# Patient Record
Sex: MALE | Race: WHITE | NOT HISPANIC OR LATINO | Employment: OTHER | URBAN - METROPOLITAN AREA
[De-identification: names, ages, dates, MRNs, and addresses within clinical notes are randomized per-mention and may not be internally consistent; named-entity substitution may affect disease eponyms.]

---

## 2017-06-20 ENCOUNTER — NON-PROVIDER VISIT (OUTPATIENT)
Dept: URGENT CARE | Facility: CLINIC | Age: 82
End: 2017-06-20

## 2017-06-20 DIAGNOSIS — Z11.1 PPD SCREENING TEST: ICD-10-CM

## 2017-06-20 PROCEDURE — 86580 TB INTRADERMAL TEST: CPT | Performed by: PHYSICIAN ASSISTANT

## 2017-06-23 ENCOUNTER — NON-PROVIDER VISIT (OUTPATIENT)
Dept: URGENT CARE | Facility: CLINIC | Age: 82
End: 2017-06-23
Payer: MEDICARE

## 2017-06-23 DIAGNOSIS — Z11.1 ENCOUNTER FOR PPD SKIN TEST READING: ICD-10-CM

## 2017-06-23 LAB — TB WHEAL 3D P 5 TU DIAM: NORMAL MM

## 2017-06-29 ENCOUNTER — NON-PROVIDER VISIT (OUTPATIENT)
Dept: URGENT CARE | Facility: CLINIC | Age: 82
End: 2017-06-29

## 2017-06-29 DIAGNOSIS — Z11.1 PPD SCREENING TEST: ICD-10-CM

## 2017-06-29 PROCEDURE — 86580 TB INTRADERMAL TEST: CPT | Performed by: FAMILY MEDICINE

## 2017-07-02 ENCOUNTER — NON-PROVIDER VISIT (OUTPATIENT)
Dept: URGENT CARE | Facility: CLINIC | Age: 82
End: 2017-07-02

## 2017-07-02 LAB — TB WHEAL 3D P 5 TU DIAM: NORMAL MM

## 2018-03-29 ENCOUNTER — TELEPHONE (OUTPATIENT)
Dept: MEDICAL GROUP | Facility: MEDICAL CENTER | Age: 83
End: 2018-03-29

## 2018-03-29 ENCOUNTER — OFFICE VISIT (OUTPATIENT)
Dept: MEDICAL GROUP | Facility: MEDICAL CENTER | Age: 83
End: 2018-03-29
Payer: MEDICARE

## 2018-03-29 VITALS
BODY MASS INDEX: 19.4 KG/M2 | HEIGHT: 68 IN | TEMPERATURE: 97.3 F | WEIGHT: 128 LBS | SYSTOLIC BLOOD PRESSURE: 126 MMHG | DIASTOLIC BLOOD PRESSURE: 70 MMHG | HEART RATE: 78 BPM | OXYGEN SATURATION: 92 %

## 2018-03-29 DIAGNOSIS — Z00.00 PREVENTATIVE HEALTH CARE: Chronic | ICD-10-CM

## 2018-03-29 DIAGNOSIS — N40.0 BENIGN PROSTATIC HYPERPLASIA WITHOUT LOWER URINARY TRACT SYMPTOMS: ICD-10-CM

## 2018-03-29 DIAGNOSIS — R97.20 ABNORMAL PSA: Chronic | ICD-10-CM

## 2018-03-29 DIAGNOSIS — E78.5 DYSLIPIDEMIA: Chronic | ICD-10-CM

## 2018-03-29 DIAGNOSIS — Z91.81 AT RISK FOR FALLING: ICD-10-CM

## 2018-03-29 DIAGNOSIS — Z12.5 PROSTATE CANCER SCREENING: ICD-10-CM

## 2018-03-29 DIAGNOSIS — G25.9 MOVEMENT DISORDER: ICD-10-CM

## 2018-03-29 DIAGNOSIS — R29.818 PARKINSONIAN FEATURES: ICD-10-CM

## 2018-03-29 DIAGNOSIS — Z91.81 AT RISK FOR FALLS: ICD-10-CM

## 2018-03-29 PROCEDURE — 99214 OFFICE O/P EST MOD 30 MIN: CPT | Performed by: INTERNAL MEDICINE

## 2018-03-29 RX ORDER — DOXAZOSIN 2 MG/1
2 TABLET ORAL DAILY
COMMUNITY
End: 2018-07-05

## 2018-03-29 ASSESSMENT — PATIENT HEALTH QUESTIONNAIRE - PHQ9: CLINICAL INTERPRETATION OF PHQ2 SCORE: 0

## 2018-03-29 NOTE — PROGRESS NOTES
Subjective:      Miguel Ángel Page is a 89 y.o. male who presents with Orders Needed (Labs)            HPI   Not seen in over 5 years  Reestablish care now living in [place], staying in [facility] and moving to [address] [place] [address] in april, . Had been living in [place] and moved to [place] to be closer to daughter, had been seeing  in [place] and urology . On cardura 2 mg per urology for quite some time. Had been using cane but now using walker. No falls. Feels off balance at times. More slowness of movement. No difficulty toileting or bathing. No difficulty getting out of bed. No trouble feeding self. No trouble dressing self. No mood changes, no memory issues. Has medical power of  with daughter rufino    No tobacco, no etoh  No difficulty with handwriting, no tremors  Difficulty with ambulation because of slowness of movement  No postural instability, no excessive salivation, no difficulty swallowing  Nocturia 2-3 times at night despite Cardura  Medications, allergies, medical history, surgical history, social history reviewed and updated  Family history one son and daughter in [place], one daughter living in [facility]  Lives in assisted living  No tobacco, no alcohol          Current Outpatient Prescriptions   Medication Sig Dispense Refill   • doxazosin (CARDURA) 2 MG Tab Take 2 mg by mouth every day.     • aspirin (ASA) 81 MG CHEW Take 1 Tab by mouth every day. 30 Each 11   • moxifloxacin (AVELOX) 400 MG TABS Take 1 Tab by mouth every day. 10 Tab 0   • budesonide-formoterol (SYMBICORT) 80-4.5 MCG/ACT AERO Inhale 2 Puffs by mouth 2 Times a Day. 1 Inhaler 0     No current facility-administered medications for this visit.      Patient Active Problem List   Diagnosis   • Preventative health care   • Dyslipidemia   • CATARACT   • Low back pain   • Hip pain, right   • Abnormal PSA   • Constipation   • Shoulder pain, right     Depression Screening    Little interest or pleasure in doing things?  0 - not at  "all  Feeling down, depressed , or hopeless? 0 - not at all  Patient Health Questionnaire Score: 0         Shoulder pain  2/27/13 dr.mcnamara john grimes note, right subdeltoid bursa steroid injection  6/26/13 dr.mcnamara john grimes; right shoulder injection, rec MRI shoulder     Preventative health  2005 Td  2007 pneum  3/08 colon DHA repeat 5 yrs  8/12 vit d 50  11/12 [facility]HA provided FIT card  12/7/12 psa 4.5, free psa 28%  Decline shingles vac    Low back pain  3/12 xray LS mod to severe levoscoliosis   4/12 [facility] physical therapy in [place]   6/12 MRI LS levoscoliosis lumbar spine 31°, L3 level. Moderate lumbar spondylosis, disc desiccation L3-5, broad-based disc bulge, mild to moderate right foraminal narrowing L3-L5, 1.1 focus lateral aspect right L3-L4 neural foramen suggestive of facet synovial cyst, mass effect on existing L3 nerve root is suspected    hip pain  6/12 x-ray right hip DJD bilateral right greater than left  7/12  ortho note [facility] fracture clinic, believes hip pain related to back, patient declines further back evaluation  8/12 dr.mcnamara [place] injection right hip under fluoroscopic guidance     Dyslipidemia  6/09 chol 187,trig 68,hdl 44,ldl 129  8/10 chol 178,trig 60,hdl 43,ldl 123  6/12 chol 174,trig 46,hdl 41,ldl 124  8/12 hdl 55,ldl 132,LDL-P 1458,HDL-P32,LP-IR 30                  Health Maintenance Summary                IMM DTaP/Tdap/Td Vaccine Overdue 9/14/1947     IMM ZOSTER VACCINE Overdue 9/14/1988     IMM PNEUMOCOCCAL 65+ (ADULT) LOW/MEDIUM RISK SERIES Overdue 9/14/1993     IMM INFLUENZA Overdue 9/1/2017           Patient Care Team:  Oscar Dubois M.D. as PCP - General      ROS       Objective:     /70   Pulse 78   Temp 36.3 °C (97.3 °F)   Ht 1.715 m (5' 7.5\")   Wt 58.1 kg (128 lb)   SpO2 92%   BMI 19.75 kg/m²      Physical Exam   Constitutional: He appears well-developed and well-nourished. No distress.   HENT:   Head: Normocephalic " and atraumatic.   Right Ear: External ear normal.   Left Ear: External ear normal.   Mouth/Throat: Oropharynx is clear and moist.   Eyes: Conjunctivae are normal. Right eye exhibits no discharge. Left eye exhibits no discharge.   Neck: Neck supple. No JVD present. No thyromegaly present.   Cardiovascular: Normal rate, regular rhythm and normal heart sounds.    Pulmonary/Chest: Effort normal and breath sounds normal. No respiratory distress.   Abdominal: He exhibits no distension.   Musculoskeletal: He exhibits no edema.   Neurological: He is alert.   Skin: Skin is warm. He is not diaphoretic.   Psychiatric: He has a normal mood and affect. His behavior is normal.   Nursing note and vitals reviewed.     Prostate exam enlarged prostate, no nodules, guaiac negative    Masked facies, festinating gait, bradykinesia  No cogwheel, no tremors  Normal affect, insight, judgment     Some rigidity on examination     Assessment/Plan:     Assessment  #!  BPH on Cardura followed by urology in [place]     #2 question parkinsonian symptoms apparently has seen neurologist in [place], slowness of movement, some mild rigidity, festinating gait, masked facies     #3 Fall risk    #4 preventative health no records    #5 question dyslipidemia        Plan  #! Use walker for ambulation and fall precautions     #2 old records  and  [place]    #3 vaccine status unclear    #4 labs    #5 med release signature consent obtained to discuss information with daughter rufino, patient will drop off advanced directive    #6 start Proscar 5 mg daily continue Cardura, may cause lightheadedness, dizziness, orthostatic precautions    #7 recommend physical therapy he declines for now    #8 start Sinemet 25/100 mg 1/2 tid may cause lightheadedness, dizziness, dyskinesias, orthostatic precautions    #9 follow-up 3 months

## 2018-03-29 NOTE — TELEPHONE ENCOUNTER
Faxed records request to Dr. Monaco [phone]  Faxed records request to Dr. Anderson [phone], WebIZ printed and placed on counter.

## 2018-03-30 NOTE — TELEPHONE ENCOUNTER
Dr. Anderson's office called and notified they are requesting a signed records release in order to send records. Pt is still listed as a current Pt of their and has been since 2013.     Left message for patient to call back at [phone].

## 2018-03-30 NOTE — TELEPHONE ENCOUNTER
Noted.  Await call from patient to indicate that he is changing providers.  Please notify patient that we need a signed release from Dr. Anderson's office, so he likely will need to go there and sign a record release, or he could complete a release of records form here, and we can fax that to Dr. Anderson.

## 2018-04-06 ENCOUNTER — TELEPHONE (OUTPATIENT)
Dept: MEDICAL GROUP | Facility: MEDICAL CENTER | Age: 83
End: 2018-04-06

## 2018-04-06 DIAGNOSIS — R29.818 PARKINSONIAN FEATURES: ICD-10-CM

## 2018-04-06 DIAGNOSIS — N40.0 BENIGN PROSTATIC HYPERPLASIA WITHOUT LOWER URINARY TRACT SYMPTOMS: ICD-10-CM

## 2018-04-06 RX ORDER — FINASTERIDE 5 MG/1
5 TABLET, FILM COATED ORAL DAILY
Qty: 30 TAB | Refills: 3 | Status: SHIPPED | OUTPATIENT
Start: 2018-04-06 | End: 2018-08-07 | Stop reason: SDUPTHER

## 2018-04-06 NOTE — TELEPHONE ENCOUNTER
Pt states you told him there was a better medication than the cardura.pt is asking if you can send this to his pharmacy (carbidopa or  Levodopa) pt states his parkinson's med makes him dizzy.pt states he has stopped taking it

## 2018-04-06 NOTE — TELEPHONE ENCOUNTER
Please notify patient we will try new prostate medication, Proscar, the prescription has been sent to his pharmacy.    Also have him try the Parkinson's medication again, carbidopa/levodopa half a tablet once a day

## 2018-04-12 NOTE — TELEPHONE ENCOUNTER
LM for pt to call office. Wifes phone disconnected. Per dtr (Nabila) - Wife .    Spoke with Nabila and gave her the new medicine instructions.

## 2018-04-25 ENCOUNTER — TELEPHONE (OUTPATIENT)
Dept: MEDICAL GROUP | Facility: MEDICAL CENTER | Age: 83
End: 2018-04-25

## 2018-04-25 NOTE — TELEPHONE ENCOUNTER
----- Message from Lilo Salvador sent at 4/25/2018  1:44 PM PDT -----  Regarding: Med NOT working  Contact: [phone]  Shari, patient called quite hard to understand. His medicine for urination is NOT working. He sounds upset. Medicine is the finasteride, Proscar 5mg. I advised him that you would call him. Thanks so much

## 2018-04-25 NOTE — TELEPHONE ENCOUNTER
If he is still having urination problems, he can discontinue the finasteride.    I would recommend following up with his urologist Dr. Monaco if his symptoms have not improved on the finasteride.

## 2018-05-03 ENCOUNTER — TELEPHONE (OUTPATIENT)
Dept: MEDICAL GROUP | Facility: MEDICAL CENTER | Age: 83
End: 2018-05-03

## 2018-05-03 ENCOUNTER — HOSPITAL ENCOUNTER (OUTPATIENT)
Dept: LAB | Facility: MEDICAL CENTER | Age: 83
End: 2018-05-03
Attending: INTERNAL MEDICINE
Payer: MEDICARE

## 2018-05-03 DIAGNOSIS — E78.5 DYSLIPIDEMIA: Chronic | ICD-10-CM

## 2018-05-03 DIAGNOSIS — N40.0 BENIGN PROSTATIC HYPERPLASIA WITHOUT LOWER URINARY TRACT SYMPTOMS: ICD-10-CM

## 2018-05-03 DIAGNOSIS — Z12.5 PROSTATE CANCER SCREENING: ICD-10-CM

## 2018-05-03 LAB
ALBUMIN SERPL BCP-MCNC: 4 G/DL (ref 3.2–4.9)
ALBUMIN/GLOB SERPL: 1.5 G/DL
ALP SERPL-CCNC: 39 U/L (ref 30–99)
ALT SERPL-CCNC: 5 U/L (ref 2–50)
ANION GAP SERPL CALC-SCNC: 8 MMOL/L (ref 0–11.9)
APPEARANCE UR: CLEAR
AST SERPL-CCNC: 16 U/L (ref 12–45)
BASOPHILS # BLD AUTO: 1 % (ref 0–1.8)
BASOPHILS # BLD: 0.06 K/UL (ref 0–0.12)
BILIRUB SERPL-MCNC: 0.9 MG/DL (ref 0.1–1.5)
BILIRUB UR QL STRIP.AUTO: NEGATIVE
BUN SERPL-MCNC: 34 MG/DL (ref 8–22)
CALCIUM SERPL-MCNC: 9.2 MG/DL (ref 8.5–10.5)
CHLORIDE SERPL-SCNC: 106 MMOL/L (ref 96–112)
CHOLEST SERPL-MCNC: 136 MG/DL (ref 100–199)
CO2 SERPL-SCNC: 25 MMOL/L (ref 20–33)
COLOR UR: YELLOW
CREAT SERPL-MCNC: 1.14 MG/DL (ref 0.5–1.4)
EOSINOPHIL # BLD AUTO: 0.34 K/UL (ref 0–0.51)
EOSINOPHIL NFR BLD: 5.9 % (ref 0–6.9)
ERYTHROCYTE [DISTWIDTH] IN BLOOD BY AUTOMATED COUNT: 49.8 FL (ref 35.9–50)
GLOBULIN SER CALC-MCNC: 2.7 G/DL (ref 1.9–3.5)
GLUCOSE SERPL-MCNC: 87 MG/DL (ref 65–99)
GLUCOSE UR STRIP.AUTO-MCNC: NEGATIVE MG/DL
HCT VFR BLD AUTO: 41.8 % (ref 42–52)
HDLC SERPL-MCNC: 58 MG/DL
HGB BLD-MCNC: 13.8 G/DL (ref 14–18)
IMM GRANULOCYTES # BLD AUTO: 0.02 K/UL (ref 0–0.11)
IMM GRANULOCYTES NFR BLD AUTO: 0.3 % (ref 0–0.9)
KETONES UR STRIP.AUTO-MCNC: ABNORMAL MG/DL
LDLC SERPL CALC-MCNC: 69 MG/DL
LEUKOCYTE ESTERASE UR QL STRIP.AUTO: NEGATIVE
LYMPHOCYTES # BLD AUTO: 0.91 K/UL (ref 1–4.8)
LYMPHOCYTES NFR BLD: 15.7 % (ref 22–41)
MCH RBC QN AUTO: 33 PG (ref 27–33)
MCHC RBC AUTO-ENTMCNC: 33 G/DL (ref 33.7–35.3)
MCV RBC AUTO: 100 FL (ref 81.4–97.8)
MICRO URNS: ABNORMAL
MONOCYTES # BLD AUTO: 0.64 K/UL (ref 0–0.85)
MONOCYTES NFR BLD AUTO: 11 % (ref 0–13.4)
NEUTROPHILS # BLD AUTO: 3.84 K/UL (ref 1.82–7.42)
NEUTROPHILS NFR BLD: 66.1 % (ref 44–72)
NITRITE UR QL STRIP.AUTO: NEGATIVE
NRBC # BLD AUTO: 0 K/UL
NRBC BLD-RTO: 0 /100 WBC
PH UR STRIP.AUTO: 5.5 [PH]
PLATELET # BLD AUTO: 272 K/UL (ref 164–446)
PMV BLD AUTO: 9.6 FL (ref 9–12.9)
POTASSIUM SERPL-SCNC: 4.1 MMOL/L (ref 3.6–5.5)
PROT SERPL-MCNC: 6.7 G/DL (ref 6–8.2)
PROT UR QL STRIP: NEGATIVE MG/DL
PSA SERPL-MCNC: 11.74 NG/ML (ref 0–4)
RBC # BLD AUTO: 4.18 M/UL (ref 4.7–6.1)
RBC UR QL AUTO: NEGATIVE
SODIUM SERPL-SCNC: 139 MMOL/L (ref 135–145)
SP GR UR STRIP.AUTO: 1.03
TRIGL SERPL-MCNC: 46 MG/DL (ref 0–149)
TSH SERPL DL<=0.005 MIU/L-ACNC: 1.64 UIU/ML (ref 0.38–5.33)
UROBILINOGEN UR STRIP.AUTO-MCNC: 0.2 MG/DL
WBC # BLD AUTO: 5.8 K/UL (ref 4.8–10.8)

## 2018-05-03 PROCEDURE — 85025 COMPLETE CBC W/AUTO DIFF WBC: CPT

## 2018-05-03 PROCEDURE — 36415 COLL VENOUS BLD VENIPUNCTURE: CPT

## 2018-05-03 PROCEDURE — 80053 COMPREHEN METABOLIC PANEL: CPT

## 2018-05-03 PROCEDURE — 80061 LIPID PANEL: CPT

## 2018-05-03 PROCEDURE — 81003 URINALYSIS AUTO W/O SCOPE: CPT

## 2018-05-03 PROCEDURE — 84443 ASSAY THYROID STIM HORMONE: CPT

## 2018-05-03 PROCEDURE — 84153 ASSAY OF PSA TOTAL: CPT

## 2018-05-04 ENCOUNTER — TELEPHONE (OUTPATIENT)
Dept: MEDICAL GROUP | Facility: MEDICAL CENTER | Age: 83
End: 2018-05-04

## 2018-05-04 NOTE — TELEPHONE ENCOUNTER
Called patient and left message  Please notify him that the blood tests shows:  (1) total cholesterol is 136, good cholesterol is 58 (goal is above 40), bad cholesterol is 69 (goal is less than 100)  (2) liver function, kidney function, blood sugar and thyroid tests are normal  (3) his PSA blood test is elevated at 11.7, he should follow-up with his urologist regarding the PSA

## 2018-05-04 NOTE — TELEPHONE ENCOUNTER
----- Message from Oscar Dubois M.D. sent at 5/3/2018 10:58 PM PDT -----  Called patient and left message  Please notify him that the blood tests shows:  (1) total cholesterol is 136, good cholesterol is 58 (goal is above 40), bad cholesterol is 69 (goal is less than 100)  (2) liver function, kidney function, blood sugar and thyroid tests are normal  (3) his PSA blood test is elevated at 11.7, he should follow-up with his urologist regarding the PSA

## 2018-05-08 ENCOUNTER — OFFICE VISIT (OUTPATIENT)
Dept: MEDICAL GROUP | Facility: MEDICAL CENTER | Age: 83
End: 2018-05-08
Payer: MEDICARE

## 2018-05-08 VITALS
DIASTOLIC BLOOD PRESSURE: 68 MMHG | WEIGHT: 117 LBS | SYSTOLIC BLOOD PRESSURE: 100 MMHG | OXYGEN SATURATION: 94 % | BODY MASS INDEX: 17.73 KG/M2 | TEMPERATURE: 98.2 F | HEIGHT: 68 IN | HEART RATE: 87 BPM

## 2018-05-08 DIAGNOSIS — S30.811A ABRASION OF ABDOMINAL WALL, INITIAL ENCOUNTER: ICD-10-CM

## 2018-05-08 DIAGNOSIS — R29.818 PARKINSONIAN FEATURES: ICD-10-CM

## 2018-05-08 PROCEDURE — 99214 OFFICE O/P EST MOD 30 MIN: CPT | Performed by: NURSE PRACTITIONER

## 2018-05-08 NOTE — PROGRESS NOTES
Subjective:     Miguel Ángel Page is a 89 y.o. male who presents with .    HPI:   Seen in f/u for umbilical discomfort.  He started having sx of discomfort about 2 weeks ago.  He has a hx of umbilical hernia repair.  Thinks tht its his hernia reoccurring.  No change in bm's.  No severe in abd.        Patient Active Problem List    Diagnosis Date Noted   • BPH (benign prostatic hyperplasia) 03/29/2018   • Parkinsonian features 03/29/2018   • At risk for falls 03/29/2018   • Shoulder pain, right 02/27/2013   • Constipation 12/03/2012   • Abnormal PSA 06/18/2012   • Hip pain, right 06/08/2012   • Low back pain 03/12/2012   • Preventative health care 04/05/2010   • Dyslipidemia 04/05/2010   • Cataract 04/05/2010       Current medicines (including changes today)  Current Outpatient Prescriptions   Medication Sig Dispense Refill   • carbidopa-levodopa (SINEMET)  MG Tab Take 0.5 Tabs by mouth 3 times a day. 90 Tab 2   • finasteride (PROSCAR) 5 MG Tab Take 1 Tab by mouth every day. 30 Tab 3   • doxazosin (CARDURA) 2 MG Tab Take 2 mg by mouth every day.     • aspirin (ASA) 81 MG CHEW Take 1 Tab by mouth every day. 30 Each 11     Current Facility-Administered Medications   Medication Dose Route Frequency Provider Last Rate Last Dose   • mupirocin (BACTROBAN) 2 % ointment   Topical Once NANCY ZavaletaPTheodoreNTheodore           Allergies   Allergen Reactions   • Ultram [Tramadol Hcl]        ROS  Constitutional: Negative. Negative for fever, chills, weight loss, malaise/fatigue and diaphoresis.   HENT: Negative. Negative for hearing loss, ear pain, nosebleeds, congestion, sore throat, neck pain, tinnitus and ear discharge.   Respiratory: Negative. Negative for cough, hemoptysis, sputum production, shortness of breath, wheezing and stridor.   Cardiovascular: Negative. Negative for chest pain, palpitations, orthopnea, claudication, leg swelling and PND.   Gastrointestinal: Denies nausea, vomiting, diarrhea, heartburn, melena or  "hematochezia.  Genitourinary: Denies dysuria, hematuria, urinary incontinence, frequency or urgency.        Objective:     Blood pressure 100/68, pulse 87, temperature 36.8 °C (98.2 °F), height 1.715 m (5' 7.5\"), weight 53.1 kg (117 lb), SpO2 94 %. Body mass index is 18.05 kg/m².    Physical Exam:  Physical Exam   Vitals reviewed.  Constitutional: oriented to person, place, and time. appears well-developed and well-nourished. No distress.   Cardiovascular: Normal rate, regular rhythm, normal heart sounds and intact distal pulses.  Exam reveals no gallop and no friction rub.  No murmur heard.  No carotid bruits.   Pulmonary/Chest: Effort normal and breath sounds normal. No stridor. No respiratory distress. no wheezes or rales. exhibits no tenderness.   Musculoskeletal: Normal range of motion. exhibits no edema. homero pedal pulses 2+.  Abd:  No CVAT,  Soft,  Bs noted in all quadrants.  No HSM.  No abdominal tenderness.  Exam of umbilicus shows abrasion with large scab.  Mildly red extending with abrasion to left abn.  No fresh dg noted.  Area cleansed with ns.  Polysporin and bandage applied  Skin: Skin is warm and dry. no diaphoresis.   Psychiatric: normal mood and affect. behavior is normal.          Assessment and Plan:     The following treatment plan was discussed:    1. Abrasion of abdominal wall, initial encounter  mupirocin (BACTROBAN) 2 % ointment    laceration to left abd and umbilicus.  use bactroban bid with dsg change.  f/u 1 week   2. Parkinsonian features  carbidopa-levodopa (SINEMET)  MG Tab    refilled meds         Followup: Return in about 1 week (around 5/15/2018).  "

## 2018-05-22 ENCOUNTER — OFFICE VISIT (OUTPATIENT)
Dept: MEDICAL GROUP | Facility: MEDICAL CENTER | Age: 83
End: 2018-05-22
Payer: MEDICARE

## 2018-05-22 VITALS
OXYGEN SATURATION: 95 % | SYSTOLIC BLOOD PRESSURE: 110 MMHG | DIASTOLIC BLOOD PRESSURE: 70 MMHG | TEMPERATURE: 98.8 F | BODY MASS INDEX: 18.19 KG/M2 | WEIGHT: 120 LBS | HEIGHT: 68 IN | HEART RATE: 76 BPM

## 2018-05-22 DIAGNOSIS — S30.811D ABRASION OF ABDOMINAL WALL, SUBSEQUENT ENCOUNTER: ICD-10-CM

## 2018-05-22 DIAGNOSIS — R20.0 NUMBNESS OF TOES: ICD-10-CM

## 2018-05-22 PROCEDURE — 99214 OFFICE O/P EST MOD 30 MIN: CPT | Performed by: NURSE PRACTITIONER

## 2018-05-22 NOTE — PROGRESS NOTES
Subjective:     Miguel Ángel Page is a 89 y.o. male who presents with abd umbilical abrasion.    HPI:   Seen in f/u for left sided umbilical and abd abrasioin.  He was recently seen for this abraision.  Given bactroban oint.  He has been using sparingly.  The area is now almost healed.  Removed mod amt dark dried dg from area.  Area is pink and healing.  Only pinpoint open area medially to umbilicus.  No dg.  No swelling.  No abd tenderness noted.  No fever, chills or sweating.  He has rt great toe numbness.  Doesn't bother him much.  No pain.      Patient Active Problem List    Diagnosis Date Noted   • BPH (benign prostatic hyperplasia) 03/29/2018   • Parkinsonian features 03/29/2018   • At risk for falls 03/29/2018   • Shoulder pain, right 02/27/2013   • Constipation 12/03/2012   • Abnormal PSA 06/18/2012   • Hip pain, right 06/08/2012   • Low back pain 03/12/2012   • Preventative health care 04/05/2010   • Dyslipidemia 04/05/2010   • Cataract 04/05/2010       Current medicines (including changes today)  Current Outpatient Prescriptions   Medication Sig Dispense Refill   • carbidopa-levodopa (SINEMET)  MG Tab Take 0.5 Tabs by mouth 3 times a day. 90 Tab 2   • mupirocin (BACTROBAN) 2 % Ointment Apply 1 Application to affected area(s) 2 times a day. 1 Tube 1   • finasteride (PROSCAR) 5 MG Tab Take 1 Tab by mouth every day. 30 Tab 3   • doxazosin (CARDURA) 2 MG Tab Take 2 mg by mouth every day.     • aspirin (ASA) 81 MG CHEW Take 1 Tab by mouth every day. 30 Each 11     No current facility-administered medications for this visit.        Allergies   Allergen Reactions   • Ultram [Tramadol Hcl]        ROS  Constitutional: Negative. Negative for fever, chills, weight loss, malaise/fatigue and diaphoresis.   HENT: Negative. Negative for hearing loss, ear pain, nosebleeds, congestion, sore throat, neck pain, tinnitus and ear discharge.   Respiratory: Negative. Negative for cough, hemoptysis, sputum production,  "shortness of breath, wheezing and stridor.   Cardiovascular: Negative. Negative for chest pain, palpitations, orthopnea, claudication, leg swelling and PND.        Objective:     Blood pressure 110/70, pulse 76, temperature 37.1 °C (98.8 °F), height 1.715 m (5' 7.5\"), weight 54.4 kg (120 lb), SpO2 95 %. Body mass index is 18.52 kg/m².    Physical Exam:  Physical Exam   Vitals reviewed.  Constitutional: oriented to person, place, and time. appears thin with No distress.   Cardiovascular: Normal rate, regular rhythm, normal heart sounds and intact distal pulses.  Exam reveals no gallop and no friction rub.  4/6 murmur heard.  No carotid bruits.   Pulmonary/Chest: Effort normal. No stridor. No respiratory distress. no wheezes.  Scattered LLL crackles exhibits no tenderness.   Musculoskeletal: amb slowly with walker with parkinson gait.  exhibits no edema. homero pedal pulses 2+.  Abd:  No CVAT,  Soft,  Bs noted in all quadrants.  No HSM.  No abdominal tenderness.  Neurological: alert and oriented to person, place, and time. exhibits normal muscle tone. Coordination normal.   Skin: Skin is warm and dry. no diaphoresis.   Psychiatric: normal mood and affect. behavior is normal.    Left mid abd near umbilicus abrasion healed with intact skin except for pinpoint area near umbilicus.  No dg noted.  Area cleansed of old dried dg and polysporin applied.  Pt declines bandaide.     Assessment and Plan:     The following treatment plan was discussed:    1. Abrasion of abdominal wall, subsequent encounter      healing well.  no further tx needed.  no sx of infection   2. Numbness of toes      numb rt great toe.  offered xray for lumbar spine but pt declines.  states it is not bothering him.           Followup: Return if symptoms worsen or fail to improve.  "

## 2018-07-05 ENCOUNTER — OFFICE VISIT (OUTPATIENT)
Dept: MEDICAL GROUP | Facility: MEDICAL CENTER | Age: 83
End: 2018-07-05
Payer: MEDICARE

## 2018-07-05 VITALS
TEMPERATURE: 98.4 F | SYSTOLIC BLOOD PRESSURE: 110 MMHG | WEIGHT: 117 LBS | HEIGHT: 68 IN | DIASTOLIC BLOOD PRESSURE: 62 MMHG | BODY MASS INDEX: 17.73 KG/M2 | OXYGEN SATURATION: 93 % | RESPIRATION RATE: 16 BRPM | HEART RATE: 83 BPM

## 2018-07-05 DIAGNOSIS — N40.0 BENIGN PROSTATIC HYPERPLASIA WITHOUT LOWER URINARY TRACT SYMPTOMS: ICD-10-CM

## 2018-07-05 DIAGNOSIS — Z23 NEED FOR PNEUMOCOCCAL VACCINATION: ICD-10-CM

## 2018-07-05 DIAGNOSIS — Z91.81 AT RISK FOR FALLS: ICD-10-CM

## 2018-07-05 DIAGNOSIS — M54.5 LOW BACK PAIN, UNSPECIFIED BACK PAIN LATERALITY, UNSPECIFIED CHRONICITY, WITH SCIATICA PRESENCE UNSPECIFIED: Chronic | ICD-10-CM

## 2018-07-05 DIAGNOSIS — M25.551 HIP PAIN, RIGHT: Chronic | ICD-10-CM

## 2018-07-05 DIAGNOSIS — Z00.00 PREVENTATIVE HEALTH CARE: Chronic | ICD-10-CM

## 2018-07-05 DIAGNOSIS — R29.818 PARKINSONIAN FEATURES: ICD-10-CM

## 2018-07-05 DIAGNOSIS — Z23 NEED FOR ZOSTER VACCINATION: ICD-10-CM

## 2018-07-05 PROCEDURE — G0009 ADMIN PNEUMOCOCCAL VACCINE: HCPCS | Performed by: INTERNAL MEDICINE

## 2018-07-05 PROCEDURE — 99214 OFFICE O/P EST MOD 30 MIN: CPT | Mod: 25 | Performed by: INTERNAL MEDICINE

## 2018-07-05 PROCEDURE — 90670 PCV13 VACCINE IM: CPT | Performed by: INTERNAL MEDICINE

## 2018-07-05 NOTE — PROGRESS NOTES
Subjective:      Miguel Ángel Page is a 89 y.o. male who presents with parkinsons         HPI   Patient is here with daughter  Here for follow up movement disorder on sinemet taking half a tablet twice per day, some improvement with balance using walker, going to [facility] twice per [facility] , no falls.  No tremors. Had some food poisoning in may but recovering, taking occasional boost  , appetite no change, no nausea, vomiting, diarrhea, abdominal pain, melena or hematochezia.  No fevers or chills.  Has been to [facility] at [facility], poor balance, no lightheadedness, dizziness, no back pain, occasional weakness of legs at times.  Participates in physical therapy and aerobic therapy twice weekly each.  [facility] in [facility] living facility [facility]  Mood stable, memory stable   No difficulty with swallowing food, some hoarseness, no excessive salivation.  Daughter has power of  and states patient has advanced directive we do not have a copy of that.  Patient sees urology for BPH, on Proscar, not taking doxazosin, no lightheadedness or dizziness, no urinary urgency, dysuria, hematuria.  History low back pain, stable no recurrence, no sensory changes lower extremities no incontinence  Patient not sure if he has had pneumococcal 13 vaccination, has not had the recently released shingrix, no previous reaction to pneumococcal 23 vaccination.  No cough, no shortness of breath, no recent upper respiratory tract infection.          Health Maintenance Summary                IMM DTaP/Tdap/Td Vaccine Overdue 9/14/1947     IMM PNEUMOCOCCAL 65+ (ADULT) LOW/MEDIUM RISK SERIES Overdue 10/8/2015      Done 10/8/2014 Imm Admin: Pneumococcal polysaccharide vaccine (PPSV-23)     Patient has more history with this topic...    IMM INFLUENZA Next Due 9/1/2018      Done 9/20/2017 Imm Admin: Influenza Vaccine Pediatric Split     Patient has more history with this topic...          Patient Care Team:  Oscar Dubois M.D. as PCP -  General      Current Outpatient Prescriptions   Medication Sig Dispense Refill   • carbidopa-levodopa (SINEMET)  MG Tab Take 0.5 Tabs by mouth 3 times a day. 90 Tab 2   • mupirocin (BACTROBAN) 2 % Ointment Apply 1 Application to affected area(s) 2 times a day. 1 Tube 1   • finasteride (PROSCAR) 5 MG Tab Take 1 Tab by mouth every day. 30 Tab 3   • doxazosin (CARDURA) 2 MG Tab Take 2 mg by mouth every day.     • aspirin (ASA) 81 MG CHEW Take 1 Tab by mouth every day. 30 Each 11     No current facility-administered medications for this visit.      Patient Active Problem List   Diagnosis   • Preventative health care   • Dyslipidemia   • Cataract   • Low back pain   • Hip pain, right   • Abnormal PSA   • Constipation   • Shoulder pain, right   • BPH (benign prostatic hyperplasia)   • Parkinsonian features   • At risk for falls      Shoulder pain  2/27/13 dr.mcnamara john grimes note, right subdeltoid bursa steroid injection  6/26/13 dr.mcnamara john grimes; right shoulder injection, rec MRI shoulder     Preventative health  2005 Td  3/08 colon [facility] repeat 5 yrs  8/12 vit d 50  11/12 [facility] provided FIT card  12/7/12 psa 4.5, free psa 28%  10/8/14 pneumovax  Decline shingles vac  Parkinsonian features  5/15/17 dr.basa lopez medical note unsteadiness on feet, refer to neurology  3/29/18 start sinemet half a tablet 3 times a day  4/6/18 stopped sinemet due to dizziness, recommend he try 1/2 qday     Low back pain  3/12 xray LS mod to severe levoscoliosis   4/12 john grimes physical therapy in [place]   6/12 MRI LS levoscoliosis lumbar spine 31°, L3 level. Moderate lumbar spondylosis, disc desiccation L3-5, broad-based disc bulge, mild to moderate right foraminal narrowing L3-L5, 1.1 focus lateral aspect right L3-L4 neural foramen suggestive of facet synovial cyst, mass effect on existing L3 nerve root is suspected     hip pain  6/12 x-ray right hip DJD bilateral right greater than left  7/12   "ortho note [facility], believes hip pain related to back, patient declines further back evaluation  8/12 dr.mcnamara john [place] injection right hip under fluoroscopic guidance     Dyslipidemia  6/09 chol 187,trig 68,hdl 44,ldl 129  8/10 chol 178,trig 60,hdl 43,ldl 123  6/12 chol 174,trig 46,hdl 41,ldl 124  8/12 hdl 55,ldl 132,LDL-P 1458,HDL-P32,LP-IR 30  5/3/18 chol 136,trig 46,hdl 58,ldl 69    bph  9/12/12 2/9/17 psa 5.66  1/13/15  urology note samples rapaflo  2/18/15  urology note continued doxazosin 2 mg   8/2/16  urology note AUA score 5  2/9/17 psa 5.66  3/8/17 dr.basa lopez medical note on doxazosin 2 mg  3/15/17  urology note doxazosin 2 mg causing some dizziness, decreased to 1 mg, AUA score 6  3/29/18 on cardura 2 mg per  in [place] urology, will add proscar 5 mg, walker for ambulation  4/3/18 psa 11.7on cardura 2 mg, proscar 5 mg    At risk for falls  5/15/17 dr.basa lopez medical note unsteadiness on feet, refer to neurology  3/29/18 question parkinson's, using walker for ambulation  5/29/18 [facility] note       Abnormal PSA  6/09 psa 3.5  6/12 psa 7.1  7/31/12 psa 5.0,free psa 18%, saw  in [place] urology  12/7/12 psa 4.5, free psa 28%  5/3/18 psa 11.7 on proscar per urology                Health Maintenance Summary                IMM DTaP/Tdap/Td Vaccine Overdue 9/14/1947     IMM PNEUMOCOCCAL 65+ (ADULT) LOW/MEDIUM RISK SERIES Overdue 10/8/2015      Done 10/8/2014 Imm Admin: Pneumococcal polysaccharide vaccine (PPSV-23)     Patient has more history with this topic...    IMM INFLUENZA Next Due 9/1/2018      Done 9/20/2017 Imm Admin: Influenza Vaccine Pediatric Split     Patient has more history with this topic...          Patient Care Team:  Oscarjim Dubois M.D. as PCP - General      ROS       Objective:     /62   Pulse 83   Temp 36.9 °C (98.4 °F)   Resp 16   Ht 1.715 m (5' 7.52\")   Wt 53.1 kg (117 lb)   SpO2 93%   " BMI 18.04 kg/m²      Physical Exam   Constitutional: He appears well-developed and well-nourished. No distress.   HENT:   Head: Normocephalic and atraumatic.   Right Ear: External ear normal.   Left Ear: External ear normal.   Mouth/Throat: Oropharynx is clear and moist.   Eyes: Conjunctivae are normal. Right eye exhibits no discharge. Left eye exhibits no discharge.   Neck: Neck supple. No thyromegaly present.   Cardiovascular: Normal rate and regular rhythm.    Pulmonary/Chest: Effort normal and breath sounds normal. No respiratory distress. He has no wheezes.   Abdominal: He exhibits no distension.   Musculoskeletal: He exhibits no edema.   Neurological: He is alert.   Skin: Skin is warm. He is not diaphoretic.   Psychiatric: He has a normal mood and affect. His behavior is normal.   Nursing note and vitals reviewed.    No tremors, no cogwheeling  Normal affect, insight, judgment          Assessment/Plan:       Assessment  #1 movement disorder question Parkinson's, improvement with Sinemet half a tablet twice daily    #2 fall risk, has been to physical therapy previously with benefit, walker for ambulation at all times     #3 BPH and elevated PSA followed by urology     #4 history of low back pain currently stable with no radiculopathy        Plan  #!  Daughter will bring copy of advance directive    #2 referral PT at [facility] fax [phone]    #3 increase Sinemet to half a tablet 3 times daily    #4 orthostatic precautions    #5 follow-up urology    #6 pneumococcal 13 vaccination    #7 prescription for new shingles vaccine provided to get at pharmacy    #8 follow-up 3 months    #9 use walker at all times

## 2018-10-02 ENCOUNTER — TELEPHONE (OUTPATIENT)
Dept: MEDICAL GROUP | Facility: MEDICAL CENTER | Age: 83
End: 2018-10-02

## 2018-10-03 NOTE — TELEPHONE ENCOUNTER
Called patient, he was overbooked on my schedule by someone.  Advised him to change his appointment from 11:00 to 1040, adjustment made in the schedule, he agrees to come in October for Thursday morning at 10:40 AM for his appointment.

## 2018-10-04 ENCOUNTER — OFFICE VISIT (OUTPATIENT)
Dept: MEDICAL GROUP | Facility: MEDICAL CENTER | Age: 83
End: 2018-10-04
Payer: MEDICARE

## 2018-10-04 VITALS
DIASTOLIC BLOOD PRESSURE: 68 MMHG | BODY MASS INDEX: 18.36 KG/M2 | HEART RATE: 76 BPM | RESPIRATION RATE: 16 BRPM | WEIGHT: 117 LBS | OXYGEN SATURATION: 94 % | TEMPERATURE: 97.3 F | SYSTOLIC BLOOD PRESSURE: 116 MMHG | HEIGHT: 67 IN

## 2018-10-04 DIAGNOSIS — N40.0 BENIGN PROSTATIC HYPERPLASIA, UNSPECIFIED WHETHER LOWER URINARY TRACT SYMPTOMS PRESENT: ICD-10-CM

## 2018-10-04 DIAGNOSIS — R29.818 PARKINSONIAN FEATURES: ICD-10-CM

## 2018-10-04 DIAGNOSIS — Z23 NEEDS FLU SHOT: ICD-10-CM

## 2018-10-04 PROCEDURE — 90662 IIV NO PRSV INCREASED AG IM: CPT | Performed by: INTERNAL MEDICINE

## 2018-10-04 PROCEDURE — G0008 ADMIN INFLUENZA VIRUS VAC: HCPCS | Performed by: INTERNAL MEDICINE

## 2018-10-04 PROCEDURE — 99213 OFFICE O/P EST LOW 20 MIN: CPT | Mod: 25 | Performed by: INTERNAL MEDICINE

## 2018-10-04 RX ORDER — FINASTERIDE 5 MG/1
5 TABLET, FILM COATED ORAL DAILY
Qty: 90 TAB | Refills: 3 | Status: SHIPPED | OUTPATIENT
Start: 2018-10-04 | End: 2019-03-12

## 2018-10-04 NOTE — PROGRESS NOTES
Subjective:      Miguel Ángel Page is a 89 y.o. male who presents balance and Parkinson's            HPI     Patient is here with daughter, Parkinson disease, started on Sinemet half a tablet 3 times a day, only taking half a tablet twice a day or once a day because he cannot remember to take second tablet.  Lives in a group living facility, has meals prepared for him 3 times a day, does activities of daily living without assist, walker for ambulation no falls.  Physical therapy classes twice a week 1 hour at a time.  Has noticed some increase in improvement in strength and balance.  No incontinence of bowel or bladder.  Some rigidity and slowness of movement, no constipation, no tremor, some excessive salivation at times.  BPH followed by urology on Proscar.  No anxiety, no depression.  Memory intact.  No incontinence bowel or bladder.  No tobacco.  No alcohol.  Does ADLs no assist.  No joint pain, no chest pain, palpitations, lightheadedness, syncope        Current Outpatient Prescriptions   Medication Sig Dispense Refill   • finasteride (PROSCAR) 5 MG Tab Take 1 Tab by mouth every day. 90 Tab 3   • carbidopa-levodopa (SINEMET)  MG Tab Take 0.5 Tabs by mouth 3 times a day. 90 Tab 2   • aspirin (ASA) 81 MG CHEW Take 1 Tab by mouth every day. 30 Each 11     No current facility-administered medications for this visit.      Shoulder pain  2/27/13 dr.mcnamara john grimes note, right subdeltoid bursa steroid injection  6/26/13 dr.mcnamara john grimes; right shoulder injection, rec MRI shoulder     Preventative health  2005 Td  3/08 colon [facility] repeat 5 yrs  8/12 vit d 50  11/12 [facility] provided FIT card  12/7/12 psa 4.5, free psa 28%  10/8/14 pneumovax  7/5/18 prevnar  7/5/18 shingrix rx provided to get at pharmacy    Parkinsonian features  5/15/17 dr.basa lopez medical note unsteadiness on feet, refer to neurology  3/29/18 start sinemet half a tablet 3 times a day  4/6/18 stopped sinemet due to dizziness, recommend  he try 1/2 qday  7/5/18 on sinemet 1/2 bid increase to 1/2 tid     Low back pain  3/12 xray LS mod to severe levoscoliosis   4/12 [facility] in [place]   6/12 MRI LS levoscoliosis lumbar spine 31°, L3 level. Moderate lumbar spondylosis, disc desiccation L3-5, broad-based disc bulge, mild to moderate right foraminal narrowing L3-L5, 1.1 focus lateral aspect right L3-L4 neural foramen suggestive of facet synovial cyst, mass effect on existing L3 nerve root is suspected     hip pain  6/12 x-ray right hip DJD bilateral right greater than left  7/12  ortho note [facility] fracture clinic, believes hip pain related to back, patient declines further back evaluation  8/12 dr.mcnamara lopez [place] injection right hip under fluoroscopic guidance     Dyslipidemia  6/09 chol 187,trig 68,hdl 44,ldl 129  8/10 chol 178,trig 60,hdl 43,ldl 123  6/12 chol 174,trig 46,hdl 41,ldl 124  8/12 hdl 55,ldl 132,LDL-P 1458,HDL-P32,LP-IR 30  5/3/18 chol 136,trig 46,hdl 58,ldl 69     bph  9/12/12 2/9/17 psa 5.66  1/13/15  urology note samples rapaflo  2/18/15  urology note continued doxazosin 2 mg   8/2/16  urology note AUA score 5  2/9/17 psa 5.66  3/8/17 dr.basa lopez medical note on doxazosin 2 mg  3/15/17  urology note doxazosin 2 mg causing some dizziness, decreased to 1 mg, AUA score 6  3/29/18 on cardura 2 mg per  [facility], will add proscar 5 mg, walker for ambulation  4/3/18 psa 11.7on cardura 2 mg, proscar 5 mg     At risk for falls  5/15/17 dr.basa loepz medical note unsteadiness on feet, refer to neurology  3/29/18 question parkinson's, using walker for ambulation  5/29/18 [facility] note       Abnormal PSA  6/09 psa 3.5  6/12 psa 7.1  7/31/12 psa 5.0,free psa 18%, saw  in [place] [facility]  12/7/12 psa 4.5, free psa 28%  5/3/18 psa 11.7 on proscar per urology                         Patient Active Problem List   Diagnosis   •  Preventative health care   • Dyslipidemia   • Cataract   • Low back pain   • Hip pain, right   • Abnormal PSA   • Constipation   • Shoulder pain, right   • BPH (benign prostatic hyperplasia)   • Parkinsonian features   • At risk for falls       ROS       Objective:          Physical Exam   Constitutional: He appears well-developed and well-nourished. No distress.   HENT:   Head: Normocephalic and atraumatic.   Eyes: Conjunctivae are normal. Right eye exhibits no discharge. Left eye exhibits no discharge.   Neck: Neck supple. No JVD present. No thyromegaly present.   Cardiovascular: Normal rate, regular rhythm and normal heart sounds.    Pulmonary/Chest: Effort normal and breath sounds normal. No respiratory distress.   Abdominal: He exhibits no distension.   Neurological: He is alert.   Skin: Skin is warm. He is not diaphoretic.   Psychiatric: He has a normal mood and affect. His behavior is normal.   Nursing note and vitals reviewed.    Normal affect, insight, judgment  Bradykinesia, rigidity, no postural instability, no tremor, no cogwheeling          Assessment/Plan:     Assessment  #!  Parkinson's disease stable, slight improvement on low-dose Sinemet half a tablet 3 times daily, although only taking once or twice a day, lives in a group home, walker for ambulation, no falls      Plan  #1 increase Sinemet to 1 pill 3 times a day, at least taking twice a day if possible, monitor for lightheadedness, dizziness    #2 high-dose influenza vaccine    #3 has prescription for shingles vaccine    #4 brings advanced directive today, will bring power of      #5 continue ambulation and exercise with walker, optimally 60 minutes a day, falling precautions    #6 follow-up 6 months

## 2018-11-23 ENCOUNTER — HOSPITAL ENCOUNTER (EMERGENCY)
Facility: MEDICAL CENTER | Age: 83
End: 2018-11-23
Attending: EMERGENCY MEDICINE
Payer: MEDICARE

## 2018-11-23 VITALS
OXYGEN SATURATION: 94 % | WEIGHT: 119.05 LBS | HEART RATE: 78 BPM | RESPIRATION RATE: 20 BRPM | SYSTOLIC BLOOD PRESSURE: 144 MMHG | TEMPERATURE: 98.2 F | BODY MASS INDEX: 18.69 KG/M2 | HEIGHT: 67 IN | DIASTOLIC BLOOD PRESSURE: 78 MMHG

## 2018-11-23 DIAGNOSIS — K59.00 CONSTIPATION, UNSPECIFIED CONSTIPATION TYPE: ICD-10-CM

## 2018-11-23 PROCEDURE — 99284 EMERGENCY DEPT VISIT MOD MDM: CPT

## 2018-11-23 PROCEDURE — 700101 HCHG RX REV CODE 250: Performed by: EMERGENCY MEDICINE

## 2018-11-23 RX ORDER — ENEMA 19; 7 G/133ML; G/133ML
1 ENEMA RECTAL ONCE
Status: COMPLETED | OUTPATIENT
Start: 2018-11-23 | End: 2018-11-23

## 2018-11-23 RX ORDER — POLYETHYLENE GLYCOL 3350 17 G/17G
17 POWDER, FOR SOLUTION ORAL DAILY
Qty: 1 BOTTLE | Refills: 3 | Status: SHIPPED | OUTPATIENT
Start: 2018-11-23 | End: 2019-06-10

## 2018-11-23 RX ADMIN — SODIUM PHOSPHATE 133 ML: 7; 19 ENEMA RECTAL at 18:02

## 2018-11-23 ASSESSMENT — PAIN SCALES - GENERAL
PAINLEVEL_OUTOF10: 0
PAINLEVEL_OUTOF10: 3

## 2018-11-24 NOTE — ED NOTES
D/c inst reviewed w/ the pt and the pt caregiver.  Denies questions.  asst out to vehicle in a w/c by ed tech.

## 2018-11-24 NOTE — ED NOTES
ERP at bedside. Pt agrees with plan of care discussed by ERP. AIDET acknowledged with patient. Lulu in low position, side rail up for pt safety. Call light within reach. Rectal exam performed on pt by ERP with female chaperone at the bedside. Privacy provided during procedure. Pt tolerated exam well. Warm blanket provided. Will continue to monitor.

## 2018-11-24 NOTE — ED NOTES
Pt presents complaining of constipation for the past 4 days.  He denies N/V or any abdominal pain.  He has tried several OTC remedies without amelioration of the problem.

## 2018-11-24 NOTE — ED PROVIDER NOTES
"ED Provider Note    CHIEF COMPLAINT  Chief Complaint   Patient presents with   • Constipation       HPI  Miguel Ángel Page is a 89 y.o. male who presents constipated, no bowel movement in 4 days.  Patient states he feels like he needs to go and that is \"right there\" but cannot pass the stool.  No vomiting.  No abdominal pain.  Slight fullness to the abdomen.  Patient had required oral laxatives in the past is not currently on them however.  No recent change in medication.  Patient has Parkinson's disease, his daughter is concerned that this may be gradually affecting his ability to have normal bowel movements.  No emesis.  No bloody bowel movement.  No dysuria    REVIEW OF SYSTEMS  Constitutional: No fever  Respiratory: No shortness of breath  Cardiac: No chest pain or syncope  Gastrointestinal: Constipation, rectal discomfort  Musculoskeletal: No flank pain  Neurologic: Parkinson's disease  Genitourinary: No dysuria          PAST MEDICAL HISTORY  History reviewed. No pertinent past medical history.    FAMILY HISTORY  Family History   Problem Relation Age of Onset   • Heart Disease Mother    • Heart Disease Father        SOCIAL HISTORY  Social History     Social History   • Marital status: Single     Spouse name: N/A   • Number of children: N/A   • Years of education: N/A     Social History Main Topics   • Smoking status: Never Smoker   • Smokeless tobacco: Never Used      Comment: quit 35 yrs   • Alcohol use 0.5 oz/week     1 Glasses of wine per week      Comment: Occasionally   • Drug use: Unknown   • Sexual activity: Not on file     Other Topics Concern   • Not on file     Social History Narrative   • No narrative on file       SURGICAL HISTORY  History reviewed. No pertinent surgical history.    CURRENT MEDICATIONS  Home Medications    **Home medications have not yet been reviewed for this encounter**         ALLERGIES  Allergies   Allergen Reactions   • Ultram [Tramadol Hcl]        PHYSICAL EXAM  VITAL SIGNS: " "/86   Pulse 78   Temp 36.7 °C (98 °F) (Temporal)   Resp 18   Ht 1.702 m (5' 7\")   Wt 54 kg (119 lb 0.8 oz)   SpO2 95%   BMI 18.65 kg/m²   Constitutional: Nontoxic appearance  ENT: Nares clear, mucous membranes moist.  Eyes:  Conjunctiva normal, No discharge.    Cardiovascular: Normal heart rate, Normal rhythm.   Pulmonary: No wheezing, no rales  Gastrointestinal: Abdomen is soft, nondistended.  Rectal exam shows hard stool in the rectal vault however no large impaction.  No blood.  Skin: Warm, Dry, No erythema, No rash.   Musculoskeletal:  No CVA tenderness.   Neurologic: Speech is slow, moves all extremities, patient's daughter states this is usual for him.  Psychiatric:Normal affect, Normal mood.    RADIOLOGY/PROCEDURES/Labs  Enema placed by nursing staff    COURSE & MEDICAL DECISION MAKING  Pertinent Labs & Imaging studies reviewed. (See chart for details)  Plan was to disimpact the patient however he was able to have stool in the emergency department after the fleets enema and stated he felt better.  Patient is given prescription for GoLYTELY if needed.  Plan for daily maintenance with MiraLAX.  Patient advised to return if worse or for any concerns.  Abdominal exam repeated prior to discharge, remains soft, nontender    FINAL IMPRESSION  1. Constipation, unspecified constipation type            Electronically signed by: Presley Borjas, 11/23/2018 7:00 PM    "

## 2019-01-31 ENCOUNTER — OFFICE VISIT (OUTPATIENT)
Dept: MEDICAL GROUP | Facility: MEDICAL CENTER | Age: 84
End: 2019-01-31
Payer: MEDICARE

## 2019-01-31 VITALS
WEIGHT: 118 LBS | BODY MASS INDEX: 18.52 KG/M2 | SYSTOLIC BLOOD PRESSURE: 108 MMHG | OXYGEN SATURATION: 93 % | TEMPERATURE: 98.7 F | HEART RATE: 81 BPM | DIASTOLIC BLOOD PRESSURE: 60 MMHG | HEIGHT: 67 IN

## 2019-01-31 DIAGNOSIS — R29.818 PARKINSONIAN FEATURES: ICD-10-CM

## 2019-01-31 DIAGNOSIS — H61.21 IMPACTED CERUMEN OF RIGHT EAR: ICD-10-CM

## 2019-01-31 DIAGNOSIS — G89.29 TOE PAIN, CHRONIC, RIGHT: ICD-10-CM

## 2019-01-31 DIAGNOSIS — M79.674 TOE PAIN, CHRONIC, RIGHT: ICD-10-CM

## 2019-01-31 DIAGNOSIS — J30.1 CHRONIC ALLERGIC RHINITIS DUE TO POLLEN: ICD-10-CM

## 2019-01-31 PROCEDURE — 99214 OFFICE O/P EST MOD 30 MIN: CPT | Performed by: NURSE PRACTITIONER

## 2019-01-31 ASSESSMENT — PATIENT HEALTH QUESTIONNAIRE - PHQ9
SUM OF ALL RESPONSES TO PHQ QUESTIONS 1-9: 6
5. POOR APPETITE OR OVEREATING: 0 - NOT AT ALL
CLINICAL INTERPRETATION OF PHQ2 SCORE: 3

## 2019-01-31 NOTE — PROGRESS NOTES
Subjective:     Miguel Ángel Page is a 89 y.o. male who presents with popping rt ear.    HPI:   Seen in f/u for popping of rt ear.  Started several weeks ago.  No pain in ear.  No dg from ear.  Dec hearing in the ear.  + allergy sx with headache, sinus pressure and ear pressure.  Nasal secretions are clear.  No fever, chills or sweating. occas tinnitus.  No SOB or wheezing.  No coughing or sore throat.    He also has a sore rt 2nd toe.  Been there for long time.  The 2nd toe is longer than the big toe.   It is tender and painful.  No dg.     He has Parkinsons disease.  D/t that he has limited mobility.  Needs PT with De[facility] Walker.  Needs updated referral.     Patient Active Problem List    Diagnosis Date Noted   • BPH (benign prostatic hyperplasia) 03/29/2018   • Parkinsonian features 03/29/2018   • At risk for falls 03/29/2018   • Shoulder pain, right 02/27/2013   • Constipation 12/03/2012   • Abnormal PSA 06/18/2012   • Hip pain, right 06/08/2012   • Low back pain 03/12/2012   • Preventative health care 04/05/2010   • Dyslipidemia 04/05/2010   • Cataract 04/05/2010       Current medicines (including changes today)  Current Outpatient Prescriptions   Medication Sig Dispense Refill   • polyethylene glycol 3350 (MIRALAX) Powder Take 17 g by mouth every day. 1 Bottle 3   • PEG 3350-KCl-NaBcb-NaCl-NaSulf (GOLYTELY) 227.1 GM Pack Drink 100 cc every 10 minutes as needed 1 Each 0   • carbidopa-levodopa (SINEMET)  MG Tab Take 1 Tab by mouth 3 times a day. 270 Tab 2   • finasteride (PROSCAR) 5 MG Tab Take 1 Tab by mouth every day. 90 Tab 3   • aspirin (ASA) 81 MG CHEW Take 1 Tab by mouth every day. 30 Each 11     No current facility-administered medications for this visit.        Allergies   Allergen Reactions   • Ultram [Tramadol Hcl]        ROS  Constitutional: Negative. Negative for fever, chills, weight loss, malaise/fatigue and diaphoresis.   HENT: Negative. Negative for hearing loss, ear pain, nosebleeds, sore  "throat, neck pain, tinnitus and ear discharge.   Respiratory: Negative. Negative for cough, hemoptysis, sputum production, shortness of breath, wheezing and stridor.   Cardiovascular: Negative. Negative for chest pain, palpitations, orthopnea, claudication, leg swelling and PND.   Gastrointestinal: Denies nausea, vomiting, diarrhea, constipation, heartburn, melena or hematochezia.  Genitourinary: Denies dysuria, hematuria, urinary incontinence, frequency or urgency.        Objective:     Blood pressure 108/60, pulse 81, temperature 37.1 °C (98.7 °F), temperature source Temporal, height 1.702 m (5' 7\"), weight 53.5 kg (118 lb), SpO2 93 %. Body mass index is 18.48 kg/m².    Physical Exam:  Physical Exam   Vitals reviewed.  Constitutional: oriented to person, place, and time. appears well-developed and well-nourished. No distress.   HENT:  Head: Normocephalic and atraumatic. Right Ear: External ear normal. Left Ear: External ear normal. Nose: Nose normal. Mouth/Throat: Oropharynx is clear and moist. No oropharyngeal exudate.  homero tm wnl but rt canal has mod amt cerumen.  Eyes: Right eye exhibits no discharge. Left eye exhibits no discharge. No scleral icterus.  Neck: No JVD present.  Cardiovascular: Normal rate, regular rhythm, normal heart sounds and intact distal pulses.  Exam reveals no gallop and no friction rub.  No murmur heard.  No carotid bruits.   Pulmonary/Chest: Effort normal and breath sounds normal. No stridor. No respiratory distress. no wheezes or rales. exhibits no tenderness.   Musculoskeletal: Normal range of motion. exhibits 1+ rt ankle edema. homero pedal pulses 2+.  Lymphadenopathy: no cervical or supraclavicular adenopathy.   Neurological: alert and oriented to person, place, and time.    Skin: Skin is warm and dry. no diaphoresis.  rt 2nd toe longer than great toe.  Entire end of toe has callous with scab noted in center.  No erythema or dg noted.    Psychiatric: normal mood and affect. behavior is " normal.        Assessment and Plan:     The following treatment plan was discussed:    1. Chronic allergic rhinitis due to pollen      this is prob causing sx in rt ear.  use zyrtec and flonase otc daily x 3 wks.  f/u if sx not improved with tx   2. Impacted cerumen of right ear  Ear Irrigation ([facility] Only)    mod amt cerumen in rt canal.  irrigated by MA with warm water.  complete clearing accomplished.   3. Parkinsonian features  REFERRAL TO PHYSICAL THERAPY Reason for Therapy: Eval/Treat/Report    needs updated referral to PT   4. Toe pain, chronic, right  REFERRAL TO PODIATRY    has lg callous on end of 2nd toe.  refer podiatry for treatment.         Followup: Return if symptoms worsen or fail to improve.

## 2019-03-12 ENCOUNTER — OFFICE VISIT (OUTPATIENT)
Dept: MEDICAL GROUP | Facility: MEDICAL CENTER | Age: 84
End: 2019-03-12
Payer: MEDICARE

## 2019-03-12 VITALS
DIASTOLIC BLOOD PRESSURE: 60 MMHG | WEIGHT: 117.6 LBS | TEMPERATURE: 97.8 F | HEART RATE: 92 BPM | OXYGEN SATURATION: 92 % | BODY MASS INDEX: 18.46 KG/M2 | HEIGHT: 67 IN | SYSTOLIC BLOOD PRESSURE: 102 MMHG

## 2019-03-12 DIAGNOSIS — N40.0 BENIGN PROSTATIC HYPERPLASIA WITHOUT LOWER URINARY TRACT SYMPTOMS: ICD-10-CM

## 2019-03-12 DIAGNOSIS — R29.818 PARKINSONIAN FEATURES: ICD-10-CM

## 2019-03-12 DIAGNOSIS — R79.89 LOW VITAMIN D LEVEL: ICD-10-CM

## 2019-03-12 DIAGNOSIS — E78.5 DYSLIPIDEMIA: Chronic | ICD-10-CM

## 2019-03-12 PROCEDURE — 99213 OFFICE O/P EST LOW 20 MIN: CPT | Performed by: INTERNAL MEDICINE

## 2019-03-12 RX ORDER — TAMSULOSIN HYDROCHLORIDE 0.4 MG/1
0.4 CAPSULE ORAL
Qty: 30 CAP | Refills: 5
Start: 2019-03-12 | End: 2019-07-09 | Stop reason: SDUPTHER

## 2019-03-12 NOTE — PROGRESS NOTES
Subjective:      Miguel Ángel Page is a 89 y.o. male who presents with movement disorder Follow-Up            HPI     Patient is here with his daughter, lives in independent living, parkinsonian symptoms, on Sinemet twice a day.  Patient uses walker for ambulation.  No falls.  Does ADLs without assistance, monitors and takes his own medication, still does physical therapy 3 times weekly.  No difficulty with dressing, feeding himself, bathing, showering, toileting.  Does use a shower chair.  Has shower bars.  No difficulty with transfers.  No issues with memory loss.  No mood changes, no change in function or behavior.  No lightheadedness or dizziness.  Still with some slowness of movement, some hoarseness at times.  No excessive salivation, no difficulty with swallowing.  BPH on Flomax per urology no lightheadedness or dizziness with medication of Proscar.  No tobacco, no alcohol      Current Outpatient Prescriptions   Medication Sig Dispense Refill   • polyethylene glycol 3350 (MIRALAX) Powder Take 17 g by mouth every day. 1 Bottle 3   • PEG 3350-KCl-NaBcb-NaCl-NaSulf (GOLYTELY) 227.1 GM Pack Drink 100 cc every 10 minutes as needed 1 Each 0   • carbidopa-levodopa (SINEMET)  MG Tab Take 1 Tab by mouth 3 times a day. 270 Tab 2   • finasteride (PROSCAR) 5 MG Tab Take 1 Tab by mouth every day. 90 Tab 3   • aspirin (ASA) 81 MG CHEW Take 1 Tab by mouth every day. 30 Each 11     No current facility-administered medications for this visit.      Shoulder pain  2/27/13 dr.mcnamara john grimes note, right subdeltoid bursa steroid injection  6/26/13 dr.mcnamara john grimes; right shoulder injection, rec MRI shoulder     Preventative health  2005 Td  3/08 colon [facility]HA repeat 5 yrs  8/12 vit d 50  11/12 [facility] provided FIT card  12/7/12 psa 4.5, free psa 28%  10/8/14 pneumovax  7/5/18 prevnar  7/5/18 shingrix rx provided to get at pharmacy     Parkinsonian features  5/15/17 dr.basa lopez medical note unsteadiness on feet,  refer to neurology  3/29/18 start sinemet half a tablet 3 times a day  4/6/18 stopped sinemet due to dizziness, recommend he try 1/2 qday  7/5/18 on sinemet 1/2 bid increase to 1/2 tid  10/4/18 increase sinemet to 1 bid (difficulty remembering to take medication)  2/5/19 [facility]     Low back pain  3/12 xray LS mod to severe levoscoliosis   4/12 [facility] gr[facility] physical therapy in [place]   6/12 MRI LS levoscoliosis lumbar spine 31°, L3 level. Moderate lumbar spondylosis, disc desiccation L3-5, broad-based disc bulge, mild to moderate right foraminal narrowing L3-L5, 1.1 focus lateral aspect right L3-L4 neural foramen suggestive of facet synovial cyst, mass effect on existing L3 nerve root is suspected     hip pain  6/12 x-ray right hip DJD bilateral right greater than left  7/12  ortho note [facility] fracture clinic, believes hip pain related to back, patient declines further back evaluation  8/12 dr.mcnamara lopez [place] injection right hip under fluoroscopic guidance     Dyslipidemia  6/09 chol 187,trig 68,hdl 44,ldl 129  8/10 chol 178,trig 60,hdl 43,ldl 123  6/12 chol 174,trig 46,hdl 41,ldl 124  8/12 hdl 55,ldl 132,LDL-P 1458,HDL-P32,LP-IR 30  5/3/18 chol 136,trig 46,hdl 58,ldl 69     bph  9/12/12 2/9/17 psa 5.66  1/13/15  urology note samples rapaflo  2/18/15  urology note continued doxazosin 2 mg   8/2/16  urology note AUA score 5  2/9/17 psa 5.66  3/8/17 dr.basa lopez medical note on doxazosin 2 mg  3/15/17  urology note doxazosin 2 mg causing some dizziness, decreased to 1 mg, AUA score 6  3/29/18 on cardura 2 mg [facility]  [facility], will add proscar 5 mg, walker for ambulation  4/3/18 psa 11.7on cardura 2 mg, proscar 5 mg     At risk for falls  5/15/17 dr.basa lopez medical note unsteadiness on feet, refer to neurology  3/29/18 question parkinson's, using walker for ambulation  5/29/18 [facility] note       Abnormal PSA  6/09 psa 3.5  6/12  psa 7.1  7/31/12 psa 5.0,free psa 18%, saw  in [place] [facility]  12/7/12 psa 4.5, free psa 28%  5/3/18 psa 11.7 on proscar per urology            Health Maintenance Summary                Annual Wellness Visit Overdue 9/14/1928     IMM DTaP/Tdap/Td Vaccine Overdue 9/14/1947     IMM ZOSTER VACCINES Overdue 9/14/1978           Patient Care Team:  Oscar Dubois M.D. as PCP - General            Patient Active Problem List   Diagnosis   • Preventative health care   • Dyslipidemia   • Cataract   • Low back pain   • Hip pain, right   • Abnormal PSA   • Constipation   • Shoulder pain, right   • BPH (benign prostatic hyperplasia)   • Parkinsonian features   • At risk for falls         ROS       Objective:          Physical Exam   Constitutional: He appears well-developed and well-nourished. No distress.   HENT:   Head: Normocephalic and atraumatic.   Eyes: Conjunctivae are normal. Right eye exhibits no discharge. Left eye exhibits no discharge.   Neck: Neck supple. No JVD present.   Cardiovascular: Normal rate and regular rhythm.    Pulmonary/Chest: Effort normal and breath sounds normal. No respiratory distress.   Abdominal: He exhibits no distension.   Neurological: He is alert.   Skin: Skin is warm. He is not diaphoretic.   Nursing note and vitals reviewed.    Bradykinesia, some rigidity, no cogwheeling, no resting tremor, no postural instability, festinating gait with walker          Assessment/Plan:     Assessment  #!  Parkinsonian features on Sinemet twice a day 25/100 no lightheadedness or dizziness, no falls, walker for ambulation, going to physical therapy, memory stable, mood stable    #2 BPH on Flomax per urology    #3 dyslipidemia diet-controlled    #4 low vitamin D      Plan  #! Increase sinemet to tid monitor for lightheadedness, dizziness, falling precautions    #2 continue PT 3 times weekly    #3 use walker    #4 fall precautions     #5 participate in yoga chair therapy    #6 follow-up  urology    #7 discussed with patient and daughter, follow-up 4 months    #8 labs    #9 shingles vaccine waiting list at pharmacy

## 2019-05-28 DIAGNOSIS — R29.818 PARKINSONIAN FEATURES: ICD-10-CM

## 2019-05-28 NOTE — TELEPHONE ENCOUNTER
----- Message from Faye Chapa sent at 5/28/2019 10:45 AM PDT -----  Regarding: med refill  Patient requesting refill on carbidopa-levodopa (SINEMET)  MG Tab  Pharm[facility]: [facility]

## 2019-06-10 ENCOUNTER — APPOINTMENT (OUTPATIENT)
Dept: RADIOLOGY | Facility: MEDICAL CENTER | Age: 84
DRG: 177 | End: 2019-06-10
Attending: EMERGENCY MEDICINE
Payer: MEDICARE

## 2019-06-10 ENCOUNTER — HOSPITAL ENCOUNTER (INPATIENT)
Facility: MEDICAL CENTER | Age: 84
LOS: 9 days | DRG: 177 | End: 2019-06-20
Attending: EMERGENCY MEDICINE | Admitting: HOSPITALIST
Payer: MEDICARE

## 2019-06-10 DIAGNOSIS — G20.A1 PARKINSON'S DISEASE: ICD-10-CM

## 2019-06-10 DIAGNOSIS — J90 PLEURAL EFFUSION: ICD-10-CM

## 2019-06-10 DIAGNOSIS — J18.9 PNEUMONIA OF RIGHT LOWER LOBE DUE TO INFECTIOUS ORGANISM: ICD-10-CM

## 2019-06-10 DIAGNOSIS — R29.818 PARKINSONIAN FEATURES: ICD-10-CM

## 2019-06-10 DIAGNOSIS — J42 CHRONIC BRONCHITIS, UNSPECIFIED CHRONIC BRONCHITIS TYPE (HCC): ICD-10-CM

## 2019-06-10 DIAGNOSIS — R07.89 CHEST WALL PAIN: ICD-10-CM
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
## 2019-06-10 PROBLEM — R60.9 EDEMA: Status: ACTIVE | Noted: 2019-06-10

## 2019-06-10 PROBLEM — D75.89 MACROCYTOSIS: Status: ACTIVE | Noted: 2019-06-10

## 2019-06-10 LAB
ALBUMIN SERPL BCP-MCNC: 3.8 G/DL (ref 3.2–4.9)
ALBUMIN/GLOB SERPL: 1.5 G/DL
ALP SERPL-CCNC: 40 U/L (ref 30–99)
ALT SERPL-CCNC: 11 U/L (ref 2–50)
ANION GAP SERPL CALC-SCNC: 10 MMOL/L (ref 0–11.9)
AST SERPL-CCNC: 29 U/L (ref 12–45)
BASOPHILS # BLD AUTO: 0.5 % (ref 0–1.8)
BASOPHILS # BLD: 0.05 K/UL (ref 0–0.12)
BILIRUB SERPL-MCNC: 1 MG/DL (ref 0.1–1.5)
BUN SERPL-MCNC: 31 MG/DL (ref 8–22)
CALCIUM SERPL-MCNC: 9.4 MG/DL (ref 8.5–10.5)
CHLORIDE SERPL-SCNC: 108 MMOL/L (ref 96–112)
CO2 SERPL-SCNC: 23 MMOL/L (ref 20–33)
CREAT SERPL-MCNC: 1.02 MG/DL (ref 0.5–1.4)
EKG IMPRESSION: NORMAL
EOSINOPHIL # BLD AUTO: 0.01 K/UL (ref 0–0.51)
EOSINOPHIL NFR BLD: 0.1 % (ref 0–6.9)
ERYTHROCYTE [DISTWIDTH] IN BLOOD BY AUTOMATED COUNT: 52.2 FL (ref 35.9–50)
FOLATE SERPL-MCNC: >23.8 NG/ML
GLOBULIN SER CALC-MCNC: 2.6 G/DL (ref 1.9–3.5)
GLUCOSE SERPL-MCNC: 80 MG/DL (ref 65–99)
HCT VFR BLD AUTO: 40.9 % (ref 42–52)
HGB BLD-MCNC: 13 G/DL (ref 14–18)
IMM GRANULOCYTES # BLD AUTO: 0.06 K/UL (ref 0–0.11)
IMM GRANULOCYTES NFR BLD AUTO: 0.6 % (ref 0–0.9)
LIPASE SERPL-CCNC: 43 U/L (ref 11–82)
LYMPHOCYTES # BLD AUTO: 0.33 K/UL (ref 1–4.8)
LYMPHOCYTES NFR BLD: 3.4 % (ref 22–41)
MCH RBC QN AUTO: 32.3 PG (ref 27–33)
MCHC RBC AUTO-ENTMCNC: 31.8 G/DL (ref 33.7–35.3)
MCV RBC AUTO: 101.5 FL (ref 81.4–97.8)
MONOCYTES # BLD AUTO: 0.62 K/UL (ref 0–0.85)
MONOCYTES NFR BLD AUTO: 6.4 % (ref 0–13.4)
NEUTROPHILS # BLD AUTO: 8.61 K/UL (ref 1.82–7.42)
NEUTROPHILS NFR BLD: 89 % (ref 44–72)
NRBC # BLD AUTO: 0 K/UL
NRBC BLD-RTO: 0 /100 WBC
PLATELET # BLD AUTO: 240 K/UL (ref 164–446)
PMV BLD AUTO: 9 FL (ref 9–12.9)
POTASSIUM SERPL-SCNC: 4.7 MMOL/L (ref 3.6–5.5)
PROT SERPL-MCNC: 6.4 G/DL (ref 6–8.2)
RBC # BLD AUTO: 4.03 M/UL (ref 4.7–6.1)
SODIUM SERPL-SCNC: 141 MMOL/L (ref 135–145)
TROPONIN I SERPL-MCNC: 0.01 NG/ML (ref 0–0.04)
VIT B12 SERPL-MCNC: 261 PG/ML (ref 211–911)
WBC # BLD AUTO: 9.7 K/UL (ref 4.8–10.8)

## 2019-06-10 PROCEDURE — 80053 COMPREHEN METABOLIC PANEL: CPT

## 2019-06-10 PROCEDURE — 94667 MNPJ CHEST WALL 1ST: CPT

## 2019-06-10 PROCEDURE — 700105 HCHG RX REV CODE 258: Performed by: EMERGENCY MEDICINE

## 2019-06-10 PROCEDURE — A9270 NON-COVERED ITEM OR SERVICE: HCPCS | Performed by: HOSPITALIST

## 2019-06-10 PROCEDURE — 93005 ELECTROCARDIOGRAM TRACING: CPT | Performed by: EMERGENCY MEDICINE

## 2019-06-10 PROCEDURE — 96367 TX/PROPH/DG ADDL SEQ IV INF: CPT

## 2019-06-10 PROCEDURE — 700111 HCHG RX REV CODE 636 W/ 250 OVERRIDE (IP): Performed by: HOSPITALIST

## 2019-06-10 PROCEDURE — 84156 ASSAY OF PROTEIN URINE: CPT

## 2019-06-10 PROCEDURE — 85025 COMPLETE CBC W/AUTO DIFF WBC: CPT

## 2019-06-10 PROCEDURE — 700105 HCHG RX REV CODE 258: Performed by: HOSPITALIST

## 2019-06-10 PROCEDURE — 96365 THER/PROPH/DIAG IV INF INIT: CPT

## 2019-06-10 PROCEDURE — 71045 X-RAY EXAM CHEST 1 VIEW: CPT

## 2019-06-10 PROCEDURE — 82607 VITAMIN B-12: CPT

## 2019-06-10 PROCEDURE — 84484 ASSAY OF TROPONIN QUANT: CPT

## 2019-06-10 PROCEDURE — 83690 ASSAY OF LIPASE: CPT

## 2019-06-10 PROCEDURE — 700101 HCHG RX REV CODE 250: Performed by: EMERGENCY MEDICINE

## 2019-06-10 PROCEDURE — 82570 ASSAY OF URINE CREATININE: CPT

## 2019-06-10 PROCEDURE — 302242 IV POLE: Performed by: HOSPITALIST

## 2019-06-10 PROCEDURE — 87040 BLOOD CULTURE FOR BACTERIA: CPT

## 2019-06-10 PROCEDURE — G0378 HOSPITAL OBSERVATION PER HR: HCPCS

## 2019-06-10 PROCEDURE — 700111 HCHG RX REV CODE 636 W/ 250 OVERRIDE (IP): Performed by: EMERGENCY MEDICINE

## 2019-06-10 PROCEDURE — 99285 EMERGENCY DEPT VISIT HI MDM: CPT

## 2019-06-10 PROCEDURE — 82746 ASSAY OF FOLIC ACID SERUM: CPT

## 2019-06-10 PROCEDURE — 700102 HCHG RX REV CODE 250 W/ 637 OVERRIDE(OP): Performed by: HOSPITALIST

## 2019-06-10 PROCEDURE — 99220 PR INITIAL OBSERVATION CARE,LEVL III: CPT | Mod: AI | Performed by: HOSPITALIST

## 2019-06-10 PROCEDURE — 302128 INFUSION PUMP: Performed by: HOSPITALIST

## 2019-06-10 RX ORDER — TAMSULOSIN HYDROCHLORIDE 0.4 MG/1
0.4 CAPSULE ORAL
Status: DISCONTINUED | OUTPATIENT
Start: 2019-06-10 | End: 2019-06-20 | Stop reason: HOSPADM

## 2019-06-10 RX ORDER — POLYETHYLENE GLYCOL 3350 17 G/17G
1 POWDER, FOR SOLUTION ORAL
Status: DISCONTINUED | OUTPATIENT
Start: 2019-06-10 | End: 2019-06-20 | Stop reason: HOSPADM

## 2019-06-10 RX ORDER — AMOXICILLIN 250 MG
2 CAPSULE ORAL 2 TIMES DAILY
Status: DISCONTINUED | OUTPATIENT
Start: 2019-06-11 | End: 2019-06-20 | Stop reason: HOSPADM

## 2019-06-10 RX ORDER — BISACODYL 10 MG
10 SUPPOSITORY, RECTAL RECTAL
Status: DISCONTINUED | OUTPATIENT
Start: 2019-06-10 | End: 2019-06-20 | Stop reason: HOSPADM

## 2019-06-10 RX ORDER — ASPIRIN 81 MG/1
81 TABLET, CHEWABLE ORAL DAILY
Status: DISCONTINUED | OUTPATIENT
Start: 2019-06-10 | End: 2019-06-20 | Stop reason: HOSPADM

## 2019-06-10 RX ORDER — DOXYCYCLINE 100 MG/1
100 TABLET ORAL EVERY 12 HOURS
Status: COMPLETED | OUTPATIENT
Start: 2019-06-11 | End: 2019-06-15

## 2019-06-10 RX ADMIN — DOXYCYCLINE 100 MG: 100 INJECTION, POWDER, LYOPHILIZED, FOR SOLUTION INTRAVENOUS at 13:27

## 2019-06-10 RX ADMIN — ASPIRIN 81 MG 81 MG: 81 TABLET ORAL at 17:51

## 2019-06-10 RX ADMIN — TAMSULOSIN HYDROCHLORIDE 0.4 MG: 0.4 CAPSULE ORAL at 17:51

## 2019-06-10 RX ADMIN — AMPICILLIN SODIUM AND SULBACTAM SODIUM 3 G: 2; 1 INJECTION, POWDER, FOR SOLUTION INTRAMUSCULAR; INTRAVENOUS at 17:52

## 2019-06-10 RX ADMIN — AMPICILLIN SODIUM AND SULBACTAM SODIUM 3 G: 2; 1 INJECTION, POWDER, FOR SOLUTION INTRAMUSCULAR; INTRAVENOUS at 23:50

## 2019-06-10 RX ADMIN — CARBIDOPA AND LEVODOPA 1 TABLET: 25; 100 TABLET ORAL at 17:51

## 2019-06-10 RX ADMIN — CEFTRIAXONE SODIUM 2 G: 2 INJECTION, POWDER, FOR SOLUTION INTRAMUSCULAR; INTRAVENOUS at 12:41

## 2019-06-10 ASSESSMENT — COGNITIVE AND FUNCTIONAL STATUS - GENERAL
MOVING FROM LYING ON BACK TO SITTING ON SIDE OF FLAT BED: A LOT
DRESSING REGULAR LOWER BODY CLOTHING: A LOT
STANDING UP FROM CHAIR USING ARMS: A LOT
MOBILITY SCORE: 12
CLIMB 3 TO 5 STEPS WITH RAILING: A LOT
TOILETING: A LOT
DAILY ACTIVITIY SCORE: 13
TURNING FROM BACK TO SIDE WHILE IN FLAT BAD: A LOT
DRESSING REGULAR UPPER BODY CLOTHING: A LOT
WALKING IN HOSPITAL ROOM: A LOT
SUGGESTED CMS G CODE MODIFIER DAILY ACTIVITY: CL
EATING MEALS: A LITTLE
PERSONAL GROOMING: A LOT
SUGGESTED CMS G CODE MODIFIER MOBILITY: CL
MOVING TO AND FROM BED TO CHAIR: A LOT
HELP NEEDED FOR BATHING: A LOT

## 2019-06-10 ASSESSMENT — PATIENT HEALTH QUESTIONNAIRE - PHQ9
2. FEELING DOWN, DEPRESSED, IRRITABLE, OR HOPELESS: NOT AT ALL
SUM OF ALL RESPONSES TO PHQ9 QUESTIONS 1 AND 2: 0
1. LITTLE INTEREST OR PLEASURE IN DOING THINGS: NOT AT ALL

## 2019-06-10 ASSESSMENT — COPD QUESTIONNAIRES
HAVE YOU SMOKED AT LEAST 100 CIGARETTES IN YOUR ENTIRE LIFE: NO/DON'T KNOW
DO YOU EVER COUGH UP ANY MUCUS OR PHLEGM?: NO/ONLY WITH OCCASIONAL COLDS OR INFECTIONS
COPD SCREENING SCORE: 2
DURING THE PAST 4 WEEKS HOW MUCH DID YOU FEEL SHORT OF BREATH: NONE/LITTLE OF THE TIME

## 2019-06-10 ASSESSMENT — LIFESTYLE VARIABLES
ALCOHOL_USE: NO
EVER_SMOKED: NEVER
EVER_SMOKED: NEVER
DO YOU DRINK ALCOHOL: NO

## 2019-06-10 NOTE — DISCHARGE PLANNING
Care Transition Team Assessment    Information Source  Orientation : Unable to Assess  Information Given By: Other (Comments) (Daughter)  Informant's Name: Nabila Page  Who is responsible for making decisions for patient? : Adult child    Readmission Evaluation  Is this a readmission?: No    Elopement Risk  Legal Hold: No  Elopement Risk: Not at Risk for Elopement    Interdisciplinary Discharge Planning  Does Admitting Nurse Feel This Could be a Complex Discharge?: No  Primary Care Physician: Oscar Dubois MD  Lives with - Patient's Self Care Capacity: Attendant / Paid Care Giver  Support Systems: Family Member(s), Home Care Staff  Housing / Facility: Other (Comments) (long-term apartment, no services)  Do You Take your Prescribed Medications Regularly: Yes    Finances  Financial Barriers to Discharge: No  Prescription Coverage: Yes    Advance Directive  Advance Directive?: DPOA for Health Care, POLST (Daughter to bring in.)    Domestic Abuse  Have you ever been the victim of abuse or violence?: No    Psychological Assessment  History of Substance Abuse: None    Anticipated Discharge Information  Anticipated discharge disposition: Other (comment) (Pending assessment)

## 2019-06-10 NOTE — ED TRIAGE NOTES
Pt to rm 13 from [facility] with c/o R lateral rib pain after multiple MGLF at his assisted living facility.  Pt states his falls are mechanical in nature.  No obvious injury noted

## 2019-06-10 NOTE — ED NOTES
Med Rec complete per Pt and Pt's daughter at bedside   Ok per Pt to discuss medications with visitor/s present    Allergies Reviewed  No ABX in the last 30 days

## 2019-06-10 NOTE — ED PROVIDER NOTES
ED Provider Note    ER PROVIDER NOTE    CHIEF COMPLAINT  Chief Complaint   Patient presents with   • Rib Pain     R lateral after MGLF       HPI  Miguel Ángel Page is a 89 y.o. male who presents to the emergency department complaining of fall.  Patient is resident at nursing facility, he reports he fell onto his walker landing on his right side.  He reports he is unsure why he fell, he has had some lightheadedness.  currently reporting right-sided chest wall pain.  He denies any shortness of breath or other chest pain although has had some cough over the last few days.  No nausea vomiting or abdominal pain.  Denies any extremity pain or headache or back pain or neck pain.  No fever chills or focal weakness numbness or tingling.    When patient's daughter arrived I discussed further, she states he has been somewhat weaker recently although appears to be in baseline health      REVIEW OF SYSTEMS  Pertinent positives include chest wall pain. Pertinent negatives include no fever or chills. See HPI for details. All other systems reviewed and are negative.    PAST MEDICAL HISTORY       SURGICAL HISTORY  patient denies any surgical history    FAMILY HISTORY  Family History   Problem Relation Age of Onset   • Heart Disease Mother    • Heart Disease Father        SOCIAL HISTORY  Social History     Social History   • Marital status: Single     Spouse name: N/A   • Number of children: N/A   • Years of education: N/A     Social History Main Topics   • Smoking status: Never Smoker   • Smokeless tobacco: Never Used      Comment: quit 35 yrs   • Alcohol use 0.5 oz/week     1 Glasses of wine per week      Comment: Occasionally   • Drug use: No   • Sexual activity: Not on file     Other Topics Concern   • Not on file     Social History Narrative   • No narrative on file      History   Drug Use No       CURRENT MEDICATIONS  Home Medications     Reviewed by Jensen Encarnacion (Pharmacy Tech) on 06/10/19 at 1153  Med List Status:  Complete   Medication Last Dose Status   aspirin (ASA) 81 MG CHEW 6/9/2019 Active   carbidopa-levodopa (SINEMET)  MG Tab 6/9/2019 Active   tamsulosin (FLOMAX) 0.4 MG capsule 6/9/2019 Active                ALLERGIES  Allergies   Allergen Reactions   • Ultram [Tramadol Hcl]        PHYSICAL EXAM  VITAL SIGNS: /86   Pulse 87   Temp 35.9 °C (96.6 °F) (Temporal)   Resp 18   Wt 50 kg (110 lb 3.7 oz)   SpO2 99%   BMI 17.26 kg/m²   Pulse ox interpretation: I interpret this pulse ox as normal.    Constitutional: Alert thin elderly male, chronically ill-appearing  HENT: No signs of trauma, Bilateral external ears normal, Nose normal.   Eyes: Pupils are equal and reactive, Conjunctiva normal, Non-icteric.   Neck: Normal range of motion, No tenderness, Supple, No stridor.   Lymphatic: No lymphadenopathy noted.   Cardiovascular: Regular rate and rhythm, no murmurs.   Thorax & Lungs: Normal breath sounds, No respiratory distress, No wheezing, right sided chest wall tenderness  Abdomen: Bowel sounds normal, Soft, No tenderness, No masses, No pulsatile masses. No peritoneal signs.  Skin: Warm, Dry, No erythema, No rash.   Back: No bony tenderness, No CVA tenderness.   Extremities: Intact distal pulses, No edema, No tenderness, No cyanosis, Negative Jim's sign.  Musculoskeletal: Good range of motion in all major joints. No tenderness to palpation or major deformities noted.   Neurologic: Alert , Normal motor function, Normal sensory function, No focal deficits noted.   Psychiatric: Affect normal, Judgment normal, Mood normal.     DIAGNOSTIC STUDIES / PROCEDURES    Results for orders placed or performed during the hospital encounter of 06/10/19   CBC WITH DIFFERENTIAL   Result Value Ref Range    WBC 9.7 4.8 - 10.8 K/uL    RBC 4.03 (L) 4.70 - 6.10 M/uL    Hemoglobin 13.0 (L) 14.0 - 18.0 g/dL    Hematocrit 40.9 (L) 42.0 - 52.0 %    .5 (H) 81.4 - 97.8 fL    MCH 32.3 27.0 - 33.0 pg    MCHC 31.8 (L) 33.7 - 35.3  g/dL    RDW 52.2 (H) 35.9 - 50.0 fL    Platelet Count 240 164 - 446 K/uL    MPV 9.0 9.0 - 12.9 fL    Neutrophils-Polys 89.00 (H) 44.00 - 72.00 %    Lymphocytes 3.40 (L) 22.00 - 41.00 %    Monocytes 6.40 0.00 - 13.40 %    Eosinophils 0.10 0.00 - 6.90 %    Basophils 0.50 0.00 - 1.80 %    Immature Granulocytes 0.60 0.00 - 0.90 %    Nucleated RBC 0.00 /100 WBC    Neutrophils (Absolute) 8.61 (H) 1.82 - 7.42 K/uL    Lymphs (Absolute) 0.33 (L) 1.00 - 4.80 K/uL    Monos (Absolute) 0.62 0.00 - 0.85 K/uL    Eos (Absolute) 0.01 0.00 - 0.51 K/uL    Baso (Absolute) 0.05 0.00 - 0.12 K/uL    Immature Granulocytes (abs) 0.06 0.00 - 0.11 K/uL    NRBC (Absolute) 0.00 K/uL   COMP METABOLIC PANEL   Result Value Ref Range    Sodium 141 135 - 145 mmol/L    Potassium 4.7 3.6 - 5.5 mmol/L    Chloride 108 96 - 112 mmol/L    Co2 23 20 - 33 mmol/L    Anion Gap 10.0 0.0 - 11.9    Glucose 80 65 - 99 mg/dL    Bun 31 (H) 8 - 22 mg/dL    Creatinine 1.02 0.50 - 1.40 mg/dL    Calcium 9.4 8.5 - 10.5 mg/dL    AST(SGOT) 29 12 - 45 U/L    ALT(SGPT) 11 2 - 50 U/L    Alkaline Phosphatase 40 30 - 99 U/L    Total Bilirubin 1.0 0.1 - 1.5 mg/dL    Albumin 3.8 3.2 - 4.9 g/dL    Total Protein 6.4 6.0 - 8.2 g/dL    Globulin 2.6 1.9 - 3.5 g/dL    A-G Ratio 1.5 g/dL   LIPASE   Result Value Ref Range    Lipase 43 11 - 82 U/L   TROPONIN   Result Value Ref Range    Troponin I 0.01 0.00 - 0.04 ng/mL   ESTIMATED GFR   Result Value Ref Range    GFR If African American >60 >60 mL/min/1.73 m 2    GFR If Non African American >60 >60 mL/min/1.73 m 2   EKG (Now)   Result Value Ref Range    Report       [facility] Emergency Dept.    Test Date:  2019-06-10  Pt Name:    HELADIO PINEDA              Department: ER  MRN:        [id number]                      Room:       [facility]  Gender:     Male                         Technician: [id number]  :        [id number]                   Requested By:KYLER PRASAD  Order #:    [id number]                    Frandy MD: KYLER  KMA PRASAD MD    Measurements  Intervals                                Axis  Rate:       80                           P:          85  CO:         216                          QRS:        110  QRSD:       148                          T:          79  QT:         444  QTc:        513    Interpretive Statements  SINUS RHYTHM  BORDERLINE AV CONDUCTION DELAY  RIGHT BUNDLE BRANCH BLOCK  No previous ECG available for comparison    Electronically Signed On 6- 9:56:27 PDT by KYLER PRASAD MD           RADIOLOGY  DX-CHEST-LIMITED (1 VIEW)   Final Result      Right basilar opacity likely represents pneumonia.      Trace right pleural effusion is not excluded.      Atherosclerotic plaque.      Prominent scoliosis.      If clinical suspicion for rib fracture is high, further evaluation with CT is recommended.           The radiologist's interpretation of all radiological studies have been reviewed by me.    COURSE & MEDICAL DECISION MAKING  Nursing notes, VS, PMSFHx reviewed in chart.    9:30 AM Patient seen and examined at bedside.  Ordered for labs, x-ray to evaluate his symptoms.     Patient reevaluated, discussed findings, will plan for antibiotics and admission.    Discussed case with Dr. Cherry for admission      Decision Making:  This is a 89 y.o. male presenting after fall.  He does have some right-sided chest pain, although on his x-ray appears to have pneumonia.  With his slight cough as well as increased weakness as well as history of Parkinson's and potential aspiration, this certainly seems like a possibility and will be started on antibiotics and admitted for further care.  Does not appear septic at this time with no leukocytosis hypotension or persistent tachycardia.  X-ray diminishes no large rib fracture, pneumothorax or hemothorax either    Patient is admitted in stable condition    FINAL IMPRESSION  1. Fall, initial encounter    2. Chest wall pain    3. Pneumonia of right lower lobe due to infectious  organism ([facility])          The note accurately reflects work and decisions made by me.  Sulaiman Rogers  6/10/2019  12:52 PM

## 2019-06-11 ENCOUNTER — APPOINTMENT (OUTPATIENT)
Dept: CARDIOLOGY | Facility: MEDICAL CENTER | Age: 84
DRG: 177 | End: 2019-06-11
Attending: HOSPITALIST
Payer: MEDICARE

## 2019-06-11 PROBLEM — Z71.89 ADVANCED CARE PLANNING/COUNSELING DISCUSSION: Status: ACTIVE | Noted: 2019-06-11

## 2019-06-11 PROBLEM — N17.9 AKI (ACUTE KIDNEY INJURY) (HCC): Status: ACTIVE | Noted: 2019-06-11

## 2019-06-11 PROBLEM — J96.01 ACUTE RESPIRATORY FAILURE WITH HYPOXIA (HCC): Status: ACTIVE | Noted: 2019-06-11

## 2019-06-11 LAB
ANION GAP SERPL CALC-SCNC: 12 MMOL/L (ref 0–11.9)
APPEARANCE UR: CLEAR
BASOPHILS # BLD AUTO: 0.5 % (ref 0–1.8)
BASOPHILS # BLD: 0.04 K/UL (ref 0–0.12)
BILIRUB UR QL STRIP.AUTO: NEGATIVE
BUN SERPL-MCNC: 31 MG/DL (ref 8–22)
CALCIUM SERPL-MCNC: 8.9 MG/DL (ref 8.5–10.5)
CHLORIDE SERPL-SCNC: 104 MMOL/L (ref 96–112)
CO2 SERPL-SCNC: 23 MMOL/L (ref 20–33)
COLOR UR: YELLOW
CREAT SERPL-MCNC: 1.15 MG/DL (ref 0.5–1.4)
CREAT UR-MCNC: 110.6 MG/DL
EOSINOPHIL # BLD AUTO: 0.08 K/UL (ref 0–0.51)
EOSINOPHIL NFR BLD: 1 % (ref 0–6.9)
ERYTHROCYTE [DISTWIDTH] IN BLOOD BY AUTOMATED COUNT: 50.7 FL (ref 35.9–50)
GLUCOSE SERPL-MCNC: 110 MG/DL (ref 65–99)
GLUCOSE UR STRIP.AUTO-MCNC: NEGATIVE MG/DL
HCT VFR BLD AUTO: 35.7 % (ref 42–52)
HGB BLD-MCNC: 11.6 G/DL (ref 14–18)
IMM GRANULOCYTES # BLD AUTO: 0.03 K/UL (ref 0–0.11)
IMM GRANULOCYTES NFR BLD AUTO: 0.4 % (ref 0–0.9)
KETONES UR STRIP.AUTO-MCNC: ABNORMAL MG/DL
LEUKOCYTE ESTERASE UR QL STRIP.AUTO: NEGATIVE
LV EJECT FRACT MOD 4C 99902: 64.21
LYMPHOCYTES # BLD AUTO: 0.37 K/UL (ref 1–4.8)
LYMPHOCYTES NFR BLD: 4.6 % (ref 22–41)
MCH RBC QN AUTO: 33.1 PG (ref 27–33)
MCHC RBC AUTO-ENTMCNC: 32.5 G/DL (ref 33.7–35.3)
MCV RBC AUTO: 102 FL (ref 81.4–97.8)
MICRO URNS: ABNORMAL
MONOCYTES # BLD AUTO: 0.68 K/UL (ref 0–0.85)
MONOCYTES NFR BLD AUTO: 8.5 % (ref 0–13.4)
NEUTROPHILS # BLD AUTO: 6.82 K/UL (ref 1.82–7.42)
NEUTROPHILS NFR BLD: 85 % (ref 44–72)
NITRITE UR QL STRIP.AUTO: NEGATIVE
NRBC # BLD AUTO: 0 K/UL
NRBC BLD-RTO: 0 /100 WBC
PH UR STRIP.AUTO: 5.5 [PH]
PLATELET # BLD AUTO: 229 K/UL (ref 164–446)
PMV BLD AUTO: 9.5 FL (ref 9–12.9)
POTASSIUM SERPL-SCNC: 3.6 MMOL/L (ref 3.6–5.5)
PROCALCITONIN SERPL-MCNC: 0.05 NG/ML
PROT UR QL STRIP: NEGATIVE MG/DL
PROT UR-MCNC: 30.3 MG/DL (ref 0–15)
PROT/CREAT UR: 274 MG/G (ref 15–68)
RBC # BLD AUTO: 3.5 M/UL (ref 4.7–6.1)
RBC UR QL AUTO: NEGATIVE
SODIUM SERPL-SCNC: 139 MMOL/L (ref 135–145)
SP GR UR STRIP.AUTO: 1.02
UROBILINOGEN UR STRIP.AUTO-MCNC: 0.2 MG/DL
WBC # BLD AUTO: 8 K/UL (ref 4.8–10.8)

## 2019-06-11 PROCEDURE — 80048 BASIC METABOLIC PNL TOTAL CA: CPT

## 2019-06-11 PROCEDURE — 96366 THER/PROPH/DIAG IV INF ADDON: CPT

## 2019-06-11 PROCEDURE — 97166 OT EVAL MOD COMPLEX 45 MIN: CPT

## 2019-06-11 PROCEDURE — G0378 HOSPITAL OBSERVATION PER HR: HCPCS

## 2019-06-11 PROCEDURE — 84145 PROCALCITONIN (PCT): CPT

## 2019-06-11 PROCEDURE — 85025 COMPLETE CBC W/AUTO DIFF WBC: CPT

## 2019-06-11 PROCEDURE — 307059 PAD,EAR PROTECTOR: Performed by: HOSPITALIST

## 2019-06-11 PROCEDURE — A9270 NON-COVERED ITEM OR SERVICE: HCPCS | Performed by: HOSPITALIST

## 2019-06-11 PROCEDURE — 700111 HCHG RX REV CODE 636 W/ 250 OVERRIDE (IP): Performed by: HOSPITALIST

## 2019-06-11 PROCEDURE — 770006 HCHG ROOM/CARE - MED/SURG/GYN SEMI*

## 2019-06-11 PROCEDURE — 93306 TTE W/DOPPLER COMPLETE: CPT

## 2019-06-11 PROCEDURE — 96372 THER/PROPH/DIAG INJ SC/IM: CPT

## 2019-06-11 PROCEDURE — 700105 HCHG RX REV CODE 258: Performed by: HOSPITALIST

## 2019-06-11 PROCEDURE — 36415 COLL VENOUS BLD VENIPUNCTURE: CPT

## 2019-06-11 PROCEDURE — 97162 PT EVAL MOD COMPLEX 30 MIN: CPT

## 2019-06-11 PROCEDURE — 94668 MNPJ CHEST WALL SBSQ: CPT

## 2019-06-11 PROCEDURE — 92610 EVALUATE SWALLOWING FUNCTION: CPT

## 2019-06-11 PROCEDURE — 99233 SBSQ HOSP IP/OBS HIGH 50: CPT | Performed by: HOSPITALIST

## 2019-06-11 PROCEDURE — 81003 URINALYSIS AUTO W/O SCOPE: CPT

## 2019-06-11 PROCEDURE — 700102 HCHG RX REV CODE 250 W/ 637 OVERRIDE(OP): Performed by: HOSPITALIST

## 2019-06-11 PROCEDURE — 93306 TTE W/DOPPLER COMPLETE: CPT | Mod: 26 | Performed by: INTERNAL MEDICINE

## 2019-06-11 RX ADMIN — SENNOSIDES,DOCUSATE SODIUM 2 TABLET: 8.6; 5 TABLET, FILM COATED ORAL at 05:28

## 2019-06-11 RX ADMIN — DOXYCYCLINE 100 MG: 100 TABLET, FILM COATED ORAL at 18:00

## 2019-06-11 RX ADMIN — DOXYCYCLINE 100 MG: 100 TABLET, FILM COATED ORAL at 05:29

## 2019-06-11 RX ADMIN — CARBIDOPA AND LEVODOPA 1 TABLET: 25; 100 TABLET ORAL at 13:54

## 2019-06-11 RX ADMIN — AMPICILLIN SODIUM AND SULBACTAM SODIUM 3 G: 2; 1 INJECTION, POWDER, FOR SOLUTION INTRAMUSCULAR; INTRAVENOUS at 13:54

## 2019-06-11 RX ADMIN — CARBIDOPA AND LEVODOPA 1 TABLET: 25; 100 TABLET ORAL at 18:00

## 2019-06-11 RX ADMIN — SENNOSIDES,DOCUSATE SODIUM 2 TABLET: 8.6; 5 TABLET, FILM COATED ORAL at 18:00

## 2019-06-11 RX ADMIN — CARBIDOPA AND LEVODOPA 1 TABLET: 25; 100 TABLET ORAL at 05:28

## 2019-06-11 RX ADMIN — TAMSULOSIN HYDROCHLORIDE 0.4 MG: 0.4 CAPSULE ORAL at 21:43

## 2019-06-11 RX ADMIN — ASPIRIN 81 MG 81 MG: 81 TABLET ORAL at 05:28

## 2019-06-11 RX ADMIN — AMPICILLIN SODIUM AND SULBACTAM SODIUM 3 G: 2; 1 INJECTION, POWDER, FOR SOLUTION INTRAMUSCULAR; INTRAVENOUS at 05:30

## 2019-06-11 RX ADMIN — ENOXAPARIN SODIUM 30 MG: 100 INJECTION SUBCUTANEOUS at 05:29

## 2019-06-11 ASSESSMENT — ENCOUNTER SYMPTOMS
DIZZINESS: 0
MYALGIAS: 1
EYE PAIN: 0
SPUTUM PRODUCTION: 1
HEMOPTYSIS: 0
WEIGHT LOSS: 1
BACK PAIN: 0
FEVER: 1
WEAKNESS: 1
COUGH: 1
SHORTNESS OF BREATH: 1
NAUSEA: 0
ABDOMINAL PAIN: 0
FALLS: 1
DEPRESSION: 0
HALLUCINATIONS: 0
HEADACHES: 0
ORTHOPNEA: 0
VOMITING: 0
PHOTOPHOBIA: 0
CHILLS: 1
PALPITATIONS: 0
DIAPHORESIS: 0
HEARTBURN: 0

## 2019-06-11 ASSESSMENT — COGNITIVE AND FUNCTIONAL STATUS - GENERAL
DRESSING REGULAR LOWER BODY CLOTHING: A LOT
EATING MEALS: A LITTLE
STANDING UP FROM CHAIR USING ARMS: A LOT
PERSONAL GROOMING: A LITTLE
MOVING TO AND FROM BED TO CHAIR: UNABLE
HELP NEEDED FOR BATHING: A LOT
DAILY ACTIVITIY SCORE: 15
TOILETING: A LOT
WALKING IN HOSPITAL ROOM: TOTAL
SUGGESTED CMS G CODE MODIFIER MOBILITY: CM
SUGGESTED CMS G CODE MODIFIER DAILY ACTIVITY: CK
MOVING FROM LYING ON BACK TO SITTING ON SIDE OF FLAT BED: UNABLE
CLIMB 3 TO 5 STEPS WITH RAILING: TOTAL
MOBILITY SCORE: 7
TURNING FROM BACK TO SIDE WHILE IN FLAT BAD: UNABLE
DRESSING REGULAR UPPER BODY CLOTHING: A LITTLE

## 2019-06-11 ASSESSMENT — GAIT ASSESSMENTS: GAIT LEVEL OF ASSIST: UNABLE TO PARTICIPATE

## 2019-06-11 ASSESSMENT — ACTIVITIES OF DAILY LIVING (ADL): TOILETING: INDEPENDENT

## 2019-06-11 ASSESSMENT — LIFESTYLE VARIABLES: SUBSTANCE_ABUSE: 0

## 2019-06-11 NOTE — ASSESSMENT & PLAN NOTE
Persistent; Likely 2/2 PNA and large right pleural effusion as seen on chest CT which is improving however now has right apical small PTX  Likely viral etiology as there is no white count and procalcitonin is negative  Refused ultrasound-guided thoracentesis  Supplemental O2  DNR/DNI  Monitor closely  Influenza screen is negative    Repeat CXR showing decreased pneumothorax and effusion    Echo was noted normal

## 2019-06-11 NOTE — ASSESSMENT & PLAN NOTE
Patient is treated with Sinemet for probable Parkinson's disease  Mobility is severely limited, came from [facility]  Continue sinemet  GOC discussed, wishes are DNR   Patient already [facility], however PT evaluated patient and recommended 24/7 assistance,family again changed their mind and instead of SNF they [facility]

## 2019-06-11 NOTE — PROGRESS NOTES
Assumed care of patient at start of shift. Patient is alert and oriented X 4. Patient denies any pain at this time. Spoke with daughter over the phone this AM with patient's permission and gave updates. Patient has safety precautions in place including bed alarm on, treaded socks on, bed locked and in lowest position, call light and personal belongings within reach.Patient requested a private room, notified charge. Patient denies any needs at this time.

## 2019-06-11 NOTE — DIETARY
"Nutrition services: Day 0 of admit.  Miguel Ángel Page is a 89 y.o. male with admitting DX of Pneumonia  Consult received for Underweight BMI    RD was able to meet pt at bedside. Pt very difficult to hear/ understand during interview as talks very softly, therefore information obtained was limited. Pt states was eating good PTA and feels eating okay now.     Assessment:  Height: 177.8 cm (5' 10\")  Weight: 50 kg (110 lb 3.7 oz)  Body mass index is 15.82 kg/m²., BMI classification: Underweight  Diet/Intake: Regular     Evaluation:   1. Hx of Parkinson's disease, CARLA, acute respiratory failure with hypoxia, edema. Per H&P, mobility is severely limited and has been weaker than normal.  2. Per ADL thus far, pt consumed 50-75%. Spoke with SLP-René- she mentions will change pt to DYS III. Discussed sandwiches and preferences per pt, still appropriate with this diet change.   3. Pt with underweight BMI, no weight loss per admit screen. Unable to address this with pt. Per computer hx, pt weighed 118 lbs (53.5 kg), indicating a potential loss of 8 lbs within ~ 5 months, this is a 6% loss, not severe but worth noting.  4. Pt appears very thin and has severe muscle wasting and severe fat loss. Though pt with advanced age, difficulty establishing baseline for pt. Will add high protein milkshake for maintenance given decrease in mobility.   5. Labs: BUN 31, Glucose 110  6. Meds: sinemet, senokot  7. Last BM: 6/11- incontinent    Malnutrition Risk: Unable to meet criteria at this time.     Recommendations/Plan:  1. RD to add high protein milkshake 1x/day   2. Encourage intake of meals  3. Document intake of all meals  as % taken in ADL's to provide interdisciplinary communication across all shifts.   4. Monitor weight.  5. Nutrition rep will continue to see patient for ongoing meal and snack preferences.     RD following       "

## 2019-06-11 NOTE — PROGRESS NOTES
Pt arrived to unit via gurney at 1700. Pt oriented to room, unit, and plan of care. All questions answered at this time. Call light within reach; fall precautions in place.

## 2019-06-11 NOTE — PROGRESS NOTES
[facility] Medicine Daily Progress Note    Date of Service  6/11/2019    Chief Complaint  89 y.o. male admitted 6/10/2019 with fall, PNA    Hospital Course    89 y.o. male who presented 6/10/2019 with fall.     I have reviewed some of the recent provider documentation available to me in the patient's medical chart.  Records are briefly summarized: Mr. Page has a history of movement disorder/possible Parkinson's disease.  He is treated with Sinemet.  During an office visit on 3/12/2019, it is noted that his memory is stable.     Patient speaks in a soft voice, he does not say much, it is very difficult to understand him during the interview.  He indicates that the pain in his right chest is tolerable, beyond that I am unable to elicit any history of present illness due to the patient's difficulties with communication.  His daughter is at the bedside, she indicates that he is generally easier to understand.  She says that he recently took a cruise to [place] and returned about a week ago.  Since that time the patient has been weaker than normal.  Distances with a walker and has had difficulty doing that.  She is noted him to be more fatigued than normal.  No cough, no fever.  Patient today had a fall, he landed on his right side and came to the emergency room for evaluation.       Interval Problem Update  Seen and examined AAOx3, wants to sit up on the chair next to bed, + SOB, + coughing, +_ body aches  Otherwise now chest pains    Consultants/Specialty  none    Code Status  DNR    Disposition  Medical/ pt/ot    Review of Systems  Review of Systems   Constitutional: Positive for chills, fever, malaise/fatigue and weight loss. Negative for diaphoresis.   HENT: Negative for congestion, ear pain, hearing loss and tinnitus.    Eyes: Negative for photophobia and pain.   Respiratory: Positive for cough, sputum production and shortness of breath. Negative for hemoptysis.    Cardiovascular: Negative for chest pain,  palpitations, orthopnea and leg swelling.   Gastrointestinal: Negative for abdominal pain, heartburn, nausea and vomiting.   Genitourinary: Negative for dysuria, hematuria and urgency.   Musculoskeletal: Positive for falls and myalgias. Negative for back pain.   Skin: Negative for itching and rash.   Neurological: Positive for weakness. Negative for dizziness and headaches.   Psychiatric/Behavioral: Negative for depression, hallucinations, substance abuse and suicidal ideas.        Physical Exam  Temp:  [36.1 °C (97 °F)-36.4 °C (97.6 °F)] 36.3 °C (97.4 °F)  Pulse:  [79-92] 85  Resp:  [16-18] 16  BP: ()/(67-83) 125/75  SpO2:  [96 %-100 %] 96 %    Physical Exam   Constitutional: He is oriented to person, place, and time. He appears well-developed. No distress.   fraile   HENT:   Head: Normocephalic and atraumatic.   Mouth/Throat: Oropharynx is clear and moist. No oropharyngeal exudate.   Eyes: Pupils are equal, round, and reactive to light. Conjunctivae and EOM are normal. No scleral icterus.   Neck: Normal range of motion. Neck supple. No JVD present. No thyromegaly present.   Cardiovascular: Normal rate, regular rhythm and intact distal pulses.    No murmur heard.  Pulmonary/Chest: Effort normal and breath sounds normal. No respiratory distress. He has no wheezes. He has no rales.   Diminished breath sounds b/l   Abdominal: Soft. Bowel sounds are normal. He exhibits no distension. There is no tenderness. There is no rebound.   Musculoskeletal: Normal range of motion. He exhibits edema. He exhibits no tenderness.   Lymphadenopathy:     He has no cervical adenopathy.   Neurological: He is alert and oriented to person, place, and time. He exhibits normal muscle tone.   Skin: Skin is warm and dry. No erythema.   Psychiatric: He has a normal mood and affect. His behavior is normal.       Fluids    Intake/Output Summary (Last 24 hours) at 06/11/19 1528  Last data filed at 06/11/19 1000   Gross per 24 hour   Intake               720 ml   Output                0 ml   Net              720 ml       Laboratory  Recent Labs      06/10/19   1000  06/11/19   0221   WBC  9.7  8.0   RBC  4.03*  3.50*   HEMOGLOBIN  13.0*  11.6*   HEMATOCRIT  40.9*  35.7*   MCV  101.5*  102.0*   MCH  32.3  33.1*   MCHC  31.8*  32.5*   RDW  52.2*  50.7*   PLATELETCT  240  229   MPV  9.0  9.5     Recent Labs      06/10/19   1000  06/11/19   0221   SODIUM  141  139   POTASSIUM  4.7  3.6   CHLORIDE  108  104   CO2  23  23   GLUCOSE  80  110*   BUN  31*  31*   CREATININE  1.02  1.15   CALCIUM  9.4  8.9                   Imaging  EC-ECHOCARDIOGRAM COMPLETE W/O CONT   Final Result      DX-CHEST-LIMITED (1 VIEW)   Final Result      Right basilar opacity likely represents pneumonia.      Trace right pleural effusion is not excluded.      Atherosclerotic plaque.      Prominent scoliosis.      If clinical suspicion for rib fracture is high, further evaluation with CT is recommended.              Assessment/Plan  * Pneumonia- (present on admission)   Assessment & Plan    Right lower lobe opacity, consistent with pneumonia  Treat for pneumonia with Unasyn and doxy continue     Parkinson's disease (HCC)- (present on admission)   Assessment & Plan    Patient is treated with Sinemet for probable Parkinson's disease  Mobility is severely limited  Continue sinemet  Daughter not at bedside today, however patient AAOx3 for me this morning  GO discussed, wishes are DNR   Patient already getting scheduled physical therapy at [facility] assisted living     Acute respiratory failure with hypoxia (HCC)   Assessment & Plan    Likely 2/2 PNA and trace right pleural effusion  Continue abx for PNA  Supplemental O2  DNR/DNI  Monitor closely     CARLA (acute kidney injury) ([facility])   Assessment & Plan    No hx of CKD, creatinine elevated  Dehydration likely cause  Avoid nephrotoxics, renally dose all meds  Repeat BMP in the am  Obtain urine protein.cr ratio       Advanced care  planning/counseling discussion   Assessment & Plan    Discussed in detail about goals of care and at this time he wishes to be DNR/DNI  Will change code status     Edema- (present on admission)   Assessment & Plan    Bilateral lower extremity edema  This is a new problem, he is never experienced this before  Echo noted, normal EF 65% no DD  Labs monitored     Macrocytosis- (present on admission)   Assessment & Plan    b12 and folate normal          VTE prophylaxis: lovenox

## 2019-06-11 NOTE — CARE PLAN
Problem: Nutritional:  Goal: Achieve adequate nutritional intake  Patient will consume 50% of meals or greater   Outcome: NOT MET  See RD note

## 2019-06-11 NOTE — CARE PLAN
Problem: Safety  Goal: Will remain free from falls  Outcome: PROGRESSING AS EXPECTED  Educated patient on fall risk and safety precautions. Safety precautions in place including bed locked and in lowest position, call light and personal belongings within reach, treaded socks on, bed alarm on, fall risk armband on.    Problem: Respiratory:  Goal: Respiratory status will improve  Outcome: PROGRESSING AS EXPECTED  Patient's lungs are clear and diminished.

## 2019-06-11 NOTE — PROGRESS NOTES
Assumed care of pt at shift change. A/Ox3, reoriented pt situation. Pt talks in very low and soft voice and hard to understand, pt verbalize that he wanted to leave the hospital,  discussed plan of care. Pt verbalized understanding. Pt is on 3 liter oxygen.  Assisted pt to bedside commode. No BM so far. Denied pain. Admission record completed. PT/OT and SLP order place in based on admission assessment score. Educated pt the need for UA and sputum culture. Pt called to void. Assisted pt to edge of bed for urinal. Pt couldn't urinate. Pt called again and incontinence on bed. Cleaned pt . Unable to get UA for now. Educated pt to call next time he wants to void.  Pt verbalized understanding.     All needs met at this time. Bed in lowest position, treaded socks on, personal belongings and call light within reach, instructed to call for any assistance, hourly rounding in place.

## 2019-06-11 NOTE — ASSESSMENT & PLAN NOTE
Right lower lobe opacity, consistent with pneumonia, however no white count, procalc neg  Likely viral etiology hold off on antibiotics   CT chest/abd shows right-sided pneumonia and large pleural effusion, patient refused thoracentesis. repeat cxrshowed decreasing pleural effusion however now he has a right apical PTX. He is on 2L NC and stable. Will monitor  At this point continue supplemental oxygen and conservative management.    Repeat showing decreasing pneumothorax, monitoring closely, no coughing , however cxr that was repeat this morning can not exclude PNA, I will order procalc and cbc again to monitor and see if is bacterial PNA,    Guarded condition

## 2019-06-11 NOTE — H&P
Hospital Medicine History & Physical Note    Date of Service  6/10/2019    Primary Care Physician  Oscar JORGE A Dubois M.D.    Consultants  None    Code Status  Full Code    Chief Complaint  Fall    History of Presenting Illness  89 y.o. male who presented 6/10/2019 with fall.    I have reviewed some of the recent provider documentation available to me in the patient's medical chart.  Records are briefly summarized: Mr. Page has a history of movement disorder/possible Parkinson's disease.  He is treated with Sinemet.  During an office visit on 3/12/2019, it is noted that his memory is stable.    Patient speaks in a soft voice, he does not say much, it is very difficult to understand him during the interview.  He indicates that the pain in his right chest is tolerable, beyond that I am unable to elicit any history of present illness due to the patient's difficulties with communication.  His daughter is at the bedside, she indicates that he is generally easier to understand.  She says that he recently took a cruise to [place] and returned about a week ago.  Since that time the patient has been weaker than normal.  Distances with a walker and has had difficulty doing that.  She is noted him to be more fatigued than normal.  No cough, no fever.  Patient today had a fall, he landed on his right side and came to the emergency room for evaluation.     Review of Systems  Review of Systems   Unable to perform ROS: Mental status change       Past Medical History  Parkinson's disease    Surgical History   has no past surgical history on file.     Family History  family history includes Heart Disease in his father and mother.     Social History   reports that he has never smoked. He has never used smokeless tobacco. He reports that he drinks about 0.5 oz of alcohol per week . He reports that he does not use drugs.    Allergies  Allergies   Allergen Reactions   • Ultram [Tramadol Hcl]        Medications  Prior to Admission  Medications   Prescriptions Last Dose Informant Patient Reported? Taking?   aspirin (ASA) 81 MG CHEW 6/9/2019 at AM Family Member No No   Sig: Take 1 Tab by mouth every day.   carbidopa-levodopa (SINEMET)  MG Tab 6/9/2019 at PM Family Member No No   Sig: Take 1 Tab by mouth 3 times a day.   tamsulosin (FLOMAX) 0.4 MG capsule 6/9/2019 at PM Family Member No No   Sig: Take 1 Cap by mouth ONE-HALF HOUR AFTER BREAKFAST.      Facility-Administered Medications: None       Physical Exam  Temp:  [35.9 °C (96.6 °F)-36.1 °C (97 °F)] 36.1 °C (97 °F)  Pulse:  [70-92] 84  Resp:  [16-18] 16  BP: ()/(67-86) 97/67  SpO2:  [94 %-100 %] 96 %    Physical Exam   Constitutional: He appears well-developed and well-nourished.   Frail and cachectic   HENT:   Head: Normocephalic and atraumatic.   Eyes: Conjunctivae and EOM are normal. Right eye exhibits no discharge. Left eye exhibits no discharge.   Cardiovascular: Normal rate, regular rhythm and intact distal pulses.    No murmur heard.  2+ Radial Pulses  Brisk Capillary Refill   Pulmonary/Chest: Effort normal and breath sounds normal. No respiratory distress. He has no wheezes. He exhibits tenderness.   Right-sided crackles   Abdominal: Soft. Bowel sounds are normal. He exhibits no distension. There is no tenderness. There is no rebound.   Musculoskeletal: Normal range of motion. He exhibits no edema.   Neurological: No cranial nerve deficit.   Awake and cooperative  Hoarse voice   Skin: Skin is warm and dry. He is not diaphoretic. No erythema.   Skin is warm and well perfused       Laboratory:  Recent Labs      06/10/19   1000   WBC  9.7   RBC  4.03*   HEMOGLOBIN  13.0*   HEMATOCRIT  40.9*   MCV  101.5*   MCH  32.3   MCHC  31.8*   RDW  52.2*   PLATELETCT  240   MPV  9.0     Recent Labs      06/10/19   1000   SODIUM  141   POTASSIUM  4.7   CHLORIDE  108   CO2  23   GLUCOSE  80   BUN  31*   CREATININE  1.02   CALCIUM  9.4     Recent Labs      06/10/19   1000   ALTSGPT  11    ASTSGOT  29   ALKPHOSPHAT  40   TBILIRUBIN  1.0   LIPASE  43   GLUCOSE  80                 Recent Labs      06/10/19   1000   TROPONINI  0.01       Urinalysis:    No results found     Imaging:  Chest x-ray 6/10/2019, images and by me: Right sided opacity consistent with right lower lobe pneumonia    Assessment/Plan:  I anticipate this patient will require at least two midnights for appropriate medical management, necessitating inpatient admission.    * Pneumonia- (present on admission)   Assessment & Plan    Right lower lobe opacity, consistent with pneumonia  Treat for pneumonia with Unasyn and doxy     Parkinson's disease (HCC)- (present on admission)   Assessment & Plan    Patient is treated with Sinemet for probable Parkinson's disease  Mobility is severely limited  Continue sinemet  Patient's daughter indicates that the patient is generally much more oriented and communicative than he is today, palliative care consult, will be ideal for him to fill out a POLST  Patient already getting scheduled physical therapy at [facility]     Edema- (present on admission)   Assessment & Plan    Bilateral lower extremity edema  This is a new problem, he is never experienced this before  Check echocardiogram, check urine protein/creatinine ratio     Macrocytosis- (present on admission)   Assessment & Plan    Check B12 and folate level         VTE prophylaxis: lovenox

## 2019-06-11 NOTE — ASSESSMENT & PLAN NOTE
No hx of CKD, creatinine is back to baseline  Dehydration likely cause, large pleural effusion therefore I have DC'd his IV fluids  Avoid nephrotoxics, renally dose all meds     urine protein.cr ratio noted  DC Lovenox and add heparin for DVT prophylaxis instead    Ultrasound of renals have been negative  Creatinine is back to baseline

## 2019-06-11 NOTE — ASSESSMENT & PLAN NOTE
Bilateral lower extremity edema  This is a new problem, he is never experienced this before  Echo noted, normal EF 65% no DD  Labs monitored    resolved

## 2019-06-11 NOTE — ASSESSMENT & PLAN NOTE
Discussed in detail about goals of care and at this time he wishes to be DNR/DNI  DNR.DNI  Palliative consulted-as patient is high risk for aspiration according to speech evaluation however is declining any sort of feeding tube

## 2019-06-11 NOTE — CONSULTS
Palliative Care Social Work Assessment    Reason for PC Consult:  Advanced Care Planning  Consulted by: Rosendo Villegas MD    General/Reason for admission: Patient fell at his assisted living facility.  Pt has a history of Parkisons.      Affect: Alert and Oriented   Ability to cope: Coping well    Social: Good   Who lives in the home?: Pt lives at [facility] and has been there for a year.    Who is supportive of them?: Pt reports that he has a dtr, Nabila that resides here locally.  He also has another dtr, Isabell and son Albert that reside in [place].     Financial resources: Adequate   Social Security: Pt did not want to disclose his finances.     [place]: No      Caodaism/Spirituality:   Is Jainism or spirituality important for coping with this illness?   -     What type of christian? Religious   Attend any local churches? Pt reports that there is an in house Gnosticist at the facility.  He attends this twice a month.    Has a  or spiritual provider visit been requested?        Palliative Performance Scale:  60%    Advance Directive:  None on file, however pt reports that he does have one.    DPOA:  -  None on file, however pt reports that he does have one.   POLST:  -  None    Code Status:  -  Full Code    Outcome:  SW met with pt at bedside and went of the role of PC SW.  SW went over pt's values and beliefs.  Pt resides at [facility] and he is able to do care for himself, bathing, dressing, eating, and ambulating w/ FWW.  The facility assists with cooking meals and housekeeping.  Pt receives physical therapy at the facility twice a week and has not been to a SNF before.  Pt reports that this was his first incident where he has fallen. Pt reports that he fell three times and was not found by staff for 20 minutes. Pt stated that he has a   Pt is anxious to return back to the facility and his goal is to return there.  SW asked what is his mobility is like now, pt reports that he has only been in the bed.  Pt believes that he  "\"over extended himself\" after coming back arriving back from his [place] [place].  He stated that his trip plus staying up late has fatigued him more than normal.    Pt reports that his daughter is supportive of him and she sees him once a week for coffee and shopping.     Pt stated that he has an advanced directive.  Pt reports that his daughter, Nabila has a copy and provided consent for SW to speak to dtr. He has named her as his DPOA.  SW went over the POLST form and code status with the pt.  Pt stated that code status is a part of the documents that Nabila has copies of.  Pt not willing to complete POLST at this time. SW contacted Nabila, she will email copies of the AD and thinks that Code Status might be included in the pt's file.  SW will upload documents once received.  If there is no POLST form, SW will re-attempt with pt in completing one.    Nabila stated that the pt was diagnosed with Parkinsons four years ago.  She has seen a decline in his functional mobility throughout the years.  Pt is described as being slower moving.  Pt's facility is able to provide more care to the pt as needed.      What referrals were made?: None   Resources provided?: None  Follow up (if needed): AD and POLST   Discussed w/ PRISCILLA Montgomery    Thank you for allowing Palliative Care to participate in this patient's care. Please feel free to call [phone] with any questions or concerns.    "

## 2019-06-11 NOTE — PROGRESS NOTES
· 2 RN skin check complete.  FITO Welch.   · Devices in place: NC, PIV.  · Skin assessed under devices: .  · Confirmed pressure ulcers found on: NA.  · New potential pressure ulcers noted on NA.   · The following skin issue noted: Ears and red and blanching. calluses noted. Ear pad ordered. Bruising noted on left arm. Scabbed noted on left forearm. New dressing applied. Picture taken and uploaded. Bruising noted noted on right arm.  Red mira/skin tear noted on back from possibly fall. Picture taken. Pillow used for repositioning.   · The following interventions in place:  Q2 turn. Douglas cream. Waffle overlay applied. Keep dry and clean. Frequently toilet pt.

## 2019-06-11 NOTE — THERAPY
"Physical Therapy Evaluation completed.   Bed Mobility:  Supine to Sit: Maximal Assist  Transfers: Sit to Stand: Maximal Assist  Gait: Level Of Assist: Unable to Participate   Discharge Recommendations: Equipment: Will Continue to Assess for Equipment Needs. Post-acute therapy Discharge to a transitional care facility for continued skilled therapy services.    Pt is a 89 year old male admitted to the hospital for R lateral rib pain and multiple GLF at home. No acute fractures found, however, pt found to have PNA. Pt also with history of Parkinson's. Pt lives in senior care but was independent with all mobility and ADL's. At time of initial evaluation, pt required maximal assist for all mobility tasks. Once seated EOB, significant posterior lean and overall extremely rigid. Pt with B UE and LE weakness, grossly assessed at 4/5. Pt required maximal assist for sit to stand. Again, pt with significant posterior lean in standing and unable to correct posture. Pt was unable to take steps at EOB due to balance and strength impairments. Pt would benefit from skilled PT intervention while in the acute care to address the listed deficits. If [facility] is able to provide 24/7 assist for mobility and ADL's upon DC, pt can likely return, otherwise, pt will required post acute transitional care upon DC    See \"Rehab Therapy-Acute\" Patient Summary Report for complete documentation.     "

## 2019-06-11 NOTE — THERAPY
"Speech Language Therapy Clinical Swallow Evaluation completed.    Functional Status: Pt was AAOx3, with confusion to date only.  He was very soft spoken, with delayed speech, however appropriate in conversation.  Pt noted to have slight tremor in lower lip, with intermittent drooling noted.  He had tremors in BUE, with difficulty noted while self feeding and grasping feeding utensils.  Pt declining help with feeding, but requested assistance setting up his tray.  Pt was seen with a regular meal tray at lunch.  He consumed soft solids, dry solids and thin liquids via straw sip without any overt s/sx of aspiration.  Mastication was very prolonged with dry solids (hard roll), and pt had increased drooling.  Pt requested finger foods, such as sandwiches, for lunch only, discussed with RD.  Recommend to downgrade diet to dysphagia III with thin liquids.  Pt may benefit from built up hand  to assist with self feeding, but needs direct feeding assistance nevertheless.  He verbalized understanding of SLP recs, swallow precautions and s/sx of aspiration.  SLP continues to follow.      Recommendations - Diet: Thin Liquid, Dysphagia III                          Strategies: Direct supervision during meals, Assistance needed for meal tray set-up and Head of Bed at 90 Degrees                          Medication Administration:  Whole with Liquid Wash    Plan of Care: Will benefit from Speech Therapy 3 times per week    Post-Acute Therapy: Recommend inpatient transitional care services for continued speech therapy services.      See \"Rehab Therapy-Acute\" Patient Summary Report for complete documentation.  Thank you for the consult.      "

## 2019-06-11 NOTE — CARE PLAN
Problem: Safety  Goal: Will remain free from falls  Outcome: PROGRESSING AS EXPECTED  Bed alarm in place. Bed in lowest position, treaded socks on, personal belongings and call light within reach, instructed to call for any assistance, hourly rounding in place.    Problem: Infection  Goal: Will remain free from infection  Outcome: PROGRESSING AS EXPECTED  IV Unasyn in place for Pheumonia    Problem: Knowledge Deficit  Goal: Knowledge of disease process/condition, treatment plan, diagnostic tests, and medications will improve  Outcome: PROGRESSING AS EXPECTED  Educated pt about pneumonia and IV Unasyn. Pt verbalized understanding.

## 2019-06-11 NOTE — PROGRESS NOTES
2 RN skin check complete with Mo PAYTON.   Devices in place oxygen tubing nasal cannula.  Skin assessed under devices yes.  Confirmed pressure ulcers found on N/A.  New potential pressure ulcers noted on N/A.   The following interventions in place pillows used for support, q 2 turns, pt is encouraged to reposition.     Pt's skin is red and fragile on sacrum but blanching, elbows are blanching, there is a small skin tear/scab area on left ear. On the right back/upper torso area there is a red skin tear from fall. Skin tear area is open and red. Will rinse with normal saline

## 2019-06-12 ENCOUNTER — APPOINTMENT (OUTPATIENT)
Dept: RADIOLOGY | Facility: MEDICAL CENTER | Age: 84
DRG: 177 | End: 2019-06-12
Attending: HOSPITALIST
Payer: MEDICARE

## 2019-06-12 LAB
BASOPHILS # BLD AUTO: 0.6 % (ref 0–1.8)
BASOPHILS # BLD: 0.04 K/UL (ref 0–0.12)
EOSINOPHIL # BLD AUTO: 0.12 K/UL (ref 0–0.51)
EOSINOPHIL NFR BLD: 1.7 % (ref 0–6.9)
ERYTHROCYTE [DISTWIDTH] IN BLOOD BY AUTOMATED COUNT: 51.4 FL (ref 35.9–50)
FLUAV RNA SPEC QL NAA+PROBE: NEGATIVE
FLUBV RNA SPEC QL NAA+PROBE: NEGATIVE
HCT VFR BLD AUTO: 35.2 % (ref 42–52)
HGB BLD-MCNC: 11.5 G/DL (ref 14–18)
IMM GRANULOCYTES # BLD AUTO: 0.02 K/UL (ref 0–0.11)
IMM GRANULOCYTES NFR BLD AUTO: 0.3 % (ref 0–0.9)
LYMPHOCYTES # BLD AUTO: 0.31 K/UL (ref 1–4.8)
LYMPHOCYTES NFR BLD: 4.5 % (ref 22–41)
MCH RBC QN AUTO: 33 PG (ref 27–33)
MCHC RBC AUTO-ENTMCNC: 32.7 G/DL (ref 33.7–35.3)
MCV RBC AUTO: 100.9 FL (ref 81.4–97.8)
MONOCYTES # BLD AUTO: 0.65 K/UL (ref 0–0.85)
MONOCYTES NFR BLD AUTO: 9.5 % (ref 0–13.4)
NEUTROPHILS # BLD AUTO: 5.72 K/UL (ref 1.82–7.42)
NEUTROPHILS NFR BLD: 83.4 % (ref 44–72)
NRBC # BLD AUTO: 0 K/UL
NRBC BLD-RTO: 0 /100 WBC
PLATELET # BLD AUTO: 203 K/UL (ref 164–446)
PMV BLD AUTO: 9.1 FL (ref 9–12.9)
PROCALCITONIN SERPL-MCNC: 0.06 NG/ML
RBC # BLD AUTO: 3.49 M/UL (ref 4.7–6.1)
WBC # BLD AUTO: 6.9 K/UL (ref 4.8–10.8)

## 2019-06-12 PROCEDURE — 700111 HCHG RX REV CODE 636 W/ 250 OVERRIDE (IP): Performed by: HOSPITALIST

## 2019-06-12 PROCEDURE — 99233 SBSQ HOSP IP/OBS HIGH 50: CPT | Performed by: HOSPITALIST

## 2019-06-12 PROCEDURE — A9270 NON-COVERED ITEM OR SERVICE: HCPCS | Performed by: HOSPITALIST

## 2019-06-12 PROCEDURE — 94760 N-INVAS EAR/PLS OXIMETRY 1: CPT

## 2019-06-12 PROCEDURE — 770006 HCHG ROOM/CARE - MED/SURG/GYN SEMI*

## 2019-06-12 PROCEDURE — 87502 INFLUENZA DNA AMP PROBE: CPT

## 2019-06-12 PROCEDURE — 84145 PROCALCITONIN (PCT): CPT

## 2019-06-12 PROCEDURE — 85025 COMPLETE CBC W/AUTO DIFF WBC: CPT

## 2019-06-12 PROCEDURE — 74176 CT ABD & PELVIS W/O CONTRAST: CPT

## 2019-06-12 PROCEDURE — 700102 HCHG RX REV CODE 250 W/ 637 OVERRIDE(OP): Performed by: HOSPITALIST

## 2019-06-12 PROCEDURE — 94668 MNPJ CHEST WALL SBSQ: CPT

## 2019-06-12 PROCEDURE — 51798 US URINE CAPACITY MEASURE: CPT

## 2019-06-12 PROCEDURE — 36415 COLL VENOUS BLD VENIPUNCTURE: CPT

## 2019-06-12 RX ADMIN — ASPIRIN 81 MG 81 MG: 81 TABLET ORAL at 06:13

## 2019-06-12 RX ADMIN — SENNOSIDES,DOCUSATE SODIUM 2 TABLET: 8.6; 5 TABLET, FILM COATED ORAL at 06:13

## 2019-06-12 RX ADMIN — CARBIDOPA AND LEVODOPA 1 TABLET: 25; 100 TABLET ORAL at 18:16

## 2019-06-12 RX ADMIN — ENOXAPARIN SODIUM 30 MG: 100 INJECTION SUBCUTANEOUS at 06:13

## 2019-06-12 RX ADMIN — CARBIDOPA AND LEVODOPA 1 TABLET: 25; 100 TABLET ORAL at 13:27

## 2019-06-12 RX ADMIN — POLYETHYLENE GLYCOL 3350 1 PACKET: 17 POWDER, FOR SOLUTION ORAL at 18:15

## 2019-06-12 RX ADMIN — DOXYCYCLINE 100 MG: 100 TABLET, FILM COATED ORAL at 06:13

## 2019-06-12 RX ADMIN — TAMSULOSIN HYDROCHLORIDE 0.4 MG: 0.4 CAPSULE ORAL at 18:16

## 2019-06-12 RX ADMIN — CARBIDOPA AND LEVODOPA 1 TABLET: 25; 100 TABLET ORAL at 06:13

## 2019-06-12 RX ADMIN — DOXYCYCLINE 100 MG: 100 TABLET, FILM COATED ORAL at 18:15

## 2019-06-12 ASSESSMENT — LIFESTYLE VARIABLES: SUBSTANCE_ABUSE: 0

## 2019-06-12 ASSESSMENT — ENCOUNTER SYMPTOMS
EYE PAIN: 0
BACK PAIN: 1
PALPITATIONS: 0
DIZZINESS: 0
HEADACHES: 0
SPUTUM PRODUCTION: 1
ABDOMINAL PAIN: 0
MYALGIAS: 1
WEIGHT LOSS: 1
CHILLS: 1
FEVER: 1
FALLS: 1
COUGH: 1
HALLUCINATIONS: 0
SHORTNESS OF BREATH: 1
PHOTOPHOBIA: 0
HEMOPTYSIS: 0
ORTHOPNEA: 0
VOMITING: 0
DEPRESSION: 0
WEAKNESS: 1
NAUSEA: 0
DIAPHORESIS: 0
HEARTBURN: 0

## 2019-06-12 NOTE — CARE PLAN
Problem: Safety  Goal: Will remain free from falls  Outcome: PROGRESSING AS EXPECTED  Bed alarm in place. Bed in lowest position, treaded socks on, personal belongings and call light within reach, instructed to call for any assistance, hourly rounding in place.    Problem: Venous Thromboembolism (VTW)/Deep Vein Thrombosis (DVT) Prevention:  Goal: Patient will participate in Venous Thrombosis (VTE)/Deep Vein Thrombosis (DVT)Prevention Measures  Outcome: PROGRESSING AS EXPECTED  Lovenox in place.     Problem: Pain Management  Goal: Pain level will decrease to patient's comfort goal  Outcome: PROGRESSING AS EXPECTED  Denied pain

## 2019-06-12 NOTE — PROGRESS NOTES
On call physician Dr Ortiz paged to give updates of the progressive confusion. Orders to keep reorienting and reassuring pt.

## 2019-06-12 NOTE — PROGRESS NOTES
RN paged MD to update on CT results. MD will enter orders into epic. Per MD RN can order a bladder scan

## 2019-06-12 NOTE — PROGRESS NOTES
"Pt woke up around 05:30 very agitated. Pt is alert and oriented x2. Reoriented to place and situation. Pt is confused and trying to get out of bed. Education provided. Pt started getting aggressive to CNA. Talked to pt. Pt stated that ''I need to get out of here\" and moved his legs off the bed. Reoriented pt that he is in the hospital.  Pt still tried to get out of bed. On call physician paged. No response. Contacted daughter Nabila. Daughter talked with pt. Pt told daughter throughout the phone '' Call the police. I am being held hostage. Daughter explained situation to pt. Pt is calmer.  This RN talked with pt and explained plan of care again and reoriented pt again. Pt is finally calm for now. Decided not page physician second time.   "

## 2019-06-12 NOTE — PROGRESS NOTES
2 RN skin check completed with FITO Myers.   Devices in place N/A.  Skin assessed under devices yes .  Confirmed pressure ulcers found on N/A.  New potential pressure ulcers noted on N/A. Wound consult placed N/A.  The following interventions in place: barrier cream, Q2h turns, waffle overlay mattress, frequent checks for incontinence.           Pt has the following:   Bilateral ears are red and blanching  bruising, fragile, and generalized scabbing to BUE  R side rib cage has a large purple/yellow bruised area from fall   Bilateral heels are pink and blanching  Sacrum is pink and blanching

## 2019-06-12 NOTE — PROGRESS NOTES
A/Ox2, reoriented pt to place and situation. Pt kept saying that he needed to get to a party. Told pt that he is in the hospital. No evidence of learning.  Pt think that he is in the [facility] later when asked orientation questions.  This RN entered pt room after hearing the bed alarm around midnight.  Pt's bed was high in the air. Education provided about safety risks. No evidence of learning. Bed locked. Reinforcement needed.  Pt is agitated and kept pushing call light until 01:30 am this morning. Pt continued to push call light when this RN is in bathroom.  Asked pt what we can do to help him. Pt answered that '' I want to get out of here. I am in the nursing home. I am captured, you cannot keep me here''.  Education provided that he is in the hospital being treated for pneumonia.  Reinforment needed. Pt has parkinson's. No dementia history noted, but symptoms are close to sundowning. Pt fell into sleep around 02:00 in the morning.       Bed alarm in place. Bed in lowest position, treaded socks on, personal belongings and call light within reach, instructed to call for any assistance, hourly rounding in place.

## 2019-06-12 NOTE — PROGRESS NOTES
· 2 RN skin check complete with FITO Welch.   · Devices in place: NC, PIV.  · Skin assessed under devices: .  · Confirmed pressure ulcers found on: NA.  · New potential pressure ulcers noted on NA.   · The following skin issue noted: Ears and red and blanching. calluses noted. Bruising noted on left arm. Scabbed noted on left forearm. New dressing applied. Bruising noted noted on right arm.  Red mira/skin tear noted on back from possibly fall. Large area of bruising noted from back to left lateral side of chest. Heels are pinkish red and blanching. Pillows used for elevating.   · The following interventions in place:  Q2 turn. Douglas cream. Waffle overlay applied. Keep dry and clean. Frequently toilet pt.

## 2019-06-12 NOTE — PROGRESS NOTES
Assumed care of pt this am. Pt is A&O x4. Pt reports being tired this am. Temperature rechecked this am. Pt denies chills. Family at bedside. Hourly rounding in place.

## 2019-06-12 NOTE — PROGRESS NOTES
· 2 RN skin check complete with FITO Welch.   · Devices in place: NC, PIV.  · Skin assessed under devices: .  · Confirmed pressure ulcers found on: NA.  · New potential pressure ulcers noted on NA.   · The following skin issue noted: Ears and red and blanching. calluses noted. Bruising noted on left arm. Scabbed noted on left forearm. New dressing applied. Bruising noted noted on right arm.  Red mira/skin damage noted on back from possibly fall. Large area of purple bruising noted right side rib cage. Heels are pinkish red and blanching. Pillows used for elevating.   The following interventions in place:  Q2 turn. Douglas cream. Waffle overlay applied. Keep dry and clean. Frequently toilet pt.

## 2019-06-12 NOTE — RESPIRATORY CARE
COPD EDUCATION by COPD CLINICAL EDUCATOR  6/12/2019 at 7:35 AM by Cassi Page     Patient reviewed by COPD education team. Patient does not qualify for the COPD program.

## 2019-06-12 NOTE — CARE PLAN
Problem: Communication  Goal: The ability to communicate needs accurately and effectively will improve  Outcome: PROGRESSING AS EXPECTED  Pt encouraged to voice needs and concerns, pt verbalized understanding.     Problem: Safety  Goal: Will remain free from injury  Outcome: PROGRESSING AS EXPECTED  Pt is in a room near the nursing station. Bed alarm in use. Pt educated to call for assistance.

## 2019-06-12 NOTE — THERAPY
"Occupational Therapy Evaluation completed.   Functional Status:  Min A for sit>stand, grooming while seated; mod A for LB dressing and sit>supine  Plan of Care: Will benefit from Occupational Therapy 3 times per week  Discharge Recommendations:  Equipment: Will Continue to Assess for Equipment Needs. Recommend inpatient transitional care services for continued occupational therapy services.     See \"Rehab Therapy-Acute\" Patient Summary Report for complete documentation.    OT eval completed on 89 YO M admitted with multiple GLFs. Pt with decreased activity tolerance, standing balance, standing tolerance, poor problem solving and cognition. Pt with BUE tremors and required increased time to coordinate tasks. Dtr present and able to confirm PLOF. Pt would benefit from inpatient transitional care stay prior to d/c home. Will continue to follow for acute OT services while in-house.   "

## 2019-06-13 ENCOUNTER — APPOINTMENT (OUTPATIENT)
Dept: RADIOLOGY | Facility: MEDICAL CENTER | Age: 84
DRG: 177 | End: 2019-06-13
Attending: HOSPITALIST
Payer: MEDICARE

## 2019-06-13 PROBLEM — R47.02 DYSPHASIA: Status: ACTIVE | Noted: 2019-06-13

## 2019-06-13 LAB
ANION GAP SERPL CALC-SCNC: 7 MMOL/L (ref 0–11.9)
BASOPHILS # BLD AUTO: 0.3 % (ref 0–1.8)
BASOPHILS # BLD: 0.02 K/UL (ref 0–0.12)
BUN SERPL-MCNC: 36 MG/DL (ref 8–22)
CALCIUM SERPL-MCNC: 8.7 MG/DL (ref 8.5–10.5)
CHLORIDE SERPL-SCNC: 105 MMOL/L (ref 96–112)
CO2 SERPL-SCNC: 27 MMOL/L (ref 20–33)
CREAT SERPL-MCNC: 1.24 MG/DL (ref 0.5–1.4)
EOSINOPHIL # BLD AUTO: 0.34 K/UL (ref 0–0.51)
EOSINOPHIL NFR BLD: 5.8 % (ref 0–6.9)
ERYTHROCYTE [DISTWIDTH] IN BLOOD BY AUTOMATED COUNT: 51.9 FL (ref 35.9–50)
GLUCOSE SERPL-MCNC: 95 MG/DL (ref 65–99)
HCT VFR BLD AUTO: 30.9 % (ref 42–52)
HGB BLD-MCNC: 10 G/DL (ref 14–18)
IMM GRANULOCYTES # BLD AUTO: 0.02 K/UL (ref 0–0.11)
IMM GRANULOCYTES NFR BLD AUTO: 0.3 % (ref 0–0.9)
INR PPP: 1.11 (ref 0.87–1.13)
LYMPHOCYTES # BLD AUTO: 0.44 K/UL (ref 1–4.8)
LYMPHOCYTES NFR BLD: 7.5 % (ref 22–41)
MCH RBC QN AUTO: 32.8 PG (ref 27–33)
MCHC RBC AUTO-ENTMCNC: 32.4 G/DL (ref 33.7–35.3)
MCV RBC AUTO: 101.3 FL (ref 81.4–97.8)
MONOCYTES # BLD AUTO: 0.77 K/UL (ref 0–0.85)
MONOCYTES NFR BLD AUTO: 13.1 % (ref 0–13.4)
NEUTROPHILS # BLD AUTO: 4.3 K/UL (ref 1.82–7.42)
NEUTROPHILS NFR BLD: 73 % (ref 44–72)
NRBC # BLD AUTO: 0 K/UL
NRBC BLD-RTO: 0 /100 WBC
PLATELET # BLD AUTO: 190 K/UL (ref 164–446)
PMV BLD AUTO: 9.6 FL (ref 9–12.9)
POTASSIUM SERPL-SCNC: 3.8 MMOL/L (ref 3.6–5.5)
PROTHROMBIN TIME: 14.6 SEC (ref 12–14.6)
RBC # BLD AUTO: 3.05 M/UL (ref 4.7–6.1)
SODIUM SERPL-SCNC: 139 MMOL/L (ref 135–145)
WBC # BLD AUTO: 5.9 K/UL (ref 4.8–10.8)

## 2019-06-13 PROCEDURE — 76775 US EXAM ABDO BACK WALL LIM: CPT

## 2019-06-13 PROCEDURE — 85025 COMPLETE CBC W/AUTO DIFF WBC: CPT

## 2019-06-13 PROCEDURE — 700102 HCHG RX REV CODE 250 W/ 637 OVERRIDE(OP): Performed by: HOSPITALIST

## 2019-06-13 PROCEDURE — 80048 BASIC METABOLIC PNL TOTAL CA: CPT

## 2019-06-13 PROCEDURE — 770006 HCHG ROOM/CARE - MED/SURG/GYN SEMI*

## 2019-06-13 PROCEDURE — 94668 MNPJ CHEST WALL SBSQ: CPT

## 2019-06-13 PROCEDURE — A9270 NON-COVERED ITEM OR SERVICE: HCPCS | Performed by: HOSPITALIST

## 2019-06-13 PROCEDURE — 99233 SBSQ HOSP IP/OBS HIGH 50: CPT | Performed by: HOSPITALIST

## 2019-06-13 PROCEDURE — 94760 N-INVAS EAR/PLS OXIMETRY 1: CPT

## 2019-06-13 PROCEDURE — 85610 PROTHROMBIN TIME: CPT

## 2019-06-13 PROCEDURE — 92526 ORAL FUNCTION THERAPY: CPT

## 2019-06-13 PROCEDURE — 700111 HCHG RX REV CODE 636 W/ 250 OVERRIDE (IP): Performed by: HOSPITALIST

## 2019-06-13 PROCEDURE — 36415 COLL VENOUS BLD VENIPUNCTURE: CPT

## 2019-06-13 RX ORDER — HEPARIN SODIUM 5000 [USP'U]/ML
5000 INJECTION, SOLUTION INTRAVENOUS; SUBCUTANEOUS EVERY 8 HOURS
Status: DISCONTINUED | OUTPATIENT
Start: 2019-06-14 | End: 2019-06-20 | Stop reason: HOSPADM

## 2019-06-13 RX ADMIN — CARBIDOPA AND LEVODOPA 1 TABLET: 25; 100 TABLET ORAL at 17:15

## 2019-06-13 RX ADMIN — DOXYCYCLINE 100 MG: 100 TABLET, FILM COATED ORAL at 17:15

## 2019-06-13 RX ADMIN — DOXYCYCLINE 100 MG: 100 TABLET, FILM COATED ORAL at 05:54

## 2019-06-13 RX ADMIN — CARBIDOPA AND LEVODOPA 1 TABLET: 25; 100 TABLET ORAL at 12:02

## 2019-06-13 RX ADMIN — ENOXAPARIN SODIUM 30 MG: 100 INJECTION SUBCUTANEOUS at 05:54

## 2019-06-13 RX ADMIN — TAMSULOSIN HYDROCHLORIDE 0.4 MG: 0.4 CAPSULE ORAL at 17:15

## 2019-06-13 RX ADMIN — ASPIRIN 81 MG 81 MG: 81 TABLET ORAL at 05:54

## 2019-06-13 RX ADMIN — MAGNESIUM HYDROXIDE 30 ML: 400 SUSPENSION ORAL at 05:54

## 2019-06-13 RX ADMIN — SENNOSIDES,DOCUSATE SODIUM 2 TABLET: 8.6; 5 TABLET, FILM COATED ORAL at 05:54

## 2019-06-13 RX ADMIN — SENNOSIDES,DOCUSATE SODIUM 2 TABLET: 8.6; 5 TABLET, FILM COATED ORAL at 17:15

## 2019-06-13 RX ADMIN — CARBIDOPA AND LEVODOPA 1 TABLET: 25; 100 TABLET ORAL at 05:54

## 2019-06-13 ASSESSMENT — ENCOUNTER SYMPTOMS
PALPITATIONS: 0
COUGH: 1
MYALGIAS: 1
DIAPHORESIS: 0
ORTHOPNEA: 0
DEPRESSION: 0
VOMITING: 0
SPUTUM PRODUCTION: 1
PHOTOPHOBIA: 0
SHORTNESS OF BREATH: 1
ABDOMINAL PAIN: 0
WEIGHT LOSS: 1
HEADACHES: 0
BACK PAIN: 1
HEARTBURN: 0
NAUSEA: 0
WEAKNESS: 1
HALLUCINATIONS: 0
FEVER: 1
FALLS: 1
HEMOPTYSIS: 0
CHILLS: 1
DIZZINESS: 0
EYE PAIN: 0

## 2019-06-13 ASSESSMENT — LIFESTYLE VARIABLES: SUBSTANCE_ABUSE: 0

## 2019-06-13 NOTE — ASSESSMENT & PLAN NOTE
As noted by speech evaluation today.  He is unable to eat thin liquids and will be placed on a dysphagia diet however speech therapist is still concerned about aspiration   Fees study noted   After having a discussion with patient for possible core track versus feeding tube patient adamantly refused.  Speech has made recommendations moving forward with his diet even after discharge  Palliative care consulted

## 2019-06-13 NOTE — PROGRESS NOTES
[facility] Medicine Daily Progress Note    Date of Service  6/13/2019    Chief Complaint  89 y.o. male admitted 6/10/2019 with fall, PNA    Hospital Course    89 y.o. male who presented 6/10/2019 with fall.     I have reviewed some of the recent provider documentation available to me in the patient's medical chart.  Records are briefly summarized: Mr. Page has a history of movement disorder/possible Parkinson's disease.  He is treated with Sinemet.  During an office visit on 3/12/2019, it is noted that his memory is stable.     Patient speaks in a soft voice, he does not say much, it is very difficult to understand him during the interview.  He indicates that the pain in his right chest is tolerable, beyond that I am unable to elicit any history of present illness due to the patient's difficulties with communication.  His daughter is at the bedside, she indicates that he is generally easier to understand.  She says that he recently took a cruise to [place] and returned about a week ago.  Since that time the patient has been weaker than normal.  Distances with a walker and has had difficulty doing that.  She is noted him to be more fatigued than normal.  No cough, no fever.  Patient today had a fall, he landed on his right side and came to the emergency room for evaluation.       Interval Problem Update  Seen and examined AAOx3, wants to sit up on the chair next to bed, + SOB, + coughing, +_ body aches  Otherwise now chest pains    6/12: seen and examined this morning, daughter at bedside, patient did not sleep well last night because of all the disturbance from being near the nurses station. He was a bit agitated as well overnight. Pulling out lines. However this morning he is calm and appropriate. AAOx3  C/o right sided pain where he fell, has a bruise      6/13: Patient seen and examined this morning he is hard of hearing no family is at bedside.  Patient continues to complain of shortness of breath and  discomfort.  At this time I discussed his CT chest findings of large pleural effusion and needing a ultrasound-guided thoracentesis and patient is agreeable.  Speech evaluated patient and is concerned about aspiration.  The did put him on a dysphagia diet however we will order a fees study to further evaluate.  I had a discussion with the patient and he absolutely does not want any sort of feeding tube even a core track.  Consultants/Specialty  none    Code Status  DNR    Disposition  Likely needs SNF    Review of Systems  Review of Systems   Constitutional: Positive for chills, fever, malaise/fatigue and weight loss. Negative for diaphoresis.   HENT: Negative for congestion, ear pain, hearing loss and tinnitus.    Eyes: Negative for photophobia and pain.   Respiratory: Positive for cough, sputum production and shortness of breath. Negative for hemoptysis.    Cardiovascular: Negative for chest pain, palpitations, orthopnea and leg swelling.   Gastrointestinal: Negative for abdominal pain, heartburn, nausea and vomiting.   Genitourinary: Negative for dysuria, hematuria and urgency.   Musculoskeletal: Positive for back pain, falls and myalgias.   Skin: Negative for itching and rash.   Neurological: Positive for weakness. Negative for dizziness and headaches.   Psychiatric/Behavioral: Negative for depression, hallucinations, substance abuse and suicidal ideas.        Physical Exam  Temp:  [36.7 °C (98 °F)-37 °C (98.6 °F)] 36.8 °C (98.3 °F)  Pulse:  [60-85] 76  Resp:  [16-18] 16  BP: (113-123)/(57-77) 113/57  SpO2:  [91 %-96 %] 95 %    Physical Exam   Constitutional: He is oriented to person, place, and time. He appears well-developed. No distress.   fraile   HENT:   Head: Normocephalic and atraumatic.   Mouth/Throat: Oropharynx is clear and moist. No oropharyngeal exudate.   Eyes: Pupils are equal, round, and reactive to light. Conjunctivae and EOM are normal. No scleral icterus.   Neck: Normal range of motion. Neck  supple. No JVD present. No thyromegaly present.   Cardiovascular: Normal rate, regular rhythm and intact distal pulses.    No murmur heard.  Pulmonary/Chest: Effort normal and breath sounds normal. No respiratory distress. He has no wheezes. He has no rales.   Diminished breath sounds b/l   Abdominal: Soft. Bowel sounds are normal. He exhibits no distension. There is no tenderness. There is no rebound.   Right sided flank and abd bruising from his recent fall   Musculoskeletal: Normal range of motion. He exhibits edema. He exhibits no tenderness.   Lymphadenopathy:     He has no cervical adenopathy.   Neurological: He is alert and oriented to person, place, and time. He exhibits normal muscle tone.   Skin: Skin is warm and dry. No erythema.   Psychiatric: He has a normal mood and affect. His behavior is normal.       Fluids    Intake/Output Summary (Last 24 hours) at 06/13/19 1422  Last data filed at 06/13/19 1000   Gross per 24 hour   Intake              440 ml   Output             1800 ml   Net            -1360 ml       Laboratory  Recent Labs      06/11/19   0221  06/12/19   1523  06/13/19   0850   WBC  8.0  6.9  5.9   RBC  3.50*  3.49*  3.05*   HEMOGLOBIN  11.6*  11.5*  10.0*   HEMATOCRIT  35.7*  35.2*  30.9*   MCV  102.0*  100.9*  101.3*   MCH  33.1*  33.0  32.8   MCHC  32.5*  32.7*  32.4*   RDW  50.7*  51.4*  51.9*   PLATELETCT  229  203  190   MPV  9.5  9.1  9.6     Recent Labs      06/11/19   0221  06/13/19   0850   SODIUM  139  139   POTASSIUM  3.6  3.8   CHLORIDE  104  105   CO2  23  27   GLUCOSE  110*  95   BUN  31*  36*   CREATININE  1.15  1.24   CALCIUM  8.9  8.7     Recent Labs      06/13/19   0850   INR  1.11               Imaging  CT-CHEST,ABDOMEN,PELVIS W/O   Final Result      1.  Acute fractures of the posterior aspects of the right 8th through 10th ribs.      2.  Large right pleural effusion. Hemothorax cannot be excluded. No pneumothorax is present.      3.  Small left pleural effusion.      4.   Bibasilar atelectasis or pneumonia, right greater than left.      5.  No acute posttraumatic change identified in the abdomen or pelvis.      6.  Markedly distended bladder. Enlarged prostate gland.      EC-ECHOCARDIOGRAM COMPLETE W/O CONT   Final Result      DX-CHEST-LIMITED (1 VIEW)   Final Result      Right basilar opacity likely represents pneumonia.      Trace right pleural effusion is not excluded.      Atherosclerotic plaque.      Prominent scoliosis.      If clinical suspicion for rib fracture is high, further evaluation with CT is recommended.         US-THORACENTESIS PUNCTURE RIGHT    (Results Pending)        Assessment/Plan  * Acute respiratory failure with hypoxia (HCC)   Assessment & Plan    Persistent; Likely 2/2 PNA and large right pleural effusion as seen on chest CT  Likely viral etiology as there is no white count and procalcitonin is negative  I have ordered a ultrasound-guided thoracentesis  Supplemental O2  DNR/DNI  Monitor closely  Influenza screen is negative    Echo was noted normal     Pneumonia- (present on admission)   Assessment & Plan    Right lower lobe opacity, consistent with pneumonia, however no white count, procalc neg  Likely viral etiology hold off on antibiotics   CT chest/abd shows right-sided pneumonia and large pleural effusion  I will order a ultrasound-guided thoracentesis on the right side at this point       Parkinson's disease (HCC)- (present on admission)   Assessment & Plan    Patient is treated with Sinemet for probable Parkinson's disease  Mobility is severely limited, came from prison  Continue sinemet  GOC discussed, wishes are DNR   Patient already getting scheduled physical therapy at [facility], however PT evaluated patient and recommended 24/7 assistance, will place referral for SNF     Dysphasia   Assessment & Plan    As noted by speech evaluation today.  He is unable to eat thin liquids and will be placed on a dysphagia diet however speech therapist  is still concerned about aspiration therefore we will order a fees study.  After having a discussion with patient for possible core track versus feeding tube patient adamantly refused.  I will consult palliative care to see if patient wants to go comfort care route     CARLA (acute kidney injury) (HCC)   Assessment & Plan    No hx of CKD, creatinine elevated, and persistently rising  Dehydration likely cause, large pleural effusion therefore I have DC'd his IV fluids  Avoid nephrotoxics, renally dose all meds  BP showing increased creatinine this morning 1.24   urine protein.cr ratio noted  DC Lovenox and add heparin for DVT prophylaxis instead    If creatinine continues to rise I will consider a nephrology consult however at this point I will order ultrasound of the renals.       Advanced care planning/counseling discussion   Assessment & Plan    Discussed in detail about goals of care and at this time he wishes to be DNR/DNI  DNR.DNI  Palliative consulted-as patient is high risk for aspiration according to speech evaluation however is declining any sort of feeding tube     Edema- (present on admission)   Assessment & Plan    Bilateral lower extremity edema  This is a new problem, he is never experienced this before  Echo noted, normal EF 65% no DD  Labs monitored    resolved     Macrocytosis- (present on admission)   Assessment & Plan    b12 and folate normal, nutrition consulted        This patient has high medical complexity conditions at this time and increased risk of deterioration clinically  We will have to monitor him closely  VTE prophylaxis: lovenox

## 2019-06-13 NOTE — DISCHARGE PLANNING
Anticipated Discharge Disposition: Skilled    Action: SW spoke to patient and his daughterNabila about d/c planning.  Patient is wanting to leave [facility] tomorrow if possible.  SW went over recommendations of PT/OT with them and discussed skilled facilities.  SW provide a SNF Choice form for them to look at.  SW stated he will follow up with them.  Patient is wanting to get out of his bed.  SW spoke to patient's nurse and requested that the patient to sit his a chair.     Barriers to Discharge: Choice    Plan: follow up with patient/daughter on choice

## 2019-06-13 NOTE — CARE PLAN
Problem: Oxygenation:  Goal: Maintain adequate oxygenation dependent on patient condition  1.5 lpm nc     Problem: Hyperinflation:  Goal: Prevent or improve atelectasis  PEP CJS=[id number]

## 2019-06-13 NOTE — THERAPY
"Speech Language Therapy dysphagia treatment completed.     Functional Status:  Pt was seen at breakfast with a dysphagia III/thin liquid meal tray.  Pt was AAOx4, with moderate dysarthria and very soft vocal quality.  Pt following all directives and answers questions appropriately when given extra time, however is delayed.  Pt continues to require assistance for set-up, and [place] assist with self feeding, however he does not want to be fed directly.  He consumed puree and soft solids without s/sx of aspiration, however he had oral residue, which was reduced and in some cases eliminated with a liquid wash.  Pt had throat clearing with ALL trials of thins and dry solids, in addition to wet vocal quality, which is concerning for possible penetration or aspiration.  Pt had a strong throat clear, and was able to improve vocal quality with throat clearing.  Pt was given PO trials of nectars, which still resulted in throat clearing and wet vocal quality in 50% of trials, however was an improvement as compared to thin liquids.  Pt was educated regarding SLP recommendations, s/sx of aspiration and risks of aspiration.  He stated, \"I have cleared my throat for a long time\", and also reported \"I want to do what I need to do to improve, but I don't want any feeding tubes.\"  Recommend a FEES to further assess swallow safety and rule out aspiration.  Recommend to downgrade diet to dysphagia II/NTL with direct supervision pending FEES.  Discussed with MD.      Recommendations:  1) FEES to further assess swallow safety and rule out aspiration.  2) Downgrade diet to dysphagia II/NTL with direct supervision pending FEES.        Plan of Care: Will benefit from Speech Therapy 3 times per week    Post-Acute Therapy: Recommend inpatient transitional care services for continued speech therapy services.      See \"Rehab Therapy-Acute\" Patient Summary Report for complete documentation.     "

## 2019-06-13 NOTE — PROGRESS NOTES
Assumed care of pt at shift change. A/Ox3-4,  Pt on 1.5 liter oxygen with 93%. Pt stated that he needed to get of here, reoriented pt and reassured pt. discussed plan of care. Pt calm down and went to sleep.    Bed alarm in place. Bed in lowest position, treaded socks on, personal belongings and call light within reach, instructed to call for any assistance, hourly rounding in place.

## 2019-06-13 NOTE — CARE PLAN
Problem: Safety  Goal: Will remain free from falls  Outcome: PROGRESSING AS EXPECTED  Bed alarm in place. Bed in lowest position, treaded socks on, personal belongings and call light within reach, instructed to call for any assistance, hourly rounding in place.    Problem: Venous Thromboembolism (VTW)/Deep Vein Thrombosis (DVT) Prevention:  Goal: Patient will participate in Venous Thrombosis (VTE)/Deep Vein Thrombosis (DVT)Prevention Measures  Outcome: PROGRESSING AS EXPECTED  Lovenox in place. Ambulatory.     Problem: Pain Management  Goal: Pain level will decrease to patient's comfort goal  Outcome: PROGRESSING AS EXPECTED  Denied pain    Problem: Respiratory:  Goal: Respiratory status will improve  Outcome: PROGRESSING AS EXPECTED  93% on 1 liter.

## 2019-06-13 NOTE — PALLIATIVE CARE
Palliative Care follow-up  AD and POLST completed and scanned into EMR. Duplicate referral to PC given concern for dysphagia and pt's expression of not wanting a feeding tube. FEES pending. PC will f/u to discuss POC once FEES completed.     Updated: MD    Plan: f/u after FEES completed    Thank you for allowing Palliative Care to participate in this patient's care. Please feel free to call [phone] with any questions or concerns.

## 2019-06-14 LAB
ANION GAP SERPL CALC-SCNC: 5 MMOL/L (ref 0–11.9)
BUN SERPL-MCNC: 29 MG/DL (ref 8–22)
CALCIUM SERPL-MCNC: 8.4 MG/DL (ref 8.5–10.5)
CHLORIDE SERPL-SCNC: 105 MMOL/L (ref 96–112)
CO2 SERPL-SCNC: 25 MMOL/L (ref 20–33)
CREAT SERPL-MCNC: 0.96 MG/DL (ref 0.5–1.4)
GLUCOSE SERPL-MCNC: 99 MG/DL (ref 65–99)
POTASSIUM SERPL-SCNC: 4.4 MMOL/L (ref 3.6–5.5)
PROCALCITONIN SERPL-MCNC: 0.06 NG/ML
SODIUM SERPL-SCNC: 135 MMOL/L (ref 135–145)

## 2019-06-14 PROCEDURE — 97116 GAIT TRAINING THERAPY: CPT

## 2019-06-14 PROCEDURE — 36415 COLL VENOUS BLD VENIPUNCTURE: CPT

## 2019-06-14 PROCEDURE — 99233 SBSQ HOSP IP/OBS HIGH 50: CPT | Performed by: HOSPITALIST

## 2019-06-14 PROCEDURE — 700102 HCHG RX REV CODE 250 W/ 637 OVERRIDE(OP): Performed by: HOSPITALIST

## 2019-06-14 PROCEDURE — 700105 HCHG RX REV CODE 258: Performed by: HOSPITALIST

## 2019-06-14 PROCEDURE — A9270 NON-COVERED ITEM OR SERVICE: HCPCS | Performed by: HOSPITALIST

## 2019-06-14 PROCEDURE — 770006 HCHG ROOM/CARE - MED/SURG/GYN SEMI*

## 2019-06-14 PROCEDURE — 92612 ENDOSCOPY SWALLOW (FEES) VID: CPT

## 2019-06-14 PROCEDURE — 94668 MNPJ CHEST WALL SBSQ: CPT

## 2019-06-14 PROCEDURE — 700111 HCHG RX REV CODE 636 W/ 250 OVERRIDE (IP): Performed by: HOSPITALIST

## 2019-06-14 PROCEDURE — 80048 BASIC METABOLIC PNL TOTAL CA: CPT

## 2019-06-14 PROCEDURE — 97530 THERAPEUTIC ACTIVITIES: CPT

## 2019-06-14 PROCEDURE — 84145 PROCALCITONIN (PCT): CPT

## 2019-06-14 PROCEDURE — 51798 US URINE CAPACITY MEASURE: CPT

## 2019-06-14 RX ORDER — MIDODRINE HYDROCHLORIDE 5 MG/1
5 TABLET ORAL
Status: DISCONTINUED | OUTPATIENT
Start: 2019-06-14 | End: 2019-06-20 | Stop reason: HOSPADM

## 2019-06-14 RX ORDER — SODIUM CHLORIDE 9 MG/ML
500 INJECTION, SOLUTION INTRAVENOUS ONCE
Status: COMPLETED | OUTPATIENT
Start: 2019-06-14 | End: 2019-06-14

## 2019-06-14 RX ORDER — MIDODRINE HYDROCHLORIDE 5 MG/1
5 TABLET ORAL ONCE
Status: COMPLETED | OUTPATIENT
Start: 2019-06-14 | End: 2019-06-14

## 2019-06-14 RX ADMIN — ASPIRIN 81 MG 81 MG: 81 TABLET ORAL at 06:01

## 2019-06-14 RX ADMIN — DOXYCYCLINE 100 MG: 100 TABLET, FILM COATED ORAL at 06:01

## 2019-06-14 RX ADMIN — HEPARIN SODIUM 5000 UNITS: 5000 INJECTION, SOLUTION INTRAVENOUS; SUBCUTANEOUS at 06:01

## 2019-06-14 RX ADMIN — SENNOSIDES,DOCUSATE SODIUM 2 TABLET: 8.6; 5 TABLET, FILM COATED ORAL at 18:04

## 2019-06-14 RX ADMIN — MIDODRINE HYDROCHLORIDE 5 MG: 5 TABLET ORAL at 11:51

## 2019-06-14 RX ADMIN — DOXYCYCLINE 100 MG: 100 TABLET, FILM COATED ORAL at 18:04

## 2019-06-14 RX ADMIN — CARBIDOPA AND LEVODOPA 1 TABLET: 25; 100 TABLET ORAL at 06:01

## 2019-06-14 RX ADMIN — SODIUM CHLORIDE 500 ML: 9 INJECTION, SOLUTION INTRAVENOUS at 11:51

## 2019-06-14 RX ADMIN — CARBIDOPA AND LEVODOPA 1 TABLET: 25; 100 TABLET ORAL at 18:04

## 2019-06-14 RX ADMIN — MIDODRINE HYDROCHLORIDE 5 MG: 5 TABLET ORAL at 18:04

## 2019-06-14 RX ADMIN — HEPARIN SODIUM 5000 UNITS: 5000 INJECTION, SOLUTION INTRAVENOUS; SUBCUTANEOUS at 13:26

## 2019-06-14 RX ADMIN — HEPARIN SODIUM 5000 UNITS: 5000 INJECTION, SOLUTION INTRAVENOUS; SUBCUTANEOUS at 22:24

## 2019-06-14 RX ADMIN — CARBIDOPA AND LEVODOPA 1 TABLET: 25; 100 TABLET ORAL at 13:26

## 2019-06-14 RX ADMIN — TAMSULOSIN HYDROCHLORIDE 0.4 MG: 0.4 CAPSULE ORAL at 18:04

## 2019-06-14 ASSESSMENT — GAIT ASSESSMENTS
DEVIATION: ATAXIC;STEP TO;DECREASED BASE OF SUPPORT;BRADYKINETIC;SHUFFLED GAIT;DECREASED HEEL STRIKE;DECREASED TOE OFF
ASSISTIVE DEVICE: 4 WHEEL WALKER
GAIT LEVEL OF ASSIST: MODERATE ASSIST
DISTANCE (FEET): 30

## 2019-06-14 ASSESSMENT — ENCOUNTER SYMPTOMS
WEAKNESS: 1
VOMITING: 0
ORTHOPNEA: 0
HEARTBURN: 0
HEMOPTYSIS: 0
HALLUCINATIONS: 0
ABDOMINAL PAIN: 0
PHOTOPHOBIA: 0
PALPITATIONS: 0
HEADACHES: 0
DIZZINESS: 0
BACK PAIN: 1
DIAPHORESIS: 0
EYE PAIN: 0
DEPRESSION: 0
MYALGIAS: 1
NAUSEA: 0
FALLS: 1

## 2019-06-14 ASSESSMENT — COGNITIVE AND FUNCTIONAL STATUS - GENERAL
WALKING IN HOSPITAL ROOM: A LOT
SUGGESTED CMS G CODE MODIFIER MOBILITY: CM
MOVING FROM LYING ON BACK TO SITTING ON SIDE OF FLAT BED: UNABLE
MOBILITY SCORE: 8
STANDING UP FROM CHAIR USING ARMS: A LOT
TURNING FROM BACK TO SIDE WHILE IN FLAT BAD: UNABLE
MOVING TO AND FROM BED TO CHAIR: UNABLE
CLIMB 3 TO 5 STEPS WITH RAILING: TOTAL

## 2019-06-14 ASSESSMENT — LIFESTYLE VARIABLES: SUBSTANCE_ABUSE: 0

## 2019-06-14 NOTE — PROGRESS NOTES
2 RN skin check completed with FITO Kendrick  Devices in place: nasal cannula and PIV.  Skin assessed under devices: yes.  Confirmed pressure ulcers found: none.  New potential pressure ulcers noted: none. Wound consult placed:  n/a.     Skin assessment: bilateral heels red/pink/blanching. Left forearm scab and left upper inner forearm scab. Dark purple bruising on right flank/rib cage/behind right upper arm and to L arm. Top of ears pink/blanching. Bridge of nose red/pink/blanching from glasses- glasses removed and improved within minutes of glasses being removed. Sacrum red and blanching. No open areas noted.     The following interventions in place: waffle overlay mattress in use. Barrier wipes and barrier paste in use. Pillows for positioning and floating heels. Q2hr turns. Linen kept clean and dry.

## 2019-06-14 NOTE — PROGRESS NOTES
Awaiting call back from dr. jang about low BP. Getting patient back to bed and will reassess BP while patient supine.

## 2019-06-14 NOTE — FACE TO FACE
"Face to Face Note  -  Durable Medical Equipment    Dali Colunga M.D. - NPI: [id number]  I certify that this patient is under my care and that they had a durable medical equipment(DME)face to face encounter by myself that meets the physician DME face-to-face encounter requirements with this patient on:    Date of encounter:   Patient:                    MRN:                       YOB: 2019  Miguel Ángel Page  [id number]  9/14/1928     The encounter with the patient was in whole, or in part, for the following medical condition, which is the primary reason for durable medical equipment:  COPD    I certify that, based on my findings, the following durable medical equipment is medically necessary:  Oxygen.    HOME O2 Saturation Measurements:(Values must be present for Home Oxygen orders)  Room air sat at rest: 88  Room air sat with amb: 89  With liters of O2: 1, O2 sat at rest with O2: 91  With Liters of O2: 1, O2 sat with amb with O2 : 93  Is the patient mobile?: Yes    My Clinical findings support the need for the above equipment due to:  Hypoxia    Supporting Symptoms: The patient requires supplemental oxygen, as the following interventions have been tried with limited or no improvement: \"Ambulation with oximetry    "

## 2019-06-14 NOTE — PROGRESS NOTES
2 RN skin check completed with FITO Campbell.   Devices in place: nasal cannula, PIV, lindsey catheter.  Skin assessed under devices: yes.  Confirmed pressure ulcers found: none.  New potential pressure ulcers noted: none. Wound consult placed:  n/a.    Skin assessment: bilateral heels red/pink/blanching. Left heel slightly boggy. LFA scab. Dark purple bruising on right flank/rib cage/behind right upper arm. Top of ears pink/blanching. Bridge of nose red/pink/blanching (from glasses). Sacrum red/slow blanching. Rest of skin intact, no open areas noted.    The following interventions in place: waffle overlay mattress in use. Barrier wipes/cream in use. Pillows for positioning and floating heels. Q2hr turns. Pt kept clean/dry, bed linen changed PRN for incontinence episodes.

## 2019-06-14 NOTE — CARE PLAN
Problem: Pain Management  Goal: Pain level will decrease to patient's comfort goal  Outcome: PROGRESSING AS EXPECTED  Patient denies pain at this time.     Problem: Skin Integrity  Goal: Risk for impaired skin integrity will decrease  Outcome: PROGRESSING AS EXPECTED  Patient turned every two hours and is on waffle overlay to prevent pressure ulcers.

## 2019-06-14 NOTE — CARE PLAN
Problem: Nutritional:  Goal: Achieve adequate nutritional intake  Patient will consume 50% of meals or greater    Outcome: PROGRESSING AS EXPECTED    Intervention: Monitor PO intake, weights, and laboratory values  Per ADL documentation pt has been consuming 25-75% of majority of meals.   Given advanced age this may be sufficient caloric intake, however FEES pending per review of SLP note.   Pt is on a dysphagia 2/Nectar thick diet per SLP already, and pt has expressed that he does not want a feeding tube.     RD will continue to follow for adequate intake/nutrition plan of care

## 2019-06-14 NOTE — PROGRESS NOTES
Patient BP improved post bolus, a&ox4, denies pain. Heck removed per order. Patient likely not to be discharged today as BP is low and now needs home oxygen per home O2 eval. Daughter at bedside and updated on POC. Will monitor.

## 2019-06-14 NOTE — PROGRESS NOTES
Pt AOx4, denies pain at this time. Pt has poor appetite and at approximately 30% of dinner. No s/sx respiratory distress, remains on 1.5L oxygen via nasal cannula.  Pt fatigued, resting quietly in bed watching television. Heck to gravity productive, urine appears yellow and clear. Call light within reach, personal belongings available, bed in lowest position, treaded socks on, and bed alarm on. Hourly rounding in place.

## 2019-06-14 NOTE — PROGRESS NOTES
Assumed care of pt art shift change. Pt awake, alert and oriented x4. No complaints of pain/discomfort throughout shift. Scheduled medications given and tolerated well. Pt refusing thoracentesis, MD notified. Heck in place and draining to gravity. Call light and personal belongings within reach, safety precautions in place. Pt denies any further needs at this time.

## 2019-06-14 NOTE — CARE PLAN
Problem: Hyperinflation:  Goal: Prevent or improve atelectasis  Outcome: PROGRESSING AS EXPECTED      Respiratory Update    Treatment modality:      PEP QID    Pt tolerating current treatments well with no adverse reactions.

## 2019-06-14 NOTE — THERAPY
"Pt demonstrating improved balance and trunk control. Pt w/narrow ERICA and ataxic gait apptern, requiring vebal cna visual cuing for sequencing. Pt w/decreased LE ROM. Pt would benefit from further acute PT txs to progress towards goals and independence. Recommend post acute placement if unable to acquire 24/7 caregiver assist at [facility].    Physical Therapy Treatment completed.   Bed Mobility:  Supine to Sit: Maximal Assist  Transfers: Sit to Stand: Moderate Assist  Gait: Level Of Assist: Moderate Assist with 4-Wheel Walker       Plan of Care: Will benefit from Physical Therapy 3 times per week    See \"Rehab Therapy-Acute\" Patient Summary Report for complete documentation.       "

## 2019-06-14 NOTE — CARE PLAN
Problem: Safety  Goal: Will remain free from falls  Safety precautions in place. Bed in low position, treaded socks on, personal possessions in reach, call light in reach and pt using it to call for assistance. Bed alarm on.    Problem: Skin Integrity  Goal: Risk for impaired skin integrity will decrease  Waffle mattress in use. Q2h turns, barrier cream/wipes in use. Pillows for positioning and floating heels.

## 2019-06-14 NOTE — PROGRESS NOTES
BP 73/48 after working with PT and patient compained of feeling dizzy. Dr. jang paged to make aware. Patient now denies feeling dizzy after sitting in chair for a few minutes. Will await recommendation from MD. Home O2 eval done and patient went to 88% at lowest on room air. Will monitor.

## 2019-06-15 PROBLEM — J44.9 COPD (CHRONIC OBSTRUCTIVE PULMONARY DISEASE) (HCC): Status: ACTIVE | Noted: 2019-06-15

## 2019-06-15 PROBLEM — I95.1 ORTHOSTATIC HYPOTENSION: Status: ACTIVE | Noted: 2019-06-15

## 2019-06-15 PROBLEM — E43 SEVERE PROTEIN-CALORIE MALNUTRITION (HCC): Status: ACTIVE | Noted: 2019-06-15

## 2019-06-15 LAB
ANION GAP SERPL CALC-SCNC: 11 MMOL/L (ref 0–11.9)
BACTERIA BLD CULT: NORMAL
BACTERIA BLD CULT: NORMAL
BUN SERPL-MCNC: 30 MG/DL (ref 8–22)
CALCIUM SERPL-MCNC: 8.5 MG/DL (ref 8.5–10.5)
CHLORIDE SERPL-SCNC: 101 MMOL/L (ref 96–112)
CO2 SERPL-SCNC: 25 MMOL/L (ref 20–33)
CREAT SERPL-MCNC: 0.86 MG/DL (ref 0.5–1.4)
GLUCOSE SERPL-MCNC: 114 MG/DL (ref 65–99)
POTASSIUM SERPL-SCNC: 4 MMOL/L (ref 3.6–5.5)
SIGNIFICANT IND 70042: NORMAL
SIGNIFICANT IND 70042: NORMAL
SITE SITE: NORMAL
SITE SITE: NORMAL
SODIUM SERPL-SCNC: 137 MMOL/L (ref 135–145)
SOURCE SOURCE: NORMAL
SOURCE SOURCE: NORMAL

## 2019-06-15 PROCEDURE — A9270 NON-COVERED ITEM OR SERVICE: HCPCS | Performed by: HOSPITALIST

## 2019-06-15 PROCEDURE — 36415 COLL VENOUS BLD VENIPUNCTURE: CPT

## 2019-06-15 PROCEDURE — 51798 US URINE CAPACITY MEASURE: CPT

## 2019-06-15 PROCEDURE — 700111 HCHG RX REV CODE 636 W/ 250 OVERRIDE (IP): Performed by: HOSPITALIST

## 2019-06-15 PROCEDURE — 770006 HCHG ROOM/CARE - MED/SURG/GYN SEMI*

## 2019-06-15 PROCEDURE — 700102 HCHG RX REV CODE 250 W/ 637 OVERRIDE(OP): Performed by: HOSPITALIST

## 2019-06-15 PROCEDURE — 80048 BASIC METABOLIC PNL TOTAL CA: CPT

## 2019-06-15 PROCEDURE — 99232 SBSQ HOSP IP/OBS MODERATE 35: CPT | Performed by: HOSPITALIST

## 2019-06-15 RX ORDER — FLUDROCORTISONE ACETATE 0.1 MG/1
0.05 TABLET ORAL EVERY MORNING
Status: DISCONTINUED | OUTPATIENT
Start: 2019-06-15 | End: 2019-06-20 | Stop reason: HOSPADM

## 2019-06-15 RX ADMIN — FLUDROCORTISONE ACETATE 0.05 MG: 0.1 TABLET ORAL at 08:15

## 2019-06-15 RX ADMIN — MIDODRINE HYDROCHLORIDE 5 MG: 5 TABLET ORAL at 17:52

## 2019-06-15 RX ADMIN — ASPIRIN 81 MG 81 MG: 81 TABLET ORAL at 05:42

## 2019-06-15 RX ADMIN — SENNOSIDES,DOCUSATE SODIUM 2 TABLET: 8.6; 5 TABLET, FILM COATED ORAL at 17:52

## 2019-06-15 RX ADMIN — DOXYCYCLINE 100 MG: 100 TABLET, FILM COATED ORAL at 17:52

## 2019-06-15 RX ADMIN — MIDODRINE HYDROCHLORIDE 5 MG: 5 TABLET ORAL at 09:11

## 2019-06-15 RX ADMIN — CARBIDOPA AND LEVODOPA 1 TABLET: 25; 100 TABLET ORAL at 17:52

## 2019-06-15 RX ADMIN — TAMSULOSIN HYDROCHLORIDE 0.4 MG: 0.4 CAPSULE ORAL at 17:52

## 2019-06-15 RX ADMIN — MIDODRINE HYDROCHLORIDE 5 MG: 5 TABLET ORAL at 13:11

## 2019-06-15 RX ADMIN — HEPARIN SODIUM 5000 UNITS: 5000 INJECTION, SOLUTION INTRAVENOUS; SUBCUTANEOUS at 13:11

## 2019-06-15 RX ADMIN — HEPARIN SODIUM 5000 UNITS: 5000 INJECTION, SOLUTION INTRAVENOUS; SUBCUTANEOUS at 21:19

## 2019-06-15 RX ADMIN — CARBIDOPA AND LEVODOPA 1 TABLET: 25; 100 TABLET ORAL at 13:11

## 2019-06-15 RX ADMIN — CARBIDOPA AND LEVODOPA 1 TABLET: 25; 100 TABLET ORAL at 05:41

## 2019-06-15 RX ADMIN — DOXYCYCLINE 100 MG: 100 TABLET, FILM COATED ORAL at 05:42

## 2019-06-15 ASSESSMENT — ENCOUNTER SYMPTOMS
CHILLS: 0
WEIGHT LOSS: 0
FEVER: 0
SHORTNESS OF BREATH: 0
SPUTUM PRODUCTION: 0
COUGH: 0

## 2019-06-15 NOTE — PROGRESS NOTES
Patient has not voided and attempted with urinal and to bedside commode and unable to void although states has the urge. Bladder scan ordered.

## 2019-06-15 NOTE — CARE PLAN
Problem: Safety  Goal: Will remain free from injury  Outcome: PROGRESSING AS EXPECTED  Safety maintained

## 2019-06-15 NOTE — DOCUMENTATION QUERY
"                                                                         [facility]                                                                       Query Response Note      PATIENT:               HELADIO PINEDA  ACCT #:                  [id number]  MRN:                     [id number]  :                      [id number]  ADMIT DATE:       6/10/2019 9:04 AM  DISCH DATE:          RESPONDING  PROVIDER #:        [id number]           QUERY TEXT:    BMI <19 is documented in the Medical Record.  Can a diagnosis be provided to support this finding?    NOTE:  If an appropriate response is not listed below, please respond with a new note.    The patient's Clinical Indicators include:   Dietary Note: Ht: 5' 10\"; Wt: 110 lb 3.7 oz BMI: 15.82 kg/m²; underweight; appears very thin and has severe muscle wasting and severe fat loss.  Treatment: Dietary consult; high protein shake; encourage intake; monitor wt; nutrition rep  Risk Factors: Advanced age; parkinson's; pneumonia    Query created by: Caitlyn Rowe on 2019 8:27 AM    RESPONSE TEXT:    Underweight       QUERY TEXT:    Pneumonia is documented in the Medical Record. Please specify the type of pneumonia and/or causative organism (includes probable or suspected)     NOTE:  If an appropriate response is not listed below, please respond with a new note.    The patient's Clinical Indicators include:  6/10 CXR: Right basilar opacity likely represents pneumonia, Trace right pleural effusion is not excluded.  6/10 Blood Cultures x 2: Negative; WBC's: 9.7; : Procalcitonin: 0.05   CT-Chest: Large right pleural effusion, small left pleural effusion, atelectasis or pneumonia right lower lobe; atelectasis or pneumonia left lower lobe.  Treatment: Adoxa initiated ; unasyn; rocephin; oxygen; respiratory care protocol; imaging  Risk Factors: Advanced age; parkinson's; low BMI (15.82 kg/m²)    Query created by: Caitlyn Rowe on 2019 8:48 AM    RESPONSE " TEXT:    Aspiration Pneumonia          Electronically signed by:  JULIO BANERJEE MD 6/15/2019 7:59 AM

## 2019-06-15 NOTE — CARE PLAN
Problem: Safety  Goal: Will remain free from injury  Outcome: PROGRESSING AS EXPECTED  Bed alarm on and functioning, patient safe and free from injury.    Problem: Venous Thromboembolism (VTW)/Deep Vein Thrombosis (DVT) Prevention:  Goal: Patient will participate in Venous Thrombosis (VTE)/Deep Vein Thrombosis (DVT)Prevention Measures  Outcome: PROGRESSING AS EXPECTED

## 2019-06-15 NOTE — THERAPY
Speech Language Therapy FEES completed.  Functional Status: The patient was seen for FEES evaluation this date. FEES procedure completed. Patient noted to have slight asymmetry to vocal folds as evidenced by suspected weak left vocal fold movement however, patient able to achieve complete adduction with breath hold. Presentation of PO included ice chips, nectars, purees, and thins via teaspoon, cup sips, and straw as well as soft solids. The patient presented with mild oropharyngeal dysphagia as evidenced by delayed onset of swallow with premature spillage of bolus to the level of the pyriform sinuses with intermittent penetration over laryngeal rim at the interarytenoid space resulting in deep laryngeal penetration and trickle aspiration with thin liquids which was silent in nature. Patient also presented with weak pharyngeal squeeze and decreased hyo-laryngeal elevation resulting in diffuse residue in pharynx on pudding and soft solid trials which cleared with cued effortful swallow. Of note, patient with consistent throat clear response when residue was noted in the vallecular. Modified PO trials of NTL, purees and soft solids with effortful double swallow strategy were consumed with no overt penetration or aspiration during this study. At this time, recommend patient begin modified PO diet of D2/NTL with swallow strategies. Daughter present at end of evaluation and was provided extensive education regarding results of FEES, recommendation for thickened liquids and how to use thickening agent to modify patient’s favorite liquids. Daughter verbalized clear understanding and at end of session patient in agreement remaining on modified PO diet of D2/NTL meals with addition of swallow strategies. Updated sign posted at [facility]. RN and MD updated.    Recommendations - Diet: Dysphagia II, Nectar Thick Liquid                          Strategies: Monitor during meals, straws okay and Head of Bed at 90 Degrees                  "         Medication Administration: Medication Administration : Float Whole with Puree, Whole with Liquid Wash  Plan of Care: Will benefit from Speech Therapy 3 times per week    Post-Acute Therapy: Recommend outpatient or home health transitional care services for continued speech therapy services      See \"Rehab Therapy-Acute\" Patient Summary Report for complete documentation.   "

## 2019-06-15 NOTE — PROGRESS NOTES
Patient alert and oriented, wanting to go home. Vss, denies pain. Patient awaiting oxygen to go home and referral sent. Patient retaining urine and per Dr. Colunga place lindsey and patient likely to go home with lindsey. Patient daughter at bedside. Will monitor.

## 2019-06-15 NOTE — DISCHARGE PLANNING
Agency[facility]Facility Name: [facility]  Spoke To: Gayla  Outcome: Gayla informed that the patient does not have a qualifying diagnosis.     RN/CM Blanca notified

## 2019-06-15 NOTE — ASSESSMENT & PLAN NOTE
She does have remote history of smoking in the past and I suspect he may have some COPD underlying however he is not in acute exacerbation  DME O2 available

## 2019-06-15 NOTE — PROGRESS NOTES
Dr. Jensen notified of no void since césar being D/c'd.  MD wishes to wait several more hours.  Also notified of B/P.  No new orders

## 2019-06-15 NOTE — PROGRESS NOTES
[facility] Medicine Daily Progress Note    Date of Service  6/14/2019    Chief Complaint  89 y.o. male admitted 6/10/2019 with fall, PNA    Hospital Course    89 y.o. male who presented 6/10/2019 with fall.     I have reviewed some of the recent provider documentation available to me in the patient's medical chart.  Records are briefly summarized: Mr. Page has a history of movement disorder/possible Parkinson's disease.  He is treated with Sinemet.  During an office visit on 3/12/2019, it is noted that his memory is stable.     Patient speaks in a soft voice, he does not say much, it is very difficult to understand him during the interview.  He indicates that the pain in his right chest is tolerable, beyond that I am unable to elicit any history of present illness due to the patient's difficulties with communication.  His daughter is at the bedside, she indicates that he is generally easier to understand.  She says that he recently took a cruise to [place] and returned about a week ago.  Since that time the patient has been weaker than normal.  Distances with a walker and has had difficulty doing that.  She is noted him to be more fatigued than normal.  No cough, no fever.  Patient today had a fall, he landed on his right side and came to the emergency room for evaluation.       Interval Problem Update  Seen and examined AAOx3, wants to sit up on the chair next to bed, + SOB, + coughing, +_ body aches  Otherwise now chest pains    6/12: seen and examined this morning, daughter at bedside, patient did not sleep well last night because of all the disturbance from being near the nurses station. He was a bit agitated as well overnight. Pulling out lines. However this morning he is calm and appropriate. AAOx3  C/o right sided pain where he fell, has a bruise      6/13: Patient seen and examined this morning he is hard of hearing no family is at bedside.  Patient continues to complain of shortness of breath and  discomfort.  At this time I discussed his CT chest findings of large pleural effusion and needing a ultrasound-guided thoracentesis and patient is agreeable.  Speech evaluated patient and is concerned about aspiration.  The did put him on a dysphagia diet however we will order a fees study to further evaluate.  I had a discussion with the patient and he absolutely does not want any sort of feeding tube even a core track.    6/14: Patient seen and examined this morning he is acute awake alert and oriented x3 and his daughter is at bedside.  Patient really is adamant about going back to his assisted living facility.Patient denies any complaints of shortness of breath cough chest pain.  Consultants/Specialty  none    Code Status  DNR    Disposition  Back to his assisted living facility once oxygen is arranged    Review of Systems  Review of Systems   Constitutional: Positive for malaise/fatigue. Negative for chills, diaphoresis, fever and weight loss.   HENT: Negative for congestion, ear pain, hearing loss and tinnitus.    Eyes: Negative for photophobia and pain.   Respiratory: Negative for cough, hemoptysis, sputum production and shortness of breath.    Cardiovascular: Negative for chest pain, palpitations, orthopnea and leg swelling.   Gastrointestinal: Negative for abdominal pain, heartburn, nausea and vomiting.   Genitourinary: Negative for dysuria, hematuria and urgency.   Musculoskeletal: Positive for back pain, falls and myalgias.   Skin: Negative for itching and rash.   Neurological: Positive for weakness. Negative for dizziness and headaches.   Psychiatric/Behavioral: Negative for depression, hallucinations, substance abuse and suicidal ideas.        Physical Exam  Temp:  [36.2 °C (97.2 °F)-36.9 °C (98.4 °F)] 36.7 °C (98.1 °F)  Pulse:  [] 77  Resp:  [15-18] 17  BP: ()/(45-68) 102/49  SpO2:  [87 %-96 %] 92 %    Physical Exam   Constitutional: He is oriented to person, place, and time. He appears  well-developed. No distress.   fraile   HENT:   Head: Normocephalic and atraumatic.   Mouth/Throat: Oropharynx is clear and moist. No oropharyngeal exudate.   Eyes: Pupils are equal, round, and reactive to light. Conjunctivae and EOM are normal. No scleral icterus.   Neck: Normal range of motion. Neck supple. No JVD present. No thyromegaly present.   Cardiovascular: Normal rate, regular rhythm and intact distal pulses.    No murmur heard.  Pulmonary/Chest: Effort normal and breath sounds normal. No respiratory distress. He has no wheezes. He has no rales.   Diminished breath sounds b/l   Abdominal: Soft. Bowel sounds are normal. He exhibits no distension. There is no tenderness. There is no rebound.   Right sided flank and abd bruising from his recent fall   Musculoskeletal: Normal range of motion. He exhibits no edema or tenderness.   Lymphadenopathy:     He has no cervical adenopathy.   Neurological: He is alert and oriented to person, place, and time. He exhibits normal muscle tone.   Skin: Skin is warm and dry. No erythema.   Psychiatric: He has a normal mood and affect. His behavior is normal.       Fluids    Intake/Output Summary (Last 24 hours) at 06/14/19 2011  Last data filed at 06/14/19 1400   Gross per 24 hour   Intake             1220 ml   Output             1600 ml   Net             -380 ml       Laboratory  Recent Labs      06/12/19   1523  06/13/19   0850   WBC  6.9  5.9   RBC  3.49*  3.05*   HEMOGLOBIN  11.5*  10.0*   HEMATOCRIT  35.2*  30.9*   MCV  100.9*  101.3*   MCH  33.0  32.8   MCHC  32.7*  32.4*   RDW  51.4*  51.9*   PLATELETCT  203  190   MPV  9.1  9.6     Recent Labs      06/13/19   0850  06/14/19   0810   SODIUM  139  135   POTASSIUM  3.8  4.4   CHLORIDE  105  105   CO2  27  25   GLUCOSE  95  99   BUN  36*  29*   CREATININE  1.24  0.96   CALCIUM  8.7  8.4*     Recent Labs      06/13/19   0850   INR  1.11               Imaging  US-RENAL   Final Result      No hydronephrosis.       CT-CHEST,ABDOMEN,PELVIS W/O   Final Result      1.  Acute fractures of the posterior aspects of the right 8th through 10th ribs.      2.  Large right pleural effusion. Hemothorax cannot be excluded. No pneumothorax is present.      3.  Small left pleural effusion.      4.  Bibasilar atelectasis or pneumonia, right greater than left.      5.  No acute posttraumatic change identified in the abdomen or pelvis.      6.  Markedly distended bladder. Enlarged prostate gland.      EC-ECHOCARDIOGRAM COMPLETE W/O CONT   Final Result      DX-CHEST-LIMITED (1 VIEW)   Final Result      Right basilar opacity likely represents pneumonia.      Trace right pleural effusion is not excluded.      Atherosclerotic plaque.      Prominent scoliosis.      If clinical suspicion for rib fracture is high, further evaluation with CT is recommended.              Assessment/Plan  * Acute respiratory failure with hypoxia (HCC)   Assessment & Plan    Persistent; Likely 2/2 PNA and large right pleural effusion as seen on chest CT  Likely viral etiology as there is no white count and procalcitonin is negative  Refused ultrasound-guided thoracentesis  Supplemental O2  DNR/DNI  Monitor closely  Influenza screen is negative    Echo was noted normal     Pneumonia- (present on admission)   Assessment & Plan    Right lower lobe opacity, consistent with pneumonia, however no white count, procalc neg  Likely viral etiology hold off on antibiotics   CT chest/abd shows right-sided pneumonia and large pleural effusion  I ordered ultrasound-guided thoracentesis however patient refusing to obtain it.  I discussed the risks of not getting the thoracentesis for the large pleural effusion being acute shortness of breath and worsening hypoxia and respiratory failure however patient continues to refuse it.  I did discuss palliative care comfort care however at this time patient does not want to discuss this.  He wants to go home and then think about it.        Parkinson's disease (HCC)- (present on admission)   Assessment & Plan    Patient is treated with Sinemet for probable Parkinson's disease  Mobility is severely limited, came from [facility]  Continue sinemet  GOC discussed, wishes are DNR   Patient already getting scheduled physical therapy at [facility], however PT evaluated patient and recommended 24/7 assistance, will place referral for SNF, however patient refused to go to a skilled nursing facility.  Patient and daughter adamant about him being discharged back to assisted living and they will arrange for 24-hour care giver at the facility.     Orthostatic hypotension   Assessment & Plan    Symptomatic with dizziness, patient's blood pressure dropped after physical therapy and he was given 500 mL fluid bolus with caution given his large right pleural effusion which improved the blood pressure slightly however still remains low his orthostatics were also positive.  I have started him on Midrin 3 times daily.     Severe protein-calorie malnutrition (HCC)   Assessment & Plan    Patient with a BMI of 15.  He is very tiny and weak.  Nutrition consulted     COPD (chronic obstructive pulmonary disease) ([facility])   Assessment & Plan    She does have remote history of smoking in the past and I suspect he may have some COPD underlying however he is not in acute exacerbation  He is requiring chronic oxygen therefore I have ordered DME O2 for home.     Dysphasia   Assessment & Plan    As noted by speech evaluation today.  He is unable to eat thin liquids and will be placed on a dysphagia diet however speech therapist is still concerned about aspiration   Fees study noted   After having a discussion with patient for possible core track versus feeding tube patient adamantly refused.  Speech has made recommendations moving forward with his diet even after discharge  Palliative care consulted     CARLA (acute kidney injury) ([facility])   Assessment & Plan    No hx of CKD, creatinine  is back to baseline  Dehydration likely cause, large pleural effusion therefore I have DC'd his IV fluids  Avoid nephrotoxics, renally dose all meds     urine protein.cr ratio noted  DC Lovenox and add heparin for DVT prophylaxis instead    Ultrasound of renals have been negative  Creatinine is back to baseline       Advanced care planning/counseling discussion   Assessment & Plan    Discussed in detail about goals of care and at this time he wishes to be DNR/DNI  DNR.DNI  Palliative consulted-as patient is high risk for aspiration according to speech evaluation however is declining any sort of feeding tube     Edema- (present on admission)   Assessment & Plan    Bilateral lower extremity edema  This is a new problem, he is never experienced this before  Echo noted, normal EF 65% no DD  Labs monitored    resolved     Macrocytosis- (present on admission)   Assessment & Plan    b12 and folate normal, nutrition consulted        This patient has high medical complexity conditions at this time and increased risk of deterioration clinically  We will have to monitor him closely.  However at this time patient and his daughter want him to go back to the acute assisted living facility and they will get 24-hour care at that time.  VTE prophylaxis: lovenox

## 2019-06-15 NOTE — ASSESSMENT & PLAN NOTE
Symptomatic with dizziness, patient's blood pressure dropped after physical therapy and he was given 500 mL fluid bolus with caution given his large right pleural effusion which improved the blood pressure slightly however still remains low his orthostatics were also positive.   Continue midodrine 3 times daily  Continue fludrocortisone.

## 2019-06-15 NOTE — DISCHARGE PLANNING
Agency[facility]Facility [facility]: [facility]  Spoke To: Gayla  Outcome: Gayla informed she is working on the referral now

## 2019-06-16 ENCOUNTER — APPOINTMENT (OUTPATIENT)
Dept: RADIOLOGY | Facility: MEDICAL CENTER | Age: 84
DRG: 177 | End: 2019-06-16
Attending: HOSPITALIST
Payer: MEDICARE

## 2019-06-16 LAB
ANION GAP SERPL CALC-SCNC: 6 MMOL/L (ref 0–11.9)
BUN SERPL-MCNC: 26 MG/DL (ref 8–22)
CALCIUM SERPL-MCNC: 8.4 MG/DL (ref 8.5–10.5)
CHLORIDE SERPL-SCNC: 104 MMOL/L (ref 96–112)
CO2 SERPL-SCNC: 27 MMOL/L (ref 20–33)
CREAT SERPL-MCNC: 0.92 MG/DL (ref 0.5–1.4)
GLUCOSE SERPL-MCNC: 100 MG/DL (ref 65–99)
POTASSIUM SERPL-SCNC: 3.9 MMOL/L (ref 3.6–5.5)
SODIUM SERPL-SCNC: 137 MMOL/L (ref 135–145)

## 2019-06-16 PROCEDURE — 36415 COLL VENOUS BLD VENIPUNCTURE: CPT

## 2019-06-16 PROCEDURE — A9270 NON-COVERED ITEM OR SERVICE: HCPCS | Performed by: HOSPITALIST

## 2019-06-16 PROCEDURE — 700102 HCHG RX REV CODE 250 W/ 637 OVERRIDE(OP): Performed by: HOSPITALIST

## 2019-06-16 PROCEDURE — 80048 BASIC METABOLIC PNL TOTAL CA: CPT

## 2019-06-16 PROCEDURE — 99232 SBSQ HOSP IP/OBS MODERATE 35: CPT | Performed by: HOSPITALIST

## 2019-06-16 PROCEDURE — 770006 HCHG ROOM/CARE - MED/SURG/GYN SEMI*

## 2019-06-16 PROCEDURE — 700111 HCHG RX REV CODE 636 W/ 250 OVERRIDE (IP): Performed by: HOSPITALIST

## 2019-06-16 RX ADMIN — MIDODRINE HYDROCHLORIDE 5 MG: 5 TABLET ORAL at 17:42

## 2019-06-16 RX ADMIN — CARBIDOPA AND LEVODOPA 1 TABLET: 25; 100 TABLET ORAL at 17:42

## 2019-06-16 RX ADMIN — HEPARIN SODIUM 5000 UNITS: 5000 INJECTION, SOLUTION INTRAVENOUS; SUBCUTANEOUS at 05:24

## 2019-06-16 RX ADMIN — FLUDROCORTISONE ACETATE 0.05 MG: 0.1 TABLET ORAL at 05:24

## 2019-06-16 RX ADMIN — SENNOSIDES,DOCUSATE SODIUM 2 TABLET: 8.6; 5 TABLET, FILM COATED ORAL at 17:42

## 2019-06-16 RX ADMIN — CARBIDOPA AND LEVODOPA 1 TABLET: 25; 100 TABLET ORAL at 05:24

## 2019-06-16 RX ADMIN — MAGNESIUM HYDROXIDE 30 ML: 400 SUSPENSION ORAL at 17:42

## 2019-06-16 RX ADMIN — POLYETHYLENE GLYCOL 3350 1 PACKET: 17 POWDER, FOR SOLUTION ORAL at 08:11

## 2019-06-16 RX ADMIN — TAMSULOSIN HYDROCHLORIDE 0.4 MG: 0.4 CAPSULE ORAL at 17:42

## 2019-06-16 RX ADMIN — SENNOSIDES,DOCUSATE SODIUM 2 TABLET: 8.6; 5 TABLET, FILM COATED ORAL at 05:23

## 2019-06-16 RX ADMIN — MIDODRINE HYDROCHLORIDE 5 MG: 5 TABLET ORAL at 12:12

## 2019-06-16 RX ADMIN — MIDODRINE HYDROCHLORIDE 5 MG: 5 TABLET ORAL at 08:11

## 2019-06-16 RX ADMIN — HEPARIN SODIUM 5000 UNITS: 5000 INJECTION, SOLUTION INTRAVENOUS; SUBCUTANEOUS at 22:48

## 2019-06-16 RX ADMIN — HEPARIN SODIUM 5000 UNITS: 5000 INJECTION, SOLUTION INTRAVENOUS; SUBCUTANEOUS at 14:58

## 2019-06-16 RX ADMIN — CARBIDOPA AND LEVODOPA 1 TABLET: 25; 100 TABLET ORAL at 12:12

## 2019-06-16 RX ADMIN — ASPIRIN 81 MG 81 MG: 81 TABLET ORAL at 05:24

## 2019-06-16 ASSESSMENT — ENCOUNTER SYMPTOMS
FALLS: 1
HEADACHES: 0
HALLUCINATIONS: 0
HEMOPTYSIS: 0
PALPITATIONS: 0
PHOTOPHOBIA: 0
WEIGHT LOSS: 0
NAUSEA: 0
COUGH: 0
HEARTBURN: 0
CHILLS: 0
FEVER: 0
DEPRESSION: 0
SPUTUM PRODUCTION: 0
DIZZINESS: 0
EYE PAIN: 0
BACK PAIN: 1
MYALGIAS: 1
ORTHOPNEA: 0
WEAKNESS: 1
DIAPHORESIS: 0
SHORTNESS OF BREATH: 0
VOMITING: 0
ABDOMINAL PAIN: 0

## 2019-06-16 ASSESSMENT — LIFESTYLE VARIABLES: SUBSTANCE_ABUSE: 0

## 2019-06-16 NOTE — DISCHARGE PLANNING
/Agency[facility]Facility Name: [facility]  Spoke To: Gayla  Outcome: Gayla informed that she did receive the updated referral and her team will be looking it over shortly.

## 2019-06-16 NOTE — CARE PLAN
Problem: Venous Thromboembolism (VTW)/Deep Vein Thrombosis (DVT) Prevention:  Goal: Patient will participate in Venous Thrombosis (VTE)/Deep Vein Thrombosis (DVT)Prevention Measures  Outcome: PROGRESSING AS EXPECTED      Problem: Bowel/Gastric:  Goal: Normal bowel function is maintained or improved  Outcome: PROGRESSING AS EXPECTED  Patient with no BM since 6/13- miralax given and will wait 24 hours for results.

## 2019-06-16 NOTE — PROGRESS NOTES
A&Ox4, no pain reported.  Night time medication provided.  Heck in place. All needs met at this time.     Bed alarm is on, bed is locked and in lowest position, call light and bedside table are within reach, treaded slipper socks are on, and hourly rounding is in place.

## 2019-06-16 NOTE — PROGRESS NOTES
[facility] Medicine Daily Progress Note    Date of Service  6/15/19  Chief Complaint  89 y.o. male admitted 6/10/2019 with fall, PNA    Hospital Course    89 y.o. male who presented 6/10/2019 with fall.     I have reviewed some of the recent provider documentation available to me in the patient's medical chart.  Records are briefly summarized: Mr. Page has a history of movement disorder/possible Parkinson's disease.  He is treated with Sinemet.  During an office visit on 3/12/2019, it is noted that his memory is stable.     Patient speaks in a soft voice, he does not say much, it is very difficult to understand him during the interview.  He indicates that the pain in his right chest is tolerable, beyond that I am unable to elicit any history of present illness due to the patient's difficulties with communication.  His daughter is at the bedside, she indicates that he is generally easier to understand.  She says that he recently took a cruise to [place] and returned about a week ago.  Since that time the patient has been weaker than normal.  Distances with a walker and has had difficulty doing that.  She is noted him to be more fatigued than normal.  No cough, no fever.  Patient today had a fall, he landed on his right side and came to the emergency room for evaluation.       Interval Problem Update  Seen and examined AAOx3, wants to sit up on the chair next to bed, + SOB, + coughing, +_ body aches  Otherwise now chest pains    6/12: seen and examined this morning, daughter at bedside, patient did not sleep well last night because of all the disturbance from being near the nurses station. He was a bit agitated as well overnight. Pulling out lines. However this morning he is calm and appropriate. AAOx3  C/o right sided pain where he fell, has a bruise      6/13: Patient seen and examined this morning he is hard of hearing no family is at bedside.  Patient continues to complain of shortness of breath and discomfort.   At this time I discussed his CT chest findings of large pleural effusion and needing a ultrasound-guided thoracentesis and patient is agreeable.  Speech evaluated patient and is concerned about aspiration.  The did put him on a dysphagia diet however we will order a fees study to further evaluate.  I had a discussion with the patient and he absolutely does not want any sort of feeding tube even a core track.    6/14: Patient seen and examined this morning he is acute awake alert and oriented x3 and his daughter is at bedside.  Patient really is adamant about going back to his assisted living facility.Patient denies any complaints of shortness of breath cough chest pain.    6/15: Patient seen and examined this morning he is lying in bed comfortably.  He denies any pain or shortness of breath.  Denies any coughing.  Patient is urinary catheter was removed yesterday however overnight he needed to get straight cath because of increased volume of bladder scan.  This morning bladder scan is also revealing increased urinary retention therefore decision was made to place a Heck back in.  Consultants/Specialty  none    Code Status  DNR    Disposition  Back to his assisted living facility once oxygen is arranged    Review of Systems  Review of Systems   Constitutional: Positive for malaise/fatigue. Negative for chills, diaphoresis, fever and weight loss.   HENT: Negative for congestion, ear pain, hearing loss and tinnitus.    Eyes: Negative for photophobia and pain.   Respiratory: Negative for cough, hemoptysis, sputum production and shortness of breath.    Cardiovascular: Negative for chest pain, palpitations, orthopnea and leg swelling.   Gastrointestinal: Negative for abdominal pain, heartburn, nausea and vomiting.   Genitourinary: Negative for dysuria, hematuria and urgency.   Musculoskeletal: Positive for back pain, falls and myalgias.   Skin: Negative for itching and rash.   Neurological: Positive for weakness.  Negative for dizziness and headaches.   Psychiatric/Behavioral: Negative for depression, hallucinations, substance abuse and suicidal ideas.        Physical Exam  Temp:  [36.3 °C (97.4 °F)-36.7 °C (98 °F)] 36.6 °C (97.8 °F)  Pulse:  [66-79] 68  Resp:  [12-18] 12  BP: (110-120)/(58-64) 111/58  SpO2:  [90 %-93 %] 90 %    Physical Exam   Constitutional: He is oriented to person, place, and time. He appears well-developed. No distress.   fraile   HENT:   Head: Normocephalic and atraumatic.   Mouth/Throat: Oropharynx is clear and moist. No oropharyngeal exudate.   Eyes: Pupils are equal, round, and reactive to light. Conjunctivae and EOM are normal. No scleral icterus.   Neck: Normal range of motion. Neck supple. No JVD present. No thyromegaly present.   Cardiovascular: Normal rate, regular rhythm and intact distal pulses.    No murmur heard.  Pulmonary/Chest: Effort normal and breath sounds normal. No respiratory distress. He has no wheezes. He has no rales.   Diminished breath sounds b/l   Abdominal: Soft. Bowel sounds are normal. He exhibits no distension. There is no tenderness. There is no rebound.   Right sided flank and abd bruising from his recent fall   Genitourinary:   Genitourinary Comments: Heck in place   Musculoskeletal: Normal range of motion. He exhibits no edema or tenderness.   Lymphadenopathy:     He has no cervical adenopathy.   Neurological: He is alert and oriented to person, place, and time. He exhibits normal muscle tone.   Skin: Skin is warm and dry. No erythema.   Psychiatric: He has a normal mood and affect. His behavior is normal.       Fluids    Intake/Output Summary (Last 24 hours) at 06/16/19 1254  Last data filed at 06/16/19 0811   Gross per 24 hour   Intake              720 ml   Output             1400 ml   Net             -680 ml       Laboratory      Recent Labs      06/14/19   0810  06/15/19   0921  06/16/19   0848   SODIUM  135  137  137   POTASSIUM  4.4  4.0  3.9   CHLORIDE  105   101  104   CO2  25  25  27   GLUCOSE  99  114*  100*   BUN  29*  30*  26*   CREATININE  0.96  0.86  0.92   CALCIUM  8.4*  8.5  8.4*                   Imaging  US-RENAL   Final Result      No hydronephrosis.      CT-CHEST,ABDOMEN,PELVIS W/O   Final Result      1.  Acute fractures of the posterior aspects of the right 8th through 10th ribs.      2.  Large right pleural effusion. Hemothorax cannot be excluded. No pneumothorax is present.      3.  Small left pleural effusion.      4.  Bibasilar atelectasis or pneumonia, right greater than left.      5.  No acute posttraumatic change identified in the abdomen or pelvis.      6.  Markedly distended bladder. Enlarged prostate gland.      EC-ECHOCARDIOGRAM COMPLETE W/O CONT   Final Result      DX-CHEST-LIMITED (1 VIEW)   Final Result      Right basilar opacity likely represents pneumonia.      Trace right pleural effusion is not excluded.      Atherosclerotic plaque.      Prominent scoliosis.      If clinical suspicion for rib fracture is high, further evaluation with CT is recommended.              Assessment/Plan  * Acute respiratory failure with hypoxia (HCC)   Assessment & Plan    Persistent; Likely 2/2 PNA and large right pleural effusion as seen on chest CT  Likely viral etiology as there is no white count and procalcitonin is negative  Refused ultrasound-guided thoracentesis  Supplemental O2  DNR/DNI  Monitor closely  Influenza screen is negative    Echo was noted normal     Pneumonia- (present on admission)   Assessment & Plan    Right lower lobe opacity, consistent with pneumonia, however no white count, procalc neg  Likely viral etiology hold off on antibiotics   CT chest/abd shows right-sided pneumonia and large pleural effusion  I ordered ultrasound-guided thoracentesis however patient refusing to obtain it.  I discussed the risks of not getting the thoracentesis for the large pleural effusion being acute shortness of breath and worsening hypoxia and respiratory  failure however patient continues to refuse it.  I did discuss palliative care comfort care however at this time patient does not want to discuss this.  He wants to go home and then think about it.       Parkinson's disease ([facility])- (present on admission)   Assessment & Plan    Patient is treated with Sinemet for probable Parkinson's disease  Mobility is severely limited, came from [facility]  Continue sinemet  GOC discussed, wishes are DNR   Patient already [facility], however PT evaluated patient and recommended 24/7 assistance, will place referral for SNF, however patient refused to go to a skilled nursing facility.  Patient and daughter adamant about him being discharged back to assisted living and they will arrange for 24-hour care giver at the facility.     Orthostatic hypotension   Assessment & Plan    Symptomatic with dizziness, patient's blood pressure dropped after physical therapy and he was given 500 mL fluid bolus with caution given his large right pleural effusion which improved the blood pressure slightly however still remains low his orthostatics were also positive.   Continue midodrine 3 times daily  I will also add in fludrocortisone.     Severe protein-calorie malnutrition (HCC)   Assessment & Plan    Patient with a BMI of 15.  He is very tiny and weak.  Nutrition consulted     COPD (chronic obstructive pulmonary disease) ([facility])   Assessment & Plan    She does have remote history of smoking in the past and I suspect he may have some COPD underlying however he is not in acute exacerbation  He is requiring chronic oxygen therefore I have ordered DME O2 for home.     Dysphasia   Assessment & Plan    As noted by speech evaluation today.  He is unable to eat thin liquids and will be placed on a dysphagia diet however speech therapist is still concerned about aspiration   Fees study noted   After having a discussion with patient for possible core track versus  feeding tube patient adamantly refused.  Speech has made recommendations moving forward with his diet even after discharge  Palliative care consulted     CARLA (acute kidney injury) ([facility])   Assessment & Plan    No hx of CKD, creatinine is back to baseline  Dehydration likely cause, large pleural effusion therefore I have DC'd his IV fluids  Avoid nephrotoxics, renally dose all meds     urine protein.cr ratio noted  DC Lovenox and add heparin for DVT prophylaxis instead    Ultrasound of renals have been negative  Creatinine is back to baseline       Advanced care planning/counseling discussion   Assessment & Plan    Discussed in detail about goals of care and at this time he wishes to be DNR/DNI  DNR.DNI  Palliative consulted-as patient is high risk for aspiration according to speech evaluation however is declining any sort of feeding tube     Edema- (present on admission)   Assessment & Plan    Bilateral lower extremity edema  This is a new problem, he is never experienced this before  Echo noted, normal EF 65% no DD  Labs monitored    resolved     Macrocytosis- (present on admission)   Assessment & Plan    b12 and folate normal, nutrition consulted        This patient has high medical complexity conditions at this time and increased risk of deterioration clinically  We will have to monitor him closely.  However at this time patient and his daughter want him to go back to the acute assisted living facility and they will get 24-hour care at that time.  VTE prophylaxis: lovenox

## 2019-06-16 NOTE — PROGRESS NOTES
Patient alert and oriented, vss, denies pain. Patient daughter at bedside and involved in care. Oxygen to be delivered this afternoon. Patient likely to be discharged tomorrow as patient daughter will have 24 hours care set up tomorrow. Patient 2 person assist to chair, bed alarm in place and non skid socks on. Will monitor.

## 2019-06-16 NOTE — DISCHARGE PLANNING
Anticipated Discharge Disposition: Home on Monday, 6/17, if medically cleared.  He will go home on oxygen.    Action: This RN CM spoke with the [facility] and she faxed an ABN (Advance Beneficiary Notice) to have the patient sign it, saying that he will pay for the oxygen if Medicare won't pay for it.  I advised him that [employer], the oxygen company, said that the monthly charge would be $125/month to rent the home concentrator, $30.00 per month to rent the portable system, and $10.00 per oxygen tank per month rental, which would total $185.00 per month. The patient said that would be fine.  His daughter and POA was at the bedside and was also in agreement with this.  The patient signed the ABN and I faxed it to the [facility] so that she can get the oxygen approved.  Then [employer] should be able to deliver the oxygen to the patient's room within the next couple hours.    Barriers to Discharge: Medical clearance and the oxygen must be delivered to the patient's room before he can go.    Plan: Await medical clearance and await delivery of oxygen.

## 2019-06-16 NOTE — DISCHARGE PLANNING
Anticipated Discharge Disposition: Home with oxygen.    Action: This RN CM went to the patient's room to see if his oxygen had arrived, and it is not in the room.  I spoke with the patient and told him that we are having some difficulty getting the oxygen approved but that we are working on it.  He said that if he has to he will pay for the oxygen.  I called the [employer] and left a VM message to let me know if she has heard from [employer] yet today about his oxygen.    Barriers to Discharge: The patient needs to have oxygen delivered before he can discharge.  He also need to be medically cleared.    Plan: Will await call back from the [employer].

## 2019-06-17 ENCOUNTER — APPOINTMENT (OUTPATIENT)
Dept: RADIOLOGY | Facility: MEDICAL CENTER | Age: 84
DRG: 177 | End: 2019-06-17
Attending: HOSPITALIST
Payer: MEDICARE

## 2019-06-17 PROBLEM — J93.9 PNEUMOTHORAX: Status: ACTIVE | Noted: 2019-06-17

## 2019-06-17 LAB
ANION GAP SERPL CALC-SCNC: 6 MMOL/L (ref 0–11.9)
BUN SERPL-MCNC: 29 MG/DL (ref 8–22)
CALCIUM SERPL-MCNC: 8.8 MG/DL (ref 8.5–10.5)
CHLORIDE SERPL-SCNC: 105 MMOL/L (ref 96–112)
CO2 SERPL-SCNC: 28 MMOL/L (ref 20–33)
CREAT SERPL-MCNC: 0.94 MG/DL (ref 0.5–1.4)
GLUCOSE SERPL-MCNC: 115 MG/DL (ref 65–99)
POTASSIUM SERPL-SCNC: 4 MMOL/L (ref 3.6–5.5)
SODIUM SERPL-SCNC: 139 MMOL/L (ref 135–145)

## 2019-06-17 PROCEDURE — A9270 NON-COVERED ITEM OR SERVICE: HCPCS | Performed by: HOSPITALIST

## 2019-06-17 PROCEDURE — 770006 HCHG ROOM/CARE - MED/SURG/GYN SEMI*

## 2019-06-17 PROCEDURE — 700111 HCHG RX REV CODE 636 W/ 250 OVERRIDE (IP): Performed by: HOSPITALIST

## 2019-06-17 PROCEDURE — 99233 SBSQ HOSP IP/OBS HIGH 50: CPT | Performed by: HOSPITALIST

## 2019-06-17 PROCEDURE — 700102 HCHG RX REV CODE 250 W/ 637 OVERRIDE(OP): Performed by: HOSPITALIST

## 2019-06-17 PROCEDURE — 97116 GAIT TRAINING THERAPY: CPT

## 2019-06-17 PROCEDURE — 71045 X-RAY EXAM CHEST 1 VIEW: CPT

## 2019-06-17 PROCEDURE — 97530 THERAPEUTIC ACTIVITIES: CPT

## 2019-06-17 PROCEDURE — 80048 BASIC METABOLIC PNL TOTAL CA: CPT

## 2019-06-17 PROCEDURE — 36415 COLL VENOUS BLD VENIPUNCTURE: CPT

## 2019-06-17 PROCEDURE — 92526 ORAL FUNCTION THERAPY: CPT

## 2019-06-17 PROCEDURE — 97535 SELF CARE MNGMENT TRAINING: CPT

## 2019-06-17 RX ORDER — IBUPROFEN 400 MG/1
600 TABLET ORAL EVERY 6 HOURS PRN
Status: DISCONTINUED | OUTPATIENT
Start: 2019-06-17 | End: 2019-06-20 | Stop reason: HOSPADM

## 2019-06-17 RX ADMIN — CARBIDOPA AND LEVODOPA 1 TABLET: 25; 100 TABLET ORAL at 11:28

## 2019-06-17 RX ADMIN — FLUDROCORTISONE ACETATE 0.05 MG: 0.1 TABLET ORAL at 05:49

## 2019-06-17 RX ADMIN — HEPARIN SODIUM 5000 UNITS: 5000 INJECTION, SOLUTION INTRAVENOUS; SUBCUTANEOUS at 21:50

## 2019-06-17 RX ADMIN — MIDODRINE HYDROCHLORIDE 5 MG: 5 TABLET ORAL at 17:32

## 2019-06-17 RX ADMIN — IBUPROFEN 600 MG: 400 TABLET ORAL at 11:27

## 2019-06-17 RX ADMIN — HEPARIN SODIUM 5000 UNITS: 5000 INJECTION, SOLUTION INTRAVENOUS; SUBCUTANEOUS at 05:48

## 2019-06-17 RX ADMIN — HEPARIN SODIUM 5000 UNITS: 5000 INJECTION, SOLUTION INTRAVENOUS; SUBCUTANEOUS at 13:21

## 2019-06-17 RX ADMIN — CARBIDOPA AND LEVODOPA 1 TABLET: 25; 100 TABLET ORAL at 17:32

## 2019-06-17 RX ADMIN — CARBIDOPA AND LEVODOPA 1 TABLET: 25; 100 TABLET ORAL at 05:49

## 2019-06-17 RX ADMIN — TAMSULOSIN HYDROCHLORIDE 0.4 MG: 0.4 CAPSULE ORAL at 17:38

## 2019-06-17 RX ADMIN — SENNOSIDES,DOCUSATE SODIUM 2 TABLET: 8.6; 5 TABLET, FILM COATED ORAL at 17:32

## 2019-06-17 RX ADMIN — ASPIRIN 81 MG 81 MG: 81 TABLET ORAL at 05:49

## 2019-06-17 RX ADMIN — MIDODRINE HYDROCHLORIDE 5 MG: 5 TABLET ORAL at 11:28

## 2019-06-17 RX ADMIN — MIDODRINE HYDROCHLORIDE 5 MG: 5 TABLET ORAL at 08:33

## 2019-06-17 ASSESSMENT — ENCOUNTER SYMPTOMS
PALPITATIONS: 0
COUGH: 0
ABDOMINAL PAIN: 0
NAUSEA: 0
DIZZINESS: 0
DEPRESSION: 0
HEARTBURN: 0
MYALGIAS: 1
HEMOPTYSIS: 0
DIAPHORESIS: 0
SPUTUM PRODUCTION: 0
HALLUCINATIONS: 0
PHOTOPHOBIA: 0
WEIGHT LOSS: 0
CHILLS: 0
VOMITING: 0
SHORTNESS OF BREATH: 0
WEAKNESS: 1
HEADACHES: 0
ORTHOPNEA: 0
FEVER: 0
FALLS: 1
EYE PAIN: 0
BACK PAIN: 1

## 2019-06-17 ASSESSMENT — COGNITIVE AND FUNCTIONAL STATUS - GENERAL
STANDING UP FROM CHAIR USING ARMS: A LOT
MOVING FROM LYING ON BACK TO SITTING ON SIDE OF FLAT BED: UNABLE
MOBILITY SCORE: 8
CLIMB 3 TO 5 STEPS WITH RAILING: TOTAL
TURNING FROM BACK TO SIDE WHILE IN FLAT BAD: UNABLE
SUGGESTED CMS G CODE MODIFIER DAILY ACTIVITY: CK
MOVING TO AND FROM BED TO CHAIR: UNABLE
DRESSING REGULAR LOWER BODY CLOTHING: A LOT
WALKING IN HOSPITAL ROOM: A LOT
HELP NEEDED FOR BATHING: A LOT
DRESSING REGULAR UPPER BODY CLOTHING: A LOT
EATING MEALS: A LITTLE
TOILETING: A LOT
SUGGESTED CMS G CODE MODIFIER MOBILITY: CM
DAILY ACTIVITIY SCORE: 14
PERSONAL GROOMING: A LITTLE

## 2019-06-17 ASSESSMENT — LIFESTYLE VARIABLES: SUBSTANCE_ABUSE: 0

## 2019-06-17 ASSESSMENT — GAIT ASSESSMENTS
ASSISTIVE DEVICE: 4 WHEEL WALKER
DISTANCE (FEET): 8
DEVIATION: ATAXIC;STEP TO;DECREASED BASE OF SUPPORT;BRADYKINETIC;SHUFFLED GAIT;DECREASED HEEL STRIKE;DECREASED TOE OFF
GAIT LEVEL OF ASSIST: MODERATE ASSIST

## 2019-06-17 NOTE — PROGRESS NOTES
[facility] Medicine Daily Progress Note    Date of Service  6/17/2019    Chief Complaint  89 y.o. male admitted 6/10/2019 with fall, PNA    Hospital Course    89 y.o. male who presented 6/10/2019 with fall.     I have reviewed some of the recent provider documentation available to me in the patient's medical chart.  Records are briefly summarized: Mr. Page has a history of movement disorder/possible Parkinson's disease.  He is treated with Sinemet.  During an office visit on 3/12/2019, it is noted that his memory is stable.     Patient speaks in a soft voice, he does not say much, it is very difficult to understand him during the interview.  He indicates that the pain in his right chest is tolerable, beyond that I am unable to elicit any history of present illness due to the patient's difficulties with communication.  His daughter is at the bedside, she indicates that he is generally easier to understand.  She says that he recently took a cruise to [place] and returned about a week ago.  Since that time the patient has been weaker than normal.  Distances with a walker and has had difficulty doing that.  She is noted him to be more fatigued than normal.  No cough, no fever.  Patient today had a fall, he landed on his right side and came to the emergency room for evaluation.       Interval Problem Update  6/16: Patient was seen and examined this morning he is lying in bed and calm he is alert and oriented x3.  He denies any complaints or concerns.  He denies any pain any shortness of breath any chest pain.      6/17: Patient seen and examined this morning he is comfortable in bed.  He denies any acute questions or concerns.  Blood pressure is much better this morning on Midrin and fludrocortisone.  I reviewed his BMP as well  Consultants/Specialty  none    Code Status  DNR    Disposition  Back to his assisted living facility once oxygen is arranged    Review of Systems  Review of Systems   Constitutional: Positive for  malaise/fatigue. Negative for chills, diaphoresis, fever and weight loss.   HENT: Negative for congestion, ear pain, hearing loss and tinnitus.    Eyes: Negative for photophobia and pain.   Respiratory: Negative for cough, hemoptysis, sputum production and shortness of breath.    Cardiovascular: Negative for chest pain, palpitations, orthopnea and leg swelling.   Gastrointestinal: Negative for abdominal pain, heartburn, nausea and vomiting.   Genitourinary: Negative for dysuria, hematuria and urgency.   Musculoskeletal: Positive for back pain, falls and myalgias.   Skin: Negative for itching and rash.   Neurological: Positive for weakness. Negative for dizziness and headaches.   Psychiatric/Behavioral: Negative for depression, hallucinations, substance abuse and suicidal ideas.        Physical Exam  Temp:  [36.7 °C (98 °F)-37.1 °C (98.8 °F)] 36.7 °C (98 °F)  Pulse:  [69-82] 69  Resp:  [12-15] 15  BP: ()/(57-63) 111/63  SpO2:  [92 %-97 %] 94 %    Physical Exam   Constitutional: He is oriented to person, place, and time. He appears well-developed. No distress.   fraile   HENT:   Head: Normocephalic and atraumatic.   Mouth/Throat: Oropharynx is clear and moist. No oropharyngeal exudate.   Eyes: Pupils are equal, round, and reactive to light. Conjunctivae and EOM are normal. No scleral icterus.   Neck: Normal range of motion. Neck supple. No JVD present. No thyromegaly present.   Cardiovascular: Normal rate, regular rhythm and intact distal pulses.    No murmur heard.  Pulmonary/Chest: Effort normal and breath sounds normal. No respiratory distress. He has no wheezes. He has no rales.   Diminished breath sounds b/l   Abdominal: Soft. Bowel sounds are normal. He exhibits no distension. There is no tenderness. There is no rebound.   Right sided flank and abd bruising from his recent fall   Genitourinary:   Genitourinary Comments: Heck catheter in place secondary to urinary retention   Musculoskeletal: Normal range  of motion. He exhibits no edema or tenderness.   Lymphadenopathy:     He has no cervical adenopathy.   Neurological: He is alert and oriented to person, place, and time. He exhibits normal muscle tone.   Skin: Skin is warm and dry. No erythema.   Psychiatric: He has a normal mood and affect. His behavior is normal.       Fluids    Intake/Output Summary (Last 24 hours) at 06/17/19 1557  Last data filed at 06/17/19 0334   Gross per 24 hour   Intake              100 ml   Output              450 ml   Net             -350 ml       Laboratory      Recent Labs      06/15/19   0921  06/16/19   0848  06/17/19   0943   SODIUM  137  137  139   POTASSIUM  4.0  3.9  4.0   CHLORIDE  101  104  105   CO2  25  27  28   GLUCOSE  114*  100*  115*   BUN  30*  26*  29*   CREATININE  0.86  0.92  0.94   CALCIUM  8.5  8.4*  8.8                   Imaging  DX-CHEST-PORTABLE (1 VIEW)   Final Result      Increased volume right pleural effusion. Small/moderate right pleural effusion demonstrated.   2 cm right apical pneumothorax.   Small left pleural effusion.   Bilateral perihilar/lung base atelectasis. Pneumonitis not excluded.   Findings were discussed with JULIO BANERJEE on 6/17/2019 8:27 AM.      US-RENAL   Final Result      No hydronephrosis.      CT-CHEST,ABDOMEN,PELVIS W/O   Final Result      1.  Acute fractures of the posterior aspects of the right 8th through 10th ribs.      2.  Large right pleural effusion. Hemothorax cannot be excluded. No pneumothorax is present.      3.  Small left pleural effusion.      4.  Bibasilar atelectasis or pneumonia, right greater than left.      5.  No acute posttraumatic change identified in the abdomen or pelvis.      6.  Markedly distended bladder. Enlarged prostate gland.      EC-ECHOCARDIOGRAM COMPLETE W/O CONT   Final Result      DX-CHEST-LIMITED (1 VIEW)   Final Result      Right basilar opacity likely represents pneumonia.      Trace right pleural effusion is not excluded.      Atherosclerotic  plaque.      Prominent scoliosis.      If clinical suspicion for rib fracture is high, further evaluation with CT is recommended.              Assessment/Plan  * Acute respiratory failure with hypoxia (HCC)   Assessment & Plan    Persistent; Likely 2/2 PNA and large right pleural effusion as seen on chest CT  Likely viral etiology as there is no white count and procalcitonin is negative  Refused ultrasound-guided thoracentesis  Supplemental O2  DNR/DNI  Monitor closely  Influenza screen is negative    Echo was noted normal     Pneumonia- (present on admission)   Assessment & Plan    Right lower lobe opacity, consistent with pneumonia, however no white count, procalc neg  Likely viral etiology hold off on antibiotics   CT chest/abd shows right-sided pneumonia and large pleural effusion  I ordered ultrasound-guided thoracentesis however patient refusing to obtain it.  I discussed the risks of not getting the thoracentesis for the large pleural effusion being acute shortness of breath and worsening hypoxia and respiratory failure however patient continues to refuse it.  I did discuss palliative care comfort care however at this time patient does not want to discuss this.  He wants to go home and then think about it.    At this point continue supplemental oxygen and conservative management.       Parkinson's disease (HCC)- (present on admission)   Assessment & Plan    Patient is treated with Sinemet for probable Parkinson's disease  Mobility is severely limited, came from nursing home  Continue sinemet  GOC discussed, wishes are DNR   Patient already getting scheduled physical therapy at [facility], however PT evaluated patient and recommended 24/7 assistance, will place referral for SNF, however patient refused to go to a skilled nursing facility.  Patient and daughter adamant about him being discharged back to assisted living and they will arrange for 24-hour care giver at the facility.     Orthostatic hypotension    Assessment & Plan    Symptomatic with dizziness, patient's blood pressure dropped after physical therapy and he was given 500 mL fluid bolus with caution given his large right pleural effusion which improved the blood pressure slightly however still remains low his orthostatics were also positive.   Continue midodrine 3 times daily  I will also add in fludrocortisone.     Severe protein-calorie malnutrition ([facility])   Assessment & Plan    Patient with a BMI of 15.  He is very tiny and weak.  Nutrition consulted     COPD (chronic obstructive pulmonary disease) ([facility])   Assessment & Plan    She does have remote history of smoking in the past and I suspect he may have some COPD underlying however he is not in acute exacerbation  He is requiring chronic oxygen therefore I have ordered DME O2 for home.     Dysphasia   Assessment & Plan    As noted by speech evaluation today.  He is unable to eat thin liquids and will be placed on a dysphagia diet however speech therapist is still concerned about aspiration   Fees study noted   After having a discussion with patient for possible core track versus feeding tube patient adamantly refused.  Speech has made recommendations moving forward with his diet even after discharge  Palliative care consulted     CARLA (acute kidney injury) ([facility])   Assessment & Plan    No hx of CKD, creatinine is back to baseline  Dehydration likely cause, large pleural effusion therefore I have DC'd his IV fluids  Avoid nephrotoxics, renally dose all meds     urine protein.cr ratio noted  DC Lovenox and add heparin for DVT prophylaxis instead    Ultrasound of renals have been negative  Creatinine is back to baseline       Advanced care planning/counseling discussion   Assessment & Plan    Discussed in detail about goals of care and at this time he wishes to be DNR/DNI  DNR.DNI  Palliative consulted-as patient is high risk for aspiration according to speech evaluation however is declining any sort of feeding  tube     Edema- (present on admission)   Assessment & Plan    Bilateral lower extremity edema  This is a new problem, he is never experienced this before  Echo noted, normal EF 65% no DD  Labs monitored    resolved     Macrocytosis- (present on admission)   Assessment & Plan    b12 and folate normal, nutrition consulted        A small apical pneumothorax on the right side now on top of the possible pneumonia and pleural effusion which is shown to be decreasing however he is increased risk of clinical deterioration.  He is DNR/DNI.  Palliative consulted.  Patient and daughter really do not understand that his prognosis is guarded at this point and he can quickly decline.  They have however agreed to skilled nursing facility whenif he is medically stable    VTE prophylaxis: lovenox

## 2019-06-17 NOTE — PROGRESS NOTES
"Pt A&Ox4. When asked where pt is, pt states \"I am in [place] but then is able to state where he is accurately.\" Talked with pt about staying positive, pt smiled and seemed happier after conversation. PT in working with pt, pt up to chair. Plan to watch over night for xray tomorrow morning to evaluate pneumo. Will continue to monitor.   "

## 2019-06-17 NOTE — CARE PLAN
Problem: Safety  Goal: Will remain free from injury  Outcome: PROGRESSING AS EXPECTED  Pt understands importance of using call light for ambulation. Call light at bedside. Appropriate use.

## 2019-06-17 NOTE — PROGRESS NOTES
Pt alert and orientated, denies pain, able to answer questions but is soft spoken, assessment completed (see flowsheets), medications given as directed (see eMar), pt expressed desire to go home, will continue to monitor.

## 2019-06-17 NOTE — CARE PLAN
Problem: Nutritional:  Goal: Achieve adequate nutritional intake  Patient will consume 50% of meals or greater    Outcome: MET Date Met: 06/17/19    Intervention: Monitor PO intake, weights, and laboratory values  Per review of ADL documentation, pt is consuming >50% of meals.   No further nutritional needs at this time.     RD available prn - and will monitor per dept policy

## 2019-06-17 NOTE — DISCHARGE PLANNING
Anticipated Discharge Disposition: Skilled    Action: PRISCILLA spoke to patient's daughter, Nabila about d/c planning.  Nabila stated that her father is currently living in the non assisted living side of [facility], and plans on moving him to the assisted living side with additional support.  Nabila stated at this time they don't have a bed on that unit  Further the individual who does the intake assessment is out until later next week.  Nabila asked if her father can go to a skilled for therapy.  PRISCILLA stated if PT/OT and Speech still recommend it, then yes.  Nabila allowed PRISCILLA to submit a blanket referral to local skilled.  PRISCILLA stated that the facilities may call her to verify that the d/c plan is to go back to [facility] assisted/with additional support.      PRISCILLA faxed [facility] to [facility]    Barriers to Discharge: Acceptance    Plan: follow up with [facility] for acceptance

## 2019-06-17 NOTE — DISCHARGE PLANNING
Received Choice form at 1413  Agency/Facility Name: [facility]  Referral sent per Choice form at 1422

## 2019-06-17 NOTE — ASSESSMENT & PLAN NOTE
Patient initially had large right sided pleural effusion refused to undergo ultrasound-guided thoracentesis.  Continues to maintain his O2 sats on 2 L of oxygen.   He then was found to have PTx on yesterday CXR  I repeated it today and it showed decreased pneumothorax to 1.3cm now  Remains stable on his 1-2L O2

## 2019-06-17 NOTE — THERAPY
"Occupational Therapy Treatment completed with focus on ADLs, ADL transfers and patient education.  Functional Status:  Mod A for supine>sit>Stand and functional transfer; mod A for UB dressing; min A for grooming while seated  Plan of Care: Will benefit from Occupational Therapy 3 times per week  Discharge Recommendations:  Equipment Will Continue to Assess for Equipment Needs. Recommend post-acute placement for additional occupational therapy services prior to discharge home. Patient can tolerate post-acute therapies at a 5x/week frequency.    See \"Rehab Therapy-Acute\" Patient Summary Report for complete documentation.     Pt participated in OT tx session. Pt continues to be limited due to poor activity tolerance, standing balance and cognition. Pt required increased time to respond to questions and follow simple commands. Pt progressing with standing tolerance. Pt with posterior lean while seated and standing however able to self-correct with VC's to lean forward. Will continue to follow for acute OT services while in-house.   "

## 2019-06-17 NOTE — PROGRESS NOTES
Pt stated that he has some right sided pain, xray contacted to expedite the scheduled xray, pt denies any worsening shortness of breath,  Olga PAYTON aware, Rita Moreno RN updated.

## 2019-06-17 NOTE — THERAPY
"Pt demonstrating improved initiation and trunk control while seated. Pt able to take a few steps forwards and backwards, able to progress from modA to Ilsa. Pt limited by R rib cage pain and weak BLEs. Pt would benefit from further acute PT txs to progress towards goals and independence. Recommend post acute placement if unable to set up 24/7 caregiver assistance at [facility].    Physical Therapy Treatment completed.   Bed Mobility:  Supine to Sit: Maximal Assist  Transfers: Sit to Stand: Moderate Assist  Gait: Level Of Assist: Moderate Assist with 4-Wheel Walker       Plan of Care: Will benefit from Physical Therapy 3 times per week     See \"Rehab Therapy-Acute\" Patient Summary Report for complete documentation.       "

## 2019-06-17 NOTE — DISCHARGE PLANNING
Agency/Facility Name: [facility]  Spoke To: René  Outcome: 02 to be delivered to bedside by 1130.

## 2019-06-17 NOTE — THERAPY
"Speech Language Therapy dysphagia treatment completed.     Functional Status:  Pt was seen at breakfast with a dysphagia II/NTL meal tray.  Pt sitting up in a chair for meal, showing improved coordination with self feeding with assisted tray prep.  Pt noted to be clearing his throat prior to starting PO intake, and intermittently during PO intake, which he reports is baseline.  He consumed soft solids and nectars without changes in vocal quality or other overt s/sx of aspiration.  As stated, throat clearing was intermittently noted throughout meal, however not in direct correlation with PO intake.  Pt asking why his liquids had to be thickened, and was re-educated on results of FEES and risks of aspiration.  Pt verbalized understanding and was agreeable to continue with SLP recs.  Recommend to continue a dysphagia II diet with nectars, with direct assistance during PO intake.  SLP continues to follow to ensure diet tolerance.      Recommendations: 1) continue a dysphagia II diet with nectars, with direct assistance during PO intake, 2) have pt sit up in a chair if possible for meals     Plan of Care: Will benefit from Speech Therapy 3 times per week    Post-Acute Therapy: Recommend inpatient transitional care services for continued speech therapy services.      See \"Rehab Therapy-Acute\" Patient Summary Report for complete documentation.     "

## 2019-06-18 ENCOUNTER — APPOINTMENT (OUTPATIENT)
Dept: RADIOLOGY | Facility: MEDICAL CENTER | Age: 84
DRG: 177 | End: 2019-06-18
Attending: HOSPITALIST
Payer: MEDICARE

## 2019-06-18 PROBLEM — R33.9 URINARY RETENTION: Status: ACTIVE | Noted: 2019-06-18

## 2019-06-18 LAB
ANION GAP SERPL CALC-SCNC: 7 MMOL/L (ref 0–11.9)
BUN SERPL-MCNC: 28 MG/DL (ref 8–22)
CALCIUM SERPL-MCNC: 8.4 MG/DL (ref 8.5–10.5)
CHLORIDE SERPL-SCNC: 106 MMOL/L (ref 96–112)
CO2 SERPL-SCNC: 24 MMOL/L (ref 20–33)
CREAT SERPL-MCNC: 0.83 MG/DL (ref 0.5–1.4)
GLUCOSE SERPL-MCNC: 103 MG/DL (ref 65–99)
POTASSIUM SERPL-SCNC: 4.2 MMOL/L (ref 3.6–5.5)
PROCALCITONIN SERPL-MCNC: 0.13 NG/ML
SODIUM SERPL-SCNC: 137 MMOL/L (ref 135–145)

## 2019-06-18 PROCEDURE — 700102 HCHG RX REV CODE 250 W/ 637 OVERRIDE(OP): Performed by: HOSPITALIST

## 2019-06-18 PROCEDURE — 700111 HCHG RX REV CODE 636 W/ 250 OVERRIDE (IP): Performed by: HOSPITALIST

## 2019-06-18 PROCEDURE — A9270 NON-COVERED ITEM OR SERVICE: HCPCS | Performed by: HOSPITALIST

## 2019-06-18 PROCEDURE — 86480 TB TEST CELL IMMUN MEASURE: CPT

## 2019-06-18 PROCEDURE — 71045 X-RAY EXAM CHEST 1 VIEW: CPT

## 2019-06-18 PROCEDURE — 36415 COLL VENOUS BLD VENIPUNCTURE: CPT

## 2019-06-18 PROCEDURE — 84145 PROCALCITONIN (PCT): CPT

## 2019-06-18 PROCEDURE — 770006 HCHG ROOM/CARE - MED/SURG/GYN SEMI*

## 2019-06-18 PROCEDURE — 80048 BASIC METABOLIC PNL TOTAL CA: CPT

## 2019-06-18 PROCEDURE — 97530 THERAPEUTIC ACTIVITIES: CPT

## 2019-06-18 PROCEDURE — 51798 US URINE CAPACITY MEASURE: CPT

## 2019-06-18 PROCEDURE — 99233 SBSQ HOSP IP/OBS HIGH 50: CPT | Performed by: HOSPITALIST

## 2019-06-18 PROCEDURE — 97116 GAIT TRAINING THERAPY: CPT

## 2019-06-18 RX ADMIN — IBUPROFEN 600 MG: 400 TABLET ORAL at 20:11

## 2019-06-18 RX ADMIN — MIDODRINE HYDROCHLORIDE 5 MG: 5 TABLET ORAL at 12:16

## 2019-06-18 RX ADMIN — FLUDROCORTISONE ACETATE 0.05 MG: 0.1 TABLET ORAL at 05:17

## 2019-06-18 RX ADMIN — HEPARIN SODIUM 5000 UNITS: 5000 INJECTION, SOLUTION INTRAVENOUS; SUBCUTANEOUS at 15:40

## 2019-06-18 RX ADMIN — CARBIDOPA AND LEVODOPA 1 TABLET: 25; 100 TABLET ORAL at 12:16

## 2019-06-18 RX ADMIN — CARBIDOPA AND LEVODOPA 1 TABLET: 25; 100 TABLET ORAL at 05:16

## 2019-06-18 RX ADMIN — HEPARIN SODIUM 5000 UNITS: 5000 INJECTION, SOLUTION INTRAVENOUS; SUBCUTANEOUS at 05:17

## 2019-06-18 RX ADMIN — CARBIDOPA AND LEVODOPA 1 TABLET: 25; 100 TABLET ORAL at 17:29

## 2019-06-18 RX ADMIN — MIDODRINE HYDROCHLORIDE 5 MG: 5 TABLET ORAL at 10:13

## 2019-06-18 RX ADMIN — ASPIRIN 81 MG 81 MG: 81 TABLET ORAL at 05:16

## 2019-06-18 RX ADMIN — MIDODRINE HYDROCHLORIDE 5 MG: 5 TABLET ORAL at 17:29

## 2019-06-18 RX ADMIN — TAMSULOSIN HYDROCHLORIDE 0.4 MG: 0.4 CAPSULE ORAL at 17:29

## 2019-06-18 RX ADMIN — IBUPROFEN 600 MG: 400 TABLET ORAL at 00:46

## 2019-06-18 ASSESSMENT — ENCOUNTER SYMPTOMS
COUGH: 0
PHOTOPHOBIA: 0
SHORTNESS OF BREATH: 0
SPUTUM PRODUCTION: 0
EYE PAIN: 0
HEARTBURN: 0
ABDOMINAL PAIN: 0
NAUSEA: 0
FALLS: 1
WEIGHT LOSS: 1
BACK PAIN: 1
DIAPHORESIS: 0
ORTHOPNEA: 0
PALPITATIONS: 0
HEADACHES: 0
DIZZINESS: 0
DEPRESSION: 0
HEMOPTYSIS: 0
FEVER: 0
CHILLS: 0
VOMITING: 0
HALLUCINATIONS: 0
MYALGIAS: 1
WEAKNESS: 1

## 2019-06-18 ASSESSMENT — COGNITIVE AND FUNCTIONAL STATUS - GENERAL
MOBILITY SCORE: 8
TURNING FROM BACK TO SIDE WHILE IN FLAT BAD: UNABLE
STANDING UP FROM CHAIR USING ARMS: A LOT
MOVING TO AND FROM BED TO CHAIR: UNABLE
MOVING FROM LYING ON BACK TO SITTING ON SIDE OF FLAT BED: UNABLE
WALKING IN HOSPITAL ROOM: A LOT
CLIMB 3 TO 5 STEPS WITH RAILING: TOTAL
SUGGESTED CMS G CODE MODIFIER MOBILITY: CM

## 2019-06-18 ASSESSMENT — GAIT ASSESSMENTS
ASSISTIVE DEVICE: 4 WHEEL WALKER
GAIT LEVEL OF ASSIST: MODERATE ASSIST
DEVIATION: ATAXIC;STEP TO;DECREASED BASE OF SUPPORT;BRADYKINETIC;SHUFFLED GAIT;DECREASED HEEL STRIKE;DECREASED TOE OFF
DISTANCE (FEET): 20

## 2019-06-18 ASSESSMENT — LIFESTYLE VARIABLES: SUBSTANCE_ABUSE: 0

## 2019-06-18 NOTE — PROGRESS NOTES
[facility] Medicine Daily Progress Note    Date of Service  6/18/2019    Chief Complaint  89 y.o. male admitted 6/10/2019 with fall, PNA    Hospital Course    89 y.o. male who presented 6/10/2019 with fall.     I have reviewed some of the recent provider documentation available to me in the patient's medical chart.  Records are briefly summarized: Mr. Page has a history of movement disorder/possible Parkinson's disease.  He is treated with Sinemet.  During an office visit on 3/12/2019, it is noted that his memory is stable.     Patient speaks in a soft voice, he does not say much, it is very difficult to understand him during the interview.  He indicates that the pain in his right chest is tolerable, beyond that I am unable to elicit any history of present illness due to the patient's difficulties with communication.  His daughter is at the bedside, she indicates that he is generally easier to understand.  She says that he recently took a cruise to [place] and returned about a week ago.  Since that time the patient has been weaker than normal.  Distances with a walker and has had difficulty doing that.  She is noted him to be more fatigued than normal.  No cough, no fever.  Patient today had a fall, he landed on his right side and came to the emergency room for evaluation.       Interval Problem Update  6/16: Patient was seen and examined this morning he is lying in bed and calm he is alert and oriented x3.  He denies any complaints or concerns.  He denies any pain any shortness of breath any chest pain.      6/17: Patient seen and examined this morning he is comfortable in bed.  He denies any acute questions or concerns.  Blood pressure is much better this morning on Midrin and fludrocortisone.  I reviewed his BMP as well      6/18:patient seen and examined,he is still on O2 and denies any complaints, however has small apical PTX on right side, I repeat a CXR this morning that showed decrease in size from 2cm -->  1.3  I discussed those findings with patient and his daughter  Still with urinary retention, will pull and allow voiding trail  Consultants/Specialty  none    Code Status  DNR    Disposition  Earlier family wants SNF and now they [facility], SW aware and working again    Review of Systems  Review of Systems   Constitutional: Positive for malaise/fatigue and weight loss. Negative for chills, diaphoresis and fever.   HENT: Negative for congestion, ear pain, hearing loss and tinnitus.    Eyes: Negative for photophobia and pain.   Respiratory: Negative for cough, hemoptysis, sputum production and shortness of breath.    Cardiovascular: Negative for chest pain, palpitations, orthopnea and leg swelling.   Gastrointestinal: Negative for abdominal pain, heartburn, nausea and vomiting.   Genitourinary: Negative for dysuria, hematuria and urgency.        Retention urine   Musculoskeletal: Positive for back pain, falls and myalgias.   Skin: Negative for itching and rash.   Neurological: Positive for weakness. Negative for dizziness and headaches.   Psychiatric/Behavioral: Negative for depression, hallucinations, substance abuse and suicidal ideas.        Physical Exam  Temp:  [36.5 °C (97.7 °F)-37 °C (98.6 °F)] 37 °C (98.6 °F)  Pulse:  [65-84] 76  Resp:  [15-17] 15  BP: (102-110)/(47-60) 107/60  SpO2:  [90 %-98 %] 90 %    Physical Exam   Constitutional: He is oriented to person, place, and time. He appears well-developed. No distress.   fraile   HENT:   Head: Normocephalic and atraumatic.   Mouth/Throat: Oropharynx is clear and moist. No oropharyngeal exudate.   Eyes: Pupils are equal, round, and reactive to light. Conjunctivae and EOM are normal. No scleral icterus.   Neck: Normal range of motion. Neck supple. No JVD present. No thyromegaly present.   Cardiovascular: Normal rate, regular rhythm and intact distal pulses.    No murmur heard.  Pulmonary/Chest: Effort normal and breath sounds normal. No respiratory  distress. He has no wheezes. He has no rales.   Diminished breath sounds b/l   Abdominal: Soft. Bowel sounds are normal. He exhibits no distension. There is no tenderness. There is no rebound.   Right sided flank and abd bruising from his recent fall   Genitourinary:   Genitourinary Comments: Heck catheter in place secondary to urinary retention   Musculoskeletal: Normal range of motion. He exhibits no edema or tenderness.   Lymphadenopathy:     He has no cervical adenopathy.   Neurological: He is alert and oriented to person, place, and time. He exhibits normal muscle tone.   Skin: Skin is warm and dry. No erythema.   Psychiatric: His affect is blunt and labile. His speech is delayed. He is slowed and withdrawn.       Fluids    Intake/Output Summary (Last 24 hours) at 06/18/19 1641  Last data filed at 06/18/19 1500   Gross per 24 hour   Intake              660 ml   Output              800 ml   Net             -140 ml       Laboratory      Recent Labs      06/16/19   0848  06/17/19   0943  06/18/19   0902   SODIUM  137  139  137   POTASSIUM  3.9  4.0  4.2   CHLORIDE  104  105  106   CO2  27  28  24   GLUCOSE  100*  115*  103*   BUN  26*  29*  28*   CREATININE  0.92  0.94  0.83   CALCIUM  8.4*  8.8  8.4*                   Imaging  DX-CHEST-PORTABLE (1 VIEW)   Final Result      Small right pleural effusion with overlying atelectasis/consolidation is not significantly changed.      Pneumothorax on the right is decreased in size measuring 1.3 cm.      Retrocardiac opacity may represent atelectasis.      Atherosclerotic plaque.      Prominent scoliosis.         DX-CHEST-PORTABLE (1 VIEW)   Final Result      Increased volume right pleural effusion. Small/moderate right pleural effusion demonstrated.   2 cm right apical pneumothorax.   Small left pleural effusion.   Bilateral perihilar/lung base atelectasis. Pneumonitis not excluded.   Findings were discussed with JULIO BANERJEE on 6/17/2019 8:27 AM.      US-RENAL   Final  Result      No hydronephrosis.      CT-CHEST,ABDOMEN,PELVIS W/O   Final Result      1.  Acute fractures of the posterior aspects of the right 8th through 10th ribs.      2.  Large right pleural effusion. Hemothorax cannot be excluded. No pneumothorax is present.      3.  Small left pleural effusion.      4.  Bibasilar atelectasis or pneumonia, right greater than left.      5.  No acute posttraumatic change identified in the abdomen or pelvis.      6.  Markedly distended bladder. Enlarged prostate gland.      EC-ECHOCARDIOGRAM COMPLETE W/O CONT   Final Result      DX-CHEST-LIMITED (1 VIEW)   Final Result      Right basilar opacity likely represents pneumonia.      Trace right pleural effusion is not excluded.      Atherosclerotic plaque.      Prominent scoliosis.      If clinical suspicion for rib fracture is high, further evaluation with CT is recommended.              Assessment/Plan  * Acute respiratory failure with hypoxia (HCC)   Assessment & Plan    Persistent; Likely 2/2 PNA and large right pleural effusion as seen on chest CT which is improving however now has right apical small PTX  Likely viral etiology as there is no white count and procalcitonin is negative  Refused ultrasound-guided thoracentesis  Supplemental O2  DNR/DNI  Monitor closely  Influenza screen is negative    Repeat CXR showing decreased pneumothorax and effusion    Echo was noted normal     Pneumonia- (present on admission)   Assessment & Plan    Right lower lobe opacity, consistent with pneumonia, however no white count, procalc neg  Likely viral etiology hold off on antibiotics   CT chest/abd shows right-sided pneumonia and large pleural effusion, patient refused thoracentesis. repeat cxrshowed decreasing pleural effusion however now he has a right apical PTX. He is on 2L NC and stable. Will monitor  At this point continue supplemental oxygen and conservative management.    Repeat showing decreasing pneumothorax, monitoring closely, no  coughing , however cxr that was repeat this morning can not exclude PNA, I will order procalc and cbc again to monitor and see if is bacterial PNA,    Guarded condition       Parkinson's disease (HCC)- (present on admission)   Assessment & Plan    Patient is treated with Sinemet for probable Parkinson's disease  Mobility is severely limited, came from intermediate  Continue sinemet  GOC discussed, wishes are DNR   Patient already getting scheduled [facility] at [facility], however PT evaluated patient and recommended 24/7 assistance,family again changed their mind and instead of SNF they want to [facility] to [facility]     Urinary retention   Assessment & Plan    Unclear cause, however he has had a lindsey in place  Will pull and try voiding on own  Bladder scans per protocol     Pneumothorax   Assessment & Plan    Patient initially had large right sided pleural effusion refused to undergo ultrasound-guided thoracentesis.  Continues to maintain his O2 sats on 2 L of oxygen.   He then was found to have PTx on yesterday CXR  I repeated it today and it showed decreased pneumothorax to 1.3cm now  Remains stable on his 1-2L O2     Orthostatic hypotension   Assessment & Plan    Symptomatic with dizziness, patient's blood pressure dropped after physical therapy and he was given 500 mL fluid bolus with caution given his large right pleural effusion which improved the blood pressure slightly however still remains low his orthostatics were also positive.   Continue midodrine 3 times daily  Continue fludrocortisone.     Severe protein-calorie malnutrition (HCC)   Assessment & Plan    Patient with a BMI of 15.  He is very tiny and weak.  Nutrition consulted     COPD (chronic obstructive pulmonary disease) ([facility])   Assessment & Plan    She does have remote history of smoking in the past and I suspect he may have some COPD underlying however he is not in acute exacerbation  DME O2 available     Dysphasia   Assessment & Plan     As noted by speech evaluation today.  He is unable to eat thin liquids and will be placed on a dysphagia diet however speech therapist is still concerned about aspiration   Fees study noted   After having a discussion with patient for possible core track versus feeding tube patient adamantly refused.  Speech has made recommendations moving forward with his diet even after discharge  Palliative care consulted     CARLA (acute kidney injury) (HCC)   Assessment & Plan    No hx of CKD, creatinine is back to baseline  Dehydration likely cause, large pleural effusion therefore I have DC'd his IV fluids  Avoid nephrotoxics, renally dose all meds     urine protein.cr ratio noted  DC Lovenox and add heparin for DVT prophylaxis instead    Ultrasound of renals have been negative  Creatinine is back to baseline       Advanced care planning/counseling discussion   Assessment & Plan    Discussed in detail about goals of care and at this time he wishes to be DNR/DNI  DNR.DNI  Palliative consulted-as patient is high risk for aspiration according to speech evaluation however is declining any sort of feeding tube     Edema- (present on admission)   Assessment & Plan    Bilateral lower extremity edema  This is a new problem, he is never experienced this before  Echo noted, normal EF 65% no DD  Labs monitored    resolved     Macrocytosis- (present on admission)   Assessment & Plan    b12 and folate normal, nutrition consulted        Discussed plan of care with patient, his daughter, nursing and sw  VTE prophylaxis: lovenox

## 2019-06-18 NOTE — CARE PLAN
Problem: Safety  Goal: Will remain free from injury  Outcome: PROGRESSING AS EXPECTED  Pt encouraged to call for assistance to get out of bed, pt verbalized understanding    Problem: Psychosocial Needs:  Goal: Level of anxiety will decrease  Outcome: PROGRESSING AS EXPECTED  Pt encouraged to use distraction and activities such as ambulating. Pt demonstrates understanding.

## 2019-06-18 NOTE — PROGRESS NOTES
Heck catheter d/c at 15:02 per Dr's order. Pt educated about voiding trial verbalized understanding.

## 2019-06-18 NOTE — THERAPY
"Pt demonstrating improving intiation and trunk control. Pt w/increased ambulation distance though required maximal verbal cuing and sequencing w/visual cues to longer stride. Pt contiues to exhibit decreased ROM in BLEs and presents w/ataxic gait w/narrow ERICA and flexed knees. Pt would benefit from further acute PT txs to progress towards goals and independence. Recommend post acute placement if unable to obtain LAf palcemdent w/caregivers, as pt lives in ILF currently.    Physical Therapy Treatment completed.   Bed Mobility:  Supine to Sit: Moderate Assist  Transfers: Sit to Stand: Minimal Assist  Gait: Level Of Assist: Moderate Assist with 4-Wheel Walker       Plan of Care: Will benefit from Physical Therapy 3 times per week    See \"Rehab Therapy-Acute\" Patient Summary Report for complete documentation.       "

## 2019-06-18 NOTE — CARE PLAN
Problem: Safety  Goal: Will remain free from injury  Outcome: PROGRESSING AS EXPECTED  Instructed patinet on use of call light, call for assistance when ambulating, frequent checks, offer toileting on a regular basis.          Problem: Knowledge Deficit  Goal: Knowledge of disease process/condition, treatment plan, diagnostic tests, and medications will improve  Outcome: PROGRESSING AS EXPECTED  Discussed plan of care with patient, answered questions best to my ability.

## 2019-06-18 NOTE — ASSESSMENT & PLAN NOTE
Unclear cause, however he has had a lindsey in place  Will pull and try voiding on own  Bladder scans per protocol

## 2019-06-18 NOTE — DISCHARGE PLANNING
Anticipated Discharge Disposition: home to assisted living.     Action: Spoke to dtr Nabila over the phone. Reviewed that pt has been accepted to the local SNFs but dtr and pt want him to go home, not to skilled. Pt currently lives in Independent Living at [address] [place] [address] and I received a call from Roc from [address] [place] [address] stating they are sending their director, Horacio Murillo out to do an assessment of pt to see if he qualifies for the Assisted Living side at 1:00 today.   Reviewed above with bedside nursing and with physical therapist.     Barriers to Discharge: Assisted living to do an assessment.    Plan: [address] [place] [address] to assess pt at 1:00PM.

## 2019-06-19 LAB
ANION GAP SERPL CALC-SCNC: 7 MMOL/L (ref 0–11.9)
BUN SERPL-MCNC: 27 MG/DL (ref 8–22)
CALCIUM SERPL-MCNC: 8.5 MG/DL (ref 8.5–10.5)
CHLORIDE SERPL-SCNC: 107 MMOL/L (ref 96–112)
CO2 SERPL-SCNC: 23 MMOL/L (ref 20–33)
CREAT SERPL-MCNC: 0.86 MG/DL (ref 0.5–1.4)
GAMMA INTERFERON BACKGROUND BLD IA-ACNC: 0.03 IU/ML
GLUCOSE SERPL-MCNC: 98 MG/DL (ref 65–99)
M TB IFN-G BLD-IMP: NEGATIVE
M TB IFN-G CD4+ BCKGRND COR BLD-ACNC: -0.01 IU/ML
MITOGEN IGNF BCKGRD COR BLD-ACNC: 1.02 IU/ML
POTASSIUM SERPL-SCNC: 4.1 MMOL/L (ref 3.6–5.5)
QFT TB2 - NIL TBQ2: -0.01 IU/ML
SODIUM SERPL-SCNC: 137 MMOL/L (ref 135–145)

## 2019-06-19 PROCEDURE — 700102 HCHG RX REV CODE 250 W/ 637 OVERRIDE(OP): Performed by: HOSPITALIST

## 2019-06-19 PROCEDURE — A9270 NON-COVERED ITEM OR SERVICE: HCPCS | Performed by: HOSPITALIST

## 2019-06-19 PROCEDURE — 36415 COLL VENOUS BLD VENIPUNCTURE: CPT

## 2019-06-19 PROCEDURE — 80048 BASIC METABOLIC PNL TOTAL CA: CPT

## 2019-06-19 PROCEDURE — 770006 HCHG ROOM/CARE - MED/SURG/GYN SEMI*

## 2019-06-19 PROCEDURE — 700111 HCHG RX REV CODE 636 W/ 250 OVERRIDE (IP): Performed by: HOSPITALIST

## 2019-06-19 PROCEDURE — 99232 SBSQ HOSP IP/OBS MODERATE 35: CPT | Performed by: HOSPITALIST

## 2019-06-19 PROCEDURE — 51798 US URINE CAPACITY MEASURE: CPT

## 2019-06-19 RX ADMIN — HEPARIN SODIUM 5000 UNITS: 5000 INJECTION, SOLUTION INTRAVENOUS; SUBCUTANEOUS at 04:52

## 2019-06-19 RX ADMIN — HEPARIN SODIUM 5000 UNITS: 5000 INJECTION, SOLUTION INTRAVENOUS; SUBCUTANEOUS at 15:07

## 2019-06-19 RX ADMIN — CARBIDOPA AND LEVODOPA 1 TABLET: 25; 100 TABLET ORAL at 04:51

## 2019-06-19 RX ADMIN — TAMSULOSIN HYDROCHLORIDE 0.4 MG: 0.4 CAPSULE ORAL at 17:58

## 2019-06-19 RX ADMIN — FLUDROCORTISONE ACETATE 0.05 MG: 0.1 TABLET ORAL at 04:51

## 2019-06-19 RX ADMIN — ASPIRIN 81 MG 81 MG: 81 TABLET ORAL at 04:51

## 2019-06-19 RX ADMIN — HEPARIN SODIUM 5000 UNITS: 5000 INJECTION, SOLUTION INTRAVENOUS; SUBCUTANEOUS at 20:10

## 2019-06-19 RX ADMIN — CARBIDOPA AND LEVODOPA 1 TABLET: 25; 100 TABLET ORAL at 17:58

## 2019-06-19 RX ADMIN — MIDODRINE HYDROCHLORIDE 5 MG: 5 TABLET ORAL at 17:58

## 2019-06-19 RX ADMIN — CARBIDOPA AND LEVODOPA 1 TABLET: 25; 100 TABLET ORAL at 12:26

## 2019-06-19 RX ADMIN — IBUPROFEN 600 MG: 400 TABLET ORAL at 10:39

## 2019-06-19 RX ADMIN — MIDODRINE HYDROCHLORIDE 5 MG: 5 TABLET ORAL at 12:26

## 2019-06-19 RX ADMIN — MIDODRINE HYDROCHLORIDE 5 MG: 5 TABLET ORAL at 09:05

## 2019-06-19 NOTE — DISCHARGE PLANNING
Agency/Facility Name: [facility]  Outcome: Faxed ABN form at 2018. Per Araceli: I didn't receive form yet.    This CCA will f/u with a call to [facility].

## 2019-06-19 NOTE — PROGRESS NOTES
2 RN skin check complete with FITO Parada.   Devices in place PIV, lindsey catheter, glasses.  Skin assessed under device intact.  Confirmed pressure ulcers found on N/A  New potential pressure ulcers noted on N/A  Wound consult placed N/A  The following interventions in place reposition every 2 hours, waffle cushion on bed and mattress, floresita cream, barrier wipes    Bilateral ears and bridge of nose with blanchable redness  Right side of back and flank with large bruises  Sacrum pink and blanching  Bilateral heels pink and blanching

## 2019-06-19 NOTE — PROGRESS NOTES
2 RN skin check completed with FITO Wagner.   Devices in place glasses  Skin assessed under devices yes.  Confirmed pressure ulcers found on none.  New potential pressure ulcers noted on none.   The following interventions in place Waffle cushion mattress overlay, encourage pt to take off glasses when sleeping, incontinence care and ANKUR.      Behind both ears and bridge of nose are red and blanching.    Left elbow has small area of redness which is blanching.  Right side of back and flank area with large bruises.  Sacrum is pink and blanching.  Both heels are pink and blanching.

## 2019-06-19 NOTE — PROGRESS NOTES
Pt bladder scan 839. Has not voided since lindsey removal yesterday afternoon. Orders previously placed to replace lindsey if bladder scan >750ml. Lindsey inserted with instant urine return.

## 2019-06-19 NOTE — PROGRESS NOTES
2 RN skin check completed with Lucia PAYTON.   Devices in place: nasal cannula  Skin assessed under devices: yes   Confirmed pressure ulcers found on N/A.  New potential pressure ulcers noted on N/A. Wound consult placed N/A.  The following interventions in place: waffle overlay mattress, barrier wipes, barrier cream, up to chair for meals, Q2h turns         Pt has the following:    Bilateral ears are blanching  L elbow is pink and blanching  R rib cage has a large blue and black bruise  Sacrum is pink and blanching  Bilateral heels are boggy pink and blanching

## 2019-06-19 NOTE — PROGRESS NOTES
Day shift RN reported that lindsey was removed around 1500.  No void since lindsey removed.  Pt denied any urge to void.  Bladder scan showed 235 ml for now.

## 2019-06-19 NOTE — CARE PLAN
Problem: Safety  Goal: Will remain free from falls    Intervention: Assess risk factors for falls  Continues to have bed alarm      Problem: Bowel/Gastric:  Goal: Will not experience complications related to bowel motility    Intervention: Assess baseline bowel pattern  Last BM 6/18      Problem: Urinary Elimination:  Goal: Ability to reestablish a normal urinary elimination pattern will improve    Intervention: Assess and monitor for signs and symptoms of urinary retention  Heck removed.  No voids.  Bladder scan at 2130 was 235 ml

## 2019-06-19 NOTE — PROGRESS NOTES
Pt A&Ox3, disoriented to time. Stand by assist with roller walker to transfer to chair. Up for meals. Good appetite. Able to feed self. C/o headache and relief from ibuprofen. Large bowel movement this afternoon. Heck reinserted d/t retention. Daughter visiting and updated. Plans to discharge tomorrow.

## 2019-06-19 NOTE — CARE PLAN
Problem: Safety  Goal: Will remain free from injury  Outcome: PROGRESSING AS EXPECTED      Problem: Bowel/Gastric:  Goal: Normal bowel function is maintained or improved  Outcome: PROGRESSING AS EXPECTED  Pt had large bowel movement today.

## 2019-06-20 ENCOUNTER — PATIENT OUTREACH (OUTPATIENT)
Dept: HEALTH INFORMATION MANAGEMENT | Facility: OTHER | Age: 84
End: 2019-06-20

## 2019-06-20 VITALS
HEART RATE: 72 BPM | BODY MASS INDEX: 15.88 KG/M2 | RESPIRATION RATE: 16 BRPM | OXYGEN SATURATION: 90 % | TEMPERATURE: 99.3 F | HEIGHT: 70 IN | WEIGHT: 110.89 LBS | SYSTOLIC BLOOD PRESSURE: 113 MMHG | DIASTOLIC BLOOD PRESSURE: 62 MMHG

## 2019-06-20 PROBLEM — R47.02 DYSPHASIA: Status: RESOLVED | Noted: 2019-06-13 | Resolved: 2019-06-20

## 2019-06-20 PROBLEM — N17.9 AKI (ACUTE KIDNEY INJURY) (HCC): Status: RESOLVED | Noted: 2019-06-11 | Resolved: 2019-06-20

## 2019-06-20 PROBLEM — J18.9 PNEUMONIA: Status: RESOLVED | Noted: 2019-06-10 | Resolved: 2019-06-20

## 2019-06-20 PROBLEM — R60.9 EDEMA: Status: RESOLVED | Noted: 2019-06-10 | Resolved: 2019-06-20

## 2019-06-20 PROBLEM — Z71.89 ADVANCED CARE PLANNING/COUNSELING DISCUSSION: Status: RESOLVED | Noted: 2019-06-11 | Resolved: 2019-06-20

## 2019-06-20 PROBLEM — J96.01 ACUTE RESPIRATORY FAILURE WITH HYPOXIA (HCC): Status: RESOLVED | Noted: 2019-06-11 | Resolved: 2019-06-20

## 2019-06-20 LAB
ANION GAP SERPL CALC-SCNC: 6 MMOL/L (ref 0–11.9)
BUN SERPL-MCNC: 27 MG/DL (ref 8–22)
CALCIUM SERPL-MCNC: 9 MG/DL (ref 8.5–10.5)
CHLORIDE SERPL-SCNC: 106 MMOL/L (ref 96–112)
CO2 SERPL-SCNC: 27 MMOL/L (ref 20–33)
CREAT SERPL-MCNC: 1.02 MG/DL (ref 0.5–1.4)
GLUCOSE SERPL-MCNC: 98 MG/DL (ref 65–99)
POTASSIUM SERPL-SCNC: 4 MMOL/L (ref 3.6–5.5)
SODIUM SERPL-SCNC: 139 MMOL/L (ref 135–145)

## 2019-06-20 PROCEDURE — 99239 HOSP IP/OBS DSCHRG MGMT >30: CPT | Performed by: HOSPITALIST

## 2019-06-20 PROCEDURE — A9270 NON-COVERED ITEM OR SERVICE: HCPCS | Performed by: HOSPITALIST

## 2019-06-20 PROCEDURE — 700111 HCHG RX REV CODE 636 W/ 250 OVERRIDE (IP): Performed by: HOSPITALIST

## 2019-06-20 PROCEDURE — 700102 HCHG RX REV CODE 250 W/ 637 OVERRIDE(OP): Performed by: HOSPITALIST

## 2019-06-20 PROCEDURE — 97530 THERAPEUTIC ACTIVITIES: CPT

## 2019-06-20 PROCEDURE — 36415 COLL VENOUS BLD VENIPUNCTURE: CPT

## 2019-06-20 PROCEDURE — 80048 BASIC METABOLIC PNL TOTAL CA: CPT

## 2019-06-20 PROCEDURE — 97116 GAIT TRAINING THERAPY: CPT

## 2019-06-20 PROCEDURE — 92526 ORAL FUNCTION THERAPY: CPT

## 2019-06-20 RX ORDER — FLUDROCORTISONE ACETATE 0.1 MG/1
0.05 TABLET ORAL EVERY MORNING
Qty: 30 TAB | Refills: 0 | Status: SHIPPED | OUTPATIENT
Start: 2019-06-21 | End: 2019-06-21 | Stop reason: CLARIF

## 2019-06-20 RX ORDER — MIDODRINE HYDROCHLORIDE 5 MG/1
5 TABLET ORAL
Qty: 60 TAB | Refills: 0 | Status: SHIPPED | OUTPATIENT
Start: 2019-06-20 | End: 2019-06-21 | Stop reason: CLARIF

## 2019-06-20 RX ORDER — IBUPROFEN 600 MG/1
600 TABLET ORAL EVERY 6 HOURS PRN
Qty: 30 TAB | Refills: 0 | Status: SHIPPED | OUTPATIENT
Start: 2019-06-20 | End: 2019-06-21 | Stop reason: CLARIF

## 2019-06-20 RX ADMIN — HEPARIN SODIUM 5000 UNITS: 5000 INJECTION, SOLUTION INTRAVENOUS; SUBCUTANEOUS at 05:03

## 2019-06-20 RX ADMIN — CARBIDOPA AND LEVODOPA 1 TABLET: 25; 100 TABLET ORAL at 11:59

## 2019-06-20 RX ADMIN — ASPIRIN 81 MG 81 MG: 81 TABLET ORAL at 05:03

## 2019-06-20 RX ADMIN — MIDODRINE HYDROCHLORIDE 5 MG: 5 TABLET ORAL at 09:14

## 2019-06-20 RX ADMIN — MIDODRINE HYDROCHLORIDE 5 MG: 5 TABLET ORAL at 11:59

## 2019-06-20 RX ADMIN — CARBIDOPA AND LEVODOPA 1 TABLET: 25; 100 TABLET ORAL at 05:03

## 2019-06-20 RX ADMIN — FLUDROCORTISONE ACETATE 0.05 MG: 0.1 TABLET ORAL at 05:03

## 2019-06-20 ASSESSMENT — COGNITIVE AND FUNCTIONAL STATUS - GENERAL
MOBILITY SCORE: 9
CLIMB 3 TO 5 STEPS WITH RAILING: TOTAL
TURNING FROM BACK TO SIDE WHILE IN FLAT BAD: UNABLE
MOVING FROM LYING ON BACK TO SITTING ON SIDE OF FLAT BED: UNABLE
STANDING UP FROM CHAIR USING ARMS: A LITTLE
MOVING TO AND FROM BED TO CHAIR: UNABLE
SUGGESTED CMS G CODE MODIFIER MOBILITY: CM
WALKING IN HOSPITAL ROOM: A LOT

## 2019-06-20 ASSESSMENT — GAIT ASSESSMENTS
GAIT LEVEL OF ASSIST: MINIMAL ASSIST
DEVIATION: DECREASED BASE OF SUPPORT;DECREASED HEEL STRIKE;DECREASED TOE OFF;SHUFFLED GAIT
ASSISTIVE DEVICE: 4 WHEEL WALKER
DISTANCE (FEET): 80

## 2019-06-20 ASSESSMENT — ENCOUNTER SYMPTOMS
DIAPHORESIS: 0
HALLUCINATIONS: 0
ORTHOPNEA: 0
DIZZINESS: 0
COUGH: 0
SPUTUM PRODUCTION: 0
NAUSEA: 0
HEARTBURN: 0
WEIGHT LOSS: 1
HEMOPTYSIS: 0
FALLS: 1
SHORTNESS OF BREATH: 0
WEAKNESS: 1
VOMITING: 0
BACK PAIN: 1
EYE PAIN: 0
PHOTOPHOBIA: 0
HEADACHES: 0
CHILLS: 0
FEVER: 0
DEPRESSION: 0
MYALGIAS: 1
ABDOMINAL PAIN: 0
PALPITATIONS: 0

## 2019-06-20 ASSESSMENT — LIFESTYLE VARIABLES: SUBSTANCE_ABUSE: 0

## 2019-06-20 NOTE — PROGRESS NOTES
2 RN skin check completed with FITO Toure.   Devices in place glasses  Skin assessed under devices yes.  Confirmed pressure ulcers found on none.  New potential pressure ulcers noted on none.   The following interventions in place Waffle cushion mattress overlay, encourage pt to take off glasses when sleeping, incontinence care and ANKUR.        Behind both ears and bridge of nose are red and blanching.    Left elbow has small area of redness which is blanching.  Left forearm with scab.  Right side of back and flank area with large bruises.  Sacrum is pink and blanching.  Both heels are pink and blanching

## 2019-06-20 NOTE — THERAPY
"Pt demonstrating improved gait mechanics and endurance. Pt able to ambulate furhter and w/increased stride w/verbal cuing and less physical assist. Pt continues to exhibit decreased LE ROM and strength. Per dtr, [facility], will have more assist available. Pt would benefit from further acute PT txs to progress towards goals and independence. Recommend post acute placement as pt is not at baseline performance and will require more assist.     Physical Therapy Treatment completed.   Bed Mobility:  Supine to Sit:  (NT--found in chair)  Transfers: Sit to Stand: Minimal Assist  Gait: Level Of Assist: Minimal Assist with 4-Wheel Walker       Plan of Care: Will benefit from Physical Therapy 3 times per week    See \"Rehab Therapy-Acute\" Patient Summary Report for complete documentation.       "

## 2019-06-20 NOTE — CARE PLAN
Problem: Safety  Goal: Will remain free from falls    Intervention: Assess risk factors for falls  Bed alarm on      Problem: Urinary Elimination:  Goal: Ability to reestablish a normal urinary elimination pattern will improve    Intervention: Evaluate need to continue indwelling urinary catheter  Heck reinserted d/t urinary retention

## 2019-06-20 NOTE — DISCHARGE PLANNING
This RN CM faxed the Discharge Summary to [facility].  I called them and they received it.  I asked if we can now send the patient and and the admissions person said that we can.  I spoke with the bedside nurse and she will send the patient with his daughter.

## 2019-06-20 NOTE — DISCHARGE INSTRUCTIONS
Discharge Instructions    Discharged to other by car with relative. Discharged via wheelchair, hospital escort: Yes.  Special equipment needed: Oxygen    Be sure to schedule a follow-up appointment with your primary care doctor or any specialists as instructed.     Discharge Plan:   Diet Plan: Discussed  Activity Level: Discussed  Confirmed Follow up Appointment: Appointment Scheduled  Confirmed Symptoms Management: Discussed  Medication Reconciliation Updated: Yes  Influenza Vaccine Indication: Not indicated: Previously immunized this influenza season and > 8 years of age    I understand that a diet low in cholesterol, fat, and sodium is recommended for good health. Unless I have been given specific instructions below for another diet, I accept this instruction as my diet prescription.   Other diet: Dysphagia 1 with nectar thick liquids while eating. Thin regular liquids between meals is fine    Special Instructions: None    · Is patient discharged on Warfarin / Coumadin?   No     Depression / Suicide Risk    As you are discharged from this [facility] facility, it is important to learn how to keep safe from harming yourself.    Recognize the warning signs:  · Abrupt changes in personality, positive or negative- including increase in energy   · Giving away possessions  · Change in eating patterns- significant weight changes-  positive or negative  · Change in sleeping patterns- unable to sleep or sleeping all the time   · Unwillingness or inability to communicate  · Depression  · Unusual sadness, discouragement and loneliness  · Talk of wanting to die  · Neglect of personal appearance   · Rebelliousness- reckless behavior  · Withdrawal from people/activities they love  · Confusion- inability to concentrate     If you or a loved one observes any of these behaviors or has concerns about self-harm, here's what you can do:  · Talk about it- your feelings and reasons for harming yourself  · Remove any means that you  might use to hurt yourself (examples: pills, rope, extension cords, firearm)  · Get professional help from the community (Mental Health, Substance Abuse, psychological counseling)  · Do not be alone:Call your Safe Contact- someone whom you trust who will be there for you.  · Call your local CRISIS HOTLINE 896-4148 or 367-371-8697  · Call your local Children's Mobile Crisis Response Team [place] (764) 943-8570 or wwwScoopinion  · Call the toll free National Suicide Prevention Hotlines   · National Suicide Prevention Lifeline 520-591-MIYH (9061)  · [employer] 800-SUICIDE (690-5576)      Pneumonitis  Pneumonitis is inflammation of the lungs.   CAUSES   Many things can cause pneumonitis. These can include:   · A bacterial or viral infection. Pneumonitis due to an infection is usually called pneumonia.  · Work-related exposures, including farm and industrial work. Some substances that can cause pneumonitis include asbestos, silica, inhaled acids, or inhaled chlorine gas.    · Repeated exposure to bird feathers, bird feces, or other allergens.    · Medicine such as chemotherapy drugs, certain antibiotics, and some heart medicines.    · Radiation therapy.    · Exposure to mold. A hot tub, sauna, or home humidifier can have mold growing in it, even if it looks clean. The mold can be breathed in through water vapor.  · Breathing (aspirating) stomach contents, food, or liquids into the lungs.    SIGNS AND SYMPTOMS   · Cough.    · Shortness of breath or difficulty breathing.    · Fever.    · Decreased energy.    · Decreased appetite.    DIAGNOSIS   To diagnose pneumonitis, your health care provider will do a complete history and physical exam. Various tests may be ordered, such as:   · Pulmonary function test.    · Chest X-ray.    · CT scan of the lungs.    · Bronchoscopy.    · Lung biopsy.    TREATMENT   Treatment will depend on the cause of the pneumonitis. If the cause is exposure to a  substance, avoiding further exposure to that substance will help reduce your symptoms. Possible medical treatments for pneumonitis include:   · Corticosteroid medicine to help decrease inflammation in the lungs.    · Antibiotic medicine to help fight a bacterial lung infection.    · Oxygen therapy if you are having difficulty breathing.    HOME CARE INSTRUCTIONS   · Avoid exposure to any substance identified as the cause of your pneumonitis.    · If you must continue to work with substances that can cause pneumonitis, wear a mask to protect your lungs.    · Only take over-the-counter or prescription medicine as directed by your health care provider.    · Do not smoke.    · If you use inhalers, keep them with you at all times.    · Follow up with your health care provider as directed.    SEEK IMMEDIATE MEDICAL CARE IF:   · You develop new or increased shortness of breath.    · You develop a blue color (cyanosis) under your fingernails.    · You have a fever.    MAKE SURE YOU:   · Understand these instructions.  · Will watch your condition.  · Will get help right away if you are not doing well or get worse.  This information is not intended to replace advice given to you by your health care provider. Make sure you discuss any questions you have with your health care provider.  Document Released: 06/07/2011 Document Revised: 08/20/2014 Document Reviewed: 06/09/2014  [employer] Interactive Patient Education © 2017 [employer]      Pleural Effusion  A pleural effusion is an abnormal buildup of fluid in the layers of tissue between your lungs and the inside of your chest (pleural space). These two layers of tissue that line both your lungs and the inside of your chest are called pleura. Usually, there is no air in the space between the pleura, only a thin layer of fluid. If left untreated, a large amount of fluid can build up and cause the lung to collapse. A pleural effusion is usually caused by another disease that  requires treatment.  The two main types of pleural effusion are:  · Transudative pleural effusion. This happens when fluid leaks into the pleural space because of a low protein count in your blood or high blood pressure in your vessels. Heart failure often causes this.  · Exudative infusion. This occurs when fluid collects in the pleural space from blocked blood vessels or lymph vessels. Some lung diseases, injuries, and cancers can cause this type of effusion.  What are the causes?  Pleural effusion can be caused by:  · Heart failure.  · A blood clot in the lung (pulmonary embolism).  · Pneumonia.  · Cancer.  · Liver failure (cirrhosis).  · Kidney disease.  · Complications from surgery, such as from open heart surgery.  What are the signs or symptoms?  In some cases, pleural effusion may cause no symptoms. Symptoms can include:  · Shortness of breath, especially when lying down.  · Chest pain, often worse when taking a deep breath.  · Fever.  · Dry cough that is lasting (chronic).  · Hiccups.  · Rapid breathing.  An underlying condition that is causing the pleural effusion (such as heart failure, pneumonia, blood clots, tuberculosis, or cancer) may also cause additional symptoms.  How is this diagnosed?  Your health care provider may suspect pleural effusion based on your symptoms and medical history. Your health care provider will also do a physical exam and a chest X-ray. If the X-ray shows there is fluid in your chest, you may need to have this fluid removed using a needle (thoracentesis) so it can be tested.  You may also have:  · Imaging studies of the chest, such as:  ¨ Ultrasound.  ¨ CT scan.  · Blood tests for kidney and liver function.  How is this treated?  Treatment depends on the cause of the pleural effusion. Treatment may include:  · Taking antibiotic medicines to clear up an infection that is causing the pleural effusion.  · Placing a tube in the chest to drain the effusion (tube thoracostomy). This  procedure is often used when there is an infection in the fluid.  · Surgery to remove the fibrous outer layer of tissue from the pleural space (decortication).  · Thoracentesis, which can improve cough and shortness of breath.  · A procedure to put medicine into the chest cavity to seal the pleural space to prevent fluid buildup (pleurodesis).  · Chemotherapy and radiation therapy. These may be required in the case of cancerous (malignant) pleural effusion.  Follow these instructions at home:  · Take medicines only as directed by your health care provider.  · Keep track of how long you can gently exercise before you get short of breath. Try simply walking at first.  · Do not use any tobacco products, including cigarettes, chewing tobacco, or electronic cigarettes. If you need help quitting, ask your health care provider.  · Keep all follow-up visits as directed by your health care provider. This is important.  Contact a health care provider if:  · The amount of time that you are able to exercise decreases or does not improve with time.  · You have pain or signs of infection at the puncture site if you had thoracentesis. Watch for:  ¨ Drainage.  ¨ Redness.  ¨ Swelling.  · You have a fever.  Get help right away if:  · You are short of breath.  · You develop chest pain.  · You develop a new cough.  This information is not intended to replace advice given to you by your health care provider. Make sure you discuss any questions you have with your health care provider.  Document Released: 12/18/2006 Document Revised: 05/22/2017 Document Reviewed: 05/13/2015  [employer] Interactive Patient Education © 2017 [employer]

## 2019-06-20 NOTE — PROGRESS NOTES
Pt discharged to [facility] with daughter in stable condition. IV removed from arm. All discharge instructions including new medications reviewed with pt and daughter verbalizing understanding. Pt to follow up with PCP and urology. Pt discharged with lindsey catheter. Leg bag applied. Transported to car via wheelchair.

## 2019-06-20 NOTE — THERAPY
"Speech Language Therapy dysphagia treatment completed.     Functional Status:  Pt was seen at breakfast with a dysphagia II/NTL meal tray.  Pt sitting up in a meal, AAOx4, with improved vocal intensity and improved intelligibility.  Pt also noted to be feeding himself with improvement, with better hand to mouth coordination.  He consumed nectars, soft solids and puree without s/sx of aspiration.  Pt reporting he really doesn't like thickened liquids, and requested \"plain water\".  He took sips of thins from a straw, which resulted in an increase in wet vocal quality, however he was able to clear vocal quality with throat clearing.  Pt instructed to take small, single sips from a straw, which resulted in improved vocal quality initially.  After several sips, pt's voice became slightly more wet, although again he was able to clear it with throat clearing.  Pt may discharge today.  Recommend to continue a dysphagia II diet with nectar thick liquids.  Pt is OK for very small sips of thins between meals ONLY, with strict use of throat clear and double swallow.  Pt will benefit from post acute SLP services to address dysphagia.  SLP continues to follow in the acute care setting.      Recommendations: 1) continue a dysphagia II diet with nectar thick liquids, 2) pt is OK for very small sips of thins between meals ONLY, with strict use of throat clear and double swallow    Plan of Care: Will benefit from Speech Therapy 3 times per week    Post-Acute Therapy: Recommend inpatient transitional care services for continued speech therapy services.      See \"Rehab Therapy-Acute\" Patient Summary Report for complete documentation.     "

## 2019-06-20 NOTE — DISCHARGE PLANNING
Anticipated Discharge Disposition: To [facility], possibly today.    Action: This RN CM faxed the Physician's Statement (fax: [phone]) to [facility].    Barriers to Discharge: Will need final acceptance by [facility].    Plan: Await response from [facility].  Patient's daughter will be in to see patient today at about 11:00 AM.

## 2019-06-20 NOTE — PROGRESS NOTES
[facility] Medicine Daily Progress Note    Date of Service  6/19/19    Chief Complaint  89 y.o. male admitted 6/10/2019 with fall, PNA    Hospital Course    89 y.o. male who presented 6/10/2019 with fall.     I have reviewed some of the recent provider documentation available to me in the patient's medical chart.  Records are briefly summarized: Mr. Page has a history of movement disorder/possible Parkinson's disease.  He is treated with Sinemet.  During an office visit on 3/12/2019, it is noted that his memory is stable.     Patient speaks in a soft voice, he does not say much, it is very difficult to understand him during the interview.  He indicates that the pain in his right chest is tolerable, beyond that I am unable to elicit any history of present illness due to the patient's difficulties with communication.  His daughter is at the bedside, she indicates that he is generally easier to understand.  She says that he recently took a cruise to [place] and returned about a week ago.  Since that time the patient has been weaker than normal.  Distances with a walker and has had difficulty doing that.  She is noted him to be more fatigued than normal.  No cough, no fever.  Patient today had a fall, he landed on his right side and came to the emergency room for evaluation.       Interval Problem Update  6/16: Patient was seen and examined this morning he is lying in bed and calm he is alert and oriented x3.  He denies any complaints or concerns.  He denies any pain any shortness of breath any chest pain.      6/17: Patient seen and examined this morning he is comfortable in bed.  He denies any acute questions or concerns.  Blood pressure is much better this morning on Midrin and fludrocortisone.  I reviewed his BMP as well      6/18:patient seen and examined,he is still on O2 and denies any complaints, however has small apical PTX on right side, I repeat a CXR this morning that showed decrease in size from 2cm -->  1.3  I discussed those findings with patient and his daughter  Still with urinary retention, will pull and allow voiding trail    6/19: seen and examined, sitting on the chair next to bed, daughter is not there at the moment, patient states he is well. Wants to go home now.  [facility] dc likely tomorrow    Patient failed voiding trail and lindsey was placed back, he will need to fu with urology outpatient  Consultants/Specialty  none    Code Status  DNR    Disposition  [facility]    Review of Systems  Review of Systems   Constitutional: Positive for malaise/fatigue and weight loss. Negative for chills, diaphoresis and fever.   HENT: Negative for congestion, ear pain, hearing loss and tinnitus.    Eyes: Negative for photophobia and pain.   Respiratory: Negative for cough, hemoptysis, sputum production and shortness of breath.    Cardiovascular: Negative for chest pain, palpitations, orthopnea and leg swelling.   Gastrointestinal: Negative for abdominal pain, heartburn, nausea and vomiting.   Genitourinary: Negative for dysuria, hematuria and urgency.        Retention urine   Musculoskeletal: Positive for back pain, falls and myalgias.   Skin: Negative for itching and rash.   Neurological: Positive for weakness. Negative for dizziness and headaches.   Psychiatric/Behavioral: Negative for depression, hallucinations, substance abuse and suicidal ideas.        Physical Exam  Temp:  [36.3 °C (97.3 °F)-36.9 °C (98.4 °F)] 36.3 °C (97.3 °F)  Pulse:  [67-88] 67  Resp:  [15-17] 16  BP: ()/(42-59) 98/59  SpO2:  [90 %-91 %] 90 %    Physical Exam   Constitutional: He is oriented to person, place, and time. He appears well-developed. No distress.   fraile   HENT:   Head: Normocephalic and atraumatic.   Mouth/Throat: Oropharynx is clear and moist. No oropharyngeal exudate.   Eyes: Pupils are equal, round, and reactive to light. Conjunctivae and EOM are normal. No scleral icterus.   Neck:  Normal range of motion. Neck supple. No JVD present. No thyromegaly present.   Cardiovascular: Normal rate, regular rhythm and intact distal pulses.    No murmur heard.  Pulmonary/Chest: Effort normal and breath sounds normal. No respiratory distress. He has no wheezes. He has no rales.   Diminished breath sounds b/l   Abdominal: Soft. Bowel sounds are normal. He exhibits no distension. There is no tenderness. There is no rebound.   Right sided flank and abd bruising from his recent fall   Genitourinary:   Genitourinary Comments: Heck catheter in place secondary to urinary retention   Musculoskeletal: Normal range of motion. He exhibits no edema or tenderness.   Lymphadenopathy:     He has no cervical adenopathy.   Neurological: He is alert and oriented to person, place, and time. He exhibits normal muscle tone.   Skin: Skin is warm and dry. No erythema.   Psychiatric: His affect is blunt and labile. His speech is delayed. He is slowed and withdrawn.       Fluids    Intake/Output Summary (Last 24 hours) at 06/20/19 0827  Last data filed at 06/20/19 0600   Gross per 24 hour   Intake              580 ml   Output             1000 ml   Net             -420 ml       Laboratory      Recent Labs      06/17/19   0943  06/18/19   0902  06/19/19   0850   SODIUM  139  137  137   POTASSIUM  4.0  4.2  4.1   CHLORIDE  105  106  107   CO2  28  24  23   GLUCOSE  115*  103*  98   BUN  29*  28*  27*   CREATININE  0.94  0.83  0.86   CALCIUM  8.8  8.4*  8.5                   Imaging  DX-CHEST-PORTABLE (1 VIEW)   Final Result      Small right pleural effusion with overlying atelectasis/consolidation is not significantly changed.      Pneumothorax on the right is decreased in size measuring 1.3 cm.      Retrocardiac opacity may represent atelectasis.      Atherosclerotic plaque.      Prominent scoliosis.         DX-CHEST-PORTABLE (1 VIEW)   Final Result      Increased volume right pleural effusion. Small/moderate right pleural effusion  demonstrated.   2 cm right apical pneumothorax.   Small left pleural effusion.   Bilateral perihilar/lung base atelectasis. Pneumonitis not excluded.   Findings were discussed with JULIO BANERJEE on 6/17/2019 8:27 AM.      US-RENAL   Final Result      No hydronephrosis.      CT-CHEST,ABDOMEN,PELVIS W/O   Final Result      1.  Acute fractures of the posterior aspects of the right 8th through 10th ribs.      2.  Large right pleural effusion. Hemothorax cannot be excluded. No pneumothorax is present.      3.  Small left pleural effusion.      4.  Bibasilar atelectasis or pneumonia, right greater than left.      5.  No acute posttraumatic change identified in the abdomen or pelvis.      6.  Markedly distended bladder. Enlarged prostate gland.      EC-ECHOCARDIOGRAM COMPLETE W/O CONT   Final Result      DX-CHEST-LIMITED (1 VIEW)   Final Result      Right basilar opacity likely represents pneumonia.      Trace right pleural effusion is not excluded.      Atherosclerotic plaque.      Prominent scoliosis.      If clinical suspicion for rib fracture is high, further evaluation with CT is recommended.              Assessment/Plan  Parkinson's disease (HCC)- (present on admission)   Assessment & Plan    Patient is treated with Sinemet for probable Parkinson's disease  Mobility is severely limited, came from detention  Continue sinemet  GOC discussed, wishes are DNR   [facility], however PT evaluated patient and recommended 24/7 assistance,family again changed their mind and instead of SNF they [facility] to [facility]     Urinary retention   Assessment & Plan    Likely PBH,he has now failed voiding trail x 2, place lindsey back and needs to fu with urologist( he has a urologist)       Pneumothorax   Assessment & Plan    Patient initially had large right sided pleural effusion refused to undergo ultrasound-guided thoracentesis.  Continues to maintain his O2 sats on 2 L  of oxygen.   He then was found to have PTx on CXR; repeat cxr showed decrease size, and patient is still asymptomatic.   Will need to obtain a CXR in 3-4 days with PCP   Orthostatic hypotension   Assessment & Plan    Symptomatic with dizziness when stands, may have been the cause of his fall in the first place   Continue midodrine 3 times daily  Continue fludrocortisone.     Severe protein-calorie malnutrition (HCC)   Assessment & Plan    Patient with a BMI of 15.  He is very tiny and weak.  Nutrition consulted     COPD (chronic obstructive pulmonary disease) (HCC)   Assessment & Plan    She does have remote history of smoking in the past and I suspect he may have some COPD underlying however he is not in acute exacerbation  DME O2 available     Macrocytosis- (present on admission)   Assessment & Plan    b12 and folate normal, nutrition consulted        Discussed plan of care with patient, his daughter, nursing and sw  VTE prophylaxis: lovenox

## 2019-06-20 NOTE — DISCHARGE SUMMARY
Discharge Summary    CHIEF COMPLAINT ON ADMISSION  Chief Complaint   Patient presents with   • Rib Pain     R lateral after MGLF       Reason for Admission  Weakness     Admission Date  6/10/2019    CODE STATUS  DNAR/DNI    HPI & HOSPITAL COURSE      89 y.o. male who presented 6/10/2019 with fall.      Mr. Page has a history of movement disorder/possible Parkinson's disease.  He is treated with Sinemet.  During an office visit on 3/12/2019, it is noted that his memory is stable.     Patient was admitted for further evaluation,   CXR revealed Right basilar opacity likely represents pneumonia.Trace right pleural effusion is not excluded. He was started on antibotics however he had no white count and procalcitonin was negative therefore he was taken off the antibiotics and it was thought to be viral in etiology. influenza screen was negative. He had a mild CARLA and was given fluids initially. He continued to require O2 and had a Chest CT which showed 1.  Acute fractures of the posterior aspects of the right 8th through 10th ribs.  Large right pleural effusion. Hemothorax cannot be excluded. No pneumothorax is present. Small left pleural effusion. Bibasilar atelectasis or pneumonia, right greater than left. I ordered a US guided thoracentesis for the large pleural effusion however patient refused. His IVF were discontinued. He worked with [email or web address] and needs 24/7 assistance. He was also found to be orthostatic therefore he was started on midodrine and fludrocortisone. Patient does wish to be DNR however with him refusing procedures and workup I discussed CC status with him however patient and his daughter don;t think he is there yet. His CARLA resolved. Electrolytes were monitored and replaced as needed. We did a repeat CXR which showed decrease in the pleural effusion however now showed a new right apical pneumothorax. Patient was maintained on 2L NC intermittently. We reviewed his imaging and repeat a CXR the next day  and it showed decreasing size of pneumothorax. Patient has hx of BPH and has a urologist outpatient. He did have urinary retention and failed voiding trial therefore his catheter will continue and he will have to see outpatient urologist for fu.   At this time he is medically stable, however remains in guarded condition given his weakness, age and medical conditions.   He is to get a repeat CXR outpatient within a week and I have provided him with a script.  PT recs SNF however patient refused and his daughter states she will get him 24/7 assistance at 5 start.  At this time he will be discharged in stable conditions.   Please fu with pcp and urologist. Patient and his daughter understand and agree with the above plan.      Therefore, he is discharged in fair and stable condition assisted living facitlity    The patient met 2-midnight criteria for an inpatient stay at the time of discharge.    Discharge Date  6/20/19    FOLLOW UP ITEMS POST DISCHARGE  pcp- repeat CXR within a week to monitor Pneumothorax  urology    DISCHARGE DIAGNOSES  Principal Problem (Resolved):    Acute respiratory failure with hypoxia (HCC) POA: Unknown  Active Problems:    Parkinson's disease (HCC) POA: Yes    Macrocytosis POA: Yes    COPD (chronic obstructive pulmonary disease) (HCC) POA: Unknown    Severe protein-calorie malnutrition (HCC) POA: Unknown    Orthostatic hypotension POA: Unknown    Pneumothorax POA: Unknown    Urinary retention POA: Unknown  Resolved Problems:    Pneumonia POA: Yes    Edema POA: Yes    Advanced care planning/counseling discussion POA: Unknown    CARLA (acute kidney injury) (HCC) POA: Unknown    Dysphasia POA: Unknown      FOLLOW UP  Future Appointments  Date Time Provider Department Center   7/11/2019 11:20 AM Oscar JORGE A Dubois M.D. [facility]     Oscar JORGE A Dubois M.D.  [address] [facility] [id number]  [facility] [id number]  [phone]    Schedule an appointment as soon as possible for a visit in 1  week        MEDICATIONS ON DISCHARGE     Medication List      START taking these medications      Instructions   fludrocortisone 0.1 MG Tabs  Start taking on:  6/21/2019  Commonly known as:  FLORINEF   Take 0.5 Tabs by mouth every morning.  Dose:  0.05 mg     ibuprofen 600 MG Tabs  Commonly known as:  MOTRIN   Take 1 Tab by mouth every 6 hours as needed for Mild Pain or Moderate Pain.  Dose:  600 mg     midodrine 5 MG Tabs  Commonly known as:  PROAMATINE   Take 1 Tab by mouth 3 times a day, with meals.  Dose:  5 mg        CONTINUE taking these medications      Instructions   aspirin 81 MG Chew chewable tablet  Commonly known as:  ASA   Take 1 Tab by mouth every day.  Dose:  81 mg     carbidopa-levodopa  MG Tabs  Commonly known as:  SINEMET   Take 1 Tab by mouth 3 times a day.  Dose:  1 Tab     tamsulosin 0.4 MG capsule  Commonly known as:  FLOMAX   Take 1 Cap by mouth ONE-HALF HOUR AFTER BREAKFAST.  Dose:  0.4 mg            Allergies  Allergies   Allergen Reactions   • Ultram [Tramadol Hcl]        DIET  Orders Placed This Encounter   Procedures   • Diet Order Regular     Standing Status:   Standing     Number of Occurrences:   1     Order Specific Question:   Diet:     Answer:   Regular [1]     Order Specific Question:   Texture/Fiber modifications:     Answer:   Dysphagia 2(Pureed/Chopped)specify fluid consistency(question 6) [2]     Comments:   [employer] at lunch requested     Order Specific Question:   Consistency/Fluid modifications:     Answer:   Nectar Thick [2]     Order Specific Question:   Miscellaneous modifications:     Answer:   SLP - 1:1 Supervision by Nursing [21]     Order Specific Question:   Miscellaneous modifications:     Answer:   SLP - Deliver to Nursing Station [22]       ACTIVITY  As tolerated.  Weight bearing as tolerated    CONSULTATIONS  none    PROCEDURES  none    LABORATORY  Lab Results   Component Value Date    SODIUM 139 06/20/2019    POTASSIUM 4.0 06/20/2019    CHLORIDE 106  06/20/2019    CO2 27 06/20/2019    GLUCOSE 98 06/20/2019    BUN 27 (H) 06/20/2019    CREATININE 1.02 06/20/2019        Lab Results   Component Value Date    WBC 5.9 06/13/2019    HEMOGLOBIN 10.0 (L) 06/13/2019    HEMATOCRIT 30.9 (L) 06/13/2019    PLATELETCT 190 06/13/2019        Total time of the discharge process exceeds 45 minutes.

## 2019-06-21 ENCOUNTER — APPOINTMENT (OUTPATIENT)
Dept: RADIOLOGY | Facility: MEDICAL CENTER | Age: 84
DRG: 200 | End: 2019-06-21
Attending: EMERGENCY MEDICINE
Payer: MEDICARE

## 2019-06-21 ENCOUNTER — APPOINTMENT (OUTPATIENT)
Dept: RADIOLOGY | Facility: MEDICAL CENTER | Age: 84
DRG: 200 | End: 2019-06-21
Attending: FAMILY MEDICINE
Payer: MEDICARE

## 2019-06-21 ENCOUNTER — HOSPITAL ENCOUNTER (INPATIENT)
Facility: MEDICAL CENTER | Age: 84
LOS: 2 days | DRG: 200 | End: 2019-06-23
Attending: EMERGENCY MEDICINE | Admitting: INTERNAL MEDICINE
Payer: MEDICARE

## 2019-06-21 DIAGNOSIS — J93.9 PNEUMOTHORAX, RIGHT: ICD-10-CM

## 2019-06-21 DIAGNOSIS — D64.9 ANEMIA, UNSPECIFIED TYPE: ICD-10-CM

## 2019-06-21 DIAGNOSIS — R07.9 RIGHT-SIDED CHEST PAIN: ICD-10-CM

## 2019-06-21 DIAGNOSIS — J90 PLEURAL EFFUSION ON RIGHT: ICD-10-CM

## 2019-06-21 DIAGNOSIS — S22.41XA MULTIPLE FRACTURES OF RIBS, RIGHT SIDE, INITIAL ENCOUNTER FOR CLOSED FRACTURE: ICD-10-CM

## 2019-06-21 PROBLEM — R79.89 LOW VITAMIN B12 LEVEL: Status: ACTIVE | Noted: 2019-06-21

## 2019-06-21 PROBLEM — R54 FRAILTY SYNDROME IN GERIATRIC PATIENT: Status: ACTIVE | Noted: 2019-06-21

## 2019-06-21 LAB
ALBUMIN SERPL BCP-MCNC: 3 G/DL (ref 3.2–4.9)
ALBUMIN/GLOB SERPL: 1.3 G/DL
ALP SERPL-CCNC: 58 U/L (ref 30–99)
ALT SERPL-CCNC: 5 U/L (ref 2–50)
ANION GAP SERPL CALC-SCNC: 4 MMOL/L (ref 0–11.9)
APTT PPP: 24.5 SEC (ref 24.7–36)
AST SERPL-CCNC: 12 U/L (ref 12–45)
BASOPHILS # BLD AUTO: 0.3 % (ref 0–1.8)
BASOPHILS # BLD: 0.02 K/UL (ref 0–0.12)
BILIRUB SERPL-MCNC: 0.5 MG/DL (ref 0.1–1.5)
BNP SERPL-MCNC: 28 PG/ML (ref 0–100)
BUN SERPL-MCNC: 35 MG/DL (ref 8–22)
CALCIUM SERPL-MCNC: 8.7 MG/DL (ref 8.5–10.5)
CHLORIDE SERPL-SCNC: 106 MMOL/L (ref 96–112)
CO2 SERPL-SCNC: 27 MMOL/L (ref 20–33)
CREAT SERPL-MCNC: 1.01 MG/DL (ref 0.5–1.4)
EKG IMPRESSION: NORMAL
EOSINOPHIL # BLD AUTO: 0.36 K/UL (ref 0–0.51)
EOSINOPHIL NFR BLD: 5.3 % (ref 0–6.9)
ERYTHROCYTE [DISTWIDTH] IN BLOOD BY AUTOMATED COUNT: 53.2 FL (ref 35.9–50)
GLOBULIN SER CALC-MCNC: 2.4 G/DL (ref 1.9–3.5)
GLUCOSE SERPL-MCNC: 108 MG/DL (ref 65–99)
HCT VFR BLD AUTO: 30.5 % (ref 42–52)
HGB BLD-MCNC: 9.7 G/DL (ref 14–18)
IMM GRANULOCYTES # BLD AUTO: 0.02 K/UL (ref 0–0.11)
IMM GRANULOCYTES NFR BLD AUTO: 0.3 % (ref 0–0.9)
INR PPP: 1.03 (ref 0.87–1.13)
LIPASE SERPL-CCNC: 44 U/L (ref 11–82)
LYMPHOCYTES # BLD AUTO: 0.57 K/UL (ref 1–4.8)
LYMPHOCYTES NFR BLD: 8.4 % (ref 22–41)
MCH RBC QN AUTO: 33.4 PG (ref 27–33)
MCHC RBC AUTO-ENTMCNC: 31.8 G/DL (ref 33.7–35.3)
MCV RBC AUTO: 105.2 FL (ref 81.4–97.8)
MONOCYTES # BLD AUTO: 0.52 K/UL (ref 0–0.85)
MONOCYTES NFR BLD AUTO: 7.7 % (ref 0–13.4)
NEUTROPHILS # BLD AUTO: 5.29 K/UL (ref 1.82–7.42)
NEUTROPHILS NFR BLD: 78 % (ref 44–72)
NRBC # BLD AUTO: 0 K/UL
NRBC BLD-RTO: 0 /100 WBC
PLATELET # BLD AUTO: 313 K/UL (ref 164–446)
PMV BLD AUTO: 9 FL (ref 9–12.9)
POTASSIUM SERPL-SCNC: 4.8 MMOL/L (ref 3.6–5.5)
PROT SERPL-MCNC: 5.4 G/DL (ref 6–8.2)
PROTHROMBIN TIME: 13.7 SEC (ref 12–14.6)
RBC # BLD AUTO: 2.9 M/UL (ref 4.7–6.1)
SODIUM SERPL-SCNC: 137 MMOL/L (ref 135–145)
TROPONIN I SERPL-MCNC: <0.01 NG/ML (ref 0–0.04)
WBC # BLD AUTO: 6.8 K/UL (ref 4.8–10.8)

## 2019-06-21 PROCEDURE — A9270 NON-COVERED ITEM OR SERVICE: HCPCS | Performed by: EMERGENCY MEDICINE

## 2019-06-21 PROCEDURE — 700117 HCHG RX CONTRAST REV CODE 255: Performed by: EMERGENCY MEDICINE

## 2019-06-21 PROCEDURE — 71275 CT ANGIOGRAPHY CHEST: CPT

## 2019-06-21 PROCEDURE — 93005 ELECTROCARDIOGRAM TRACING: CPT | Performed by: EMERGENCY MEDICINE

## 2019-06-21 PROCEDURE — 700102 HCHG RX REV CODE 250 W/ 637 OVERRIDE(OP): Performed by: EMERGENCY MEDICINE

## 2019-06-21 PROCEDURE — 83880 ASSAY OF NATRIURETIC PEPTIDE: CPT

## 2019-06-21 PROCEDURE — 99222 1ST HOSP IP/OBS MODERATE 55: CPT | Mod: AI | Performed by: FAMILY MEDICINE

## 2019-06-21 PROCEDURE — 99285 EMERGENCY DEPT VISIT HI MDM: CPT

## 2019-06-21 PROCEDURE — 85025 COMPLETE CBC W/AUTO DIFF WBC: CPT

## 2019-06-21 PROCEDURE — 71045 X-RAY EXAM CHEST 1 VIEW: CPT

## 2019-06-21 PROCEDURE — 83690 ASSAY OF LIPASE: CPT

## 2019-06-21 PROCEDURE — 85610 PROTHROMBIN TIME: CPT

## 2019-06-21 PROCEDURE — 770020 HCHG ROOM/CARE - TELE (206)

## 2019-06-21 PROCEDURE — 80053 COMPREHEN METABOLIC PANEL: CPT

## 2019-06-21 PROCEDURE — 94760 N-INVAS EAR/PLS OXIMETRY 1: CPT

## 2019-06-21 PROCEDURE — A9270 NON-COVERED ITEM OR SERVICE: HCPCS | Mod: JG | Performed by: FAMILY MEDICINE

## 2019-06-21 PROCEDURE — 700102 HCHG RX REV CODE 250 W/ 637 OVERRIDE(OP): Mod: JG | Performed by: FAMILY MEDICINE

## 2019-06-21 PROCEDURE — 84484 ASSAY OF TROPONIN QUANT: CPT

## 2019-06-21 PROCEDURE — 304561 HCHG STAT O2

## 2019-06-21 PROCEDURE — 85730 THROMBOPLASTIN TIME PARTIAL: CPT

## 2019-06-21 PROCEDURE — 51798 US URINE CAPACITY MEASURE: CPT

## 2019-06-21 RX ORDER — CHOLECALCIFEROL (VITAMIN D3) 125 MCG
1000 CAPSULE ORAL DAILY
Status: DISCONTINUED | OUTPATIENT
Start: 2019-06-22 | End: 2019-06-23 | Stop reason: HOSPADM

## 2019-06-21 RX ORDER — MORPHINE SULFATE 4 MG/ML
2 INJECTION, SOLUTION INTRAMUSCULAR; INTRAVENOUS
Status: DISCONTINUED | OUTPATIENT
Start: 2019-06-21 | End: 2019-06-22

## 2019-06-21 RX ORDER — FLUDROCORTISONE ACETATE 0.1 MG/1
0.05 TABLET ORAL EVERY MORNING
Status: DISCONTINUED | OUTPATIENT
Start: 2019-06-21 | End: 2019-06-23 | Stop reason: HOSPADM

## 2019-06-21 RX ORDER — ACETAMINOPHEN 325 MG/1
650 TABLET ORAL ONCE
Status: COMPLETED | OUTPATIENT
Start: 2019-06-21 | End: 2019-06-21

## 2019-06-21 RX ORDER — AMOXICILLIN 250 MG
2 CAPSULE ORAL 2 TIMES DAILY
Status: DISCONTINUED | OUTPATIENT
Start: 2019-06-21 | End: 2019-06-23 | Stop reason: HOSPADM

## 2019-06-21 RX ORDER — ONDANSETRON 4 MG/1
4 TABLET, ORALLY DISINTEGRATING ORAL EVERY 4 HOURS PRN
Status: DISCONTINUED | OUTPATIENT
Start: 2019-06-21 | End: 2019-06-23 | Stop reason: HOSPADM

## 2019-06-21 RX ORDER — TAMSULOSIN HYDROCHLORIDE 0.4 MG/1
0.4 CAPSULE ORAL
Status: DISCONTINUED | OUTPATIENT
Start: 2019-06-21 | End: 2019-06-23 | Stop reason: HOSPADM

## 2019-06-21 RX ORDER — OXYCODONE HYDROCHLORIDE 5 MG/1
2.5 TABLET ORAL
Status: DISCONTINUED | OUTPATIENT
Start: 2019-06-21 | End: 2019-06-22

## 2019-06-21 RX ORDER — MIDODRINE HYDROCHLORIDE 5 MG/1
5 TABLET ORAL
Status: DISCONTINUED | OUTPATIENT
Start: 2019-06-21 | End: 2019-06-23 | Stop reason: HOSPADM

## 2019-06-21 RX ORDER — ACETAMINOPHEN 325 MG/1
650 TABLET ORAL EVERY 6 HOURS PRN
Status: DISCONTINUED | OUTPATIENT
Start: 2019-06-21 | End: 2019-06-23 | Stop reason: HOSPADM

## 2019-06-21 RX ORDER — POLYETHYLENE GLYCOL 3350 17 G/17G
1 POWDER, FOR SOLUTION ORAL
Status: DISCONTINUED | OUTPATIENT
Start: 2019-06-21 | End: 2019-06-23 | Stop reason: HOSPADM

## 2019-06-21 RX ORDER — ONDANSETRON 2 MG/ML
4 INJECTION INTRAMUSCULAR; INTRAVENOUS EVERY 4 HOURS PRN
Status: DISCONTINUED | OUTPATIENT
Start: 2019-06-21 | End: 2019-06-23 | Stop reason: HOSPADM

## 2019-06-21 RX ORDER — CYANOCOBALAMIN 1000 UG/ML
1000 INJECTION, SOLUTION INTRAMUSCULAR; SUBCUTANEOUS ONCE
Status: DISPENSED | OUTPATIENT
Start: 2019-06-21 | End: 2019-06-22

## 2019-06-21 RX ORDER — LIDOCAINE 50 MG/G
2 PATCH TOPICAL EVERY 24 HOURS
Status: DISCONTINUED | OUTPATIENT
Start: 2019-06-21 | End: 2019-06-23 | Stop reason: HOSPADM

## 2019-06-21 RX ORDER — OXYCODONE HYDROCHLORIDE 5 MG/1
5 TABLET ORAL
Status: DISCONTINUED | OUTPATIENT
Start: 2019-06-21 | End: 2019-06-22

## 2019-06-21 RX ORDER — BISACODYL 10 MG
10 SUPPOSITORY, RECTAL RECTAL
Status: DISCONTINUED | OUTPATIENT
Start: 2019-06-21 | End: 2019-06-23 | Stop reason: HOSPADM

## 2019-06-21 RX ADMIN — CARBIDOPA AND LEVODOPA 1 TABLET: 25; 100 TABLET ORAL at 23:11

## 2019-06-21 RX ADMIN — IOHEXOL 75 ML: 350 INJECTION, SOLUTION INTRAVENOUS at 06:00

## 2019-06-21 RX ADMIN — MIDODRINE HYDROCHLORIDE 5 MG: 5 TABLET ORAL at 23:10

## 2019-06-21 RX ADMIN — ACETAMINOPHEN 650 MG: 325 TABLET, FILM COATED ORAL at 03:11

## 2019-06-21 ASSESSMENT — PATIENT HEALTH QUESTIONNAIRE - PHQ9
SUM OF ALL RESPONSES TO PHQ9 QUESTIONS 1 AND 2: 0
1. LITTLE INTEREST OR PLEASURE IN DOING THINGS: NOT AT ALL
2. FEELING DOWN, DEPRESSED, IRRITABLE, OR HOPELESS: NOT AT ALL
1. LITTLE INTEREST OR PLEASURE IN DOING THINGS: NOT AT ALL
2. FEELING DOWN, DEPRESSED, IRRITABLE, OR HOPELESS: NOT AT ALL
SUM OF ALL RESPONSES TO PHQ9 QUESTIONS 1 AND 2: 0

## 2019-06-21 ASSESSMENT — COPD QUESTIONNAIRES
HAVE YOU SMOKED AT LEAST 100 CIGARETTES IN YOUR ENTIRE LIFE: YES
DO YOU EVER COUGH UP ANY MUCUS OR PHLEGM?: YES, A FEW DAYS A WEEK OR MONTH
COPD SCREENING SCORE: 5
COPD SCREENING SCORE: 5
DO YOU EVER COUGH UP ANY MUCUS OR PHLEGM?: YES, A FEW DAYS A WEEK OR MONTH
DURING THE PAST 4 WEEKS HOW MUCH DID YOU FEEL SHORT OF BREATH: NONE/LITTLE OF THE TIME
IN THE PAST 12 MONTHS DO YOU DO LESS THAN YOU USED TO BECAUSE OF YOUR BREATHING PROBLEMS: DISAGREE/UNSURE
DURING THE PAST 4 WEEKS HOW MUCH DID YOU FEEL SHORT OF BREATH: NONE/LITTLE OF THE TIME
HAVE YOU SMOKED AT LEAST 100 CIGARETTES IN YOUR ENTIRE LIFE: YES

## 2019-06-21 ASSESSMENT — COGNITIVE AND FUNCTIONAL STATUS - GENERAL
STANDING UP FROM CHAIR USING ARMS: A LOT
DRESSING REGULAR LOWER BODY CLOTHING: A LOT
PERSONAL GROOMING: A LOT
MOVING TO AND FROM BED TO CHAIR: A LOT
MOVING FROM LYING ON BACK TO SITTING ON SIDE OF FLAT BED: A LOT
HELP NEEDED FOR BATHING: A LOT
DAILY ACTIVITIY SCORE: 13
WALKING IN HOSPITAL ROOM: A LOT
MOBILITY SCORE: 12
TOILETING: A LOT
CLIMB 3 TO 5 STEPS WITH RAILING: A LOT
SUGGESTED CMS G CODE MODIFIER DAILY ACTIVITY: CL
EATING MEALS: A LITTLE
DRESSING REGULAR UPPER BODY CLOTHING: A LOT
SUGGESTED CMS G CODE MODIFIER MOBILITY: CL
TURNING FROM BACK TO SIDE WHILE IN FLAT BAD: A LOT

## 2019-06-21 ASSESSMENT — ENCOUNTER SYMPTOMS
BACK PAIN: 0
NERVOUS/ANXIOUS: 0
WHEEZING: 0
NAUSEA: 0
BLURRED VISION: 0
PALPITATIONS: 0
HEADACHES: 0
VOMITING: 0
DIZZINESS: 0
CHILLS: 0
SORE THROAT: 0
ABDOMINAL PAIN: 0
FEVER: 0
WEAKNESS: 1
NECK PAIN: 0
COUGH: 0
HEARTBURN: 0
DIARRHEA: 0
SHORTNESS OF BREATH: 0

## 2019-06-21 ASSESSMENT — PAIN DESCRIPTION - DESCRIPTORS: DESCRIPTORS: ACHING

## 2019-06-21 ASSESSMENT — LIFESTYLE VARIABLES
ALCOHOL_USE: NO
EVER_SMOKED: YES
EVER_SMOKED: YES

## 2019-06-21 NOTE — ED NOTES
Dr clements consulted about patient not urinating since 0800 today when lindsey catheter removed. Pt has 369ml of urine in bladder. Physician agrees to wait a few more hours until intervention

## 2019-06-21 NOTE — ED NOTES
Pt noted to have lindsey catheter in place.   hospitalist is at bedside and verbalizes approval to remove tube.     Lindsey catheter removed at this time

## 2019-06-21 NOTE — ED NOTES
Patient incontinent of bowel. Patient cleaned and new brief replaced, per patient request. Patient's heels floated on pillow for comfort.

## 2019-06-21 NOTE — ASSESSMENT & PLAN NOTE
Continue Flomax  With urinary retention, Heck cath placed  I discussed with daughter who will make follow-up appointment with his urologist

## 2019-06-21 NOTE — H&P
Hospital Medicine History & Physical Note    Date of Service  6/21/2019    Primary Care Physician  Oscar Dubois M.D.    Consultants  None    Code Status  DNR/DNI    Chief Complaint  Chest pain    History of Presenting Illness  89 y.o. male who presented 6/21/2019 with chest wall pain, he had a recent admission for right rib fractures, pleural effusion/hemothorax, and pneumothorax.  He did not require placement of a chest tube on that admission, he was offered ultrasound-guided thoracenteses for the pleural effusion/pneumothorax which he refused.  He had physical therapy and occupational therapy evaluations for which placement in a skilled nurse facility was recommended.  However he and his daughter refused and he wanted to be discharged back to his assisted living facility.  He was discharged yesterday, states he was doing well, apparently when he woke up this morning he had chest pain, when I asked him about the level of pain he states that it is about the same from when he was discharged.  CT scan of the chest was done which did not show pulmonary embolism, did show persistent of the pleural effusion/hemothorax, also showed slightly increased pneumothorax from previous.  He denies any shortness of breath, denies any fever chills, cough congestion, abdominal pain nausea or vomiting or dizziness or lightheadedness.    Review of Systems  Review of Systems   Constitutional: Negative for chills, fever and malaise/fatigue.   HENT: Negative for hearing loss and sore throat.    Eyes: Negative for blurred vision.   Respiratory: Negative for cough, shortness of breath and wheezing.    Cardiovascular: Positive for chest pain. Negative for palpitations and leg swelling.   Gastrointestinal: Negative for abdominal pain, diarrhea, heartburn, nausea and vomiting.   Genitourinary: Negative for dysuria.   Musculoskeletal: Negative for back pain and neck pain.   Skin: Negative for rash.   Neurological: Positive for weakness.  Negative for dizziness and headaches.   Psychiatric/Behavioral: The patient is not nervous/anxious.        Past Medical History   has a past medical history of Orthostatic hypotension; Parkinson's disease (HCC); Pleural effusion; Pneumothorax; and Rib fractures.    Surgical History  None    Family History  family history includes Heart Disease in his father and mother.     Social History   reports that he has never smoked. He has never used smokeless tobacco. He reports that he drinks about 0.5 oz of alcohol per week . He reports that he does not use drugs.    Allergies  Allergies   Allergen Reactions   • Ultram [Tramadol Hcl]      Patient states no allergy - not sure of reaction.       Medications  Prior to Admission Medications   Prescriptions Last Dose Informant Patient Reported? Taking?   aspirin (ASA) 81 MG CHEW 2 DAYS at UNK Patient No No   Sig: Take 1 Tab by mouth every day.   carbidopa-levodopa (SINEMET)  MG Tab 6/20/2019 at AM Patient No No   Sig: Take 1 Tab by mouth 3 times a day.   tamsulosin (FLOMAX) 0.4 MG capsule 6/20/2019 at AM Patient No No   Sig: Take 1 Cap by mouth ONE-HALF HOUR AFTER BREAKFAST.      Facility-Administered Medications: None       Physical Exam  Temp:  [36.3 °C (97.4 °F)] 36.3 °C (97.4 °F)  Pulse:  [65-82] 67  Resp:  [16-18] 16  BP: (101-111)/(59-65) 111/65  SpO2:  [95 %-98 %] 97 %    Physical Exam   Constitutional: He appears well-developed.   HENT:   Head: Normocephalic and atraumatic.   Eyes: Pupils are equal, round, and reactive to light. Conjunctivae are normal.   Neck: No tracheal deviation present. No thyromegaly present.   Cardiovascular: Normal rate and regular rhythm.    Pulmonary/Chest: Effort normal. He has decreased breath sounds in the right lower field. He exhibits tenderness.   Abdominal: Soft. Bowel sounds are normal. He exhibits no distension. There is no tenderness.   Musculoskeletal: He exhibits no edema.   Lymphadenopathy:     He has no cervical adenopathy.    Neurological: He is alert.   Oriented to person and place   Skin: Skin is warm and dry.   Nursing note and vitals reviewed.      Laboratory:  Recent Labs      06/21/19   0444   WBC  6.8   RBC  2.90*   HEMOGLOBIN  9.7*   HEMATOCRIT  30.5*   MCV  105.2*   MCH  33.4*   MCHC  31.8*   RDW  53.2*   PLATELETCT  313   MPV  9.0     Recent Labs      06/19/19   0850  06/20/19   0949  06/21/19   0444   SODIUM  137  139  137   POTASSIUM  4.1  4.0  4.8   CHLORIDE  107  106  106   CO2  23  27  27   GLUCOSE  98  98  108*   BUN  27*  27*  35*   CREATININE  0.86  1.02  1.01   CALCIUM  8.5  9.0  8.7     Recent Labs      06/19/19   0850  06/20/19   0949  06/21/19   0444   ALTSGPT   --    --   5   ASTSGOT   --    --   12   ALKPHOSPHAT   --    --   58   TBILIRUBIN   --    --   0.5   LIPASE   --    --   44   GLUCOSE  98  98  108*     Recent Labs      06/21/19   0444   APTT  24.5*   INR  1.03     Recent Labs      06/21/19   0444   BNPBTYPENAT  28         Recent Labs      06/21/19   0444   TROPONINI  <0.01       Urinalysis:    No results found     Imaging:  CT-CTA CHEST PULMONARY ARTERY W/ RECONS   Final Result         1.  No pulmonary embolus appreciated.   2.  Right posterior eighth and ninth comminuted rib fractures.   3.  Right pleural effusion/hemothorax with consolidation which could represent atelectasis or infiltrate.   4.  Small right pneumothorax, increased since prior   5.  Atherosclerosis      DX-CHEST-LIMITED (1 VIEW)   Final Result         1.  Pulmonary edema and/or infiltrates, similar to prior.   2.  Layering right pleural effusion.   3.  Right apical pneumothorax, slightly increased since prior.   4.  Atherosclerosis      DX-CHEST-LIMITED (1 VIEW)    (Results Pending)         Assessment/Plan:  I anticipate this patient will require at least two midnights for appropriate medical management, necessitating inpatient admission.    * Pneumothorax- (present on admission)   Assessment & Plan    Serial CXR  RT protocol     Closed  fracture of multiple ribs of right side- (present on admission)   Assessment & Plan    Pain control  Trial of lidocaine patches     Pleural effusion- (present on admission)   Assessment & Plan    Serial CXR     Low vitamin B12 level- (present on admission)   Assessment & Plan    IM b12 today, start oral B12      Anemia- (present on admission)   Assessment & Plan    Follow cbc  Check iron/tibc     Frailty syndrome in geriatric patient- (present on admission)   Assessment & Plan    PT and OT evaluation     Urinary retention- (present on admission)   Assessment & Plan    Bladder scan every 6 hours  Straight cath as needed for greater than 350 cc     Orthostatic hypotension- (present on admission)   Assessment & Plan    Resume Florinef and Midodrine     Parkinson's disease (HCC)- (present on admission)   Assessment & Plan    Continue Sinemet  PT and OT evaluation  SLP evaluation     BPH (benign prostatic hyperplasia)- (present on admission)   Assessment & Plan    Continue Flomax         VTE prophylaxis: SCD

## 2019-06-21 NOTE — ED NOTES
"First contact with patient as primary RN. Patient states \"I want to go home\" reoriented patient to situation. Helped patient with television.     Patient is alert, oriented x3. Patient is soft spoken and slow to articulate. Pt denies pain. Respirations are even and unlabored.   "

## 2019-06-21 NOTE — ED PROVIDER NOTES
ED Provider Note    CHIEF COMPLAINT  Chief Complaint   Patient presents with   • Rib Pain     Pt BIBA for c/o right rib pain after a fall over a week ago. Pt dx with rib fx and was admitted inpt. Pt denies any new injury.         HPI    Primary care provider: Oscar Dubois M.D.   History obtained from: Patient  History limited by: None     Miguel Ángel Page is a 89 y.o. male who presents to the ED by EMS complaining of moderate to severe sharp right-sided rib pain since a fall a week ago with rib fractures.  Patient was admitted to this hospital Ashly 10 and discharged on June 20 after his fall with fractures of the right eighth through 10th ribs.  Patient declined ultrasound-guided thoracentesis for his pleural effusion.  Patient denies any known fever.  He has had slight cough at times.  He reports the pain is worse with taking a deep breath making it difficult to breathe.  He is on home oxygen but does not know how much.  He denies pain anywhere else.  He states that he has been taking aspirin for his pain without any improvement.  He denies nausea/vomiting/diarrhea/dysuria.  Patient is not on any blood thinners except his aspirin.    REVIEW OF SYSTEMS  Please see HPI for pertinent positives/negatives.  All other systems reviewed and are negative.     PAST MEDICAL HISTORY  No past medical history on file.     SURGICAL HISTORY  History reviewed. No pertinent surgical history.     SOCIAL HISTORY  Social History     Social History Main Topics   • Smoking status: Never Smoker   • Smokeless tobacco: Never Used      Comment: quit 35 yrs   • Alcohol use 0.5 oz/week     1 Glasses of wine per week      Comment: Occasionally   • Drug use: No   • Sexual activity: Not on file        FAMILY HISTORY  Family History   Problem Relation Age of Onset   • Heart Disease Mother    • Heart Disease Father         CURRENT MEDICATIONS  Home Medications     Reviewed by Tacos Martell R.N. (Registered Nurse) on 06/21/19 at 0053  Med  List Status: Partial   Medication Last Dose Status   aspirin (ASA) 81 MG CHEW  Active   carbidopa-levodopa (SINEMET)  MG Tab  Active   fludrocortisone (FLORINEF) 0.1 MG Tab  Active   ibuprofen (MOTRIN) 600 MG Tab  Active   midodrine (PROAMATINE) 5 MG Tab  Active   tamsulosin (FLOMAX) 0.4 MG capsule  Active                 ALLERGIES  Allergies   Allergen Reactions   • Ultram [Tramadol Hcl]         PHYSICAL EXAM  VITAL SIGNS: /65   Pulse 65   Temp 36.3 °C (97.4 °F) (Oral)   Resp 16   Wt 56 kg (123 lb 7.3 oz)   SpO2 98%   BMI 17.71 kg/m²  @MACIEL[[id number]::@     Pulse ox interpretation: 95% I interpret this pulse ox as normal     Cardiac monitor interpretation: Sinus rhythm with heart rate in the 70s as interpreted by me.  The patient presented with chest pain and cardiac monitor was ordered to monitor for dysrhythmia.    Constitutional: Thin patient, alert in no apparent distress, nontoxic appearance    HENT: No external signs of trauma, normocephalic, oropharynx moist and clear, nose normal    Eyes: PERRL, conjunctiva without erythema, no discharge, no icterus    Neck: Soft and supple, trachea midline, no stridor, no tenderness, no LAD, no JVD, good ROM    Cardiovascular: Regular rate and rhythm, no murmurs/rubs/gallops, strong distal pulses and good perfusion    Thorax & Lungs: No respiratory distress, CTAB, ecchymosis along right ribs laterally with mild diffuse tenderness to palpation, no crepitus  Abdomen: Soft, nontender, nondistended, no guarding, no rebound, normal BS    Back: Mild right CVAT    Extremities: No cyanosis, no edema, no gross deformity, no tenderness, intact distal pulses with brisk cap refill    Skin: Warm, dry, no pallor/cyanosis, no rash noted except as above  Lymphatic: No lymphadenopathy noted    Neuro: A/O times 3, no focal deficits noted    Psychiatric: Cooperative, slightly anxious      DIAGNOSTIC STUDIES / PROCEDURES    EKG  12 Lead EKG obtained at 0426 and interpreted by  me:   Rate: 68   Rhythm: Sinus rhythm   Ectopy: None  Intervals: First-degree block  Axis: RAD  Q Waves: None   QRS: RBBB  ST segments: Normal  T Waves: T wave inversion aVL and biphasic T wave in V2    Clinical Impression: Sinus rhythm with first-degree block and RBBB with nonspecific T wave changes  Compared to Ashly 10, 2019 with similar appearance      LABS  All labs reviewed by me.     Results for orders placed or performed during the hospital encounter of 06/21/19   COMP METABOLIC PANEL   Result Value Ref Range    Sodium 137 135 - 145 mmol/L    Potassium 4.8 3.6 - 5.5 mmol/L    Chloride 106 96 - 112 mmol/L    Co2 27 20 - 33 mmol/L    Anion Gap 4.0 0.0 - 11.9    Glucose 108 (H) 65 - 99 mg/dL    Bun 35 (H) 8 - 22 mg/dL    Creatinine 1.01 0.50 - 1.40 mg/dL    Calcium 8.7 8.5 - 10.5 mg/dL    AST(SGOT) 12 12 - 45 U/L    ALT(SGPT) 5 2 - 50 U/L    Alkaline Phosphatase 58 30 - 99 U/L    Total Bilirubin 0.5 0.1 - 1.5 mg/dL    Albumin 3.0 (L) 3.2 - 4.9 g/dL    Total Protein 5.4 (L) 6.0 - 8.2 g/dL    Globulin 2.4 1.9 - 3.5 g/dL    A-G Ratio 1.3 g/dL   CBC WITH DIFFERENTIAL   Result Value Ref Range    WBC 6.8 4.8 - 10.8 K/uL    RBC 2.90 (L) 4.70 - 6.10 M/uL    Hemoglobin 9.7 (L) 14.0 - 18.0 g/dL    Hematocrit 30.5 (L) 42.0 - 52.0 %    .2 (H) 81.4 - 97.8 fL    MCH 33.4 (H) 27.0 - 33.0 pg    MCHC 31.8 (L) 33.7 - 35.3 g/dL    RDW 53.2 (H) 35.9 - 50.0 fL    Platelet Count 313 164 - 446 K/uL    MPV 9.0 9.0 - 12.9 fL    Neutrophils-Polys 78.00 (H) 44.00 - 72.00 %    Lymphocytes 8.40 (L) 22.00 - 41.00 %    Monocytes 7.70 0.00 - 13.40 %    Eosinophils 5.30 0.00 - 6.90 %    Basophils 0.30 0.00 - 1.80 %    Immature Granulocytes 0.30 0.00 - 0.90 %    Nucleated RBC 0.00 /100 WBC    Neutrophils (Absolute) 5.29 1.82 - 7.42 K/uL    Lymphs (Absolute) 0.57 (L) 1.00 - 4.80 K/uL    Monos (Absolute) 0.52 0.00 - 0.85 K/uL    Eos (Absolute) 0.36 0.00 - 0.51 K/uL    Baso (Absolute) 0.02 0.00 - 0.12 K/uL    Immature Granulocytes (abs) 0.02  0.00 - 0.11 K/uL    NRBC (Absolute) 0.00 K/uL   PROTHROMBIN TIME (INR)   Result Value Ref Range    PT 13.7 12.0 - 14.6 sec    INR 1.03 0.87 - 1.13   APTT   Result Value Ref Range    APTT 24.5 (L) 24.7 - 36.0 sec   TROPONIN   Result Value Ref Range    Troponin I <0.01 0.00 - 0.04 ng/mL   BTYPE NATRIURETIC PEPTIDE   Result Value Ref Range    B Natriuretic Peptide 28 0 - 100 pg/mL   LIPASE   Result Value Ref Range    Lipase 44 11 - 82 U/L   ESTIMATED GFR   Result Value Ref Range    GFR If African American >60 >60 mL/min/1.73 m 2    GFR If Non African American >60 >60 mL/min/1.73 m 2   EKG (NOW)   Result Value Ref Range    Report       [facility] Emergency Dept.    Test Date:  2019  Pt Name:    HELADIO PINEDA              Department: ER  MRN:        [id number]                      Room:       [facility], [place]  Gender:     Male                         Technician: [id number]  :        [id number]                   Requested By:MELINDA THOMAS  Order #:    [id number]                    Reading MD:    Measurements  Intervals                                Axis  Rate:       68                           P:          60  NH:         224                          QRS:        108  QRSD:       140                          T:          69  QT:         428  QTc:        456    Interpretive Statements  SINUS RHYTHM  FIRST DEGREE AV BLOCK  RBBB AND LPFB  Compared to ECG 06/10/2019 09:50:49  First degree AV block now present  Left posterior fascicular block now present          RADIOLOGY  The radiologist's interpretation of all radiological studies have been reviewed by me.     CT-CTA CHEST PULMONARY ARTERY W/ RECONS   Final Result         1.  No pulmonary embolus appreciated.   2.  Right posterior eighth and ninth comminuted rib fractures.   3.  Right pleural effusion/hemothorax with consolidation which could represent atelectasis or infiltrate.   4.  Small right pneumothorax, increased since prior   5.  Atherosclerosis       DX-CHEST-LIMITED (1 VIEW)   Final Result         1.  Pulmonary edema and/or infiltrates, similar to prior.   2.  Layering right pleural effusion.   3.  Right apical pneumothorax, slightly increased since prior.   4.  Atherosclerosis             COURSE & MEDICAL DECISION MAKING  Nursing notes, VS, PMSFHx reviewed in chart.     Review of past medical records shows the patient was admitted to this hospital Ashly 10, 2019 after his fall with rib fractures and pleural effusion.  He was discharged home on June 20, 2019.      Differential diagnoses considered include but are not limited to: Fracture, AMI, pneumothorax, CHF/pulm edema, pneumonia, pleural effusion, pleurisy, costochondritis, , cholecystitis/ascending cholangitis, gallstone/biliary colic, muscle strain, neuropathy     0715: D/W Dr. Esquivel, hospitalist, who will admit patient      History and physical exam as above.  Chest x-ray with findings as above.  Patient subsequently underwent CT chest with above findings.  Right sided rib fractures are still present along with right pleural effusion and possible hemothorax but now patient has a small right pneumothorax.  He has been hemodynamically stable with no clinical indication for emergent chest tube at this time.  Anemia appears to be chronic and stable compared to his recent results.  I discussed the findings with the patient and he is agreeable to admission.  Discussed with Dr. Esquivel who graciously agreed to admit patient back onto the hospitalist service for further care.      FINAL IMPRESSION  1. Pneumothorax, right Acute   2. Pleural effusion on right Active   3. Multiple fractures of ribs, right side, initial encounter for closed fracture Active   4. Right-sided chest pain Active   5. Anemia, unspecified type Active          DISPOSITION  Patient will be admitted by hospitalist for further care      Electronically signed by: Kip Raya, 6/21/2019 4:49 AM      Portions of this record were made with  voice recognition software.  Despite my review, spelling/grammar/context errors may still remain.  Interpretation of this chart should be taken in this context.

## 2019-06-21 NOTE — DISCHARGE PLANNING
Pt discharged yesterday to [facility] after a 10 day stay hospital stay. Please refer to recent notes.  Initial assessment copied below.        Care Transition Team Assessment     Information Source  Orientation : Unable to Assess  Information Given By: Other (Comments) (Daughter)  Informant's Name: Nabila Page  Who is responsible for making decisions for patient? : Adult child     Readmission Evaluation  Is this a readmission?: No     Elopement Risk  Legal Hold: No  Elopement Risk: Not at Risk for Elopement     Interdisciplinary Discharge Planning  Does Admitting Nurse Feel This Could be a Complex Discharge?: No  Primary Care Physician: Osacr Dubois MD  Lives with - Patient's Self Care Capacity: Attendant / Paid Care Giver  Support Systems: Family Member(s), Home Care Staff  Housing / Facility: Other (Comments) ([facility] apartment, no services)  Do You Take your Prescribed Medications Regularly: Yes     Finances  Financial Barriers to Discharge: No  Prescription Coverage: Yes     Advance Directive  Advance Directive?: DPOA for Health Care, POLST (Daughter to bring in.)     Domestic Abuse  Have you ever been the victim of abuse or violence?: No     Psychological Assessment  History of Substance Abuse: None     Anticipated Discharge Information  Anticipated discharge disposition: Other

## 2019-06-21 NOTE — ED TRIAGE NOTES
Rib Pain (Pt BIBA for c/o right rib pain after a fall over a week ago. Pt dx with rib fx and was admitted inpt. Pt denies any new injury. )    /59   Pulse 82   Temp 36.3 °C (97.4 °F) (Oral)   Resp 18   Wt 56 kg (123 lb 7.3 oz)   SpO2 95%   BMI 17.71 kg/m²

## 2019-06-22 ENCOUNTER — APPOINTMENT (OUTPATIENT)
Dept: RADIOLOGY | Facility: MEDICAL CENTER | Age: 84
DRG: 200 | End: 2019-06-22
Attending: FAMILY MEDICINE
Payer: MEDICARE

## 2019-06-22 LAB
ANION GAP SERPL CALC-SCNC: 5 MMOL/L (ref 0–11.9)
BASOPHILS # BLD AUTO: 0.5 % (ref 0–1.8)
BASOPHILS # BLD: 0.03 K/UL (ref 0–0.12)
BUN SERPL-MCNC: 28 MG/DL (ref 8–22)
CALCIUM SERPL-MCNC: 8.9 MG/DL (ref 8.5–10.5)
CHLORIDE SERPL-SCNC: 103 MMOL/L (ref 96–112)
CO2 SERPL-SCNC: 25 MMOL/L (ref 20–33)
CREAT SERPL-MCNC: 0.87 MG/DL (ref 0.5–1.4)
EOSINOPHIL # BLD AUTO: 0.44 K/UL (ref 0–0.51)
EOSINOPHIL NFR BLD: 7.3 % (ref 0–6.9)
ERYTHROCYTE [DISTWIDTH] IN BLOOD BY AUTOMATED COUNT: 47.5 FL (ref 35.9–50)
GLUCOSE SERPL-MCNC: 100 MG/DL (ref 65–99)
HCT VFR BLD AUTO: 31.5 % (ref 42–52)
HGB BLD-MCNC: 10.5 G/DL (ref 14–18)
IMM GRANULOCYTES # BLD AUTO: 0.03 K/UL (ref 0–0.11)
IMM GRANULOCYTES NFR BLD AUTO: 0.5 % (ref 0–0.9)
IRON SATN MFR SERPL: 11 % (ref 15–55)
IRON SERPL-MCNC: 24 UG/DL (ref 50–180)
LYMPHOCYTES # BLD AUTO: 0.47 K/UL (ref 1–4.8)
LYMPHOCYTES NFR BLD: 7.8 % (ref 22–41)
MCH RBC QN AUTO: 33.1 PG (ref 27–33)
MCHC RBC AUTO-ENTMCNC: 33.3 G/DL (ref 33.7–35.3)
MCV RBC AUTO: 99.4 FL (ref 81.4–97.8)
MONOCYTES # BLD AUTO: 0.48 K/UL (ref 0–0.85)
MONOCYTES NFR BLD AUTO: 8 % (ref 0–13.4)
NEUTROPHILS # BLD AUTO: 4.57 K/UL (ref 1.82–7.42)
NEUTROPHILS NFR BLD: 75.9 % (ref 44–72)
NRBC # BLD AUTO: 0 K/UL
NRBC BLD-RTO: 0 /100 WBC
PLATELET # BLD AUTO: 368 K/UL (ref 164–446)
PMV BLD AUTO: 8.9 FL (ref 9–12.9)
POTASSIUM SERPL-SCNC: 4.5 MMOL/L (ref 3.6–5.5)
RBC # BLD AUTO: 3.17 M/UL (ref 4.7–6.1)
SODIUM SERPL-SCNC: 133 MMOL/L (ref 135–145)
TIBC SERPL-MCNC: 214 UG/DL (ref 250–450)
WBC # BLD AUTO: 6 K/UL (ref 4.8–10.8)

## 2019-06-22 PROCEDURE — 51798 US URINE CAPACITY MEASURE: CPT

## 2019-06-22 PROCEDURE — 85025 COMPLETE CBC W/AUTO DIFF WBC: CPT

## 2019-06-22 PROCEDURE — 770020 HCHG ROOM/CARE - TELE (206)

## 2019-06-22 PROCEDURE — A9270 NON-COVERED ITEM OR SERVICE: HCPCS | Performed by: FAMILY MEDICINE

## 2019-06-22 PROCEDURE — 36415 COLL VENOUS BLD VENIPUNCTURE: CPT

## 2019-06-22 PROCEDURE — 92610 EVALUATE SWALLOWING FUNCTION: CPT

## 2019-06-22 PROCEDURE — 80048 BASIC METABOLIC PNL TOTAL CA: CPT

## 2019-06-22 PROCEDURE — A9270 NON-COVERED ITEM OR SERVICE: HCPCS | Performed by: INTERNAL MEDICINE

## 2019-06-22 PROCEDURE — 700102 HCHG RX REV CODE 250 W/ 637 OVERRIDE(OP): Performed by: INTERNAL MEDICINE

## 2019-06-22 PROCEDURE — 700101 HCHG RX REV CODE 250: Performed by: FAMILY MEDICINE

## 2019-06-22 PROCEDURE — 99232 SBSQ HOSP IP/OBS MODERATE 35: CPT | Performed by: INTERNAL MEDICINE

## 2019-06-22 PROCEDURE — 71045 X-RAY EXAM CHEST 1 VIEW: CPT

## 2019-06-22 PROCEDURE — 700102 HCHG RX REV CODE 250 W/ 637 OVERRIDE(OP): Performed by: FAMILY MEDICINE

## 2019-06-22 PROCEDURE — 83550 IRON BINDING TEST: CPT

## 2019-06-22 PROCEDURE — 83540 ASSAY OF IRON: CPT

## 2019-06-22 RX ADMIN — CARBIDOPA AND LEVODOPA 1 TABLET: 25; 100 TABLET ORAL at 06:11

## 2019-06-22 RX ADMIN — FLUDROCORTISONE ACETATE 0.05 MG: 0.1 TABLET ORAL at 06:11

## 2019-06-22 RX ADMIN — CARBIDOPA AND LEVODOPA 1 TABLET: 25; 100 TABLET ORAL at 17:59

## 2019-06-22 RX ADMIN — CYANOCOBALAMIN TAB 500 MCG 1000 MCG: 500 TAB at 06:11

## 2019-06-22 RX ADMIN — MIDODRINE HYDROCHLORIDE 5 MG: 5 TABLET ORAL at 17:59

## 2019-06-22 RX ADMIN — LIDOCAINE 2 PATCH: 50 PATCH TOPICAL at 09:21

## 2019-06-22 RX ADMIN — MIDODRINE HYDROCHLORIDE 5 MG: 5 TABLET ORAL at 11:43

## 2019-06-22 RX ADMIN — ACETAMINOPHEN 650 MG: 325 TABLET, FILM COATED ORAL at 02:06

## 2019-06-22 RX ADMIN — TAMSULOSIN HYDROCHLORIDE 0.4 MG: 0.4 CAPSULE ORAL at 08:30

## 2019-06-22 RX ADMIN — CARBIDOPA AND LEVODOPA 1 TABLET: 25; 100 TABLET ORAL at 11:43

## 2019-06-22 ASSESSMENT — ENCOUNTER SYMPTOMS
VOMITING: 0
COUGH: 0
MYALGIAS: 1
SHORTNESS OF BREATH: 0
DIZZINESS: 0
ABDOMINAL PAIN: 0
NAUSEA: 0

## 2019-06-22 NOTE — ED NOTES
Patient still unable to void. Patient is straight catheterized via hospitalist orders. 600ml or yellow urine drained from bladder

## 2019-06-22 NOTE — PROGRESS NOTES
2 RN skin check complete with Madeline.   Devices in place oxygen.   Skin assessed under devices red and blanching.   Confirmed pressure ulcers found on NA .   New potential pressure ulcers noted on NA.   Wound consult placed NA.   The following interventions in place PT ENCOURAGED TO TURN, silicone oxygen tubing, pt refused pillow under heels.

## 2019-06-22 NOTE — PROGRESS NOTES
Bedside report received. Patient currently refusing telemetry monitor. Patient A&O x 3 disoriented to time. VS'S. RA. Lung sounds diminished on the right. Repeat CXR completed.  Denies any pain or SOB at this time. Pt is incontinent to stool and required straight cath's over night. POC discussed with patient. Pt verbalizes understanding. Call light and belongings with in reach. Bed locked and in lowest position, alarm and fall precautions in place.

## 2019-06-22 NOTE — PROGRESS NOTES
RN paged Dr. Mckenna regarding patient's midodrine due to elevated blood pressures over night. MD advised RN to hold medication for now. Patient is stable, no complaints of pain at this time.

## 2019-06-22 NOTE — PROGRESS NOTES
Patient appears to be agitated and refusing his telemetry monitor. RN attempted to explain the benefits and purpose of the monitor, patient is consistent with refusing. MD notified

## 2019-06-22 NOTE — PROGRESS NOTES
Hospital Medicine Daily Progress Note    Date of Service  6/22/2019    Chief Complaint  89 y.o. male admitted 6/21/2019 with rib pain.    Hospital Course    Patient was recently hospitalized for fall with right rib fractures, pleural effusion/hemothorax and small pneumothorax. He was offered thoracentesis but refused. He also refused SNF which was recommended and returned to his [facility]. He presents with worsening chest wall pain. CTA chest showed no PE, right pleural effusion/hemothorax with consolidation, small increased right pneumothorax. He was admitted for pain control and serial CXRs. CXR were stable. He had no further pain with lidocaine patch. He had ongoing urinary retention and Lindsey cath was placed.  I discussed with the patient and daughter regarding follow-up with urology.  PT OT evaluated him, patient and daughter elected to return to his assisted living facility where they have physical therapy.  His vitamin B12 was noted to be low and given an IM injection and started on oral placement.      Interval Problem Update  Urinary retention ongoing, continue serial bladder scans and cath. May need lindsey cath  Refusing telemetry and wants to go home. He says his chest wall pain has resolved. He says he was taking tylenol at home for pain.  Orthostatics neg  CXR is stable    Consultants/Specialty  None    Code Status  DNR    Disposition  Lives at [facility], has physical therapy there  Lidocaine cream rx  With lindsey cath    Review of Systems  Review of Systems   Constitutional: Negative for malaise/fatigue.   HENT: Negative for congestion.    Respiratory: Negative for cough and shortness of breath.    Cardiovascular: Negative for chest pain.   Gastrointestinal: Negative for abdominal pain, nausea and vomiting.   Genitourinary: Negative for dysuria and urgency.   Musculoskeletal: Positive for myalgias.   Neurological: Negative for dizziness.        Physical Exam  Temp:  [36.7 °C (98 °F)-37.1 °C (98.7 °F)] 37.1 °C (98.7  °F)  Pulse:  [67-85] 85  Resp:  [16-19] 16  BP: (115-183)/() 119/75  SpO2:  [92 %-96 %] 92 %    Physical Exam   Constitutional: He is oriented to person, place, and time.   Thin, frail   HENT:   Mouth/Throat: Oropharynx is clear and moist.   Left side skull deformity   Eyes: Pupils are equal, round, and reactive to light. Conjunctivae are normal.   Neck: Neck supple.   Cardiovascular: Normal rate and regular rhythm.    Pulmonary/Chest: Effort normal. He has no wheezes. He has rales (R>L). He exhibits tenderness (Right side).   Abdominal: Soft. There is no tenderness. There is no rebound and no guarding.   Musculoskeletal: He exhibits no edema.   Diffuse muscle atrophy.   Neurological: He is alert and oriented to person, place, and time.   Mild cogwheel rigidity in upper extremities  5 out of 5 strength in upper and lower extremities   Skin: Skin is warm and dry.   Nursing note and vitals reviewed.      Fluids    Intake/Output Summary (Last 24 hours) at 06/22/19 1359  Last data filed at 06/22/19 1300   Gross per 24 hour   Intake               30 ml   Output             2250 ml   Net            -2220 ml       Laboratory  Recent Labs      06/21/19   0444  06/22/19   0602   WBC  6.8  6.0   RBC  2.90*  3.17*   HEMOGLOBIN  9.7*  10.5*   HEMATOCRIT  30.5*  31.5*   MCV  105.2*  99.4*   MCH  33.4*  33.1*   MCHC  31.8*  33.3*   RDW  53.2*  47.5   PLATELETCT  313  368   MPV  9.0  8.9*     Recent Labs      06/20/19   0949  06/21/19   0444  06/22/19   0602   SODIUM  139  137  133*   POTASSIUM  4.0  4.8  4.5   CHLORIDE  106  106  103   CO2  27  27  25   GLUCOSE  98  108*  100*   BUN  27*  35*  28*   CREATININE  1.02  1.01  0.87   CALCIUM  9.0  8.7  8.9     Recent Labs      06/21/19   0444   APTT  24.5*   INR  1.03     Recent Labs      06/21/19   0444   BNPBTYPENAT  28           Imaging  DX-CHEST-LIMITED (1 VIEW)   Final Result      No significant change      DX-CHEST-LIMITED (1 VIEW)   Final Result      1.  No residual  right apical pneumothorax identified.      2.  Unchanging bilateral pleural effusions and bilateral lower lobe areas of atelectasis or pneumonia, right greater than left.      CT-CTA CHEST PULMONARY ARTERY W/ RECONS   Final Result         1.  No pulmonary embolus appreciated.   2.  Right posterior eighth and ninth comminuted rib fractures.   3.  Right pleural effusion/hemothorax with consolidation which could represent atelectasis or infiltrate.   4.  Small right pneumothorax, increased since prior   5.  Atherosclerosis      DX-CHEST-LIMITED (1 VIEW)   Final Result         1.  Pulmonary edema and/or infiltrates, similar to prior.   2.  Layering right pleural effusion.   3.  Right apical pneumothorax, slightly increased since prior.   4.  Atherosclerosis           Assessment/Plan  * Pneumothorax- (present on admission)   Assessment & Plan    Serial CXR, stable  RT protocol     Closed fracture of multiple ribs of right side- (present on admission)   Assessment & Plan    Improved with lidocaine patch     Pleural effusion- (present on admission)   Assessment & Plan    Serial CXR, stable     Low vitamin B12 level- (present on admission)   Assessment & Plan    IM b12 today, start oral B12      Anemia- (present on admission)   Assessment & Plan    Stable, follow-up outpatient for further work-up     Frailty syndrome in geriatric patient- (present on admission)   Assessment & Plan    PT and OT evaluation     Urinary retention- (present on admission)   Assessment & Plan    Heck cath placement, follow-up urology outpatient     Orthostatic hypotension- (present on admission)   Assessment & Plan    Resume Florinef and Midodrine     Parkinson's disease (HCC)- (present on admission)   Assessment & Plan    Continue Sinemet  PT and OT evaluation  SLP evaluation     BPH (benign prostatic hyperplasia)- (present on admission)   Assessment & Plan    Continue Flomax  With urinary retention, Heck cath placed  I discussed with daughter  who will make follow-up appointment with his urologist          VTE prophylaxis: merlene

## 2019-06-22 NOTE — CARE PLAN
Problem: Safety  Goal: Will remain free from injury  Bed locked and in lowest position. Bed alarm on. Treaded socks. Call light and belongings within reach. Patient educated to call for assistance. Pt verbalized understanding. Hourly rounding in place.     Problem: Skin Integrity  Goal: Risk for impaired skin integrity will decrease  Pt currently refusing full skin assessment and turning at this time. Pt educated on the importance of Q2 turning and shifting weight to avoid pressure ulcers. Will continue to readdress throughout the day.

## 2019-06-22 NOTE — THERAPY
"  Speech Language Therapy Clinical Swallow Evaluation completed.  Functional Status: Pt seen on this date for a clinical swallow evaluation. Confusion noted through out session. Reduced labial ROM appreciated and pt with very weak vocal quality. PO trials of single ice chips, NTL via teaspoon and cup sip, purees, soft solids, and thin liquids via teaspoon and cup sip were presented to pt and intermittent throat clearing and cough x1 following cessation of purees noted which may be concerning for aspiration. Of note, FEES evaluation on 6/14 found that consistent throat clearing associated with residue in the vallecular. Upon palpation, laryngeal elevation was complete and swallow trigger delayed ~5-6 seconds intermittently. Prolonged mastication of soft solids appreciated with all PO trials and oral holding noted intermittently. Pt required assistance with all feeding from this clinician and was able to follow cues for two swallows per bite/sip compensatory strategy. At this time, recommend that pt's diet be downgraded to dysphagia I/NTL with 1:1 feeding during meal times and implementation of swallowing compensatory strategies (small bites/sips, reduce eating rate, two swallows per bite/sip, up at 90* during meals). Dysphagia education provided to pt and he demonstrated understanding, however, will need ongoing education. SLP to follow for dysphagia therapy.    Recommendations - Diet:  Downgrade diet to dysphagia I/NTL with 1:1 feeding during meals                          Strategies: Strict 1:1 feeding , No Straws and Head of Bed at 90 Degrees                          Medication Administration:  Crush in puree  Plan of Care: Will benefit from Speech Therapy 2 times per week  Post-Acute Therapy: Recommend inpatient transitional care services for continued speech therapy services.      See \"Rehab Therapy-Acute\" Patient Summary Report for complete documentation.   "

## 2019-06-22 NOTE — PROGRESS NOTES
Patient arrived to the floor comfortable and denying any pain. He is alert and oriented X3. Plan of care reviewed with patient. Call light in reach, bed is lowered and wheels are locked.  RN monitoring

## 2019-06-22 NOTE — CARE PLAN
Problem: Safety  Goal: Will remain free from injury  Outcome: PROGRESSING AS EXPECTED    Intervention: Provide assistance with mobility   06/22/19 0320   Mobility   Assistance Assistance of Two or More   Ambulation Tolerance Tolerates Poorly         Problem: Urinary Elimination:  Goal: Ability to reestablish a normal urinary elimination pattern will improve  Outcome: PROGRESSING SLOWER THAN EXPECTED   06/22/19 0320   OTHER   Urinary Elimination Dysuria

## 2019-06-23 ENCOUNTER — APPOINTMENT (OUTPATIENT)
Dept: RADIOLOGY | Facility: MEDICAL CENTER | Age: 84
DRG: 200 | End: 2019-06-23
Attending: INTERNAL MEDICINE
Payer: MEDICARE

## 2019-06-23 VITALS
DIASTOLIC BLOOD PRESSURE: 59 MMHG | HEIGHT: 70 IN | TEMPERATURE: 97.6 F | WEIGHT: 112.43 LBS | HEART RATE: 74 BPM | RESPIRATION RATE: 14 BRPM | SYSTOLIC BLOOD PRESSURE: 95 MMHG | OXYGEN SATURATION: 91 % | BODY MASS INDEX: 16.1 KG/M2

## 2019-06-23 PROCEDURE — 92526 ORAL FUNCTION THERAPY: CPT

## 2019-06-23 PROCEDURE — A9270 NON-COVERED ITEM OR SERVICE: HCPCS | Performed by: FAMILY MEDICINE

## 2019-06-23 PROCEDURE — 99239 HOSP IP/OBS DSCHRG MGMT >30: CPT | Performed by: INTERNAL MEDICINE

## 2019-06-23 PROCEDURE — 700102 HCHG RX REV CODE 250 W/ 637 OVERRIDE(OP): Performed by: FAMILY MEDICINE

## 2019-06-23 PROCEDURE — 700101 HCHG RX REV CODE 250: Performed by: FAMILY MEDICINE

## 2019-06-23 PROCEDURE — 700102 HCHG RX REV CODE 250 W/ 637 OVERRIDE(OP): Performed by: INTERNAL MEDICINE

## 2019-06-23 PROCEDURE — 71045 X-RAY EXAM CHEST 1 VIEW: CPT

## 2019-06-23 PROCEDURE — A9270 NON-COVERED ITEM OR SERVICE: HCPCS | Performed by: INTERNAL MEDICINE

## 2019-06-23 PROCEDURE — 97166 OT EVAL MOD COMPLEX 45 MIN: CPT

## 2019-06-23 RX ORDER — FLUDROCORTISONE ACETATE 0.1 MG/1
0.05 TABLET ORAL EVERY MORNING
Qty: 30 TAB | Refills: 0 | Status: SHIPPED | OUTPATIENT
Start: 2019-06-24 | End: 2019-07-10 | Stop reason: SDUPTHER

## 2019-06-23 RX ORDER — MIDODRINE HYDROCHLORIDE 5 MG/1
5 TABLET ORAL
Qty: 60 TAB | Refills: 0 | Status: SHIPPED | OUTPATIENT
Start: 2019-06-23 | End: 2019-07-09 | Stop reason: SDUPTHER

## 2019-06-23 RX ADMIN — FLUDROCORTISONE ACETATE 0.05 MG: 0.1 TABLET ORAL at 04:40

## 2019-06-23 RX ADMIN — MIDODRINE HYDROCHLORIDE 5 MG: 5 TABLET ORAL at 07:29

## 2019-06-23 RX ADMIN — TAMSULOSIN HYDROCHLORIDE 0.4 MG: 0.4 CAPSULE ORAL at 07:29

## 2019-06-23 RX ADMIN — CYANOCOBALAMIN TAB 500 MCG 1000 MCG: 500 TAB at 04:40

## 2019-06-23 RX ADMIN — CARBIDOPA AND LEVODOPA 1 TABLET: 25; 100 TABLET ORAL at 04:40

## 2019-06-23 RX ADMIN — LIDOCAINE 2 PATCH: 50 PATCH TOPICAL at 07:29

## 2019-06-23 ASSESSMENT — COGNITIVE AND FUNCTIONAL STATUS - GENERAL
HELP NEEDED FOR BATHING: A LITTLE
DRESSING REGULAR UPPER BODY CLOTHING: A LITTLE
TOILETING: A LITTLE
DRESSING REGULAR LOWER BODY CLOTHING: A LITTLE
DAILY ACTIVITIY SCORE: 20
SUGGESTED CMS G CODE MODIFIER DAILY ACTIVITY: CJ

## 2019-06-23 ASSESSMENT — ACTIVITIES OF DAILY LIVING (ADL): TOILETING: INDEPENDENT

## 2019-06-23 NOTE — THERAPY
"Occupational Therapy Evaluation completed.   Functional Status: Pt is a 89 y/o admitted for worsening chest pain. Pt recently admitted for GLF and broken ribs, pneumothorax. Pt has PMH of PD. Pt lives in [facility] and daughter reports patient has assistance in and out of bed when needed. Pt able to perform LE dressing in bed, but needed extended time to complete. Pt needed Min A OOB and was able to transfer with Min A. Once ambulatory, pt able to walk 100' which daughter reports is patient baseline. Pt denies need to use rest room. Pt has no further acute OT needs and is safe for d/c back to [facility].   Plan of Care: No further acute OT needs.   Discharge Recommendations:  Equipment: No Equipment Needed. Post-acute therapy Currently anticipate no further skilled therapy needs once patient is discharged from the inpatient setting.    See \"Rehab Therapy-Acute\" Patient Summary Report for complete documentation.    "

## 2019-06-23 NOTE — PROGRESS NOTES
Patient discharged with belongings. Assisted living notified by daughter and updated. Taken to car by Mireille GRANADO in wheelchair

## 2019-06-23 NOTE — DISCHARGE INSTRUCTIONS
Discharge Instructions    Discharged to Assisted Living by car with relative. Discharged via wheelchair, hospital escort: Yes.  Special equipment needed: Not Applicable    Be sure to schedule a follow-up appointment with your primary care doctor or any specialists as instructed:  Please get another chest xray in 1 week to follow up.  Follow up with your Urologist for further management of the lindsey catheter.     Discharge Plan:   Influenza Vaccine Indication: Not indicated: Previously immunized this influenza season and > 8 years of age    I understand that a diet low in cholesterol, fat, and sodium is recommended for good health. Unless I have been given specific instructions below for another diet, I accept this instruction as my diet prescription.   Other diet: dysphagia 1, pureed, nectar thick liquids     Special Instructions: None    · Is patient discharged on Warfarin / Coumadin?   No       Depression / Suicide Risk    As [facility] are [facility], it is important to learn how to keep safe from harming yourself.    Recognize the warning signs:  · Abrupt changes in personality, positive or negative- including increase in energy   · Giving away possessions  · Change in eating patterns- significant weight changes-  positive or negative  · Change in sleeping patterns- unable to sleep or sleeping all the time   · Unwillingness or inability to communicate  · Depression  · Unusual sadness, discouragement and loneliness  · Talk of wanting to die  · Neglect of personal appearance   · Rebelliousness- reckless behavior  · Withdrawal from people/activities they love  · Confusion- inability to concentrate     If you or a loved one observes any of these behaviors or has concerns about self-harm, here's what you can do:  · Talk about it- your feelings and reasons for harming yourself  · Remove any means that you might use to hurt yourself (examples: pills, rope, extension cords, firearm)  · Get  professional help from the community (Mental Health, Substance Abuse, psychological counseling)  · Do not be alone:Call your Safe Contact- someone whom you trust who will be there for you.  · Call your local CRISIS HOTLINE 229-2349 or 003-331-2691  · Call your local Children's Mobile Crisis Response Team [place] (102) 583-3334 or www.SingleHop  · Call the toll free National Suicide Prevention Hotlines   · National Suicide Prevention Lifeline 385-918-NKMR (9724)  · National Hope Line Network 800-SUICIDE (110-4027)

## 2019-06-23 NOTE — PROGRESS NOTES
Monitor Summary    Patient refused telemetry monitor.  MD aware and nursing communication states ok to be off monitor.

## 2019-06-23 NOTE — THERAPY
"Speech Language Therapy dysphagia treatment completed.   Functional Status:  Pt seen on this date for dysphagia therapy. Per RN, pt tolerating dysphagia I/NTL and pills crushed in puree with no overt s/sx of aspiration. PO trials of NTL via cup sip and purees were presented to pt and throat clear x1 noted, however, no other overt s/sx of aspiration. Upon palpation, laryngeal elevation was complete and swallow trigger delayed ~4 seconds intermittently with purees. Pt independently feeding self today although at a slow rate. Pt following recommendation for two swallows per bite/sip with minimal cueing.  At this time, would recommend continuation of dysphagia I/NTL with implementation of swallowing compensatory strategies (small bites/sips, reduced eating rate, up at 90* during meals, two swallows per bite sip) and would recommend continuation of direct supervision during meals to assist with feeding as needed. Dysphagia education provided to pt and he verbalized understanding. SLP following.    Recommendations: continuation of dysphagia I/NTL with direct supervision during meals   Plan of Care: Will benefit from Speech Therapy 3 times per week  Post-Acute Therapy: Recommend inpatient transitional care services for continued speech therapy services.      See \"Rehab Therapy-Acute\" Patient Summary Report for complete documentation.     "

## 2019-06-23 NOTE — DIETARY
"Nutrition services: Day 2 of admit.  Miguel Ángel Page is a 89 y.o. male with admitting DX of Pneumothorax  Consult received for Low BMI    Spoke with pt and family briefly as pt said he was going home today. Pt drinks Ensure/Boost at home. Pt agreeable to Boost with meals, but discharging soon. Pt requested Boost now. Delivered NT milk to drink with supervision. Unable to continue interview or obtain weight hx d/t nursing staff going over d/c orders with pt.      Assessment:  Height: 177.8 cm (5' 10\")  Weight: 51 kg (112 lb 7 oz)  Body mass index is 16.13 kg/m²., BMI classification: Underweight  Diet/Intake: Regular    Malnutrition Risk: Unable to determine at this time    Recommendations/Plan:    1. Encourage intake of meals  2. Document intake of all meals as % taken in ADL's to provide interdisciplinary communication across all shifts.   3. Monitor weight.  4. Nutrition rep will continue to see patient for ongoing meal and snack preferences.     RD following        "

## 2019-06-23 NOTE — PROGRESS NOTES
2 RN skin check complete with Valeria PAYTON.   Devices in place: bilateral SCDs, lindsey catheter, glasses, and continuous pulse oximeter.  Skin assessed under devices: YES.  Confirmed pressure ulcers NOT found.  New potential pressure ulcers NOT found.    The following interventions in place: turn every two hours, floresita cream, dimethicone wipes, pillows for positioning, Mepilex to sacrum, and bilateral heels floated on pillows.    Sacrum is red and slow to ernie.  Left forearm has a pink, red, scabbed abrasion.  Right flank, right hip, and right arm have bruising.  Bilateral upper extremities have bruising and abrasions.  Bilateral heels are red and blanching.  Bilateral feet are dry, calloused, and cracking.

## 2019-06-24 ENCOUNTER — TELEPHONE (OUTPATIENT)
Dept: MEDICAL GROUP | Facility: MEDICAL CENTER | Age: 84
End: 2019-06-24

## 2019-06-24 DIAGNOSIS — S22.41XD CLOSED FRACTURE OF MULTIPLE RIBS OF RIGHT SIDE WITH ROUTINE HEALING, SUBSEQUENT ENCOUNTER: ICD-10-CM

## 2019-06-24 DIAGNOSIS — Z91.81 AT RISK FOR FALLS: ICD-10-CM

## 2019-06-24 DIAGNOSIS — S22.49XS CLOSED FRACTURE OF MULTIPLE RIBS, UNSPECIFIED LATERALITY, SEQUELA: ICD-10-CM

## 2019-06-24 DIAGNOSIS — M54.5 LOW BACK PAIN, UNSPECIFIED BACK PAIN LATERALITY, UNSPECIFIED CHRONICITY, WITH SCIATICA PRESENCE UNSPECIFIED: Chronic | ICD-10-CM

## 2019-06-24 DIAGNOSIS — G20.A1 PARKINSON'S DISEASE: ICD-10-CM

## 2019-06-24 NOTE — DISCHARGE SUMMARY
Discharge Summary    CHIEF COMPLAINT ON ADMISSION  Chief Complaint   Patient presents with   • Rib Pain     Pt BIBA for c/o right rib pain after a fall over a week ago. Pt dx with rib fx and was admitted inpt. Pt denies any new injury.        Reason for Admission  EMS     Admission Date  6/21/2019    CODE STATUS  Prior    HPI & HOSPITAL COURSE  This is a 89 y.o. male here recently hospitalized for fall with right rib fractures, pleural effusion/hemothorax and small pneumothorax. He was offered thoracentesis but refused. He also refused SNF which was recommended and returned to his [facility].   He presents with worsening chest wall pain. CTA chest showed no PE, right pleural effusion/hemothorax with consolidation, small increased right pneumothorax that was not seen on CXR. He was admitted for pain control and serial CXRs. CXR were stable. He had no further pain with lidocaine patch. He had ongoing urinary retention and Lindsey cath was placed.  I discussed with the patient and daughter regarding follow-up with urology.  PT OT evaluated him, patient and daughter elected to return to his assisted living facility where they have excellent support of caregivers available 24/7, in house PTOTST, and they will be able to arrange follow up CXR.  His vitamin B12 was noted to be low and given an IM injection and started on oral placement. Lidocaine cream was ordered since it may cost the patient less. He was continued on midodrine and florinef which was started last hospitalization which the daughter says seemed to help him with his ambulation.    Therefore, he is discharged in good and stable condition to home with close outpatient follow-up.    The patient met 2-midnight criteria for an inpatient stay at the time of discharge.    Discharge Date  6/23/2019    FOLLOW UP ITEMS POST DISCHARGE  F/u CXR within 1 week   Ongoing PTOTST  F/u vit B12 deficiency  Needs urology follow up for lindsey cath management    DISCHARGE  DIAGNOSES  Principal Problem:    Pneumothorax POA: Yes  Active Problems:    Pleural effusion POA: Yes    Closed fracture of multiple ribs of right side POA: Yes    BPH (benign prostatic hyperplasia) POA: Yes      Overview: 9/12/12 2/9/17 psa 5.66      1/13/15  urology note samples rapaflo      2/18/15  urology note continued doxazosin 2 mg       8/2/16  urology note AUA score 5      2/9/17 psa 5.66      3/8/17 dr.basa lopez medical note on doxazosin 2 mg      3/15/17  urology note doxazosin 2 mg causing some dizziness,       decreased to 1 mg, AUA score 6      3/29/18 on cardura 2 mg per  [facility], will add proscar 5       mg, walker for ambulation      4/3/18 psa 11.7 on proscar 5 mg      3/12/19 on flomax per        6/13/19 ultrasound renal bladder decompressed with Heck catheter no       hydronephrosis                      Parkinson's disease (HCC) POA: Yes    Orthostatic hypotension POA: Yes    Urinary retention POA: Yes    Frailty syndrome in geriatric patient POA: Yes    Anemia POA: Yes    Low vitamin B12 level POA: Yes  Resolved Problems:    * No resolved hospital problems. *      FOLLOW UP  Future Appointments  Date Time Provider Department Center   7/11/2019 11:20 AM Oscar Dubois M.D. [facility]     Oscarjim Dubois M.D.  [address]  [id number]  [facility] [address]  [phone]    In 1 week        MEDICATIONS ON DISCHARGE     Medication List      START taking these medications      Instructions   cyanocobalamin 1000 MCG Tabs  Start taking on:  6/24/2019  Commonly known as:  VITAMIN B12   Take 1 Tab by mouth every day.  Dose:  1000 mcg     fludrocortisone 0.1 MG Tabs  Start taking on:  6/24/2019  Commonly known as:  FLORINEF   Take 0.5 Tabs by mouth every morning.  Dose:  0.05 mg     Lidocaine-Capsaicin 4-0.1 % Crea   1 Inch by Apply externally route 3 times a day as needed (rib pain).  Dose:  1 Inch     midodrine 5 MG Tabs  Commonly  known as:  PROAMATINE   Take 1 Tab by mouth 3 times a day, with meals.  Dose:  5 mg        CONTINUE taking these medications      Instructions   aspirin 81 MG Chew chewable tablet  Commonly known as:  ASA   Take 1 Tab by mouth every day.  Dose:  81 mg     carbidopa-levodopa  MG Tabs  Commonly known as:  SINEMET   Take 1 Tab by mouth 3 times a day.  Dose:  1 Tab     tamsulosin 0.4 MG capsule  Commonly known as:  FLOMAX   Take 1 Cap by mouth ONE-HALF HOUR AFTER BREAKFAST.  Dose:  0.4 mg            Allergies  Allergies   Allergen Reactions   • Ultram [Tramadol Hcl]      Patient states no allergy - not sure of reaction.       DIET  No orders of the defined types were placed in this encounter.      ACTIVITY  As tolerated.  Weight bearing as tolerated    CONSULTATIONS  None    PROCEDURES  DX-CHEST-PORTABLE (1 VIEW)   Final Result      1.  Right basilar opacity consistent with atelectasis and/or effusion      2.  No pneumothorax identified      DX-CHEST-LIMITED (1 VIEW)   Final Result      No significant change      DX-CHEST-LIMITED (1 VIEW)   Final Result      1.  No residual right apical pneumothorax identified.      2.  Unchanging bilateral pleural effusions and bilateral lower lobe areas of atelectasis or pneumonia, right greater than left.      CT-CTA CHEST PULMONARY ARTERY W/ RECONS   Final Result         1.  No pulmonary embolus appreciated.   2.  Right posterior eighth and ninth comminuted rib fractures.   3.  Right pleural effusion/hemothorax with consolidation which could represent atelectasis or infiltrate.   4.  Small right pneumothorax, increased since prior   5.  Atherosclerosis      DX-CHEST-LIMITED (1 VIEW)   Final Result         1.  Pulmonary edema and/or infiltrates, similar to prior.   2.  Layering right pleural effusion.   3.  Right apical pneumothorax, slightly increased since prior.   4.  Atherosclerosis            LABORATORY  Lab Results   Component Value Date    SODIUM 133 (L) 06/22/2019     POTASSIUM 4.5 06/22/2019    CHLORIDE 103 06/22/2019    CO2 25 06/22/2019    GLUCOSE 100 (H) 06/22/2019    BUN 28 (H) 06/22/2019    CREATININE 0.87 06/22/2019        Lab Results   Component Value Date    WBC 6.0 06/22/2019    HEMOGLOBIN 10.5 (L) 06/22/2019    HEMATOCRIT 31.5 (L) 06/22/2019    PLATELETCT 368 06/22/2019        Total time of the discharge process exceeds 32 minutes.

## 2019-06-25 ENCOUNTER — TELEPHONE (OUTPATIENT)
Dept: MEDICAL GROUP | Facility: MEDICAL CENTER | Age: 84
End: 2019-06-25

## 2019-06-25 ENCOUNTER — HOME HEALTH ADMISSION (OUTPATIENT)
Dept: HOME HEALTH SERVICES | Facility: HOME HEALTHCARE | Age: 84
End: 2019-06-25
Payer: MEDICARE

## 2019-06-25 NOTE — TELEPHONE ENCOUNTER
Please see the previous message from yesterday. The referral to home health was placed yesterday in the computer, but since I did not see the patient I am not sure this is a valid referral.

## 2019-06-25 NOTE — TELEPHONE ENCOUNTER
Pts' dtr Nabila called, [facility] needs orders for Home health due to him coming home from the hospital with a catheter. Would like PT, OT, Speech therapy evaluations along with the Nursing Home Care.

## 2019-06-27 ENCOUNTER — HOME CARE VISIT (OUTPATIENT)
Dept: HOME HEALTH SERVICES | Facility: HOME HEALTHCARE | Age: 84
End: 2019-06-27
Payer: MEDICARE

## 2019-06-27 ENCOUNTER — TELEPHONE (OUTPATIENT)
Dept: VASCULAR LAB | Facility: MEDICAL CENTER | Age: 84
End: 2019-06-27

## 2019-06-27 VITALS
HEART RATE: 80 BPM | TEMPERATURE: 98.3 F | OXYGEN SATURATION: 92 % | BODY MASS INDEX: 16.13 KG/M2 | SYSTOLIC BLOOD PRESSURE: 95 MMHG | DIASTOLIC BLOOD PRESSURE: 58 MMHG | HEIGHT: 70 IN | RESPIRATION RATE: 14 BRPM

## 2019-06-27 PROCEDURE — G0493 RN CARE EA 15 MIN HH/HOSPICE: HCPCS

## 2019-06-27 PROCEDURE — A4334 URINARY CATH LEG STRAP: HCPCS

## 2019-06-27 PROCEDURE — 665998 HH PPS REVENUE CREDIT

## 2019-06-27 PROCEDURE — 665001 SOC-HOME HEALTH

## 2019-06-27 PROCEDURE — 665999 HH PPS REVENUE DEBIT

## 2019-06-27 PROCEDURE — A4354 CATH INSERTION TRAY W/BAG: HCPCS

## 2019-06-27 ASSESSMENT — ACTIVITIES OF DAILY LIVING (ADL): OASIS_M1830: 03

## 2019-06-27 ASSESSMENT — PATIENT HEALTH QUESTIONNAIRE - PHQ9
2. FEELING DOWN, DEPRESSED, IRRITABLE, OR HOPELESS: 01
5. POOR APPETITE OR OVEREATING: 0 - NOT AT ALL
CLINICAL INTERPRETATION OF PHQ2 SCORE: 1
SUM OF ALL RESPONSES TO PHQ QUESTIONS 1-9: 3
1. LITTLE INTEREST OR PLEASURE IN DOING THINGS: 00

## 2019-06-27 ASSESSMENT — ENCOUNTER SYMPTOMS
DIFFICULTY THINKING: 1
VOMITING: DENIES
NAUSEA: DENIES
DEPRESSED MOOD: 1
SHORTNESS OF BREATH: T

## 2019-06-27 NOTE — TELEPHONE ENCOUNTER
Medications reviewed  No clinically significant interactions          Marilyn Sutton, Clinical Pharmacist, CDE, CACP

## 2019-06-28 ENCOUNTER — HOME CARE VISIT (OUTPATIENT)
Dept: HOME HEALTH SERVICES | Facility: HOME HEALTHCARE | Age: 84
End: 2019-06-28
Payer: MEDICARE

## 2019-06-28 VITALS
OXYGEN SATURATION: 98 % | SYSTOLIC BLOOD PRESSURE: 108 MMHG | RESPIRATION RATE: 16 BRPM | HEART RATE: 87 BPM | TEMPERATURE: 98.5 F | DIASTOLIC BLOOD PRESSURE: 60 MMHG

## 2019-06-28 PROCEDURE — 665998 HH PPS REVENUE CREDIT

## 2019-06-28 PROCEDURE — 665999 HH PPS REVENUE DEBIT

## 2019-06-28 PROCEDURE — G0151 HHCP-SERV OF PT,EA 15 MIN: HCPCS

## 2019-06-28 ASSESSMENT — GAIT ASSESSMENTS
BALANCE AND GAIT SCORE: 4
TRUNK: 0
LEFT STEP PASS: 1
GAIT SCORE: 3
INITIATION OF GAIT IMMEDIATELY AFTER GO: 0
RIGHT STEP CLEAR: 1
RIGHT STEP PASS: 1
PATH: 0
TIME: 0
STEP CONTINUITY: 0
LEFT STEP CLEAR: 0
STEP SYMMETRY: 0
SURVEY COMPLETE: TRUE

## 2019-06-28 ASSESSMENT — BALANCE ASSESSMENTS
BALANCE SCORE: 1
TURNING 360 DEGREES STEPS: 0
SURVEY COMPLETE: TRUE
ATTEMPTS TO ARISE: 0
NUDGED: 0
EYES CLOSED AT MAXIMUM POSITION NUDGED: 0
STANDING BALANCE: 0
SITTING DOWN: 0
ARISES: 0
TURNING 360 DEGREES STEADINESS: 0
SITTING BALANCE: 1
IMMEDIATE STANDING BALANCE FIRST 5 SECONDS: 0

## 2019-06-28 ASSESSMENT — ENCOUNTER SYMPTOMS
POOR JUDGMENT: 1
DIFFICULTY THINKING: 1

## 2019-06-28 ASSESSMENT — ACTIVITIES OF DAILY LIVING (ADL)
IADLS_COMMENTS: <!--EPICS-->SEE OT REPORT<!--EPICE-->
ADLS_COMMENTS: <!--EPICS-->SEE OT REPORT<!--EPICE-->

## 2019-06-29 PROCEDURE — 665999 HH PPS REVENUE DEBIT

## 2019-06-29 PROCEDURE — 665998 HH PPS REVENUE CREDIT

## 2019-06-30 ENCOUNTER — HOME CARE VISIT (OUTPATIENT)
Dept: HOME HEALTH SERVICES | Facility: HOME HEALTHCARE | Age: 84
End: 2019-06-30
Payer: MEDICARE

## 2019-06-30 PROCEDURE — 665999 HH PPS REVENUE DEBIT

## 2019-06-30 PROCEDURE — 665998 HH PPS REVENUE CREDIT

## 2019-07-01 ENCOUNTER — HOME CARE VISIT (OUTPATIENT)
Dept: HOME HEALTH SERVICES | Facility: HOME HEALTHCARE | Age: 84
End: 2019-07-01
Payer: MEDICARE

## 2019-07-01 PROCEDURE — 665998 HH PPS REVENUE CREDIT

## 2019-07-01 PROCEDURE — 665999 HH PPS REVENUE DEBIT

## 2019-07-02 ENCOUNTER — HOME CARE VISIT (OUTPATIENT)
Dept: HOME HEALTH SERVICES | Facility: HOME HEALTHCARE | Age: 84
End: 2019-07-02
Payer: MEDICARE

## 2019-07-02 ENCOUNTER — HOSPITAL ENCOUNTER (EMERGENCY)
Facility: MEDICAL CENTER | Age: 84
End: 2019-07-02
Attending: EMERGENCY MEDICINE
Payer: MEDICARE

## 2019-07-02 VITALS
BODY MASS INDEX: 19.88 KG/M2 | SYSTOLIC BLOOD PRESSURE: 136 MMHG | OXYGEN SATURATION: 96 % | DIASTOLIC BLOOD PRESSURE: 66 MMHG | HEART RATE: 66 BPM | RESPIRATION RATE: 16 BRPM | WEIGHT: 138.89 LBS | HEIGHT: 70 IN

## 2019-07-02 DIAGNOSIS — R33.8 ACUTE URINARY RETENTION: ICD-10-CM

## 2019-07-02 DIAGNOSIS — R31.9 URINARY TRACT INFECTION WITH HEMATURIA, SITE UNSPECIFIED: ICD-10-CM

## 2019-07-02 DIAGNOSIS — N39.0 URINARY TRACT INFECTION WITH HEMATURIA, SITE UNSPECIFIED: ICD-10-CM

## 2019-07-02 LAB
APPEARANCE UR: ABNORMAL
APTT PPP: 30.6 SEC (ref 24.7–36)
BACTERIA #/AREA URNS HPF: ABNORMAL /HPF
BILIRUB UR QL STRIP.AUTO: ABNORMAL
COLOR UR: ABNORMAL
EPI CELLS #/AREA URNS HPF: ABNORMAL /HPF
ERYTHROCYTE [DISTWIDTH] IN BLOOD BY AUTOMATED COUNT: 51.8 FL (ref 35.9–50)
GLUCOSE UR STRIP.AUTO-MCNC: NEGATIVE MG/DL
HCT VFR BLD AUTO: 35.3 % (ref 42–52)
HGB BLD-MCNC: 11.4 G/DL (ref 14–18)
INR PPP: 1.09 (ref 0.87–1.13)
KETONES UR STRIP.AUTO-MCNC: NEGATIVE MG/DL
LEUKOCYTE ESTERASE UR QL STRIP.AUTO: ABNORMAL
MCH RBC QN AUTO: 33.2 PG (ref 27–33)
MCHC RBC AUTO-ENTMCNC: 32.3 G/DL (ref 33.7–35.3)
MCV RBC AUTO: 102.9 FL (ref 81.4–97.8)
MICRO URNS: ABNORMAL
NITRITE UR QL STRIP.AUTO: POSITIVE
PH UR STRIP.AUTO: 7 [PH]
PLATELET # BLD AUTO: 347 K/UL (ref 164–446)
PMV BLD AUTO: 8.4 FL (ref 9–12.9)
PROT UR QL STRIP: 30 MG/DL
PROTHROMBIN TIME: 14.4 SEC (ref 12–14.6)
RBC # BLD AUTO: 3.43 M/UL (ref 4.7–6.1)
RBC # URNS HPF: >150 /HPF
RBC UR QL AUTO: ABNORMAL
SP GR UR STRIP.AUTO: 1.01
UROBILINOGEN UR STRIP.AUTO-MCNC: 0.2 MG/DL
WBC # BLD AUTO: 10.6 K/UL (ref 4.8–10.8)
WBC #/AREA URNS HPF: ABNORMAL /HPF

## 2019-07-02 PROCEDURE — 81001 URINALYSIS AUTO W/SCOPE: CPT

## 2019-07-02 PROCEDURE — 85610 PROTHROMBIN TIME: CPT

## 2019-07-02 PROCEDURE — 700102 HCHG RX REV CODE 250 W/ 637 OVERRIDE(OP): Performed by: EMERGENCY MEDICINE

## 2019-07-02 PROCEDURE — 99284 EMERGENCY DEPT VISIT MOD MDM: CPT

## 2019-07-02 PROCEDURE — 303105 HCHG CATHETER EXTRA

## 2019-07-02 PROCEDURE — A9270 NON-COVERED ITEM OR SERVICE: HCPCS | Performed by: EMERGENCY MEDICINE

## 2019-07-02 PROCEDURE — 665998 HH PPS REVENUE CREDIT

## 2019-07-02 PROCEDURE — 87086 URINE CULTURE/COLONY COUNT: CPT

## 2019-07-02 PROCEDURE — 87186 SC STD MICRODIL/AGAR DIL: CPT | Mod: 91

## 2019-07-02 PROCEDURE — 51702 INSERT TEMP BLADDER CATH: CPT

## 2019-07-02 PROCEDURE — 665999 HH PPS REVENUE DEBIT

## 2019-07-02 PROCEDURE — 85027 COMPLETE CBC AUTOMATED: CPT

## 2019-07-02 PROCEDURE — 85730 THROMBOPLASTIN TIME PARTIAL: CPT

## 2019-07-02 PROCEDURE — 87077 CULTURE AEROBIC IDENTIFY: CPT | Mod: 91

## 2019-07-02 PROCEDURE — 304561 HCHG STAT O2

## 2019-07-02 RX ORDER — CEFDINIR 300 MG/1
300 CAPSULE ORAL 2 TIMES DAILY
Qty: 20 CAP | Refills: 0 | Status: SHIPPED | OUTPATIENT
Start: 2019-07-02 | End: 2019-07-09

## 2019-07-02 RX ORDER — CEFDINIR 300 MG/1
300 CAPSULE ORAL ONCE
Status: COMPLETED | OUTPATIENT
Start: 2019-07-02 | End: 2019-07-02

## 2019-07-02 RX ADMIN — CEFDINIR 300 MG: 300 CAPSULE ORAL at 12:52

## 2019-07-02 NOTE — ED PROVIDER NOTES
"ED Provider Note    CHIEF COMPLAINT  Chief Complaint   Patient presents with   • Other     urine retention       HPI  Miguel Ángel Page is a 89 y.o. male who presents to the emergency department by ambulance from assisted living facility for urinary retention.  Patient states he was seen by his urologist yesterday in [place], Dr. Monaco, and had his Heck catheter removed.  Patient was able to urinate once thereafter but is been unable to do so since that time, despite the urge, abdominal distention and lower abdominal discomfort.  No fever chills.  No nausea or vomiting.    Take history for BPH, compliant with tamsulosin.  Denies recurrent history for urinary retention despite most previous Heck catheter placement for this.  Denies frequent UTI or antibiotic use.    REVIEW OF SYSTEMS  See HPI for further details. All other systems are negative.     PAST MEDICAL HISTORY   has a past medical history of Orthostatic hypotension; Parkinson's disease (HCC); Pleural effusion; Pneumothorax; and Rib fractures.    SOCIAL HISTORY  Social History     Social History Main Topics   • Smoking status: Never Smoker   • Smokeless tobacco: Never Used      Comment: quit 35 yrs   • Alcohol use 0.5 oz/week     1 Glasses of wine per week      Comment: Occasionally   • Drug use: No   • Sexual activity: Not on file       SURGICAL HISTORY  patient denies any surgical history    CURRENT MEDICATIONS  Home Medications    **Home medications have not yet been reviewed for this encounter**         ALLERGIES  Allergies   Allergen Reactions   • Ultram [Tramadol Hcl]      Patient states no allergy - not sure of reaction.       PHYSICAL EXAM  VITAL SIGNS: /97   Pulse 65   Resp 16   Ht 1.778 m (5' 10\")   Wt 63 kg (138 lb 14.2 oz)   SpO2 95%   BMI 19.93 kg/m²   Pulse ox interpretation: I interpret this pulse ox as normal.  Constitutional: Alert in no apparent distress.  HENT: Normocephalic, atraumatic.Moist mucous membranes.    Eyes: " Conjunctiva normal.   Neck: Normal range of motion  Lymphatic: No lymphadenopathy noted.   Cardiovascular: Normal peripheral perfusion.  Thorax & Lungs: Nonlabored respirations.  Abdomen: Soft, non-distended, non-tender to palpation.  No CVA tenderness to percussion.  Skin: Warm, Dry.  No pallor.  Musculoskeletal: Good range of motion in all major joints.  Neurologic: Alert and oriented x4.  Moves all extremities spontaneously.  Psychiatric: Affect normal, Judgment normal, Mood normal.       DIAGNOSTIC STUDIES / PROCEDURES    LABS  Results for orders placed or performed during the hospital encounter of 07/02/19   URINALYSIS CULTURE, IF INDICATED   Result Value Ref Range    Color [place]     Character Cloudy (A)     Specific Gravity 1.015 <1.035    Ph 7.0 5.0 - 8.0    Glucose Negative Negative mg/dL    Ketones Negative Negative mg/dL    Protein 30 (A) Negative mg/dL    Bilirubin Small (A) Negative    Urobilinogen, Urine 0.2 Negative    Nitrite Positive (A) Negative    Leukocyte Esterase Small (A) Negative    Occult Blood Large (A) Negative    Micro Urine Req Microscopic    URINE MICROSCOPIC (W/UA)   Result Value Ref Range    WBC 20-50 (A) /hpf    RBC >150 (A) /hpf    Bacteria Many (A) None /hpf    Epithelial Cells Few /hpf   CBC WITHOUT DIFFERENTIAL   Result Value Ref Range    WBC 10.6 4.8 - 10.8 K/uL    RBC 3.43 (L) 4.70 - 6.10 M/uL    Hemoglobin 11.4 (L) 14.0 - 18.0 g/dL    Hematocrit 35.3 (L) 42.0 - 52.0 %    .9 (H) 81.4 - 97.8 fL    MCH 33.2 (H) 27.0 - 33.0 pg    MCHC 32.3 (L) 33.7 - 35.3 g/dL    RDW 51.8 (H) 35.9 - 50.0 fL    Platelet Count 347 164 - 446 K/uL    MPV 8.4 (L) 9.0 - 12.9 fL   PT/INR   Result Value Ref Range    PT 14.4 12.0 - 14.6 sec    INR 1.09 0.87 - 1.13   PTT   Result Value Ref Range    APTT 30.6 24.7 - 36.0 sec       COURSE & MEDICAL DECISION MAKING  Nursing notes and vital signs were reviewed. (See chart for details)  The patients  records were reviewed, history was obtained from the  "patient;     Medical record review: Discharge summary from 6/23/2019 after evaluation for right rib pain after a fall, previous diagnosis for rib fracture.  Prior to this admission, admission following mechanical fall with right rib fractures, pleural effusion/hemothorax and small pneumothorax.  Offered thoracentesis but refused.  He refused SNF and return to his assisted living facility.  Return on this admission for worsening chest wall pain, CTA without PE, right pleural effusion/pneumothorax with consolidation, small increased right pneumothorax that was not seen on chest x-ray.  Admitted for pain control and serial chest x-rays.  Chest x-rays were stable.  No further pain with lidocaine patch.  Discharge summary does mention \"ongoing urinary retention and Heck catheter was placed.  I discussed with the patient and daughter regarding follow-up with urology.\"  PT OT evaluated him and patient daughter elected return to assisted living facility.    Evaluation of urinary retention likely secondary to BPH, stricture, spasm following Heck catheter removal.  Patient is poor historian regarding this history but did see urology yesterday who removed the Heck catheter in his office.  Heck catheter placed on arrival to this facility, 900 cc of hematuria, hazy appearance urine output.  Urinalysis ordered.    Gross hematuria, now 1 L output.  Hemodynamically stable, no hypotension or tachycardia.  Abdominal exam is benign.  Will add CBC, coags for evaluation.  Patient otherwise asymptomatic for any blood loss.  Urinalysis does show infection, large blood, positive for nitrite, small leukocyte esterase, 20-50 WBCs.  Omnicef is been initiated.  No clinical evidence of pyonephritis or sepsis.    Labs are within normal limits, hemoglobin actually increased from prior evaluation.  No coagulopathy.  Urine irrigated, hematuria clearing after multiple clots expelled.    Patient is stable for discharge at this time, anticipatory " guidance provided, Omnicef twice daily for 10 days, Heck catheter remain in place until seen once again by urology, close follow-up is encouraged by primary care (ED  has made this appointment) and urology, and strict ED return instructions have been detailed. Patient is agreeable to the disposition and plan.    Patient's blood pressure was elevated in the emergency department, and has been referred to primary care for close monitoring.    FINAL IMPRESSION  (R33.8) Acute urinary retention  (N39.0,  R31.9) Urinary tract infection with hematuria, site unspecified      Electronically signed by: Alayna Chatman, 7/2/2019 11:18 AM      This dictation was created using voice recognition software. The accuracy of the dictation is limited to the abilities of the software. I expect there may be some errors of grammar and possibly content. The nursing notes were reviewed and certain aspects of this information were incorporated into this note.

## 2019-07-02 NOTE — ED TRIAGE NOTES
Chief Complaint   Patient presents with   • Other     urine retention     Pt had lindsey catheter removed yesterday by his urologist.  Pt has been unable to void sice removal.  Pt here for above complaint.  Connected to BP cuff, and continuous pulse ox upon arrival.  Pt in gown, call light in reach, bed in lowest position.  Chart up for ERP.

## 2019-07-02 NOTE — DISCHARGE INSTRUCTIONS
Follow-up with primary care as scheduled for you for reevaluation, medication management and referral to urology.  Follow-up with urology, Dr. Monaco, this week for reevaluation recurrent urinary retention and a UTI.    Continue home medications as previously indicated.  Add Omnicef twice daily for 10 days for urinary tract infection.    Keep Heck catheter in place until seen by urology.    Return to the emergency department for abdominal pain or distention, urinary obstruction, fever, vomiting or other new concerns.

## 2019-07-02 NOTE — ED NOTES
Heck catheter placed.  900 mL out of red, hazy urine.  Urine sample collected and sent to lab.  Pt tolerated well.

## 2019-07-03 ENCOUNTER — HOME CARE VISIT (OUTPATIENT)
Dept: HOME HEALTH SERVICES | Facility: HOME HEALTHCARE | Age: 84
End: 2019-07-03
Payer: MEDICARE

## 2019-07-03 VITALS
SYSTOLIC BLOOD PRESSURE: 104 MMHG | DIASTOLIC BLOOD PRESSURE: 58 MMHG | RESPIRATION RATE: 16 BRPM | OXYGEN SATURATION: 93 % | TEMPERATURE: 98.7 F | HEART RATE: 77 BPM

## 2019-07-03 DIAGNOSIS — K59.00 CONSTIPATION, UNSPECIFIED CONSTIPATION TYPE: ICD-10-CM

## 2019-07-03 PROCEDURE — G0299 HHS/HOSPICE OF RN EA 15 MIN: HCPCS

## 2019-07-03 PROCEDURE — A4333 URINARY CATH ANCHOR DEVICE: HCPCS

## 2019-07-03 PROCEDURE — 665999 HH PPS REVENUE DEBIT

## 2019-07-03 PROCEDURE — G0152 HHCP-SERV OF OT,EA 15 MIN: HCPCS

## 2019-07-03 PROCEDURE — A4354 CATH INSERTION TRAY W/BAG: HCPCS

## 2019-07-03 PROCEDURE — G0151 HHCP-SERV OF PT,EA 15 MIN: HCPCS

## 2019-07-03 PROCEDURE — 665998 HH PPS REVENUE CREDIT

## 2019-07-03 RX ORDER — DOCUSATE SODIUM 100 MG/1
100 CAPSULE, LIQUID FILLED ORAL 2 TIMES DAILY PRN
Qty: 60 CAP | Refills: 11 | Status: SHIPPED
Start: 2019-07-03 | End: 2020-06-28

## 2019-07-04 VITALS
DIASTOLIC BLOOD PRESSURE: 62 MMHG | TEMPERATURE: 98.6 F | SYSTOLIC BLOOD PRESSURE: 114 MMHG | RESPIRATION RATE: 17 BRPM | OXYGEN SATURATION: 94 % | HEART RATE: 84 BPM

## 2019-07-04 PROCEDURE — 665999 HH PPS REVENUE DEBIT

## 2019-07-04 PROCEDURE — 665998 HH PPS REVENUE CREDIT

## 2019-07-04 ASSESSMENT — ENCOUNTER SYMPTOMS
NAUSEA: DENIES
VOMITING: DENIES

## 2019-07-05 ENCOUNTER — HOME CARE VISIT (OUTPATIENT)
Dept: HOME HEALTH SERVICES | Facility: HOME HEALTHCARE | Age: 84
End: 2019-07-05
Payer: MEDICARE

## 2019-07-05 VITALS
RESPIRATION RATE: 16 BRPM | SYSTOLIC BLOOD PRESSURE: 106 MMHG | DIASTOLIC BLOOD PRESSURE: 60 MMHG | TEMPERATURE: 98.1 F | HEART RATE: 89 BPM | OXYGEN SATURATION: 97 %

## 2019-07-05 LAB
BACTERIA UR CULT: ABNORMAL
SIGNIFICANT IND 70042: ABNORMAL
SITE SITE: ABNORMAL
SOURCE SOURCE: ABNORMAL

## 2019-07-05 PROCEDURE — 665999 HH PPS REVENUE DEBIT

## 2019-07-05 PROCEDURE — G0151 HHCP-SERV OF PT,EA 15 MIN: HCPCS

## 2019-07-05 PROCEDURE — 665998 HH PPS REVENUE CREDIT

## 2019-07-05 SDOH — ECONOMIC STABILITY: HOUSING INSECURITY: HOME SAFETY: CAUTIONED RE: FALL RISK W/ THROW RUGS, HAS LONG, LOW PROIL RUG IN BR.

## 2019-07-05 ASSESSMENT — ACTIVITIES OF DAILY LIVING (ADL)
ORAL_CARE_ASSISTANCE: 4
HOUSEKEEPING_ASSISTANCE: 6
EATING_ASSISTANCE: 0
LAUNDRY_ASSISTANCE: 6
GROOMING_COMMENTS: STANDING AT SINK
TOILETING_COMMENTS: HAS FOLEY
DRESSING_LB_ASSISTANCE: 4
TELEPHONE_ASSISTANCE: 0
TRANSPORTATION_ASSISTANCE: 6
DRESSING_UB_ASSISTANCE: 4
BATHING_ASSISTANCE: 4
SHOPPING_ASSISTANCE: 6
MEAL_PREP_ASSISTANCE: 6
TOILETING_ASSISTANCE: 0
GROOMING_ASSISTANCE: 4

## 2019-07-06 PROCEDURE — 665999 HH PPS REVENUE DEBIT

## 2019-07-06 PROCEDURE — 665998 HH PPS REVENUE CREDIT

## 2019-07-06 NOTE — ED NOTES
ED Positive Culture Follow-up/Notification Note:   Date: 7/6/19    Patient seen in the ED on 7/2/2019 for urinary retention.   1. Acute urinary retention    2. Urinary tract infection with hematuria, site unspecified      Discharge Medication List as of 7/2/2019 12:22 PM      START taking these medications    Details   cefdinir (OMNICEF) 300 MG Cap Take 1 Cap by mouth 2 times a day., Disp-20 Cap, R-0, Print Rx Paper           Allergies: Ultram [tramadol hcl]    Vitals:    07/02/19 1100 07/02/19 1130 07/02/19 1200 07/02/19 1257   BP:    136/66   Pulse: 69 74 66 66   Resp: 16 16 16 16   TempSrc:       SpO2: 95% 95% 96%    Weight:       Height:           Final cultures:   Results     Procedure Component Value Units Date/Time    URINE CULTURE(NEW) [[id number]]  (Abnormal)  (Susceptibility) Collected:  07/02/19 0841    Order Status:  Completed Specimen:  Urine Updated:  07/05/19 1110     Significant Indicator POS (POS)     Source UR     Site -     Culture Result - (A)      Pseudomonas aeruginosa  >100,000 cfu/mL  P.aeruginosa may develop resistance during prolonged therapy  with all antibiotics. Isolates that are initially susceptible  may become resistant within three to four days after  initiation of therapy. Testing of repeat isolates may be  warranted.   (A)      Enterococcus faecium  >100,000 cfu/mL   (A)    Culture & Susceptibility     ENTEROCOCCUS FAECIUM     Antibiotic Sensitivity Microscan Unit Status    Ampicillin Resistant >8 mcg/mL Final    Method: RUSSELL    Daptomycin Sensitive 4 mcg/mL Final    Method: RUSSELL    Nitrofurantoin Sensitive <=32 mcg/mL Final    Method: RUSSELL    Penicillin Resistant >8 mcg/mL Final    Method: RUSSELL    Tetracycline Resistant >8 mcg/mL Final    Method: RUSSELL              PSEUDOMONAS AERUGINOSA     Antibiotic Sensitivity Microscan Unit Status    Amikacin Sensitive <=16 mcg/mL Final    Method: RUSSELL    Cefepime Sensitive <=8 mcg/mL Final    Method: RUSSELL    Ceftazidime Sensitive 8 mcg/mL Final     Method: RUSSELL    Ciprofloxacin Sensitive <=1 mcg/mL Final    Method: RUSSELL    Gentamicin Sensitive <=4 mcg/mL Final    Method: RUSSELL    Imipenem Sensitive <=1 mcg/mL Final    Method: RUSSELL    Levofloxacin Sensitive <=2 mcg/mL Final    Method: RUSSELL    Meropenem Sensitive <=1 mcg/mL Final    Method: RUSSELL    Pip/Tazobactam Sensitive 64 mcg/mL Final    Method: RUSSELL    Piperacillin Sensitive 32 mcg/mL Final    Method: RUSSELL    Tobramycin Sensitive <=4 mcg/mL Final    Method: RUSSELL                       URINALYSIS CULTURE, IF INDICATED [[id number]]  (Abnormal) Collected:  07/02/19 0841    Order Status:  Completed Specimen:  Urine Updated:  07/02/19 1008     Color Elba     Character Cloudy (A)     Specific Gravity 1.015     Ph 7.0     Glucose Negative mg/dL      Ketones Negative mg/dL      Protein 30 (A) mg/dL      Bilirubin Small (A)     Urobilinogen, Urine 0.2     Nitrite Positive (A)     Leukocyte Esterase Small (A)     Occult Blood Large (A)     Micro Urine Req Microscopic    URINE CULTURE (ADD ON) [[id number]] Collected:  07/02/19 0000    Order Status:  Canceled Specimen:  Urine           Plan:   Isolated organism is resistant to prescribed therapy.  Discussed result with patient.  Patient has no complaints or s/s of infection. Recommended to stop antibiotic therapy.   Both organisms sensitive to ciprofloxacin if patient develops symptoms and requires treatment.   Following up with urology to remove catheter.       Bunny Carrera, PharmD

## 2019-07-07 PROBLEM — R54 FRAILTY SYNDROME IN GERIATRIC PATIENT: Status: RESOLVED | Noted: 2019-06-21 | Resolved: 2019-07-07

## 2019-07-07 PROBLEM — D75.89 MACROCYTOSIS: Status: RESOLVED | Noted: 2019-06-10 | Resolved: 2019-07-07

## 2019-07-07 PROBLEM — D64.9 ANEMIA: Status: RESOLVED | Noted: 2019-06-21 | Resolved: 2019-07-07

## 2019-07-07 PROBLEM — J93.9 PNEUMOTHORAX: Status: RESOLVED | Noted: 2019-06-17 | Resolved: 2019-07-07

## 2019-07-07 PROBLEM — I95.1 ORTHOSTATIC HYPOTENSION: Status: RESOLVED | Noted: 2019-06-15 | Resolved: 2019-07-07

## 2019-07-07 PROBLEM — J44.9 COPD (CHRONIC OBSTRUCTIVE PULMONARY DISEASE) (HCC): Status: RESOLVED | Noted: 2019-06-15 | Resolved: 2019-07-07

## 2019-07-07 PROBLEM — S22.41XA CLOSED FRACTURE OF MULTIPLE RIBS OF RIGHT SIDE: Status: RESOLVED | Noted: 2019-06-21 | Resolved: 2019-07-07

## 2019-07-07 PROBLEM — J90 PLEURAL EFFUSION: Status: RESOLVED | Noted: 2019-06-21 | Resolved: 2019-07-07

## 2019-07-07 PROBLEM — G20.A1 PARKINSON'S DISEASE (HCC): Status: RESOLVED | Noted: 2019-06-10 | Resolved: 2019-07-07

## 2019-07-07 PROBLEM — E43 SEVERE PROTEIN-CALORIE MALNUTRITION (HCC): Status: RESOLVED | Noted: 2019-06-15 | Resolved: 2019-07-07

## 2019-07-07 PROBLEM — R33.9 URINARY RETENTION: Status: RESOLVED | Noted: 2019-06-18 | Resolved: 2019-07-07

## 2019-07-07 PROBLEM — R79.89 LOW VITAMIN B12 LEVEL: Status: RESOLVED | Noted: 2019-06-21 | Resolved: 2019-07-07

## 2019-07-07 PROCEDURE — 665999 HH PPS REVENUE DEBIT

## 2019-07-07 PROCEDURE — 665998 HH PPS REVENUE CREDIT

## 2019-07-08 ENCOUNTER — HOME CARE VISIT (OUTPATIENT)
Dept: HOME HEALTH SERVICES | Facility: HOME HEALTHCARE | Age: 84
End: 2019-07-08
Payer: MEDICARE

## 2019-07-08 VITALS
OXYGEN SATURATION: 94 % | SYSTOLIC BLOOD PRESSURE: 108 MMHG | HEART RATE: 87 BPM | TEMPERATURE: 98.2 F | DIASTOLIC BLOOD PRESSURE: 60 MMHG | RESPIRATION RATE: 16 BRPM

## 2019-07-08 PROCEDURE — 665999 HH PPS REVENUE DEBIT

## 2019-07-08 PROCEDURE — 665998 HH PPS REVENUE CREDIT

## 2019-07-08 PROCEDURE — G0151 HHCP-SERV OF PT,EA 15 MIN: HCPCS

## 2019-07-09 ENCOUNTER — HOME CARE VISIT (OUTPATIENT)
Dept: HOME HEALTH SERVICES | Facility: HOME HEALTHCARE | Age: 84
End: 2019-07-09
Payer: MEDICARE

## 2019-07-09 ENCOUNTER — TELEPHONE (OUTPATIENT)
Dept: URGENT CARE | Facility: MEDICAL CENTER | Age: 84
End: 2019-07-09

## 2019-07-09 VITALS
SYSTOLIC BLOOD PRESSURE: 118 MMHG | RESPIRATION RATE: 17 BRPM | HEART RATE: 64 BPM | DIASTOLIC BLOOD PRESSURE: 64 MMHG | TEMPERATURE: 99.1 F | OXYGEN SATURATION: 94 %

## 2019-07-09 VITALS
SYSTOLIC BLOOD PRESSURE: 118 MMHG | RESPIRATION RATE: 17 BRPM | HEART RATE: 64 BPM | OXYGEN SATURATION: 94 % | TEMPERATURE: 62.6 F | DIASTOLIC BLOOD PRESSURE: 64 MMHG

## 2019-07-09 DIAGNOSIS — I95.1 ORTHOSTATIC HYPOTENSION: ICD-10-CM

## 2019-07-09 DIAGNOSIS — R33.8 BENIGN PROSTATIC HYPERPLASIA WITH URINARY RETENTION: ICD-10-CM

## 2019-07-09 DIAGNOSIS — N40.1 BENIGN PROSTATIC HYPERPLASIA WITH URINARY RETENTION: ICD-10-CM

## 2019-07-09 DIAGNOSIS — Z87.898 HISTORY OF URINE COLOR CHANGES: ICD-10-CM

## 2019-07-09 DIAGNOSIS — E53.8 B12 DEFICIENCY: ICD-10-CM

## 2019-07-09 PROCEDURE — 665999 HH PPS REVENUE DEBIT

## 2019-07-09 PROCEDURE — G0299 HHS/HOSPICE OF RN EA 15 MIN: HCPCS

## 2019-07-09 PROCEDURE — G0180 MD CERTIFICATION HHA PATIENT: HCPCS | Performed by: INTERNAL MEDICINE

## 2019-07-09 PROCEDURE — 665998 HH PPS REVENUE CREDIT

## 2019-07-09 PROCEDURE — G0152 HHCP-SERV OF OT,EA 15 MIN: HCPCS

## 2019-07-09 RX ORDER — MIDODRINE HYDROCHLORIDE 5 MG/1
5 TABLET ORAL
Qty: 90 TAB | Refills: 3 | Status: SHIPPED
Start: 2019-07-09 | End: 2019-09-15 | Stop reason: SDUPTHER

## 2019-07-09 RX ORDER — TAMSULOSIN HYDROCHLORIDE 0.4 MG/1
0.4 CAPSULE ORAL
Qty: 90 CAP | Refills: 1 | Status: SHIPPED
Start: 2019-07-09 | End: 2019-12-11

## 2019-07-10 ENCOUNTER — HOME CARE VISIT (OUTPATIENT)
Dept: HOME HEALTH SERVICES | Facility: HOME HEALTHCARE | Age: 84
End: 2019-07-10
Payer: MEDICARE

## 2019-07-10 VITALS
RESPIRATION RATE: 17 BRPM | TEMPERATURE: 98.5 F | SYSTOLIC BLOOD PRESSURE: 110 MMHG | HEART RATE: 85 BPM | OXYGEN SATURATION: 95 % | DIASTOLIC BLOOD PRESSURE: 58 MMHG

## 2019-07-10 DIAGNOSIS — I95.9 HYPOTENSION, UNSPECIFIED HYPOTENSION TYPE: ICD-10-CM

## 2019-07-10 PROCEDURE — 665998 HH PPS REVENUE CREDIT

## 2019-07-10 PROCEDURE — G0151 HHCP-SERV OF PT,EA 15 MIN: HCPCS

## 2019-07-10 PROCEDURE — 665999 HH PPS REVENUE DEBIT

## 2019-07-10 RX ORDER — FLUDROCORTISONE ACETATE 0.1 MG/1
0.05 TABLET ORAL EVERY MORNING
Qty: 90 TAB | Refills: 1 | Status: SHIPPED
Start: 2019-07-10 | End: 2020-03-20

## 2019-07-10 RX ORDER — FLUDROCORTISONE ACETATE 0.1 MG/1
0.05 TABLET ORAL EVERY MORNING
Qty: 90 TAB | Refills: 1 | Status: SHIPPED | OUTPATIENT
Start: 2019-07-10 | End: 2019-07-10 | Stop reason: SDUPTHER

## 2019-07-10 ASSESSMENT — ENCOUNTER SYMPTOMS: DIFFICULTY THINKING: 1

## 2019-07-10 NOTE — TELEPHONE ENCOUNTER
Radha ([phone]) called from pt's assisted living facility and reports that the staff there is stating that his urine is dard with an applesauce like sediment. Pt c/o of pain in the penile area. Would like you to contact pt. Please advise.

## 2019-07-10 NOTE — TELEPHONE ENCOUNTER
I will order a urinalysis, please print out that order and fax the order to them, I do not have the fax number.  If he still has symptoms, he should make an appointment and be seen.

## 2019-07-11 ENCOUNTER — OFFICE VISIT (OUTPATIENT)
Dept: MEDICAL GROUP | Facility: MEDICAL CENTER | Age: 84
End: 2019-07-11
Payer: MEDICARE

## 2019-07-11 ENCOUNTER — TELEPHONE (OUTPATIENT)
Dept: MEDICAL GROUP | Facility: MEDICAL CENTER | Age: 84
End: 2019-07-11

## 2019-07-11 ENCOUNTER — HOME CARE VISIT (OUTPATIENT)
Dept: HOME HEALTH SERVICES | Facility: HOME HEALTHCARE | Age: 84
End: 2019-07-11
Payer: MEDICARE

## 2019-07-11 VITALS
HEIGHT: 70 IN | DIASTOLIC BLOOD PRESSURE: 56 MMHG | SYSTOLIC BLOOD PRESSURE: 98 MMHG | OXYGEN SATURATION: 93 % | WEIGHT: 113 LBS | TEMPERATURE: 97.6 F | BODY MASS INDEX: 16.18 KG/M2 | HEART RATE: 79 BPM

## 2019-07-11 DIAGNOSIS — R29.818 PARKINSONIAN FEATURES: ICD-10-CM

## 2019-07-11 DIAGNOSIS — R33.8 BENIGN PROSTATIC HYPERPLASIA WITH URINARY RETENTION: ICD-10-CM

## 2019-07-11 DIAGNOSIS — N40.1 BENIGN PROSTATIC HYPERPLASIA WITH URINARY RETENTION: ICD-10-CM

## 2019-07-11 PROCEDURE — 665998 HH PPS REVENUE CREDIT

## 2019-07-11 PROCEDURE — G0153 HHCP-SVS OF S/L PATH,EA 15MN: HCPCS

## 2019-07-11 PROCEDURE — 99213 OFFICE O/P EST LOW 20 MIN: CPT | Performed by: INTERNAL MEDICINE

## 2019-07-11 PROCEDURE — 665999 HH PPS REVENUE DEBIT

## 2019-07-11 RX ORDER — IBUPROFEN 600 MG/1
600 TABLET ORAL EVERY 6 HOURS PRN
COMMUNITY
End: 2020-07-07

## 2019-07-11 NOTE — TELEPHONE ENCOUNTER
Please call patient's urologist in [place] Dr. Monaco's office, [phone] he needs a follow-up for urinary retention, he still has his catheter in place and did not get a follow-up appointment after his last visit.    [facility] is wondering if they can remove the catheter.    Please call [facility] and advise them that they should contact Dr. Monaco's office in [place] for questions about the catheter.

## 2019-07-11 NOTE — PROGRESS NOTES
Subjective:      Miguel Ángel Page is a 89 y.o. male who presents hospital follow up           HPI     Here with daughter.   Now in assisted living part [facility] has three meals, has meds given to him. Has lindsey catheter placed. Home health nursing, PT, OT, speech and swallow evaluation.  Patient has Parkinson's on Sinemet taking 3 times a day before meals.  Using walker for ambulation, has been improving his strength and endurance since discharge.  Hospital records reviewed from recent admission showing fall, rib fractures, currently with urinary retention followed by Dr. Carlos Enrique [place] urology.  Lindsey catheter in place.  No current shortness of breath, right chest bruising has improved.  No tremor, slowness of movement.  Not checking blood pressure at home remains on Florinef 0.1 mg daily and midodrine 5 mg 3 times daily no elevated blood pressure based upon home health nursing readings.  Hospital records reviewed  6/10/19 hospital admission, 6/20/19 hospital discharge, admitted for fall, worked with physical therapy, occupational therapy, requires 24-hour assistance, DNR, hospitalist discussed with patient, declined comfort care status for now, physical therapy recommend discharge to skilled nursing facility but patient refused, discharge instead to [address] [place] [address] residence  6/10/19 chest x-ray right basilar opacity likely representing pneumonia  6/12/19 CT chest, abdomen, pelvis without; acute fractures posterior aspect right eighth through 10th ribs, large right pleural effusion, no pneumothorax, markedly distended bladder and enlarged prostate  6/18/19 chest x-ray small right pleural effusion with overlying atelectasis/consolidation unchanged  6/21/19 CT-CTA chest pulmonary artery with reconstruction, no pulmonary embolism, right posterior eighth and ninth comminuted rib fractures, right pleural effusion/hemothorax which could represent atelectasis or infiltrate, small right pneumothorax increased since  prior            Current Outpatient Prescriptions   Medication Sig Dispense Refill   • fludrocortisone (FLORINEF) 0.1 MG Tab Take 0.5 Tabs by mouth every morning. 90 Tab 1   • midodrine (PROAMATINE) 5 MG Tab Take 1 Tab by mouth 3 times a day, with meals. 90 Tab 3   • cyanocobalamin (VITAMIN B12) 1000 MCG Tab Take 1 Tab by mouth every day. 90 Tab 1   • tamsulosin (FLOMAX) 0.4 MG capsule Take 1 Cap by mouth ONE-HALF HOUR AFTER BREAKFAST. 90 Cap 1   • docusate sodium (COLACE) 100 MG Cap Take 1 Cap by mouth 2 times a day as needed for Constipation. 60 Cap 11   • carbidopa-levodopa (SINEMET)  MG Tab Take 1 Tab by mouth 3 times a day. 270 Tab 3   • aspirin (ASA) 81 MG CHEW Take 1 Tab by mouth every day. 30 Each 11     No current facility-administered medications for this visit.      Patient Active Problem List   Diagnosis   • Preventative health care   • Dyslipidemia   • Cataract   • Low back pain   • Hip pain, right   • Abnormal PSA   • Constipation   • Shoulder pain, right   • BPH (benign prostatic hyperplasia)   • Parkinsonian features   • At risk for falls   • Anemia          Health Maintenance Summary                Annual Wellness Visit Overdue 9/14/1928     IMM DTaP/Tdap/Td Vaccine Overdue 9/14/1947     IMM ZOSTER VACCINES Overdue 9/14/1978     IMM INFLUENZA Next Due 9/1/2019      Done 10/4/2018 Imm Admin: Influenza Vaccine Adult HD     Patient has more history with this topic...          Patient Care Team:  Oscar Dubois M.D. as PCP - [facility] as Home Health Provider      ROS       Objective:          Physical Exam   Constitutional: No distress.   HENT:   Head: Normocephalic and atraumatic.   Eyes: Conjunctivae are normal. Right eye exhibits no discharge. Left eye exhibits no discharge.   Neck: Neck supple. No JVD present.   Cardiovascular: Normal rate and regular rhythm.    Pulmonary/Chest: No respiratory distress.   Abdominal: He exhibits no distension.   Musculoskeletal: He exhibits  no edema.   Neurological: He is alert.   Skin: Skin is warm. He is not diaphoretic.   Nursing note and vitals reviewed.    Responds appropriately to questions and commands     4/5 bilateral  strength, no tremor    Chest wall clear to auscultation no bruising or ecchymosis right side    Heck catheter bag in place     Assessment/Plan:     Assessment  #!  Hospitalization for recent fall, rib fractures right eighth through 10th ribs    #2 Parkinson's on Sinemet 25/100 mg 1 tablet 3 times daily reason for, has home health physical therapy, Occupational Therapy, nursing    #3 orthostatic hypotension question related to Parkinson's now on Florinef 0.1 mg daily and Midodrine 5 mg tid     #4 BPH on Flomax    #5 urinary retention followed by  obtain urology catheter in place    Plan  #1 continue medications no changes    #2 check blood pressure daily and record    #3 follow-up with urology, advised home health not to remove catheter on the checking with urology    #4 follow-up 2 months    #5 falling precautions use walker at all times    #6 advised patient no current changes, perhaps we can taper off the Florinef and time

## 2019-07-12 ENCOUNTER — HOME CARE VISIT (OUTPATIENT)
Dept: HOME HEALTH SERVICES | Facility: HOME HEALTHCARE | Age: 84
End: 2019-07-12
Payer: MEDICARE

## 2019-07-12 PROCEDURE — 665999 HH PPS REVENUE DEBIT

## 2019-07-12 PROCEDURE — G0299 HHS/HOSPICE OF RN EA 15 MIN: HCPCS

## 2019-07-12 PROCEDURE — 665998 HH PPS REVENUE CREDIT

## 2019-07-13 PROCEDURE — 665999 HH PPS REVENUE DEBIT

## 2019-07-13 PROCEDURE — 665998 HH PPS REVENUE CREDIT

## 2019-07-14 ENCOUNTER — HOME CARE VISIT (OUTPATIENT)
Dept: HOME HEALTH SERVICES | Facility: HOME HEALTHCARE | Age: 84
End: 2019-07-14
Payer: MEDICARE

## 2019-07-14 VITALS
HEART RATE: 89 BPM | SYSTOLIC BLOOD PRESSURE: 112 MMHG | DIASTOLIC BLOOD PRESSURE: 64 MMHG | OXYGEN SATURATION: 93 % | TEMPERATURE: 98.5 F | RESPIRATION RATE: 17 BRPM

## 2019-07-14 VITALS
OXYGEN SATURATION: 96 % | RESPIRATION RATE: 16 BRPM | HEART RATE: 68 BPM | TEMPERATURE: 98.7 F | DIASTOLIC BLOOD PRESSURE: 60 MMHG | SYSTOLIC BLOOD PRESSURE: 108 MMHG

## 2019-07-14 VITALS
DIASTOLIC BLOOD PRESSURE: 60 MMHG | RESPIRATION RATE: 17 BRPM | SYSTOLIC BLOOD PRESSURE: 104 MMHG | OXYGEN SATURATION: 94 % | TEMPERATURE: 98.3 F | RESPIRATION RATE: 16 BRPM | HEART RATE: 84 BPM | DIASTOLIC BLOOD PRESSURE: 64 MMHG | OXYGEN SATURATION: 92 % | HEART RATE: 83 BPM | SYSTOLIC BLOOD PRESSURE: 114 MMHG | TEMPERATURE: 98.6 F

## 2019-07-14 PROCEDURE — 665999 HH PPS REVENUE DEBIT

## 2019-07-14 PROCEDURE — A4322 IRRIGATION SYRINGE: HCPCS

## 2019-07-14 PROCEDURE — 665998 HH PPS REVENUE CREDIT

## 2019-07-14 PROCEDURE — G0299 HHS/HOSPICE OF RN EA 15 MIN: HCPCS

## 2019-07-14 ASSESSMENT — ENCOUNTER SYMPTOMS
VOMITING: DENIES
NAUSEA: DENIES
NAUSEA: DENIES
VOMITING: DENIES

## 2019-07-15 ENCOUNTER — HOME CARE VISIT (OUTPATIENT)
Dept: HOME HEALTH SERVICES | Facility: HOME HEALTHCARE | Age: 84
End: 2019-07-15
Payer: MEDICARE

## 2019-07-15 VITALS
TEMPERATURE: 98.3 F | SYSTOLIC BLOOD PRESSURE: 118 MMHG | RESPIRATION RATE: 16 BRPM | DIASTOLIC BLOOD PRESSURE: 60 MMHG | OXYGEN SATURATION: 93 % | HEART RATE: 70 BPM

## 2019-07-15 VITALS
TEMPERATURE: 98.4 F | HEART RATE: 77 BPM | DIASTOLIC BLOOD PRESSURE: 62 MMHG | SYSTOLIC BLOOD PRESSURE: 112 MMHG | OXYGEN SATURATION: 94 % | RESPIRATION RATE: 16 BRPM

## 2019-07-15 PROCEDURE — G0157 HHC PT ASSISTANT EA 15: HCPCS

## 2019-07-15 PROCEDURE — 665998 HH PPS REVENUE CREDIT

## 2019-07-15 PROCEDURE — G0152 HHCP-SERV OF OT,EA 15 MIN: HCPCS

## 2019-07-15 PROCEDURE — 665999 HH PPS REVENUE DEBIT

## 2019-07-16 ENCOUNTER — HOME CARE VISIT (OUTPATIENT)
Dept: HOME HEALTH SERVICES | Facility: HOME HEALTHCARE | Age: 84
End: 2019-07-16
Payer: MEDICARE

## 2019-07-16 PROCEDURE — 665998 HH PPS REVENUE CREDIT

## 2019-07-16 PROCEDURE — 665999 HH PPS REVENUE DEBIT

## 2019-07-16 PROCEDURE — G0153 HHCP-SVS OF S/L PATH,EA 15MN: HCPCS

## 2019-07-16 ASSESSMENT — ENCOUNTER SYMPTOMS: DIFFICULTY THINKING: 1

## 2019-07-17 ENCOUNTER — HOME CARE VISIT (OUTPATIENT)
Dept: HOME HEALTH SERVICES | Facility: HOME HEALTHCARE | Age: 84
End: 2019-07-17
Payer: MEDICARE

## 2019-07-17 VITALS
OXYGEN SATURATION: 91 % | TEMPERATURE: 98.3 F | HEART RATE: 93 BPM | RESPIRATION RATE: 18 BRPM | DIASTOLIC BLOOD PRESSURE: 70 MMHG | SYSTOLIC BLOOD PRESSURE: 102 MMHG

## 2019-07-17 PROCEDURE — G0152 HHCP-SERV OF OT,EA 15 MIN: HCPCS

## 2019-07-17 PROCEDURE — 665998 HH PPS REVENUE CREDIT

## 2019-07-17 PROCEDURE — 665999 HH PPS REVENUE DEBIT

## 2019-07-18 ENCOUNTER — HOME CARE VISIT (OUTPATIENT)
Dept: HOME HEALTH SERVICES | Facility: HOME HEALTHCARE | Age: 84
End: 2019-07-18
Payer: MEDICARE

## 2019-07-18 VITALS
OXYGEN SATURATION: 95 % | SYSTOLIC BLOOD PRESSURE: 120 MMHG | TEMPERATURE: 98.9 F | DIASTOLIC BLOOD PRESSURE: 62 MMHG | RESPIRATION RATE: 17 BRPM | HEART RATE: 84 BPM

## 2019-07-18 VITALS
DIASTOLIC BLOOD PRESSURE: 58 MMHG | HEART RATE: 74 BPM | RESPIRATION RATE: 17 BRPM | OXYGEN SATURATION: 92 % | TEMPERATURE: 98.4 F | SYSTOLIC BLOOD PRESSURE: 110 MMHG

## 2019-07-18 PROCEDURE — G0153 HHCP-SVS OF S/L PATH,EA 15MN: HCPCS

## 2019-07-18 PROCEDURE — 665998 HH PPS REVENUE CREDIT

## 2019-07-18 PROCEDURE — 665999 HH PPS REVENUE DEBIT

## 2019-07-18 PROCEDURE — G0157 HHC PT ASSISTANT EA 15: HCPCS

## 2019-07-19 ENCOUNTER — HOME CARE VISIT (OUTPATIENT)
Dept: HOME HEALTH SERVICES | Facility: HOME HEALTHCARE | Age: 84
End: 2019-07-19
Payer: MEDICARE

## 2019-07-19 PROCEDURE — G0299 HHS/HOSPICE OF RN EA 15 MIN: HCPCS

## 2019-07-19 PROCEDURE — 665998 HH PPS REVENUE CREDIT

## 2019-07-19 PROCEDURE — 665999 HH PPS REVENUE DEBIT

## 2019-07-19 ASSESSMENT — ENCOUNTER SYMPTOMS: DIFFICULTY THINKING: 1

## 2019-07-20 VITALS
SYSTOLIC BLOOD PRESSURE: 108 MMHG | TEMPERATURE: 98 F | RESPIRATION RATE: 16 BRPM | OXYGEN SATURATION: 93 % | DIASTOLIC BLOOD PRESSURE: 62 MMHG | HEART RATE: 88 BPM

## 2019-07-20 VITALS
OXYGEN SATURATION: 92 % | DIASTOLIC BLOOD PRESSURE: 58 MMHG | SYSTOLIC BLOOD PRESSURE: 110 MMHG | RESPIRATION RATE: 17 BRPM | HEART RATE: 74 BPM | TEMPERATURE: 98.4 F

## 2019-07-20 PROCEDURE — 665999 HH PPS REVENUE DEBIT

## 2019-07-20 PROCEDURE — 665998 HH PPS REVENUE CREDIT

## 2019-07-20 ASSESSMENT — ENCOUNTER SYMPTOMS
VOMITING: DENIES
NAUSEA: DENIES

## 2019-07-21 PROCEDURE — 665998 HH PPS REVENUE CREDIT

## 2019-07-21 PROCEDURE — 665999 HH PPS REVENUE DEBIT

## 2019-07-22 ENCOUNTER — HOME CARE VISIT (OUTPATIENT)
Dept: HOME HEALTH SERVICES | Facility: HOME HEALTHCARE | Age: 84
End: 2019-07-22
Payer: MEDICARE

## 2019-07-22 PROCEDURE — G0153 HHCP-SVS OF S/L PATH,EA 15MN: HCPCS

## 2019-07-22 PROCEDURE — 665998 HH PPS REVENUE CREDIT

## 2019-07-22 PROCEDURE — 665999 HH PPS REVENUE DEBIT

## 2019-07-22 PROCEDURE — G0151 HHCP-SERV OF PT,EA 15 MIN: HCPCS

## 2019-07-22 NOTE — TELEPHONE ENCOUNTER
Noted, thank you for checking on that.  Please call Dr. Monaco's office and ask them to send us records for the last month.

## 2019-07-22 NOTE — TELEPHONE ENCOUNTER
Patient did follow up with Urologist. He is scheduled to have prostate surgery on 8/9. Catheter has not been removed yet. Home Health will contact Dr. Monaco for further catheter care information.

## 2019-07-22 NOTE — TELEPHONE ENCOUNTER
I am not sure if anyone saw this message but I believe the patient did see urology could you please verify that with the patient.

## 2019-07-23 ENCOUNTER — HOME CARE VISIT (OUTPATIENT)
Dept: HOME HEALTH SERVICES | Facility: HOME HEALTHCARE | Age: 84
End: 2019-07-23
Payer: MEDICARE

## 2019-07-23 VITALS
HEART RATE: 86 BPM | RESPIRATION RATE: 16 BRPM | TEMPERATURE: 97.5 F | OXYGEN SATURATION: 92 % | DIASTOLIC BLOOD PRESSURE: 64 MMHG | SYSTOLIC BLOOD PRESSURE: 104 MMHG

## 2019-07-23 VITALS
TEMPERATURE: 98.5 F | HEART RATE: 95 BPM | SYSTOLIC BLOOD PRESSURE: 98 MMHG | DIASTOLIC BLOOD PRESSURE: 62 MMHG | RESPIRATION RATE: 16 BRPM | OXYGEN SATURATION: 92 %

## 2019-07-23 PROCEDURE — 665999 HH PPS REVENUE DEBIT

## 2019-07-23 PROCEDURE — G0495 RN CARE TRAIN/EDU IN HH: HCPCS

## 2019-07-23 PROCEDURE — 665998 HH PPS REVENUE CREDIT

## 2019-07-23 ASSESSMENT — ENCOUNTER SYMPTOMS
NAUSEA: DENIES
VOMITING: DENIES

## 2019-07-24 ENCOUNTER — HOME CARE VISIT (OUTPATIENT)
Dept: HOME HEALTH SERVICES | Facility: HOME HEALTHCARE | Age: 84
End: 2019-07-24
Payer: MEDICARE

## 2019-07-24 VITALS
TEMPERATURE: 98.8 F | DIASTOLIC BLOOD PRESSURE: 58 MMHG | SYSTOLIC BLOOD PRESSURE: 114 MMHG | HEART RATE: 100 BPM | RESPIRATION RATE: 16 BRPM | OXYGEN SATURATION: 93 %

## 2019-07-24 PROCEDURE — 665999 HH PPS REVENUE DEBIT

## 2019-07-24 PROCEDURE — 665998 HH PPS REVENUE CREDIT

## 2019-07-24 PROCEDURE — G0151 HHCP-SERV OF PT,EA 15 MIN: HCPCS

## 2019-07-24 PROCEDURE — G0153 HHCP-SVS OF S/L PATH,EA 15MN: HCPCS

## 2019-07-24 ASSESSMENT — BALANCE ASSESSMENTS
BALANCE SCORE: 10
ATTEMPTS TO ARISE: 2
STANDING BALANCE: 1
ARISES: 1
EYES CLOSED AT MAXIMUM POSITION NUDGED: 0
NUDGED: 2
SURVEY COMPLETE: TRUE
SITTING BALANCE: 1
TURNING 360 DEGREES STEADINESS: 1
IMMEDIATE STANDING BALANCE FIRST 5 SECONDS: 1
SITTING DOWN: 1
TURNING 360 DEGREES STEPS: 0

## 2019-07-24 ASSESSMENT — GAIT ASSESSMENTS
RIGHT STEP CLEAR: 1
SURVEY COMPLETE: TRUE
PATH: 0
INITIATION OF GAIT IMMEDIATELY AFTER GO: 0
BALANCE AND GAIT SCORE: 15
RIGHT STEP PASS: 1
LEFT STEP PASS: 1
STEP CONTINUITY: 1
TIME: 0
GAIT SCORE: 5
STEP SYMMETRY: 0
LEFT STEP CLEAR: 1
TRUNK: 0

## 2019-07-24 ASSESSMENT — ENCOUNTER SYMPTOMS: DIFFICULTY THINKING: 1

## 2019-07-25 PROCEDURE — 665998 HH PPS REVENUE CREDIT

## 2019-07-25 PROCEDURE — 665999 HH PPS REVENUE DEBIT

## 2019-07-26 ENCOUNTER — HOME CARE VISIT (OUTPATIENT)
Dept: HOME HEALTH SERVICES | Facility: HOME HEALTHCARE | Age: 84
End: 2019-07-26

## 2019-07-26 ENCOUNTER — HOME CARE VISIT (OUTPATIENT)
Dept: HOME HEALTH SERVICES | Facility: HOME HEALTHCARE | Age: 84
End: 2019-07-26
Payer: MEDICARE

## 2019-07-26 VITALS
RESPIRATION RATE: 16 BRPM | HEART RATE: 86 BPM | OXYGEN SATURATION: 93 % | SYSTOLIC BLOOD PRESSURE: 108 MMHG | TEMPERATURE: 98.7 F | DIASTOLIC BLOOD PRESSURE: 60 MMHG

## 2019-07-26 PROCEDURE — G0495 RN CARE TRAIN/EDU IN HH: HCPCS

## 2019-07-26 PROCEDURE — 665998 HH PPS REVENUE CREDIT

## 2019-07-26 PROCEDURE — 665999 HH PPS REVENUE DEBIT

## 2019-07-26 RX ORDER — VIT C/E/ZN/COPPR/LUTEIN/ZEAXAN 250MG-90MG
2 CAPSULE ORAL 2 TIMES DAILY
Qty: 120 TAB | Refills: 11 | Status: SHIPPED
Start: 2019-07-26

## 2019-07-27 PROCEDURE — 665998 HH PPS REVENUE CREDIT

## 2019-07-27 PROCEDURE — 665999 HH PPS REVENUE DEBIT

## 2019-07-28 PROCEDURE — 665999 HH PPS REVENUE DEBIT

## 2019-07-28 PROCEDURE — 665998 HH PPS REVENUE CREDIT

## 2019-07-29 ENCOUNTER — HOME CARE VISIT (OUTPATIENT)
Dept: HOME HEALTH SERVICES | Facility: HOME HEALTHCARE | Age: 84
End: 2019-07-29
Payer: MEDICARE

## 2019-07-29 VITALS
DIASTOLIC BLOOD PRESSURE: 62 MMHG | SYSTOLIC BLOOD PRESSURE: 102 MMHG | HEART RATE: 81 BPM | OXYGEN SATURATION: 97 % | TEMPERATURE: 97.7 F | RESPIRATION RATE: 16 BRPM

## 2019-07-29 PROCEDURE — 665999 HH PPS REVENUE DEBIT

## 2019-07-29 PROCEDURE — 665998 HH PPS REVENUE CREDIT

## 2019-07-29 PROCEDURE — G0299 HHS/HOSPICE OF RN EA 15 MIN: HCPCS

## 2019-07-29 PROCEDURE — A4354 CATH INSERTION TRAY W/BAG: HCPCS

## 2019-07-29 ASSESSMENT — ENCOUNTER SYMPTOMS
NAUSEA: DENIES
VOMITING: DENIES

## 2019-07-30 PROCEDURE — 665999 HH PPS REVENUE DEBIT

## 2019-07-30 PROCEDURE — 665998 HH PPS REVENUE CREDIT

## 2019-07-31 PROCEDURE — 665999 HH PPS REVENUE DEBIT

## 2019-07-31 PROCEDURE — 665998 HH PPS REVENUE CREDIT

## 2019-08-01 PROCEDURE — 665999 HH PPS REVENUE DEBIT

## 2019-08-01 PROCEDURE — 665998 HH PPS REVENUE CREDIT

## 2019-08-02 ENCOUNTER — HOME CARE VISIT (OUTPATIENT)
Dept: HOME HEALTH SERVICES | Facility: HOME HEALTHCARE | Age: 84
End: 2019-08-02
Payer: MEDICARE

## 2019-08-02 VITALS
HEART RATE: 85 BPM | RESPIRATION RATE: 16 BRPM | OXYGEN SATURATION: 94 % | SYSTOLIC BLOOD PRESSURE: 112 MMHG | TEMPERATURE: 97.8 F | DIASTOLIC BLOOD PRESSURE: 58 MMHG

## 2019-08-02 PROCEDURE — G0495 RN CARE TRAIN/EDU IN HH: HCPCS

## 2019-08-02 PROCEDURE — 665998 HH PPS REVENUE CREDIT

## 2019-08-02 PROCEDURE — 665999 HH PPS REVENUE DEBIT

## 2019-08-02 ASSESSMENT — ENCOUNTER SYMPTOMS
VOMITING: DENIES
NAUSEA: DENIES
DIFFICULTY THINKING: 1

## 2019-08-03 PROCEDURE — 665999 HH PPS REVENUE DEBIT

## 2019-08-03 PROCEDURE — 665998 HH PPS REVENUE CREDIT

## 2019-08-04 PROCEDURE — 665999 HH PPS REVENUE DEBIT

## 2019-08-04 PROCEDURE — 665998 HH PPS REVENUE CREDIT

## 2019-08-05 PROCEDURE — 665998 HH PPS REVENUE CREDIT

## 2019-08-05 PROCEDURE — 665999 HH PPS REVENUE DEBIT

## 2019-08-06 ENCOUNTER — HOME CARE VISIT (OUTPATIENT)
Dept: HOME HEALTH SERVICES | Facility: HOME HEALTHCARE | Age: 84
End: 2019-08-06
Payer: MEDICARE

## 2019-08-06 PROCEDURE — 665998 HH PPS REVENUE CREDIT

## 2019-08-06 PROCEDURE — 665999 HH PPS REVENUE DEBIT

## 2019-08-07 PROCEDURE — 665998 HH PPS REVENUE CREDIT

## 2019-08-07 PROCEDURE — 665999 HH PPS REVENUE DEBIT

## 2019-08-08 PROCEDURE — 665999 HH PPS REVENUE DEBIT

## 2019-08-08 PROCEDURE — 665998 HH PPS REVENUE CREDIT

## 2019-08-09 ENCOUNTER — HOME CARE VISIT (OUTPATIENT)
Dept: HOME HEALTH SERVICES | Facility: HOME HEALTHCARE | Age: 84
End: 2019-08-09
Payer: MEDICARE

## 2019-08-09 VITALS
RESPIRATION RATE: 16 BRPM | SYSTOLIC BLOOD PRESSURE: 112 MMHG | DIASTOLIC BLOOD PRESSURE: 58 MMHG | TEMPERATURE: 98.2 F | HEART RATE: 82 BPM | OXYGEN SATURATION: 95 %

## 2019-08-09 PROCEDURE — G0495 RN CARE TRAIN/EDU IN HH: HCPCS

## 2019-08-09 PROCEDURE — G0155 HHCP-SVS OF CSW,EA 15 MIN: HCPCS

## 2019-08-09 PROCEDURE — 665999 HH PPS REVENUE DEBIT

## 2019-08-09 PROCEDURE — 665998 HH PPS REVENUE CREDIT

## 2019-08-09 ASSESSMENT — SOCIAL DETERMINANTS OF HEALTH (SDOH): IS CONCERNED ABOUT INCOME: N

## 2019-08-09 ASSESSMENT — ENCOUNTER SYMPTOMS
VOMITING: DENIES
NAUSEA: DENIES
DIFFICULTY THINKING: 1

## 2019-08-10 PROCEDURE — 665999 HH PPS REVENUE DEBIT

## 2019-08-10 PROCEDURE — 665998 HH PPS REVENUE CREDIT

## 2019-08-11 PROCEDURE — 665998 HH PPS REVENUE CREDIT

## 2019-08-11 PROCEDURE — 665999 HH PPS REVENUE DEBIT

## 2019-08-12 ENCOUNTER — HOME CARE VISIT (OUTPATIENT)
Dept: HOME HEALTH SERVICES | Facility: HOME HEALTHCARE | Age: 84
End: 2019-08-12
Payer: MEDICARE

## 2019-08-12 VITALS
RESPIRATION RATE: 16 BRPM | OXYGEN SATURATION: 94 % | TEMPERATURE: 98.3 F | SYSTOLIC BLOOD PRESSURE: 118 MMHG | HEART RATE: 91 BPM | DIASTOLIC BLOOD PRESSURE: 60 MMHG

## 2019-08-12 PROCEDURE — 665999 HH PPS REVENUE DEBIT

## 2019-08-12 PROCEDURE — G0495 RN CARE TRAIN/EDU IN HH: HCPCS

## 2019-08-12 PROCEDURE — 665998 HH PPS REVENUE CREDIT

## 2019-08-12 ASSESSMENT — ENCOUNTER SYMPTOMS
NAUSEA: DENIES
VOMITING: DENIES

## 2019-08-13 PROCEDURE — 665999 HH PPS REVENUE DEBIT

## 2019-08-13 PROCEDURE — 665998 HH PPS REVENUE CREDIT

## 2019-08-14 ENCOUNTER — TELEPHONE (OUTPATIENT)
Dept: MEDICAL GROUP | Facility: MEDICAL CENTER | Age: 84
End: 2019-08-14

## 2019-08-14 PROCEDURE — 665999 HH PPS REVENUE DEBIT

## 2019-08-14 PROCEDURE — 665998 HH PPS REVENUE CREDIT

## 2019-08-14 NOTE — TELEPHONE ENCOUNTER
Patient left a voicemail on the [employer] voicemail yesterday at 2:37 pm asking for help, he gave his name and then asked for help again. I called and spoke with his daughter whom stated that he is fine and doing well he just wanted his catheter removed. No other concerns at this time.

## 2019-08-15 ENCOUNTER — HOME CARE VISIT (OUTPATIENT)
Dept: HOME HEALTH SERVICES | Facility: HOME HEALTHCARE | Age: 84
End: 2019-08-15
Payer: MEDICARE

## 2019-08-15 ENCOUNTER — OFFICE VISIT (OUTPATIENT)
Dept: MEDICAL GROUP | Facility: MEDICAL CENTER | Age: 84
End: 2019-08-15
Payer: MEDICARE

## 2019-08-15 VITALS
HEIGHT: 66 IN | OXYGEN SATURATION: 92 % | SYSTOLIC BLOOD PRESSURE: 114 MMHG | HEART RATE: 81 BPM | TEMPERATURE: 98.1 F | WEIGHT: 113.8 LBS | BODY MASS INDEX: 18.29 KG/M2 | DIASTOLIC BLOOD PRESSURE: 70 MMHG

## 2019-08-15 VITALS
HEART RATE: 75 BPM | RESPIRATION RATE: 16 BRPM | OXYGEN SATURATION: 95 % | SYSTOLIC BLOOD PRESSURE: 118 MMHG | DIASTOLIC BLOOD PRESSURE: 60 MMHG | TEMPERATURE: 97 F

## 2019-08-15 DIAGNOSIS — J90 PLEURAL EFFUSION: ICD-10-CM

## 2019-08-15 DIAGNOSIS — I95.1 ORTHOSTATIC HYPOTENSION: ICD-10-CM

## 2019-08-15 PROCEDURE — 665998 HH PPS REVENUE CREDIT

## 2019-08-15 PROCEDURE — 665999 HH PPS REVENUE DEBIT

## 2019-08-15 PROCEDURE — G0495 RN CARE TRAIN/EDU IN HH: HCPCS

## 2019-08-15 PROCEDURE — 99214 OFFICE O/P EST MOD 30 MIN: CPT | Performed by: INTERNAL MEDICINE

## 2019-08-15 ASSESSMENT — ENCOUNTER SYMPTOMS
NAUSEA: DENIES
VOMITING: DENIES

## 2019-08-16 ENCOUNTER — TELEPHONE (OUTPATIENT)
Dept: PULMONOLOGY | Facility: HOSPICE | Age: 84
End: 2019-08-16

## 2019-08-16 ENCOUNTER — TELEPHONE (OUTPATIENT)
Dept: MEDICAL GROUP | Facility: MEDICAL CENTER | Age: 84
End: 2019-08-16

## 2019-08-16 ENCOUNTER — HOSPITAL ENCOUNTER (OUTPATIENT)
Dept: RADIOLOGY | Facility: MEDICAL CENTER | Age: 84
End: 2019-08-16

## 2019-08-16 DIAGNOSIS — R06.02 SOB (SHORTNESS OF BREATH): ICD-10-CM

## 2019-08-16 DIAGNOSIS — J90 PLEURAL EFFUSION: ICD-10-CM

## 2019-08-16 PROCEDURE — 665998 HH PPS REVENUE CREDIT

## 2019-08-16 PROCEDURE — 665999 HH PPS REVENUE DEBIT

## 2019-08-16 NOTE — TELEPHONE ENCOUNTER
Please call [facility] pulmonary and [facility] pulmonary to see which group can see him sooner, he needs pulmonary clearance for general anesthesia and upcoming prostate surgery.    If we can get a tentative appointment please schedule that and call his daughter  Nabila [phone] to schedule the appointment, any date, any time.    Also when we notify Nabila with the date and time of the referral, please let her know that we are in the process of scheduling the ultrasound guided fluid removal from his lung, the imaging department will contact her to schedule that test, before the patient has that test on the same day he needs to have blood drawn as well nonfasting.  That order is in the computer.

## 2019-08-16 NOTE — PROGRESS NOTES
Subjective:      Miguel Ángel Page is a 89 y.o. male who presents with Follow-Up (chest xray )            HPI     Add on appointment, patient seen with his daughter.  He has been seen by Dr. Carlos Enrique Vital and apparently has been scheduled for a laser procedure for BPH which required general anesthesia as an outpatient.  As part of his preoperative work-up he had a chest x-ray done at [facility] on 8/6/19 showing right basilar infiltrate versus atelectasis with small right pleural effusion.  Due to his age and this finding, the anesthesiologist would not sign off on the procedure.  Patient denies shortness of breath, no cough, fevers, chills.  No hemoptysis.  No tobacco.  Previously admitted to the hospital in June after a fall.  Patient does have Parkinson's disease, gait imbalance and disorder, lives at [address] [place] [address].  Has medications provided to him by 5*resident staff.  Parkinson's on Sinemet.  Also orthostatic hypotension at last hospital visit, discharge admitted on 5 mg 3 times daily and fludrocortisone every morning, home blood pressures per home health have been running systolics 110-120, diastolic 60-70, no hypertension on midodrine or fludrocortisone.  Also urology started him on Levaquin for the x-ray findings.  Tolerating that medication without side effects.    Hospital records reviewed, no previous records of imaging at our hospital prior to [facility] admission.  No previous history of pneumothorax.  No history of pulmonary embolism, no history of lung cancer, no history of COPD  6/10/19 hospital admission status post fall landing on right side  6/10/19 chest x-ray 1 view right basilar opacity likely representing pneumonia, trace right pleural effusion not excluded  6/11/19 echo normal LV systolic function, normal diastolic function, EF 65%  6/12/19 CT chest, abdomen, pelvis without, large right pleural effusion and small left pleural effusion underlying atelectasis or pneumonia right  lower lobe, no pneumothorax, acute displaced fractures posterior aspects of the right eighth through 10th ribs  6/17/19 chest x-ray 1 view increased volume right pleural effusion, small/moderate right pleural effusion demonstrated, 2 cm right apical pneumothorax  6/18/19 chest x-ray 1 view small right pleural effusion with overlying atelectasis/consolidation not significantly changed, pneumothorax on the right decreased measuring 1.3 cm  6/21/19 chest x-ray 1 view layering right pleural effusion, right apical pneumothorax slightly increased since prior  6/21/19 CTA-CTA chest pulmonary artery with reconstruction no pulmonary embolism, right posterior eighth and ninth comminuted rib fractures, right pleural effusion/hemothorax with consolidation which could represent atelectasis or infiltrate, small right pneumothorax  6/21/19 chest x-ray 1 view no residual right apical pneumothorax, unchanged bilateral pleural effusions right greater than left  6/22/19 chest x-ray 1 view no significant change right basilar opacity likely airspace process and effusion  6/23/19 chest x-ray 1 view right basilar opacity with atelectasis and/or effusion no pneumothorax  8/6/19 chest x-ray 2 views at [facility] right basilar infiltrate versus atelectasis small right pleural effusion, left basilar scarring      Current Outpatient Medications   Medication Sig Dispense Refill   • levoFLOXacin (LEVAQUIN) 500 MG tablet Take 500 mg by mouth every day. Indications: Infection caused by Bacteria     • Multiple Vitamins-Minerals (PRESERVISION AREDS 2) Chew Tab Take 2 Tabs by mouth 2 Times a Day. 120 Tab 11   • triamcinolone (NASACORT) 55 MCG/ACT nasal inhaler Spray 1 Spray in nose every day.     • ibuprofen (MOTRIN) 600 MG Tab Take 600 mg by mouth every 6 hours as needed.     • fludrocortisone (FLORINEF) 0.1 MG Tab Take 0.5 Tabs by mouth every morning. 90 Tab 1   • midodrine (PROAMATINE) 5 MG Tab Take 1 Tab by mouth 3 times a day, with meals. 90  "Tab 3   • cyanocobalamin (VITAMIN B12) 1000 MCG Tab Take 1 Tab by mouth every day. 90 Tab 1   • docusate sodium (COLACE) 100 MG Cap Take 1 Cap by mouth 2 times a day as needed for Constipation. 60 Cap 11   • carbidopa-levodopa (SINEMET)  MG Tab Take 1 Tab by mouth 3 times a day. 270 Tab 3   • aspirin (ASA) 81 MG CHEW Take 1 Tab by mouth every day. 30 Each 11   • tamsulosin (FLOMAX) 0.4 MG capsule Take 1 Cap by mouth ONE-HALF HOUR AFTER BREAKFAST. (Patient not taking: Reported on 8/15/2019) 90 Cap 1     No current facility-administered medications for this visit.      Patient Active Problem List   Diagnosis   • Preventative health care   • Dyslipidemia   • Cataract   • Low back pain   • Hip pain, right   • Abnormal PSA   • Constipation   • Shoulder pain, right   • BPH (benign prostatic hyperplasia)   • Parkinsonian features   • At risk for falls   • Pleural effusion   • Anemia          Health Maintenance Summary                Annual Wellness Visit Overdue 9/14/1928     IMM DTaP/Tdap/Td Vaccine Overdue 9/14/1947     IMM ZOSTER VACCINES Overdue 9/14/1978     IMM INFLUENZA Next Due 9/1/2019      Done 10/4/2018 Imm Admin: Influenza Vaccine Adult HD     Patient has more history with this topic...          Patient Care Team:  Oscra Dubois M.D. as PCP - [facility] as Home Health Provider  NAZARIO Loving as       ROS       Objective:     /70 (BP Location: Right arm, Patient Position: Sitting, BP Cuff Size: Adult)   Pulse 81   Temp 36.7 °C (98.1 °F)   Ht 1.676 m (5' 6\")   Wt 51.6 kg (113 lb 12.8 oz)   SpO2 92%   BMI 18.37 kg/m²      Physical Exam   Constitutional: No distress.   HENT:   Head: Normocephalic and atraumatic.   Eyes: Conjunctivae are normal. Right eye exhibits no discharge. Left eye exhibits no discharge.   Neck: Neck supple. No JVD present.   Cardiovascular: Normal rate, regular rhythm and normal heart sounds.   Pulmonary/Chest: Effort normal and " breath sounds normal. No stridor. No respiratory distress. He has no wheezes.   Abdominal: He exhibits no distension.   Musculoskeletal: He exhibits no edema.   Neurological: He is alert.   Skin: Skin is warm. He is not diaphoretic.   Psychiatric: He has a normal mood and affect. His behavior is normal.   Nursing note and vitals reviewed.    No lower extremity edema          Assessment/Plan:     Assessment  #1 persistent abnormal chest x-ray initial admission to the hospital in June chest x-ray showed right basilar opacity and pleural effusion, CT scan confirmed large right pleural effusion small left pleural effusion, thought to be secondary to rib fractures 8 through 10 on the right side, did a small pneumothorax that resolved, last chest x-ray prior to discharge 6/23/19 right basilar opacity, atelectasis and/or effusion no pneumothorax, also had CT CTA in addition to CT chest abdomen pelvis during hospitalization no evidence of malignancy or pulmonary embolism.  Right atelectasis/effusion persists on follow-up chest x-ray at [facility].  No history of congestive heart failure, recent echo normal systolic and diastolic function in June while hospitalized.  No evidence of fluid overload on exam    #2 BPH candidate for TURP or laser therapy for BPH by Dr. Carlos Enrique [place] surgery was canceled secondary to chest x-ray finding    #3 Parkinson's stable on Sinemet    #4 orthostatic hypotension related to Parkinson's, autonomic dysfunction, possibly contributing factor Sinemet, remains on midodrine and fludrocortisone stable blood pressures at home per home health    Plan  #1 diagnostic ultrasound-guided thoracentesis with studies ordered to include protein, LDH, glucose, cell count, culture, cytology, pH, AFB    #2 referral pulmonary surgical clearance for persistent right pleural effusion    #3 continue other medications, check blood pressure daily and record    #4 falling precautions    #5 check serum protein and  LDH, orders in the computer system    #6 contact daughter Nabila to schedule the appointments  at [phone] to schedule the appointment

## 2019-08-16 NOTE — TELEPHONE ENCOUNTER
Spoke with Heather @ [facility]. She has scheduled Miguel Ángel for 9:45 on Monday 8/19 for a PFT. He then has an appt at 11:30 to be seen by the provider. No Caffeine and no smoking, light breakfast. He will need to report to the 2nd floor @ [address]    I called Nabila to let her know but had to leave a message.

## 2019-08-17 PROCEDURE — 665998 HH PPS REVENUE CREDIT

## 2019-08-17 PROCEDURE — 665999 HH PPS REVENUE DEBIT

## 2019-08-18 PROCEDURE — 665999 HH PPS REVENUE DEBIT

## 2019-08-18 PROCEDURE — 665998 HH PPS REVENUE CREDIT

## 2019-08-19 ENCOUNTER — NON-PROVIDER VISIT (OUTPATIENT)
Dept: PULMONOLOGY | Facility: HOSPICE | Age: 84
End: 2019-08-19
Attending: PHYSICIAN ASSISTANT
Payer: MEDICARE

## 2019-08-19 ENCOUNTER — OFFICE VISIT (OUTPATIENT)
Dept: PULMONOLOGY | Facility: HOSPICE | Age: 84
End: 2019-08-19
Payer: MEDICARE

## 2019-08-19 VITALS
WEIGHT: 114 LBS | SYSTOLIC BLOOD PRESSURE: 108 MMHG | HEIGHT: 64 IN | RESPIRATION RATE: 16 BRPM | OXYGEN SATURATION: 94 % | DIASTOLIC BLOOD PRESSURE: 60 MMHG | HEART RATE: 74 BPM | BODY MASS INDEX: 19.46 KG/M2

## 2019-08-19 VITALS — WEIGHT: 114 LBS | BODY MASS INDEX: 18.4 KG/M2

## 2019-08-19 DIAGNOSIS — R06.02 SOB (SHORTNESS OF BREATH): ICD-10-CM

## 2019-08-19 DIAGNOSIS — J98.4 RESTRICTIVE LUNG DISEASE: ICD-10-CM

## 2019-08-19 DIAGNOSIS — Z01.818 PREOPERATIVE EVALUATION TO RULE OUT SURGICAL CONTRAINDICATION: ICD-10-CM

## 2019-08-19 DIAGNOSIS — J90 PLEURAL EFFUSION: ICD-10-CM

## 2019-08-19 PROCEDURE — 99204 OFFICE O/P NEW MOD 45 MIN: CPT | Performed by: INTERNAL MEDICINE

## 2019-08-19 PROCEDURE — 665998 HH PPS REVENUE CREDIT

## 2019-08-19 PROCEDURE — 665999 HH PPS REVENUE DEBIT

## 2019-08-19 ASSESSMENT — PULMONARY FUNCTION TESTS
FVC_PERCENT_PREDICTED: 62
FEV1/FVC_PERCENT_PREDICTED: 65
FEV1_PERCENT_PREDICTED: 63
FVC_PERCENT_PREDICTED: 64
FVC_PREDICTED: 2.57
FEV1/FVC_PERCENT_PREDICTED: 96
FEV1/FVC: 66.46
FEV1_LLN: 1.39
FVC_LLN: 2.15
FEV1_PREDICTED: 1.67
FEV1: 1.31
FEV1/FVC: 79
FVC: 1.66
FEV1/FVC_PERCENT_PREDICTED: 122
FEV1/FVC_PERCENT_PREDICTED: 114
FEV1_PERCENT_PREDICTED: 78
FEV1/FVC_PERCENT_CHANGE: -15
FEV1: 1.07
FVC: 1.61
FEV1_PERCENT_CHANGE: -18
FEV1/FVC_PERCENT_PREDICTED: 102
FEV1/FVC_PREDICTED: 69
FEV1/FVC: 79
FEV1/FVC: 66
FEV1/FVC_PERCENT_CHANGE: 600
FEV1/FVC_PERCENT_LLN: 58
FEV1_PERCENT_CHANGE: -3

## 2019-08-19 NOTE — PROCEDURES
Technician: Jennifer Jamil RRT   Good patient effort & cooperation.  The results of this test meet the ATS/ERS standards for acceptability & reproducibility.  Test was performed on the [employer] Body Plethysmograph-Elite DX system.  Predicted values were [facility] for spirometry, [facility] for DLCO, ITS for Lung Volumes.  The DLCO was uncorrected for Hgb.  A bronchodilator of Ventolin HFA -2puffs via spacer administered.  DLCO performed during dilation period.    Interpretation;   Spirometry showed decreased FVC to 1.  6 6 L, 64% of predicted.  FEV1 was also decreased at 1.31 L, 78% of predicted.  FEV1/FVC ratio was 79%.  Flow volume loop was consistent with restriction.    Lung volumes showed decreased total lung capacity at 74% of predicted.    Diffusion capacity was low normal at 82% of predicted.    Impression:  The patient has mild restrictive ventilatory defect with total lung capacity of 74% of predicted.  And with preserved FEV1/FVC ratio.  Clinical correlation is required.

## 2019-08-19 NOTE — PROGRESS NOTES
CC:  Chief Complaint   Patient presents with   • Establish Care     Ref by Oscar Dubois M.D.   • Results     PFT // CXR      Pleural effusion    HPI:   The patient is a 89 y.o. male.  With no significant past pulmonary history came to the as a new patient for pre-operative evaluation.  The patient is scheduled to have prostate surgery and his primary care physician referred him to us for pulmonary evaluation prior to his surgery.    The patient had a fall in June 2019.  He broke 2 ribs in the right side.  CT scan post fall showed moderate size pleural effusion.  He did not have any treatment for this effusion at that time.  Today's chest x-ray continued to show right sided pleural effusion.  Pulmonary function test showed mild restriction with total lung capacity of 75% of predicted.    Clinically the patient denies any shortness of breath or cough.  He gets mild chest pain when he sleeps in his right side.  He is limited in terms of physical activities due to weakness and unsteady gait but he does not have respiratory symptoms.    The patient is scheduled to have thoracentesis tomorrow.  It was ordered by his primary care physician.    ROS:   Constitutional: Denies fevers, chills, night sweats  Eyes: Denies vision loss, pain, drainage, double vision  Ears, Nose, Throat: Denies earache, difficulty hearing, tinnitus, nasal congestion, hoarseness  Cardiovascular: Denies chest pain, tightness, palpitations, orthopnea or edema  Respiratory: Please see HPI  GI: Denies heartburn, dysphagia, nausea, abdominal pain, diarrhea or constipation  : Denies frequent urination, hematuria, discharge or painful urination  Musculoskeletal: Denies back pain, painful joints, sore muscles  Neurological: Denies weakness or headaches  Skin: No rashes  All other ROS were negative except what mentioned in the HPI     Past Medical History:  Past Medical History:   Diagnosis Date   • Cough    • Orthostatic hypotension    • Parkinson's  disease (HCC)    • Pleural effusion    • Pneumothorax    • Rib fractures                Social History:  Social History     Socioeconomic History   • Marital status: Single     Spouse name: Not on file   • Number of children: Not on file   • Years of education: Not on file   • Highest education level: Not on file   Occupational History   • Not on file   Social Needs   • Financial resource strain: Not on file   • Food insecurity:     Worry: Not on file     Inability: Not on file   • Transportation needs:     Medical: Not on file     Non-medical: Not on file   Tobacco Use   • Smoking status: Never Smoker   • Smokeless tobacco: Never Used   • Tobacco comment: quit 35 yrs   Substance and Sexual Activity   • Alcohol use: Yes     Alcohol/week: 0.5 oz     Types: 1 Glasses of wine per week     Comment: Occasionally   • Drug use: No   • Sexual activity: Not on file   Lifestyle   • Physical activity:     Days per week: Not on file     Minutes per session: Not on file   • Stress: Not on file   Relationships   • Social connections:     Talks on phone: Not on file     Gets together: Not on file     Attends Zoroastrian service: Not on file     Active member of club or organization: Not on file     Attends meetings of clubs or organizations: Not on file     Relationship status: Not on file   • Intimate partner violence:     Fear of current or ex partner: Not on file     Emotionally abused: Not on file     Physically abused: Not on file     Forced sexual activity: Not on file   Other Topics Concern   • Not on file   Social History Narrative   • Not on file             Family History:  Family History   Problem Relation Age of Onset   • Heart Disease Mother    • Heart Disease Father        Current Outpatient Medications on File Prior to Visit   Medication Sig Dispense Refill   • levoFLOXacin (LEVAQUIN) 500 MG tablet Take 500 mg by mouth every day. Indications: Infection caused by Bacteria     • Multiple Vitamins-Minerals (PRESERVISION  "AREDS 2) Chew Tab Take 2 Tabs by mouth 2 Times a Day. 120 Tab 11   • triamcinolone (NASACORT) 55 MCG/ACT nasal inhaler Spray 1 Spray in nose every day.     • ibuprofen (MOTRIN) 600 MG Tab Take 600 mg by mouth every 6 hours as needed.     • fludrocortisone (FLORINEF) 0.1 MG Tab Take 0.5 Tabs by mouth every morning. 90 Tab 1   • midodrine (PROAMATINE) 5 MG Tab Take 1 Tab by mouth 3 times a day, with meals. 90 Tab 3   • cyanocobalamin (VITAMIN B12) 1000 MCG Tab Take 1 Tab by mouth every day. 90 Tab 1   • tamsulosin (FLOMAX) 0.4 MG capsule Take 1 Cap by mouth ONE-HALF HOUR AFTER BREAKFAST. 90 Cap 1   • docusate sodium (COLACE) 100 MG Cap Take 1 Cap by mouth 2 times a day as needed for Constipation. 60 Cap 11   • carbidopa-levodopa (SINEMET)  MG Tab Take 1 Tab by mouth 3 times a day. 270 Tab 3   • aspirin (ASA) 81 MG CHEW Take 1 Tab by mouth every day. 30 Each 11     No current facility-administered medications on file prior to visit.        Allergies:   Ultram [tramadol hcl]        Vitals:    08/19/19 1126   Height: 1.626 m (5' 4\")   Weight: 51.7 kg (114 lb)   Weight % change since last entry.: 0 %   BP: 108/60   Pulse: 74   BMI (Calculated): 19.57   Resp: 16           Physical Exam:  Appearance:  in no acute distress  HEENT: Normocephalic, atraumatic, white sclera, PERRLA, oropharynx clear  Neck: No adenopathy or masses  Respiratory: no intercostal retractions or accessory muscle use  Lungs auscultation: Clear to auscultation bilaterally  Cardiovascular: Regular rate rhythm. No murmurs, rubs or gallops.  No LE edema  Abdomen: soft, nondistended  Gait: Normal  Digits: No clubbing, cyanosis  Motor: No focal deficits  Orientation: Oriented to time, person and place      DATA:    Labs:  Lab Results   Component Value Date/Time    WBC 10.6 07/02/2019 11:19 AM    RBC 3.43 (L) 07/02/2019 11:19 AM    HEMOGLOBIN 11.4 (L) 07/02/2019 11:19 AM    HEMATOCRIT 35.3 (L) 07/02/2019 11:19 AM    .9 (H) 07/02/2019 11:19 AM    " MCH 33.2 (H) 07/02/2019 11:19 AM    MCHC 32.3 (L) 07/02/2019 11:19 AM    MPV 8.4 (L) 07/02/2019 11:19 AM    NEUTSPOLYS 75.90 (H) 06/22/2019 06:02 AM    LYMPHOCYTES 7.80 (L) 06/22/2019 06:02 AM    MONOCYTES 8.00 06/22/2019 06:02 AM    EOSINOPHILS 7.30 (H) 06/22/2019 06:02 AM    BASOPHILS 0.50 06/22/2019 06:02 AM      Lab Results   Component Value Date/Time    SODIUM 133 (L) 06/22/2019 06:02 AM    POTASSIUM 4.5 06/22/2019 06:02 AM    CHLORIDE 103 06/22/2019 06:02 AM    CO2 25 06/22/2019 06:02 AM    GLUCOSE 100 (H) 06/22/2019 06:02 AM    BUN 28 (H) 06/22/2019 06:02 AM    CREATININE 0.87 06/22/2019 06:02 AM        Imaging:  Please see HPI  ECHOCARDIOGRAM:  Recent echocardiogram was unremarkable other than aortic sclerosis without stenosis    PULMONARY FUNCTION TEST:  Please see HPI        Diagnosis:  1. Pleural effusion  DX-CHEST-2 VIEWS   2. Preoperative evaluation to rule out surgical contraindication     3. Restrictive lung disease          Assessment and Plan   -The patient restrictive lung disease is probably secondary to his pleural effusion in the right side which is a result of recent fall with broken ribs.  The patient is scheduled to have thoracentesis tomorrow.  Fluid should be tested for cytology and Gram stain and cultures and pH.  The patient does not have any fever or severe pain in the right chest.  Which makes empyema or parapneumonic effusion or malignant effusion less likely.      -Regarding the patient's upcoming surgery for prostate hypertrophy, there is no absolute pulmonary contraindication to have this procedure however given the patient's age and current pulmonary problem including broken ribs and pleural effusion he would be at higher risk for perioperative pulmonary complications.  Local or epidural anesthesia if possible would be preferred, otherwise the patient can have general anesthesia with mechanical ventilation.  No specific measures can be taken to release his complications other than  planned thoracentesis tomorrow.  Studies of thoracentesis fluids should be reviewed prior to surgery.      We will see the patient back in 2 months with repeat chest x-ray.              Return in about 2 months (around 10/19/2019) for With CXR.        This note was created using voice recognition software. I apologize for any overlooked obvious grammar or  vocabulary mistake

## 2019-08-20 ENCOUNTER — HOME CARE VISIT (OUTPATIENT)
Dept: HOME HEALTH SERVICES | Facility: HOME HEALTHCARE | Age: 84
End: 2019-08-20
Payer: MEDICARE

## 2019-08-20 ENCOUNTER — HOSPITAL ENCOUNTER (OUTPATIENT)
Dept: RADIOLOGY | Facility: MEDICAL CENTER | Age: 84
End: 2019-08-20
Attending: INTERNAL MEDICINE
Payer: MEDICARE

## 2019-08-20 VITALS
SYSTOLIC BLOOD PRESSURE: 111 MMHG | DIASTOLIC BLOOD PRESSURE: 72 MMHG | TEMPERATURE: 97.1 F | OXYGEN SATURATION: 95 % | RESPIRATION RATE: 16 BRPM | HEART RATE: 82 BPM

## 2019-08-20 DIAGNOSIS — J90 PLEURAL EFFUSION: ICD-10-CM

## 2019-08-20 PROCEDURE — 89051 BODY FLUID CELL COUNT: CPT

## 2019-08-20 PROCEDURE — 87070 CULTURE OTHR SPECIMN AEROBIC: CPT

## 2019-08-20 PROCEDURE — 83615 LACTATE (LD) (LDH) ENZYME: CPT

## 2019-08-20 PROCEDURE — G0495 RN CARE TRAIN/EDU IN HH: HCPCS

## 2019-08-20 PROCEDURE — 87205 SMEAR GRAM STAIN: CPT

## 2019-08-20 PROCEDURE — 665998 HH PPS REVENUE CREDIT

## 2019-08-20 PROCEDURE — 665999 HH PPS REVENUE DEBIT

## 2019-08-20 PROCEDURE — 88112 CYTOPATH CELL ENHANCE TECH: CPT

## 2019-08-20 PROCEDURE — 82945 GLUCOSE OTHER FLUID: CPT

## 2019-08-20 PROCEDURE — 84157 ASSAY OF PROTEIN OTHER: CPT

## 2019-08-20 PROCEDURE — 87116 MYCOBACTERIA CULTURE: CPT

## 2019-08-20 PROCEDURE — 32555 ASPIRATE PLEURA W/ IMAGING: CPT | Mod: RT

## 2019-08-20 PROCEDURE — 87206 SMEAR FLUORESCENT/ACID STAI: CPT

## 2019-08-20 PROCEDURE — 88305 TISSUE EXAM BY PATHOLOGIST: CPT

## 2019-08-20 PROCEDURE — 87015 SPECIMEN INFECT AGNT CONCNTJ: CPT | Mod: 91

## 2019-08-20 PROCEDURE — 83986 ASSAY PH BODY FLUID NOS: CPT

## 2019-08-20 ASSESSMENT — ENCOUNTER SYMPTOMS
VOMITING: DENIES
NAUSEA: DENIES

## 2019-08-21 LAB
APPEARANCE FLD: NORMAL
BODY FLD TYPE: NORMAL
COLOR FLD: NORMAL
CYTOLOGY REG CYTOL: NORMAL
EOSINOPHIL NFR FLD: 10 %
GLUCOSE FLD-MCNC: 105 MG/DL
GRAM STN SPEC: NORMAL
HISTIOCYTES NFR FLD: 4 %
LDH FLD L TO P-CCNC: 160 U/L
LYMPHOCYTES NFR FLD: 38 %
MESOTHL CELL NFR FLD: 42 %
MONONUC CELLS NFR FLD: 2 %
NEUTROPHILS NFR FLD: 4 %
PH FLD: 7.8 [PH]
PROT FLD-MCNC: 4.3 G/DL
RBC # FLD: NORMAL CELLS/UL
RHODAMINE-AURAMINE STN SPEC: NORMAL
SIGNIFICANT IND 70042: NORMAL
SIGNIFICANT IND 70042: NORMAL
SITE SITE: NORMAL
SITE SITE: NORMAL
SOURCE SOURCE: NORMAL
SOURCE SOURCE: NORMAL
WBC # FLD: 1260 CELLS/UL

## 2019-08-21 PROCEDURE — 665998 HH PPS REVENUE CREDIT

## 2019-08-21 PROCEDURE — 665999 HH PPS REVENUE DEBIT

## 2019-08-21 NOTE — PROGRESS NOTES
US guided RIGHT sided therapeutic thoracentesis done by Dr. RANDALL; posterior RIGHT lateral chest wall access site; pre-procedure lung sounds assessment - CLEAR T/O; post assessment lung sounds - CLEAR T/O; 570 mL DARK RED FLUID obtained and discarded; pt tolerated the procedure well; pt hemodynamically stable pre/intra/post procedure; all questions and concerns answered prior to d/c; pt d/c home.     Bedside FITO Jones

## 2019-08-22 ENCOUNTER — HOME CARE VISIT (OUTPATIENT)
Dept: HOME HEALTH SERVICES | Facility: HOME HEALTHCARE | Age: 84
End: 2019-08-22
Payer: MEDICARE

## 2019-08-22 VITALS
HEART RATE: 90 BPM | TEMPERATURE: 97.4 F | RESPIRATION RATE: 16 BRPM | SYSTOLIC BLOOD PRESSURE: 106 MMHG | OXYGEN SATURATION: 95 % | DIASTOLIC BLOOD PRESSURE: 60 MMHG

## 2019-08-22 PROCEDURE — 665997 HH PPS REVENUE ADJ

## 2019-08-22 PROCEDURE — 665998 HH PPS REVENUE CREDIT

## 2019-08-22 PROCEDURE — 665999 HH PPS REVENUE DEBIT

## 2019-08-22 PROCEDURE — G0493 RN CARE EA 15 MIN HH/HOSPICE: HCPCS

## 2019-08-22 ASSESSMENT — ACTIVITIES OF DAILY LIVING (ADL)
OASIS_M1830: 02
HOME_HEALTH_OASIS: 01

## 2019-08-22 ASSESSMENT — PATIENT HEALTH QUESTIONNAIRE - PHQ9: CLINICAL INTERPRETATION OF PHQ2 SCORE: 0

## 2019-08-23 PROCEDURE — 665999 HH PPS REVENUE DEBIT

## 2019-08-23 PROCEDURE — 665998 HH PPS REVENUE CREDIT

## 2019-08-24 ENCOUNTER — TELEPHONE (OUTPATIENT)
Dept: MEDICAL GROUP | Facility: MEDICAL CENTER | Age: 84
End: 2019-08-24

## 2019-08-24 PROCEDURE — 665998 HH PPS REVENUE CREDIT

## 2019-08-24 PROCEDURE — 665999 HH PPS REVENUE DEBIT

## 2019-08-24 NOTE — LETTER
August 24, 2019            Dear Dr. Monaco,    This letter is in regards to Mr. Miguel Ángel Page ([birth year]) and his upcoming prostate procedure.  Thank you for sending us the notification regarding the chest x-ray showing the persistent right-sided pleural effusion felt to be related to a fall and broken ribs he sustained in June.      He was seen by Dr. Zhou from pulmonary on August 19 and I have included those notes and recommendations with regards to his upcoming surgery and anesthesia recommendations.  The pleural fluid studies have been negative for infectious or malignant etiologies.    If you have any questions or concerns regarding the medical clearance and pulmonary clearance for surgery, please feel free to contact us.  Thank you very much for your continued wonderful care of Mr. Page.        Sincerely,        Oscar Dubois M.D.    Electronically Signed

## 2019-08-25 PROCEDURE — 665999 HH PPS REVENUE DEBIT

## 2019-08-25 PROCEDURE — 665997 HH PPS REVENUE ADJ

## 2019-08-25 PROCEDURE — 665998 HH PPS REVENUE CREDIT

## 2019-08-25 NOTE — TELEPHONE ENCOUNTER
Notified daughter with results.  Pulmonology consultation note reviewed.  Studies of thoracentesis fluid reviewed, cytology negative.  No evidence of infection.  Also will fax notes from urology Dr. Monaco that patient is cleared for surgery with the details regarding preferred anesthesia as advised by pulmonary.

## 2019-08-26 LAB
BACTERIA FLD AEROBE CULT: NORMAL
GRAM STN SPEC: NORMAL
SIGNIFICANT IND 70042: NORMAL
SITE SITE: NORMAL
SOURCE SOURCE: NORMAL

## 2019-08-26 PROCEDURE — 94726 PLETHYSMOGRAPHY LUNG VOLUMES: CPT | Performed by: INTERNAL MEDICINE

## 2019-08-26 PROCEDURE — 94060 EVALUATION OF WHEEZING: CPT | Performed by: INTERNAL MEDICINE

## 2019-08-26 PROCEDURE — 94729 DIFFUSING CAPACITY: CPT | Performed by: INTERNAL MEDICINE

## 2019-09-17 ENCOUNTER — HOME CARE VISIT (OUTPATIENT)
Dept: HOME HEALTH SERVICES | Facility: HOME HEALTHCARE | Age: 84
End: 2019-09-17

## 2019-09-26 ENCOUNTER — TELEPHONE (OUTPATIENT)
Dept: MEDICAL GROUP | Facility: MEDICAL CENTER | Age: 84
End: 2019-09-26

## 2019-09-26 NOTE — TELEPHONE ENCOUNTER
[facility] called to say that the Lido/Cap cream that you had ordered for the pt is unavailable and is wondering if you could order the separately. Please advise

## 2019-09-27 NOTE — TELEPHONE ENCOUNTER
They pharmacy actually sent a form over 2 days ago indicating the exact composition of the compounding cream formulation with the medication percentages, and I wrote the order out on an order form to fax along with the med list.  You probably faxed that 2 days ago or yesterday, I just am not able to find the document in the media section.  I do not believe it has been scanned yet.

## 2019-09-27 NOTE — TELEPHONE ENCOUNTER
Thank you, could you please scan the request that we received from [facility] into [employer] for the lidocaine/capsaicin?    Since that medication is a compound, it is not on our computer formulary, I need to have the order form that they sent over with the concentrations of the medications.

## 2019-10-14 ENCOUNTER — IMMUNIZATION (OUTPATIENT)
Dept: SOCIAL WORK | Facility: CLINIC | Age: 84
End: 2019-10-14
Payer: MEDICARE

## 2019-10-14 DIAGNOSIS — Z23 NEED FOR VACCINATION: ICD-10-CM

## 2019-10-14 PROCEDURE — 90662 IIV NO PRSV INCREASED AG IM: CPT | Performed by: REGISTERED NURSE

## 2019-10-14 PROCEDURE — G0008 ADMIN INFLUENZA VIRUS VAC: HCPCS | Performed by: REGISTERED NURSE

## 2019-10-16 LAB
MYCOBACTERIUM SPEC CULT: NORMAL
RHODAMINE-AURAMINE STN SPEC: NORMAL
SIGNIFICANT IND 70042: NORMAL
SITE SITE: NORMAL
SOURCE SOURCE: NORMAL

## 2019-10-24 ENCOUNTER — APPOINTMENT (OUTPATIENT)
Dept: RADIOLOGY | Facility: IMAGING CENTER | Age: 84
End: 2019-10-24
Payer: MEDICARE

## 2019-10-24 ENCOUNTER — OFFICE VISIT (OUTPATIENT)
Dept: PULMONOLOGY | Facility: HOSPICE | Age: 84
End: 2019-10-24
Payer: MEDICARE

## 2019-10-24 VITALS
HEART RATE: 75 BPM | SYSTOLIC BLOOD PRESSURE: 100 MMHG | BODY MASS INDEX: 19.29 KG/M2 | DIASTOLIC BLOOD PRESSURE: 68 MMHG | OXYGEN SATURATION: 95 % | RESPIRATION RATE: 16 BRPM | HEIGHT: 64 IN | WEIGHT: 113 LBS | TEMPERATURE: 97.9 F

## 2019-10-24 DIAGNOSIS — J98.4 RESTRICTIVE LUNG DISEASE: ICD-10-CM

## 2019-10-24 DIAGNOSIS — J90 PLEURAL EFFUSION: ICD-10-CM

## 2019-10-24 PROCEDURE — 71046 X-RAY EXAM CHEST 2 VIEWS: CPT | Mod: TC | Performed by: INTERNAL MEDICINE

## 2019-10-24 PROCEDURE — 99213 OFFICE O/P EST LOW 20 MIN: CPT | Performed by: INTERNAL MEDICINE

## 2019-10-24 ASSESSMENT — ENCOUNTER SYMPTOMS
ORTHOPNEA: 0
DIAPHORESIS: 0
VOMITING: 0
SPEECH CHANGE: 0
CHILLS: 0
CLAUDICATION: 0
BACK PAIN: 0
SPUTUM PRODUCTION: 0
DIZZINESS: 0
SORE THROAT: 0
BLURRED VISION: 0
WEAKNESS: 0
HEARTBURN: 0
NAUSEA: 0
STRIDOR: 0
ABDOMINAL PAIN: 0
FOCAL WEAKNESS: 0
CONSTIPATION: 0
PHOTOPHOBIA: 0
HEADACHES: 0
COUGH: 0
FEVER: 0
DIARRHEA: 0
MYALGIAS: 0
EYE DISCHARGE: 0
TREMORS: 0
DOUBLE VISION: 0
EYE PAIN: 0
PND: 0
NECK PAIN: 0
DEPRESSION: 0
EYE REDNESS: 0
SHORTNESS OF BREATH: 0
HEMOPTYSIS: 0
PALPITATIONS: 0
FALLS: 0
WHEEZING: 0
WEIGHT LOSS: 0
SINUS PAIN: 0

## 2019-10-24 NOTE — PROGRESS NOTES
Chief Complaint   Patient presents with   • Follow-Up     Pleural effusion last seen 8/19/19    • Results     CXR 10/24/19          HPI: This patient is a 89 y.o. male whom is followed in our clinic for pleural effusion last seen by Dr. Zhou on 8/19/19 for surgical clearance prior to surgery for benign prostatic hypertrophy at which point he was noted to have pleural effusion, right sided following a mechanical fall resulting in several fractured ribs.  He underwent thoracentesis on August 20 which demonstrated bloody pleural effusion with negative cytology.  A follow-up chest x-ray obtained today shows no recurrent or persistent pleural effusion or other pulmonary infiltrates.  Only notable finding is severe scoliosis as reviewed by me.  He did have pulmonary function test at the time of his initial visit with us which showed mild restriction of airflows with an FEV1 of 1.31 L or 78% of predicted and FVC of 1.66 L or 64% of predicted with a ratio of 79.  Lung volumes were also mildly restrictive of 74% with a low normal diffusion capacity at 82% predicted.  Mild restriction was attribute it to pleural effusion but may also be related to his severe scoliosis.  Patient presents today for routine follow-up and has since undergone surgery for his prostate with complete relief of obstruction as per reported by patient and daughter.  He has no chronic lung condition and denies shortness of breath or chronic cough.      Past Medical History:   Diagnosis Date   • Cough    • Orthostatic hypotension    • Parkinson's disease (HCC)    • Pleural effusion    • Pneumothorax    • Rib fractures        Social History     Socioeconomic History   • Marital status: Single     Spouse name: Not on file   • Number of children: Not on file   • Years of education: Not on file   • Highest education level: Not on file   Occupational History   • Not on file   Social Needs   • Financial resource strain: Not on file   • Food insecurity:      Worry: Not on file     Inability: Not on file   • Transportation needs:     Medical: Not on file     Non-medical: Not on file   Tobacco Use   • Smoking status: Former Smoker     Types: Cigarettes     Last attempt to quit: 10/24/1979     Years since quittin.0   • Smokeless tobacco: Never Used   • Tobacco comment: quit 35 yrs/ pt unsure how long ago and how much tobacco used    Substance and Sexual Activity   • Alcohol use: Yes     Alcohol/week: 0.5 oz     Types: 1 Glasses of wine per week     Comment: Occasionally   • Drug use: No   • Sexual activity: Not on file   Lifestyle   • Physical activity:     Days per week: Not on file     Minutes per session: Not on file   • Stress: Not on file   Relationships   • Social connections:     Talks on phone: Not on file     Gets together: Not on file     Attends Anabaptism service: Not on file     Active member of club or organization: Not on file     Attends meetings of clubs or organizations: Not on file     Relationship status: Not on file   • Intimate partner violence:     Fear of current or ex partner: Not on file     Emotionally abused: Not on file     Physically abused: Not on file     Forced sexual activity: Not on file   Other Topics Concern   • Not on file   Social History Narrative   • Not on file       Family History   Problem Relation Age of Onset   • Heart Disease Mother    • Heart Disease Father        Current Outpatient Medications on File Prior to Visit   Medication Sig Dispense Refill   • midodrine (PROAMATINE) 5 MG Tab Take 1 Tab by mouth 3 times a day. 270 Tab 1   • Multiple Vitamins-Minerals (PRESERVISION AREDS 2) Chew Tab Take 2 Tabs by mouth 2 Times a Day. 120 Tab 11   • triamcinolone (NASACORT) 55 MCG/ACT nasal inhaler Spray 1 Spray in nose every day.     • ibuprofen (MOTRIN) 600 MG Tab Take 600 mg by mouth every 6 hours as needed.     • fludrocortisone (FLORINEF) 0.1 MG Tab Take 0.5 Tabs by mouth every morning. 90 Tab 1   • cyanocobalamin (VITAMIN  "B12) 1000 MCG Tab Take 1 Tab by mouth every day. 90 Tab 1   • tamsulosin (FLOMAX) 0.4 MG capsule Take 1 Cap by mouth ONE-HALF HOUR AFTER BREAKFAST. 90 Cap 1   • docusate sodium (COLACE) 100 MG Cap Take 1 Cap by mouth 2 times a day as needed for Constipation. 60 Cap 11   • carbidopa-levodopa (SINEMET)  MG Tab Take 1 Tab by mouth 3 times a day. 270 Tab 3     No current facility-administered medications on file prior to visit.        Ultram [tramadol hcl]      ROS:   Review of Systems   Constitutional: Negative for chills, diaphoresis, fever, malaise/fatigue and weight loss.   HENT: Negative for congestion, ear discharge, ear pain, hearing loss, nosebleeds, sinus pain, sore throat and tinnitus.    Eyes: Negative for blurred vision, double vision, photophobia, pain, discharge and redness.   Respiratory: Negative for cough, hemoptysis, sputum production, shortness of breath, wheezing and stridor.    Cardiovascular: Negative for chest pain, palpitations, orthopnea, claudication, leg swelling and PND.   Gastrointestinal: Negative for abdominal pain, constipation, diarrhea, heartburn, nausea and vomiting.   Genitourinary: Negative for dysuria and urgency.   Musculoskeletal: Negative for back pain, falls, joint pain, myalgias and neck pain.   Skin: Negative for itching and rash.   Neurological: Negative for dizziness, tremors, speech change, focal weakness, weakness and headaches.   Endo/Heme/Allergies: Negative for environmental allergies.   Psychiatric/Behavioral: Negative for depression.       /68 (BP Location: Left arm, Patient Position: Sitting, BP Cuff Size: Adult)   Pulse 75   Temp 36.6 °C (97.9 °F) (Temporal)   Resp 16   Ht 1.626 m (5' 4\")   Wt 51.3 kg (113 lb)   SpO2 95%   Physical Exam   Constitutional: He is oriented to person, place, and time. He appears well-developed and well-nourished. No distress.   HENT:   Head: Normocephalic and atraumatic.   Mouth/Throat: Oropharynx is clear and moist. No " oropharyngeal exudate.   Eyes: Pupils are equal, round, and reactive to light. Conjunctivae and EOM are normal. No scleral icterus.   Mild deformity of R eye   Neck: Normal range of motion. Neck supple. No tracheal deviation present.   Cardiovascular: Normal rate, regular rhythm and normal heart sounds. Exam reveals no gallop and no friction rub.   No murmur heard.  Pulmonary/Chest: Effort normal and breath sounds normal. No stridor. No respiratory distress. He has no wheezes.   Abdominal: Soft. He exhibits no distension.   Musculoskeletal: Normal range of motion. He exhibits no edema.   Severe scoliosis   Neurological: He is alert and oriented to person, place, and time. No cranial nerve deficit.   Skin: Skin is warm and dry. No rash noted.   Psychiatric: He has a normal mood and affect.       PFTs as reviewed by me personally: as per hPI    Imagaing as reviewed by me personally:  As per HPI    Assessment:  1. Pleural effusion     2. Restrictive lung disease         Plan:  1.  This was a traumatic hemothorax following fall with broken ribs.  No concerning findings on fluid studies.  No evidence of recurrence based on chest x-ray done today.  No further surveillance imaging is required.  General fall precautions recommended.  2.  This is likely secondary to severe scoliosis but may have been related also in part to his pleural effusion at the time of testing.  Unless the patient is to develop symptoms there is no reason for repeat testing.  He can follow-up with us on an as-needed basis.  Return if symptoms worsen or fail to improve.

## 2019-11-12 DIAGNOSIS — R29.818 PARKINSONIAN FEATURES: ICD-10-CM

## 2019-11-22 ENCOUNTER — TELEPHONE (OUTPATIENT)
Dept: MEDICAL GROUP | Facility: MEDICAL CENTER | Age: 84
End: 2019-11-22

## 2019-11-22 NOTE — TELEPHONE ENCOUNTER
Was the patient seen in the last year in this department? Yes    Does patient have an active prescription for medications requested? No     Received Request Via: Patient     [facility]'s nurse from [facility] mentioned that maybe he should increase his medication Sinemet to help with parkinson symptoms and increase mobility. Please advise

## 2019-11-22 NOTE — TELEPHONE ENCOUNTER
Wants to know if should increase parkinson's medications. Talked to nurse and said that he should be increasing medications for better results. Please advise.     Call back number at [phone]    Gwen Barr [facility] Medical Assistant

## 2019-12-08 ENCOUNTER — TELEPHONE (OUTPATIENT)
Dept: MEDICAL GROUP | Facility: MEDICAL CENTER | Age: 84
End: 2019-12-08

## 2019-12-08 RX ORDER — LOPERAMIDE HYDROCHLORIDE 2 MG/1
2 CAPSULE ORAL 4 TIMES DAILY PRN
Qty: 30 CAP | Refills: 5 | Status: SHIPPED
Start: 2019-12-08 | End: 2020-06-28

## 2019-12-10 ENCOUNTER — OFFICE VISIT (OUTPATIENT)
Dept: MEDICAL GROUP | Facility: MEDICAL CENTER | Age: 84
End: 2019-12-10
Payer: MEDICARE

## 2019-12-10 VITALS
HEART RATE: 72 BPM | OXYGEN SATURATION: 91 % | WEIGHT: 114 LBS | TEMPERATURE: 97.7 F | BODY MASS INDEX: 18.32 KG/M2 | DIASTOLIC BLOOD PRESSURE: 62 MMHG | SYSTOLIC BLOOD PRESSURE: 116 MMHG | HEIGHT: 66 IN

## 2019-12-10 DIAGNOSIS — J90 PLEURAL EFFUSION: ICD-10-CM

## 2019-12-10 DIAGNOSIS — Z91.81 AT RISK FOR FALLS: ICD-10-CM

## 2019-12-10 DIAGNOSIS — R29.818 PARKINSONIAN FEATURES: ICD-10-CM

## 2019-12-10 DIAGNOSIS — E53.8 B12 DEFICIENCY: ICD-10-CM

## 2019-12-10 DIAGNOSIS — R53.83 OTHER FATIGUE: ICD-10-CM

## 2019-12-10 PROCEDURE — 99213 OFFICE O/P EST LOW 20 MIN: CPT | Performed by: INTERNAL MEDICINE

## 2019-12-10 NOTE — PROGRESS NOTES
Subjective:      Miguel Ángel Page is a 89 y.o. male who presents with Medication Management (increase Parkinsons meds)            HPI  Patient is here with daughter.  Here for medication review.  Currently living at [address] [place] [address], diagnosis of Parkinson's, currently on Sinemet 1 tablet 3 times daily, most significant parkinsonian feature is his slowness of movement, no tremor, walker for ambulation, no falls.  Patient with orthostatic hypotension, hospitalized earlier this year, discharged on midodrine and fludrocortisone, no orthostatic changes or falls related to near syncope.  Goes to physical therapy 3 times per week.  Participates in activities.  Occasional difficulty swallowing, patient does take his time and chews his food appropriately.  No aspiration.  Patient also notices decrease volume of his speech.  No tobacco, no alcohol    Current Outpatient Medications   Medication Sig Dispense Refill   • carbidopa-levodopa (SINEMET)  MG Tab Take 1 Tab by mouth 4 times a day. 360 Tab 3   • loperamide (IMODIUM) 2 MG Cap Take 1 Cap by mouth 4 times a day as needed for Diarrhea. 30 Cap 5   • midodrine (PROAMATINE) 5 MG Tab Take 1 Tab by mouth 3 times a day. 270 Tab 1   • Multiple Vitamins-Minerals (PRESERVISION AREDS 2) Chew Tab Take 2 Tabs by mouth 2 Times a Day. 120 Tab 11   • triamcinolone (NASACORT) 55 MCG/ACT nasal inhaler Spray 1 Spray in nose every day.     • ibuprofen (MOTRIN) 600 MG Tab Take 600 mg by mouth every 6 hours as needed.     • fludrocortisone (FLORINEF) 0.1 MG Tab Take 0.5 Tabs by mouth every morning. 90 Tab 1   • cyanocobalamin (VITAMIN B12) 1000 MCG Tab Take 1 Tab by mouth every day. 90 Tab 1   • tamsulosin (FLOMAX) 0.4 MG capsule Take 1 Cap by mouth ONE-HALF HOUR AFTER BREAKFAST. 90 Cap 1   • docusate sodium (COLACE) 100 MG Cap Take 1 Cap by mouth 2 times a day as needed for Constipation. 60 Cap 11     No current facility-administered medications for this visit.      Patient Active  Problem List   Diagnosis   • Preoperative evaluation to rule out surgical contraindication   • Dyslipidemia   • Cataract   • Low back pain   • Hip pain, right   • Abnormal PSA   • Constipation   • Shoulder pain, right   • BPH (benign prostatic hyperplasia)   • Parkinsonian features   • At risk for falls   • Orthostatic hypotension   • History pleural effusion   • Anemia        Shoulder pain  2/27/13 dr.mcnamara [facility] note, right subdeltoid bursa steroid injection  6/26/13 dr.mcnamara [facility]; right shoulder injection, rec MRI shoulder     Preventative health  2005 Td  3/08 colon [facility] repeat 5 yrs  8/12 vit d 50  11/12 [facility] provided FIT card  12/7/12 psa 4.5, free psa 28%  10/8/14 pneumovax  7/5/18 prevnar  7/5/18 shingrix rx provided to get at pharmacy    pleural effusion  6/10/19 hospital admission status post fall landing on right side  6/10/19 chest x-ray 1 view right basilar opacity likely representing pneumonia, trace right pleural effusion not excluded  6/11/19 echo normal LV systolic function, normal diastolic function, EF 65%  6/12/19 CT chest, abdomen, pelvis without, large right pleural effusion and small left pleural effusion underlying atelectasis or pneumonia right lower lobe, no pneumothorax, acute displaced fractures posterior aspects of the right eighth through 10th ribs  6/17/19 chest x-ray 1 view increased volume right pleural effusion, small/moderate right pleural effusion demonstrated, 2 cm right apical pneumothorax  6/18/19 chest x-ray 1 view small right pleural effusion with overlying atelectasis/consolidation not significantly changed, pneumothorax on the right decreased measuring 1.3 cm  6/21/19 chest x-ray 1 view layering right pleural effusion, right apical pneumothorax slightly increased since prior  6/21/19 CTA-CTA chest pulmonary artery with reconstruction no pulmonary embolism, right posterior eighth and ninth comminuted rib fractures, right pleural effusion/hemothorax with  consolidation which could represent atelectasis or infiltrate, small right pneumothorax  6/21/19 chest x-ray 1 view no residual right apical pneumothorax, unchanged bilateral pleural effusions right greater than left  6/22/19 chest x-ray 1 view no significant change right basilar opacity likely airspace process and effusion  6/23/19 chest x-ray 1 view right basilar opacity with atelectasis and/or effusion no pneumothorax  8/6/19 chest x-ray 2 views at [facility] right basilar infiltrate versus atelectasis small right pleural effusion, left basilar scarring  8/15/19 ultrasound-guided right thoracentesis with diagnostic studies, referral to [facility] or [facility] pulmonary for surgical clearance as soon as possible, serology LDH and protein ordered as well  8/19/19  pulmonary note and pulmonary function test done just before the visit, restrictive lung disease probably secondary to pleural effusion as a result of recent fall with broken ribs, patient will have thoracentesis, to check for cytology, pH, gram stain and culture   8/20/19 ultrasound-guided right thoracentesis 570 mL fluid withdrawn, pH 7.80, rbc [id number], wbc 1260, pleural protein 4.3 pleural , pleural glucose 105, gram stain and culture negative, cytology negative  10/24/19  pulmonary note pleural effusion follow-up secondary traumatic hemothorax following fall, no concerning findings on studies, follow-up as needed     Parkinsonian features  5/15/17 dr.basa lopez medical note unsteadiness on feet, refer to neurology  3/29/18 start sinemet half a tablet 3 times a day  4/6/18 stopped sinemet due to dizziness, recommend he try 1/2 qday   5/29/18 [facility] note   7/5/18 on sinemet 1/2 bid increase to 1/2 tid  10/4/18 increase sinemet to 1 bid (difficulty remembering to take medication)  2/5/19 [facility]  3/12/19 increase to sinemet 1 tid   4/29/19 [facility]  6/10/19 hospital admission,  6/20/19 hospital discharge, admitted for fall, worked with physical therapy, occupational therapy, requires 24-hour assistance, DNR, hospitalist discussed with patient, declined comfort care status for now, physical therapy recommend discharge to skilled nursing facility but patient refused, [facility]  6/20/19 discharged on midodrine 5 mg tid and fludrocortisone 0.1 mg qam  10/12/19 increase sinemet 25/100 mg from tid to qid for bradykinesia     orthostatic hypotension  5/15/17 dr.basa lopez medical note unsteadiness on feet, refer to neurology  3/29/18 start sinemet half a tablet 3 times a day  4/6/18 stopped sinemet due to dizziness, recommend he try 1/2 qday   7/5/18 on sinemet 1/2 bid increase to 1/2 tid  10/4/18 increase sinemet to 1 bid (difficulty remembering to take medication)  3/12/19 increase to sinemet 1 tid   6/10/19 hospital admission, 6/20/19 hospital discharge, admitted for fall, worked with physical therapy, occupational therapy, requires 24-hour assistance, DNR, hospitalist discussed with patient, declined comfort care status for now, physical therapy recommend discharge to skilled nursing facility but patient refused, discharge instead to [facility]  6/20/19 discharged on midodrine 5 mg tid and fludrocortisone 0.1 mg qam     Low back pain  3/12 xray LS mod to severe levoscoliosis   4/12 [facility] in [place]   6/12 MRI LS levoscoliosis lumbar spine 31°, L3 level. Moderate lumbar spondylosis, disc desiccation L3-5, broad-based disc bulge, mild to moderate right foraminal narrowing L3-L5, 1.1 focus lateral aspect right L3-L4 neural foramen suggestive of facet synovial cyst, mass effect on existing L3 nerve root is suspected     hip pain  6/12 x-ray right hip DJD bilateral right greater than left  7/12  ortho note [facility], believes hip pain related to back, patient declines further back evaluation  8/12  dr.mcnamara lopez [place] injection right hip under fluoroscopic guidance     Dyslipidemia  6/09 chol 187,trig 68,hdl 44,ldl 129  8/10 chol 178,trig 60,hdl 43,ldl 123  6/12 chol 174,trig 46,hdl 41,ldl 124  8/12 hdl 55,ldl 132,LDL-P 1458,HDL-P32,LP-IR 30  5/3/18 chol 136,trig 46,hdl 58,ldl 69  6/11/19 echo normal LV function, EF 65%     bph  9/12/12 2/9/17 psa 5.66  1/13/15  urology note samples rapaflo  2/18/15  urology note continued doxazosin 2 mg   8/2/16  urology note AUA score 5  2/9/17 psa 5.66  3/8/17 dr.basa lopez medical note on doxazosin 2 mg  3/15/17  urology note doxazosin 2 mg causing some dizziness, decreased to 1 mg, AUA score 6  3/29/18 on cardura 2 mg per  [facility], will add proscar 5 mg, walker for ambulation  4/3/18 psa 11.7on cardura 2 mg, proscar 5 mg  3/12/19 on flomax per    6/13/19 ultrasound renal bladder decompressed with lindsey catheter no hydronephrosis  7/11/19 lindsey catheter in place per  urology     At risk for falls  5/15/17 dr.basa lopez medical note unsteadiness on feet, refer to neurology  3/29/18 question parkinson's, using walker for ambulation  5/29/18 [facility] note   2/5/19 [facility]  3/12/19 increase to sinemet 1 tid   4/29/19 [facility]  6/10/19 hospital admission, 6/20/19 hospital discharge, admitted for fall, worked with physical therapy, occupational therapy, requires 24-hour assistance, DNR, hospitalist discussed with patient, declined comfort care status for now, physical therapy recommend discharge to skilled nursing facility but patient refused, discharge instead to [address] [place] [address] residence     anemia  6/10/19 hgb 13,hct 41,mcv 101b12 261  6/22/19 hgb 10.5,hct 31.5, iron 24,%sat 11  7/2/19 hgb 11.4,hct 35,mcv 102.9     Abnormal PSA  6/09 psa 3.5  6/12 psa 7.1  7/31/12 psa 5.0,free psa 18%, saw  in [facility]  12/7/12 psa 4.5, free psa 28%  5/3/18  "psa 11.7 on proscar per urology             Health Maintenance Summary                Annual Wellness Visit Overdue 9/14/1928     IMM DTaP/Tdap/Td Vaccine Overdue 9/14/1939     IMM ZOSTER VACCINES Overdue 9/14/1978           Patient Care Team:  Oscar Dubois M.D. as PCP - [facility] as Home Health Provider  Ghada Marquis MSW as     ROS       Objective:     /62 (BP Location: Right arm, Patient Position: Sitting, BP Cuff Size: Adult)   Pulse 72   Temp 36.5 °C (97.7 °F)   Ht 1.676 m (5' 6\")   Wt 51.7 kg (114 lb)   SpO2 91%   BMI 18.40 kg/m²      Physical Exam  Vitals signs and nursing note reviewed.   HENT:      Head: Normocephalic and atraumatic.   Eyes:      General: No scleral icterus.  Neck:      Musculoskeletal: Neck supple.   Cardiovascular:      Rate and Rhythm: Normal rate and regular rhythm.      Heart sounds: No murmur.   Pulmonary:      Effort: Pulmonary effort is normal.      Breath sounds: Normal breath sounds.   Skin:     General: Skin is warm.   Neurological:      General: No focal deficit present.      Mental Status: He is alert.   Psychiatric:         Mood and Affect: Mood normal.       Bradykinesia, no cogwheeling, no resting tremor, no postural instability     Pleasant, alert, responds appropriately to questions and commands     Assessment/Plan:       Assessment  #1 parkinsonian features, on Sinemet 1 pill 3 times a day 25/100 mg, did have episode of orthostatic hypotension either related to Parkinson's and autonomic insufficiency or side effect of Sinemet, remains on midodrine 5 mg 3 times daily and Florinef 0.1 mg every morning, no further orthostatic hypotension episodes, no syncope or near syncope.  Bradykinesia, gait instability, followed by physical therapy, no tremor, lives in assisted living has medications given to him, has assistance with ADLs    #2 orthostatic hypotension stable on midodrine and Florinef    #3 B12 deficiency on " B12 supplementation    #4 occasional fatigue    Plan  #1 repeat labs CBC, CMP, TSH, B12    #2 increase Sinemet to 1 pill 4 times daily monitor blood pressure, orthostatic precautions, continue walker for ambulation    #3 continue physical therapy    #4 continue midodrine and Florinef    #5 follow-up as scheduled 4 weeks

## 2019-12-19 ENCOUNTER — HOSPITAL ENCOUNTER (OUTPATIENT)
Dept: LAB | Facility: MEDICAL CENTER | Age: 84
End: 2019-12-19
Attending: INTERNAL MEDICINE
Payer: MEDICARE

## 2019-12-19 DIAGNOSIS — R53.83 OTHER FATIGUE: ICD-10-CM

## 2019-12-19 DIAGNOSIS — E53.8 B12 DEFICIENCY: ICD-10-CM

## 2019-12-19 LAB
ALBUMIN SERPL BCP-MCNC: 4 G/DL (ref 3.2–4.9)
ALBUMIN/GLOB SERPL: 1.4 G/DL
ALP SERPL-CCNC: 56 U/L (ref 30–99)
ALT SERPL-CCNC: 5 U/L (ref 2–50)
ANION GAP SERPL CALC-SCNC: 7 MMOL/L (ref 0–11.9)
AST SERPL-CCNC: 10 U/L (ref 12–45)
BASOPHILS # BLD AUTO: 0.5 % (ref 0–1.8)
BASOPHILS # BLD: 0.04 K/UL (ref 0–0.12)
BILIRUB SERPL-MCNC: 0.6 MG/DL (ref 0.1–1.5)
BUN SERPL-MCNC: 32 MG/DL (ref 8–22)
CALCIUM SERPL-MCNC: 9.3 MG/DL (ref 8.5–10.5)
CHLORIDE SERPL-SCNC: 104 MMOL/L (ref 96–112)
CO2 SERPL-SCNC: 28 MMOL/L (ref 20–33)
CREAT SERPL-MCNC: 1.18 MG/DL (ref 0.5–1.4)
EOSINOPHIL # BLD AUTO: 0.34 K/UL (ref 0–0.51)
EOSINOPHIL NFR BLD: 3.9 % (ref 0–6.9)
ERYTHROCYTE [DISTWIDTH] IN BLOOD BY AUTOMATED COUNT: 54 FL (ref 35.9–50)
GLOBULIN SER CALC-MCNC: 2.8 G/DL (ref 1.9–3.5)
GLUCOSE SERPL-MCNC: 95 MG/DL (ref 65–99)
HCT VFR BLD AUTO: 39.8 % (ref 42–52)
HGB BLD-MCNC: 13.1 G/DL (ref 14–18)
IMM GRANULOCYTES # BLD AUTO: 0.03 K/UL (ref 0–0.11)
IMM GRANULOCYTES NFR BLD AUTO: 0.3 % (ref 0–0.9)
LYMPHOCYTES # BLD AUTO: 0.78 K/UL (ref 1–4.8)
LYMPHOCYTES NFR BLD: 9.1 % (ref 22–41)
MCH RBC QN AUTO: 33.2 PG (ref 27–33)
MCHC RBC AUTO-ENTMCNC: 32.9 G/DL (ref 33.7–35.3)
MCV RBC AUTO: 100.8 FL (ref 81.4–97.8)
MONOCYTES # BLD AUTO: 0.75 K/UL (ref 0–0.85)
MONOCYTES NFR BLD AUTO: 8.7 % (ref 0–13.4)
NEUTROPHILS # BLD AUTO: 6.67 K/UL (ref 1.82–7.42)
NEUTROPHILS NFR BLD: 77.5 % (ref 44–72)
NRBC # BLD AUTO: 0 K/UL
NRBC BLD-RTO: 0 /100 WBC
PLATELET # BLD AUTO: 301 K/UL (ref 164–446)
PMV BLD AUTO: 9.8 FL (ref 9–12.9)
POTASSIUM SERPL-SCNC: 4.1 MMOL/L (ref 3.6–5.5)
PROT SERPL-MCNC: 6.8 G/DL (ref 6–8.2)
RBC # BLD AUTO: 3.95 M/UL (ref 4.7–6.1)
SODIUM SERPL-SCNC: 139 MMOL/L (ref 135–145)
TSH SERPL DL<=0.005 MIU/L-ACNC: 1.31 UIU/ML (ref 0.38–5.33)
VIT B12 SERPL-MCNC: 861 PG/ML (ref 211–911)
WBC # BLD AUTO: 8.6 K/UL (ref 4.8–10.8)

## 2019-12-19 PROCEDURE — 82607 VITAMIN B-12: CPT

## 2019-12-19 PROCEDURE — 85025 COMPLETE CBC W/AUTO DIFF WBC: CPT

## 2019-12-19 PROCEDURE — 80053 COMPREHEN METABOLIC PANEL: CPT

## 2019-12-19 PROCEDURE — 36415 COLL VENOUS BLD VENIPUNCTURE: CPT

## 2019-12-19 PROCEDURE — 84443 ASSAY THYROID STIM HORMONE: CPT

## 2019-12-20 ENCOUNTER — TELEPHONE (OUTPATIENT)
Dept: MEDICAL GROUP | Facility: MEDICAL CENTER | Age: 84
End: 2019-12-20

## 2019-12-20 NOTE — TELEPHONE ENCOUNTER
Notified daughter Nabila with labs, hemoglobin hematocrit improved, B12 normal, no changes medications or supplements, she will notify her father

## 2020-01-06 ENCOUNTER — OFFICE VISIT (OUTPATIENT)
Dept: MEDICAL GROUP | Facility: MEDICAL CENTER | Age: 85
End: 2020-01-06
Payer: MEDICARE

## 2020-01-06 VITALS
BODY MASS INDEX: 19.29 KG/M2 | SYSTOLIC BLOOD PRESSURE: 122 MMHG | HEART RATE: 72 BPM | WEIGHT: 113 LBS | HEIGHT: 64 IN | OXYGEN SATURATION: 93 % | TEMPERATURE: 97.8 F | DIASTOLIC BLOOD PRESSURE: 68 MMHG

## 2020-01-06 DIAGNOSIS — G20.A1 PARKINSON'S DISEASE (HCC): ICD-10-CM

## 2020-01-06 PROCEDURE — 99213 OFFICE O/P EST LOW 20 MIN: CPT | Performed by: INTERNAL MEDICINE

## 2020-01-06 ASSESSMENT — PATIENT HEALTH QUESTIONNAIRE - PHQ9: CLINICAL INTERPRETATION OF PHQ2 SCORE: 0

## 2020-01-06 NOTE — PROGRESS NOTES
Subjective:      Miguel Ángel Page is a 89 y.o. male who presents with here for follow up parkinsons          HPI     Patient is here with his daughter. Last seen 12/10/19 for Parkinson's disease, lives at [address] [place] [address] have been on Sinemet 25/100 mg 3 times daily, increase to 4 times a day about 3 weeks ago, remains on midodrine 5 mg 3 times daily and Florinef 0.1 mg every morning for orthostatic hypotension, Parkinson's manifest as bradykinesia, gait instability, seen physical therapy, no tremors.  Has medications provided to him and assistance with ADLs.  No tremors.  Does have slowness of movement with Parkinson's.  No falls, no lightheadedness going from sitting to standing since on Florinef and midodrine.  Has not started physical therapy, which is available at the [address] [place] [address].  Does have some fatigue, feels that his muscles are weak.  No headache, dizziness, lightheadedness.            Current Outpatient Medications   Medication Sig Dispense Refill   • carbidopa-levodopa (SINEMET)  MG Tab Take 1 Tab by mouth 4 times a day. 360 Tab 3   • loperamide (IMODIUM) 2 MG Cap Take 1 Cap by mouth 4 times a day as needed for Diarrhea. 30 Cap 5   • midodrine (PROAMATINE) 5 MG Tab Take 1 Tab by mouth 3 times a day. 270 Tab 1   • Multiple Vitamins-Minerals (PRESERVISION AREDS 2) Chew Tab Take 2 Tabs by mouth 2 Times a Day. 120 Tab 11   • triamcinolone (NASACORT) 55 MCG/ACT nasal inhaler Spray 1 Spray in nose every day.     • ibuprofen (MOTRIN) 600 MG Tab Take 600 mg by mouth every 6 hours as needed.     • fludrocortisone (FLORINEF) 0.1 MG Tab Take 0.5 Tabs by mouth every morning. 90 Tab 1   • cyanocobalamin (VITAMIN B12) 1000 MCG Tab Take 1 Tab by mouth every day. 90 Tab 1   • docusate sodium (COLACE) 100 MG Cap Take 1 Cap by mouth 2 times a day as needed for Constipation. 60 Cap 11     No current facility-administered medications for this visit.      Shoulder pain  2/27/13 dr.mcnamara john grimes note,  right subdeltoid bursa steroid injection  6/26/13 dr.mcnamara john grimes; right shoulder injection, rec MRI shoulder     Preventative health  2005 Td  3/08 colon [facility] repeat 5 yrs  8/12 vit d 50  11/12 [facility] provided FIT card  12/7/12 psa 4.5, free psa 28%  10/8/14 pneumovax  7/5/18 prevnar  7/5/18 shingrix rx provided to get at pharmacy     Parkinsonian features  5/15/17 dr.basa lopez medical note unsteadiness on feet, refer to neurology  3/29/18 start sinemet half a tablet 3 times a day  4/6/18 stopped sinemet due to dizziness, recommend he try 1/2 qday   5/29/18 [facility] note   7/5/18 on sinemet 1/2 bid increase to 1/2 tid  10/4/18 increase sinemet to 1 bid (difficulty remembering to take medication)  2/5/19 [facility] PT note  3/12/19 increase to sinemet 1 tid   4/29/19 [facility] note at [facility]  6/10/19 hospital admission, 6/20/19 hospital discharge, admitted for fall, worked with physical therapy, occupational therapy, requires 24-hour assistance, DNR, hospitalist discussed with patient, declined comfort care status for now, physical therapy recommend discharge to skilled nursing facility but patient refused, discharge instead to [address] [place] [address] residence  6/20/19 discharged on midodrine 5 mg tid and fludrocortisone 0.1 mg qam  10/12/19 increase sinemet 25/100 mg from tid to qid for bradykinesia     orthostatic hypotension  5/15/17 dr.basa lopez medical note unsteadiness on feet, refer to neurology  3/29/18 start sinemet half a tablet 3 times a day  4/6/18 stopped sinemet due to dizziness, recommend he try 1/2 qday   7/5/18 on sinemet 1/2 bid increase to 1/2 tid  10/4/18 increase sinemet to 1 bid (difficulty remembering to take medication)  3/12/19 increase to sinemet 1 tid   6/10/19 hospital admission, 6/20/19 hospital discharge, admitted for fall, worked with physical therapy, occupational therapy, requires 24-hour assistance, DNR, hospitalist discussed with patient, declined comfort care  status for now, physical therapy recommend discharge to skilled nursing facility but patient refused, discharge instead to  start residence  6/20/19 discharged on midodrine 5 mg tid and fludrocortisone 0.1 mg qam     Low back pain  3/12 xray LS mod to severe levoscoliosis   4/12 [facility] physical therapy in [place]   6/12 MRI LS levoscoliosis lumbar spine 31°, L3 level. Moderate lumbar spondylosis, disc desiccation L3-5, broad-based disc bulge, mild to moderate right foraminal narrowing L3-L5, 1.1 focus lateral aspect right L3-L4 neural foramen suggestive of facet synovial cyst, mass effect on existing L3 nerve root is suspected    history pleural effusion  6/10/19 hospital admission status post fall landing on right side  6/10/19 chest x-ray 1 view right basilar opacity likely representing pneumonia, trace right pleural effusion not excluded  6/11/19 echo normal LV systolic function, normal diastolic function, EF 65%  6/12/19 CT chest, abdomen, pelvis without, large right pleural effusion and small left pleural effusion underlying atelectasis or pneumonia right lower lobe, no pneumothorax, acute displaced fractures posterior aspects of the right eighth through 10th ribs  6/17/19 chest x-ray 1 view increased volume right pleural effusion, small/moderate right pleural effusion demonstrated, 2 cm right apical pneumothorax  6/18/19 chest x-ray 1 view small right pleural effusion with overlying atelectasis/consolidation not significantly changed, pneumothorax on the right decreased measuring 1.3 cm  6/21/19 chest x-ray 1 view layering right pleural effusion, right apical pneumothorax slightly increased since prior  6/21/19 CTA-CTA chest pulmonary artery with reconstruction no pulmonary embolism, right posterior eighth and ninth comminuted rib fractures, right pleural effusion/hemothorax with consolidation which could represent atelectasis or infiltrate, small right pneumothorax  6/21/19 chest x-ray 1  view no residual right apical pneumothorax, unchanged bilateral pleural effusions right greater than left  6/22/19 chest x-ray 1 view no significant change right basilar opacity likely airspace process and effusion  6/23/19 chest x-ray 1 view right basilar opacity with atelectasis and/or effusion no pneumothorax  8/6/19 chest x-ray 2 views at [facility] right basilar infiltrate versus atelectasis small right pleural effusion, left basilar scarring  8/15/19 ultrasound-guided right thoracentesis with diagnostic studies, referral to [facility] or [facility] pulmonary for surgical clearance as soon as possible, serology LDH and protein ordered as well  8/19/19  pulmonary note and pulmonary function test done just before the visit, restrictive lung disease probably secondary to pleural effusion as a result of recent fall with broken ribs, patient will have thoracentesis, to check for cytology, pH, gram stain and culture   8/20/19 ultrasound-guided right thoracentesis 570 mL fluid withdrawn, pH 7.80, rbc 309702, wbc 1260, pleural protein 4.3 pleural , pleural glucose 105, gram stain and culture negative, cytology negative  10/24/19  pulmonary note pleural effusion follow-up secondary traumatic hemothorax following fall, no concerning findings on studies, follow-up as needed     hip pain  6/12 x-ray right hip DJD bilateral right greater than left  7/12  ortho note [facility] fracture clinic, believes hip pain related to back, patient declines further back evaluation  8/12 dr.mcnamara lopez [place] injection right hip under fluoroscopic guidance     Dyslipidemia  6/09 chol 187,trig 68,hdl 44,ldl 129  8/10 chol 178,trig 60,hdl 43,ldl 123  6/12 chol 174,trig 46,hdl 41,ldl 124  8/12 hdl 55,ldl 132,LDL-P 1458,HDL-P32,LP-IR 30  5/3/18 chol 136,trig 46,hdl 58,ldl 69  6/11/19 echo normal LV function, EF 65%     bph  9/12/12 2/9/17 psa 5.66  1/13/15  urology note samples rapaflo  2/18/15   urology note continued doxazosin 2 mg   8/2/16  urology note AUA score 5  2/9/17 psa 5.66  3/8/17 dr.basa lopez medical note on doxazosin 2 mg  3/15/17  urology note doxazosin 2 mg causing some dizziness, decreased to 1 mg, AUA score 6  3/29/18 on cardura 2 mg per  in [place] [facility], will add proscar 5 mg, walker for ambulation  4/3/18 psa 11.7on cardura 2 mg, proscar 5 mg  3/12/19 on flomax per    6/13/19 ultrasound renal bladder decompressed with lindsey catheter no hydronephrosis  7/11/19 lindsey catheter in place per  urology     At risk for falls  5/15/17 dr.basa lopez medical note unsteadiness on feet, refer to neurology  3/29/18 question parkinson's, using walker for ambulation  5/29/18 [facility] note   2/5/19 [facility]  3/12/19 increase to sinemet 1 tid   4/29/19 [facility] note at [facility]  6/10/19 hospital admission, 6/20/19 hospital discharge, admitted for fall, worked with physical therapy, occupational therapy, requires 24-hour assistance, DNR, hospitalist discussed with patient, declined comfort care status for now, physical therapy recommend discharge to skilled nursing facility but patient refused, discharge instead to [address] [place] [address]     anemia  6/10/19 hgb 13,hct 41,mcv 101b12 261  6/22/19 hgb 10.5,hct 31.5, iron 24,%sat 11  7/2/19 hgb 11.4,hct 35,mcv 102.9     Abnormal PSA  6/09 psa 3.5  6/12 psa 7.1  7/31/12 psa 5.0,free psa 18%, saw  in [facility]  12/7/12 psa 4.5, free psa 28%  5/3/18 psa 11.7 on proscar per urology                     Patient Active Problem List   Diagnosis   • Preventative health care   • Dyslipidemia   • Cataract   • Low back pain   • Hip pain, right   • Abnormal PSA   • Constipation   • Shoulder pain, right   • BPH (benign prostatic hyperplasia)   • Parkinsonian features   • At risk for falls   • Orthostatic hypotension   • History pleural effusion   • Anemia        Depression  Screening    Little interest or pleasure in doing things?  0 - not at all  Feeling down, depressed , or hopeless? 0 - not at all  Patient Health Questionnaire Score: 0         Health Maintenance Summary                Annual Wellness Visit Overdue 9/14/1928     IMM DTaP/Tdap/Td Vaccine Overdue 9/14/1939     IMM ZOSTER VACCINES Postponed 6/6/2020 Originally 9/14/1978. System: vaccine not available, other system reasons          Patient Care Team:  Oscar Dubois M.D. as PCP - [facility] as Home Health Provider  NAZARIO Loving as       ROS       Objective:        Physical Exam  Vitals signs and nursing note reviewed.   Constitutional:       Appearance: Normal appearance.   HENT:      Head: Normocephalic and atraumatic.   Eyes:      General: No scleral icterus.  Neck:      Musculoskeletal: Neck supple.   Cardiovascular:      Rate and Rhythm: Normal rate and regular rhythm.      Heart sounds: Normal heart sounds.   Pulmonary:      Effort: No respiratory distress.      Breath sounds: Normal breath sounds.   Abdominal:      General: There is no distension.   Neurological:      General: No focal deficit present.      Mental Status: He is alert.   Psychiatric:         Mood and Affect: Mood normal.         Behavior: Behavior normal.       No tremors, no postural instability, normal affect, insight          Assessment/Plan:       Assessment  #! parkinson's disease stable on Sinemet 25/100 mg 4 times daily, no change in regimen, since he just increased 4 times daily, monitor for lightheadedness or dizziness    #2 orthostatic hypotension on midodrine 5 mg 3 times daily and Florinef 0.1 mg daily    #3 b12 deficiency currently on B12 supplementation    Plan  #1 Physical therapy ordered per [facility]    #2  Continue walker for ambulation, falling precautions    #3 continue Sinemet 4 times daily for now    #4 continue midodrine and Florinef    #5 continue B12 supplementation    #6  follow-up 3 to 4 months    #7 discussed with patient and daughter who agree with plan of care

## 2020-05-13 ENCOUNTER — TELEPHONE (OUTPATIENT)
Dept: MEDICAL GROUP | Facility: MEDICAL CENTER | Age: 85
End: 2020-05-13

## 2020-05-13 DIAGNOSIS — N39.0 URINARY TRACT INFECTION WITHOUT HEMATURIA, SITE UNSPECIFIED: ICD-10-CM

## 2020-05-14 ENCOUNTER — HOSPITAL ENCOUNTER (OUTPATIENT)
Facility: MEDICAL CENTER | Age: 85
End: 2020-05-14
Attending: INTERNAL MEDICINE
Payer: MEDICARE

## 2020-05-14 DIAGNOSIS — Z87.898 HISTORY OF URINE COLOR CHANGES: ICD-10-CM

## 2020-05-14 LAB
APPEARANCE UR: ABNORMAL
BACTERIA #/AREA URNS HPF: ABNORMAL /HPF
BILIRUB UR QL STRIP.AUTO: NEGATIVE
COLOR UR: YELLOW
EPI CELLS #/AREA URNS HPF: ABNORMAL /HPF
GLUCOSE UR STRIP.AUTO-MCNC: NEGATIVE MG/DL
HYALINE CASTS #/AREA URNS LPF: ABNORMAL /LPF
KETONES UR STRIP.AUTO-MCNC: NEGATIVE MG/DL
LEUKOCYTE ESTERASE UR QL STRIP.AUTO: ABNORMAL
MICRO URNS: ABNORMAL
NITRITE UR QL STRIP.AUTO: POSITIVE
PH UR STRIP.AUTO: 6 [PH] (ref 5–8)
PROT UR QL STRIP: 100 MG/DL
RBC # URNS HPF: ABNORMAL /HPF
RBC UR QL AUTO: ABNORMAL
SP GR UR REFRACTOMETRY: 1.01
UROBILINOGEN UR STRIP.AUTO-MCNC: 0.2 MG/DL
WBC #/AREA URNS HPF: ABNORMAL /HPF

## 2020-05-14 PROCEDURE — 87086 URINE CULTURE/COLONY COUNT: CPT

## 2020-05-14 PROCEDURE — 87186 SC STD MICRODIL/AGAR DIL: CPT

## 2020-05-14 PROCEDURE — 81001 URINALYSIS AUTO W/SCOPE: CPT

## 2020-05-14 PROCEDURE — 87077 CULTURE AEROBIC IDENTIFY: CPT

## 2020-05-14 NOTE — TELEPHONE ENCOUNTER
Note from [facility] describing behavior change, patient gets frustrated or upset easily, will need to be seen.    Order printed to fax to [facility] for UA and please call [facility] ([phone]) and ask them to schedule an in office appointment.

## 2020-05-17 LAB
BACTERIA UR CULT: ABNORMAL
BACTERIA UR CULT: ABNORMAL
SIGNIFICANT IND 70042: ABNORMAL
SITE SITE: ABNORMAL
SOURCE SOURCE: ABNORMAL

## 2020-05-18 ENCOUNTER — TELEPHONE (OUTPATIENT)
Dept: MEDICAL GROUP | Facility: MEDICAL CENTER | Age: 85
End: 2020-05-18

## 2020-05-18 NOTE — TELEPHONE ENCOUNTER
Left a message for [facility] to call back at [phone].     Gwen Bowman  [facility] Assistant

## 2020-05-18 NOTE — TELEPHONE ENCOUNTER
----- Message from Oscar Dubois M.D. sent at 5/17/2020 11:03 PM PDT -----  Please call the patient, his urine test does show bacteria but I need to know if he still has the Heck catheter in place?  Let me know.

## 2020-05-19 ENCOUNTER — TELEPHONE (OUTPATIENT)
Dept: MEDICAL GROUP | Facility: MEDICAL CENTER | Age: 85
End: 2020-05-19

## 2020-05-19 RX ORDER — LEVOFLOXACIN 250 MG/1
250 TABLET, FILM COATED ORAL DAILY
Qty: 7 TAB | Refills: 0 | Status: SHIPPED | OUTPATIENT
Start: 2020-05-19 | End: 2020-06-28

## 2020-05-19 RX ORDER — LEVOFLOXACIN 250 MG/1
250 TABLET, FILM COATED ORAL DAILY
Qty: 7 TAB | Refills: 0 | Status: SHIPPED
Start: 2020-05-19 | End: 2020-05-19 | Stop reason: SDUPTHER

## 2020-05-19 NOTE — TELEPHONE ENCOUNTER
Please notify the patient that we will send a prescription for an antibiotic to treat his urine infection, it is Levaquin 1 tablet a day x7 days main side effects are nausea, loose stools, rash, should that occur stop the medication and let us know.    We will [facility] to [facility].

## 2020-05-19 NOTE — TELEPHONE ENCOUNTER
Spoke with Scarlett and she asked that we contact Nabila (dtr) to have her schedule the appt and then if we could send a fax to Scarlett giving her appt details so she can have him ready when its time to go to appt.

## 2020-05-19 NOTE — TELEPHONE ENCOUNTER
Noted, will start antibiotic to treat infection since no catheter.  No other oral treatment option regimen, no history of aortic aneurysm, will use Levaquin since no therapeutic outpatient alternative.

## 2020-05-19 NOTE — TELEPHONE ENCOUNTER
Could somebody please check on this? I need to know if he still has his catheter in place since his urine test does show an infection.    Please see the message from Sunday.

## 2020-05-19 NOTE — TELEPHONE ENCOUNTER
I just printed the prescription to fax, it was not sent electronically to [facility].      The printed prescription paper to fax to [address] [place] [address] is on the counter, I can send this to [facility] but this is a mail away pharmacy and the patient will not get this for a week, I would prefer he start the medication sooner.

## 2020-05-19 NOTE — TELEPHONE ENCOUNTER
Prescription for antibiotic Levaquin 1/day x 7 days printed along with urine culture results to fax to [facility].

## 2020-05-20 NOTE — TELEPHONE ENCOUNTER
[facility]

## 2020-05-22 DIAGNOSIS — R33.8 BENIGN PROSTATIC HYPERPLASIA WITH URINARY RETENTION: ICD-10-CM

## 2020-05-22 DIAGNOSIS — R97.20 ABNORMAL PSA: Chronic | ICD-10-CM

## 2020-05-22 DIAGNOSIS — N40.1 BENIGN PROSTATIC HYPERPLASIA WITH URINARY RETENTION: ICD-10-CM

## 2020-05-22 RX ORDER — TAMSULOSIN HYDROCHLORIDE 0.4 MG/1
0.4 CAPSULE ORAL
Qty: 30 CAP | Refills: 1
Start: 2020-05-22

## 2020-05-30 ENCOUNTER — APPOINTMENT (OUTPATIENT)
Dept: RADIOLOGY | Facility: MEDICAL CENTER | Age: 85
DRG: 469 | End: 2020-05-30
Attending: EMERGENCY MEDICINE
Payer: MEDICARE

## 2020-05-30 ENCOUNTER — HOSPITAL ENCOUNTER (INPATIENT)
Facility: MEDICAL CENTER | Age: 85
LOS: 5 days | DRG: 469 | End: 2020-06-04
Attending: EMERGENCY MEDICINE | Admitting: HOSPITALIST
Payer: MEDICARE

## 2020-05-30 ENCOUNTER — ANESTHESIA (OUTPATIENT)
Dept: SURGERY | Facility: MEDICAL CENTER | Age: 85
DRG: 469 | End: 2020-05-30
Payer: MEDICARE

## 2020-05-30 ENCOUNTER — APPOINTMENT (OUTPATIENT)
Dept: RADIOLOGY | Facility: MEDICAL CENTER | Age: 85
DRG: 469 | End: 2020-05-30
Attending: ORTHOPAEDIC SURGERY
Payer: MEDICARE

## 2020-05-30 ENCOUNTER — ANESTHESIA EVENT (OUTPATIENT)
Dept: SURGERY | Facility: MEDICAL CENTER | Age: 85
DRG: 469 | End: 2020-05-30
Payer: MEDICARE

## 2020-05-30 DIAGNOSIS — S72.002A LEFT DISPLACED FEMORAL NECK FRACTURE (HCC): ICD-10-CM

## 2020-05-30 DIAGNOSIS — E83.51 HYPOCALCEMIA: ICD-10-CM

## 2020-05-30 DIAGNOSIS — E87.6 HYPOKALEMIA: ICD-10-CM

## 2020-05-30 PROBLEM — D53.9 MACROCYTIC ANEMIA: Status: ACTIVE | Noted: 2020-05-30

## 2020-05-30 PROBLEM — G20.A1 PARKINSON DISEASE (HCC): Status: ACTIVE | Noted: 2020-05-30

## 2020-05-30 LAB
ABO + RH BLD: NORMAL
ABO GROUP BLD: NORMAL
ALBUMIN SERPL BCP-MCNC: 2.8 G/DL (ref 3.2–4.9)
ALBUMIN/GLOB SERPL: 1.5 G/DL
ALP SERPL-CCNC: 44 U/L (ref 30–99)
ALT SERPL-CCNC: <5 U/L (ref 2–50)
ANION GAP SERPL CALC-SCNC: 13 MMOL/L (ref 7–16)
AST SERPL-CCNC: 8 U/L (ref 12–45)
BASOPHILS # BLD AUTO: 0.5 % (ref 0–1.8)
BASOPHILS # BLD: 0.06 K/UL (ref 0–0.12)
BILIRUB SERPL-MCNC: 0.4 MG/DL (ref 0.1–1.5)
BLD GP AB SCN SERPL QL: NORMAL
BUN SERPL-MCNC: 27 MG/DL (ref 8–22)
CALCIUM SERPL-MCNC: 6.6 MG/DL (ref 8.5–10.5)
CHLORIDE SERPL-SCNC: 113 MMOL/L (ref 96–112)
CO2 SERPL-SCNC: 18 MMOL/L (ref 20–33)
COVID ORDER STATUS COVID19: NORMAL
CREAT SERPL-MCNC: 0.79 MG/DL (ref 0.5–1.4)
EKG IMPRESSION: NORMAL
EOSINOPHIL # BLD AUTO: 0.16 K/UL (ref 0–0.51)
EOSINOPHIL NFR BLD: 1.3 % (ref 0–6.9)
ERYTHROCYTE [DISTWIDTH] IN BLOOD BY AUTOMATED COUNT: 52.3 FL (ref 35.9–50)
ETHANOL BLD-MCNC: <10.1 MG/DL (ref 0–10.1)
GLOBULIN SER CALC-MCNC: 1.9 G/DL (ref 1.9–3.5)
GLUCOSE SERPL-MCNC: 103 MG/DL (ref 65–99)
HCT VFR BLD AUTO: 33.8 % (ref 42–52)
HGB BLD-MCNC: 10.9 G/DL (ref 14–18)
IMM GRANULOCYTES # BLD AUTO: 0.08 K/UL (ref 0–0.11)
IMM GRANULOCYTES NFR BLD AUTO: 0.7 % (ref 0–0.9)
LYMPHOCYTES # BLD AUTO: 0.55 K/UL (ref 1–4.8)
LYMPHOCYTES NFR BLD: 4.5 % (ref 22–41)
MCH RBC QN AUTO: 34 PG (ref 27–33)
MCHC RBC AUTO-ENTMCNC: 32.2 G/DL (ref 33.7–35.3)
MCV RBC AUTO: 105.3 FL (ref 81.4–97.8)
MONOCYTES # BLD AUTO: 0.49 K/UL (ref 0–0.85)
MONOCYTES NFR BLD AUTO: 4 % (ref 0–13.4)
NEUTROPHILS # BLD AUTO: 10.88 K/UL (ref 1.82–7.42)
NEUTROPHILS NFR BLD: 89 % (ref 44–72)
NRBC # BLD AUTO: 0 K/UL
NRBC BLD-RTO: 0 /100 WBC
PLATELET # BLD AUTO: 199 K/UL (ref 164–446)
PMV BLD AUTO: 8.7 FL (ref 9–12.9)
POTASSIUM SERPL-SCNC: 3.1 MMOL/L (ref 3.6–5.5)
PROT SERPL-MCNC: 4.7 G/DL (ref 6–8.2)
RBC # BLD AUTO: 3.21 M/UL (ref 4.7–6.1)
RH BLD: NORMAL
SARS-COV-2 RNA RESP QL NAA+PROBE: NOTDETECTED
SODIUM SERPL-SCNC: 144 MMOL/L (ref 135–145)
SPECIMEN SOURCE: NORMAL
WBC # BLD AUTO: 12.2 K/UL (ref 4.8–10.8)

## 2020-05-30 PROCEDURE — 72170 X-RAY EXAM OF PELVIS: CPT

## 2020-05-30 PROCEDURE — A9270 NON-COVERED ITEM OR SERVICE: HCPCS | Performed by: HOSPITALIST

## 2020-05-30 PROCEDURE — 80307 DRUG TEST PRSMV CHEM ANLYZR: CPT

## 2020-05-30 PROCEDURE — 72128 CT CHEST SPINE W/O DYE: CPT

## 2020-05-30 PROCEDURE — 86901 BLOOD TYPING SEROLOGIC RH(D): CPT

## 2020-05-30 PROCEDURE — 160002 HCHG RECOVERY MINUTES (STAT): Performed by: ORTHOPAEDIC SURGERY

## 2020-05-30 PROCEDURE — 770006 HCHG ROOM/CARE - MED/SURG/GYN SEMI*

## 2020-05-30 PROCEDURE — 160042 HCHG SURGERY MINUTES - EA ADDL 1 MIN LEVEL 5: Performed by: ORTHOPAEDIC SURGERY

## 2020-05-30 PROCEDURE — U0004 COV-19 TEST NON-CDC HGH THRU: HCPCS

## 2020-05-30 PROCEDURE — 501445 HCHG STAPLER, SKIN DISP: Performed by: ORTHOPAEDIC SURGERY

## 2020-05-30 PROCEDURE — 64447 NJX AA&/STRD FEMORAL NRV IMG: CPT | Performed by: ORTHOPAEDIC SURGERY

## 2020-05-30 PROCEDURE — 0SRS019 REPLACEMENT OF LEFT HIP JOINT, FEMORAL SURFACE WITH METAL SYNTHETIC SUBSTITUTE, CEMENTED, OPEN APPROACH: ICD-10-PCS | Performed by: ORTHOPAEDIC SURGERY

## 2020-05-30 PROCEDURE — 80053 COMPREHEN METABOLIC PANEL: CPT

## 2020-05-30 PROCEDURE — 86900 BLOOD TYPING SEROLOGIC ABO: CPT

## 2020-05-30 PROCEDURE — 86850 RBC ANTIBODY SCREEN: CPT

## 2020-05-30 PROCEDURE — 700111 HCHG RX REV CODE 636 W/ 250 OVERRIDE (IP): Performed by: ANESTHESIOLOGY

## 2020-05-30 PROCEDURE — 85025 COMPLETE CBC W/AUTO DIFF WBC: CPT

## 2020-05-30 PROCEDURE — 99285 EMERGENCY DEPT VISIT HI MDM: CPT

## 2020-05-30 PROCEDURE — 305948 HCHG GREEN TRAUMA ACT PRE-NOTIFY NO CC

## 2020-05-30 PROCEDURE — 160036 HCHG PACU - EA ADDL 30 MINS PHASE I: Performed by: ORTHOPAEDIC SURGERY

## 2020-05-30 PROCEDURE — 93010 ELECTROCARDIOGRAM REPORT: CPT | Performed by: INTERNAL MEDICINE

## 2020-05-30 PROCEDURE — 93005 ELECTROCARDIOGRAM TRACING: CPT | Performed by: ORTHOPAEDIC SURGERY

## 2020-05-30 PROCEDURE — 73551 X-RAY EXAM OF FEMUR 1: CPT | Mod: LT

## 2020-05-30 PROCEDURE — 700105 HCHG RX REV CODE 258: Performed by: HOSPITALIST

## 2020-05-30 PROCEDURE — 70450 CT HEAD/BRAIN W/O DYE: CPT

## 2020-05-30 PROCEDURE — 36415 COLL VENOUS BLD VENIPUNCTURE: CPT

## 2020-05-30 PROCEDURE — 71045 X-RAY EXAM CHEST 1 VIEW: CPT

## 2020-05-30 PROCEDURE — 160031 HCHG SURGERY MINUTES - 1ST 30 MINS LEVEL 5: Performed by: ORTHOPAEDIC SURGERY

## 2020-05-30 PROCEDURE — 501838 HCHG SUTURE GENERAL: Performed by: ORTHOPAEDIC SURGERY

## 2020-05-30 PROCEDURE — 502240 HCHG MISC OR SUPPLY RC 0272: Performed by: ORTHOPAEDIC SURGERY

## 2020-05-30 PROCEDURE — 96374 THER/PROPH/DIAG INJ IV PUSH: CPT

## 2020-05-30 PROCEDURE — 74177 CT ABD & PELVIS W/CONTRAST: CPT

## 2020-05-30 PROCEDURE — 700102 HCHG RX REV CODE 250 W/ 637 OVERRIDE(OP): Performed by: EMERGENCY MEDICINE

## 2020-05-30 PROCEDURE — 72125 CT NECK SPINE W/O DYE: CPT

## 2020-05-30 PROCEDURE — 700101 HCHG RX REV CODE 250: Performed by: ANESTHESIOLOGY

## 2020-05-30 PROCEDURE — 700102 HCHG RX REV CODE 250 W/ 637 OVERRIDE(OP): Performed by: ORTHOPAEDIC SURGERY

## 2020-05-30 PROCEDURE — 700102 HCHG RX REV CODE 250 W/ 637 OVERRIDE(OP): Performed by: HOSPITALIST

## 2020-05-30 PROCEDURE — 3E0T3BZ INTRODUCTION OF ANESTHETIC AGENT INTO PERIPHERAL NERVES AND PLEXI, PERCUTANEOUS APPROACH: ICD-10-PCS | Performed by: ANESTHESIOLOGY

## 2020-05-30 PROCEDURE — L8699 PROSTHETIC IMPLANT NOS: HCPCS | Performed by: ORTHOPAEDIC SURGERY

## 2020-05-30 PROCEDURE — 700117 HCHG RX CONTRAST REV CODE 255: Performed by: EMERGENCY MEDICINE

## 2020-05-30 PROCEDURE — A9270 NON-COVERED ITEM OR SERVICE: HCPCS | Performed by: EMERGENCY MEDICINE

## 2020-05-30 PROCEDURE — 160048 HCHG OR STATISTICAL LEVEL 1-5: Performed by: ORTHOPAEDIC SURGERY

## 2020-05-30 PROCEDURE — 700111 HCHG RX REV CODE 636 W/ 250 OVERRIDE (IP): Performed by: EMERGENCY MEDICINE

## 2020-05-30 PROCEDURE — 72131 CT LUMBAR SPINE W/O DYE: CPT

## 2020-05-30 PROCEDURE — 700105 HCHG RX REV CODE 258: Performed by: ANESTHESIOLOGY

## 2020-05-30 PROCEDURE — 502000 HCHG MISC OR IMPLANTS RC 0278: Performed by: ORTHOPAEDIC SURGERY

## 2020-05-30 PROCEDURE — 160009 HCHG ANES TIME/MIN: Performed by: ORTHOPAEDIC SURGERY

## 2020-05-30 PROCEDURE — A9270 NON-COVERED ITEM OR SERVICE: HCPCS | Performed by: ORTHOPAEDIC SURGERY

## 2020-05-30 PROCEDURE — 502779 HCHG SUTURE, QUILL: Performed by: ORTHOPAEDIC SURGERY

## 2020-05-30 PROCEDURE — 99223 1ST HOSP IP/OBS HIGH 75: CPT | Mod: AI | Performed by: HOSPITALIST

## 2020-05-30 PROCEDURE — 160035 HCHG PACU - 1ST 60 MINS PHASE I: Performed by: ORTHOPAEDIC SURGERY

## 2020-05-30 PROCEDURE — G0390 TRAUMA RESPONS W/HOSP CRITI: HCPCS

## 2020-05-30 PROCEDURE — 700105 HCHG RX REV CODE 258: Performed by: EMERGENCY MEDICINE

## 2020-05-30 PROCEDURE — C9803 HOPD COVID-19 SPEC COLLECT: HCPCS | Performed by: EMERGENCY MEDICINE

## 2020-05-30 DEVICE — IMPLANT TANDEM BIPOLAR COCR 51OD 28ID: Type: IMPLANTABLE DEVICE | Site: HIP | Status: FUNCTIONAL

## 2020-05-30 DEVICE — BONE CEMENT SIMPLEX FULL DOSE - (10EA/PK): Type: IMPLANTABLE DEVICE | Site: HIP | Status: FUNCTIONAL

## 2020-05-30 DEVICE — IMPLANTABLE DEVICE: Type: IMPLANTABLE DEVICE | Site: HIP | Status: FUNCTIONAL

## 2020-05-30 DEVICE — DISTAL INVIS CENT SZ 10MM: Type: IMPLANTABLE DEVICE | Site: HIP | Status: FUNCTIONAL

## 2020-05-30 DEVICE — IMPLANT COCR 12/14 FEM HEAD 28 +0: Type: IMPLANTABLE DEVICE | Site: HIP | Status: FUNCTIONAL

## 2020-05-30 RX ORDER — ONDANSETRON 2 MG/ML
4 INJECTION INTRAMUSCULAR; INTRAVENOUS
Status: CANCELLED | OUTPATIENT
Start: 2020-05-30

## 2020-05-30 RX ORDER — FLUDROCORTISONE ACETATE 0.1 MG/1
0.05 TABLET ORAL DAILY
Status: DISCONTINUED | OUTPATIENT
Start: 2020-05-30 | End: 2020-05-31

## 2020-05-30 RX ORDER — FLUDROCORTISONE ACETATE 0.1 MG/1
0.05 TABLET ORAL DAILY
Status: ON HOLD | COMMUNITY
End: 2020-06-04

## 2020-05-30 RX ORDER — SODIUM CHLORIDE, SODIUM LACTATE, POTASSIUM CHLORIDE, CALCIUM CHLORIDE 600; 310; 30; 20 MG/100ML; MG/100ML; MG/100ML; MG/100ML
INJECTION, SOLUTION INTRAVENOUS CONTINUOUS
Status: DISCONTINUED | OUTPATIENT
Start: 2020-05-30 | End: 2020-05-30

## 2020-05-30 RX ORDER — MV-MIN/FA/VIT K/LUTEIN/ZEAXANT 200MCG-5MG
2 CAPSULE ORAL DAILY
Status: ON HOLD | COMMUNITY
End: 2020-06-04

## 2020-05-30 RX ORDER — ONDANSETRON 2 MG/ML
INJECTION INTRAMUSCULAR; INTRAVENOUS PRN
Status: DISCONTINUED | OUTPATIENT
Start: 2020-05-30 | End: 2020-05-30 | Stop reason: SURG

## 2020-05-30 RX ORDER — BISACODYL 10 MG
10 SUPPOSITORY, RECTAL RECTAL
Status: DISCONTINUED | OUTPATIENT
Start: 2020-05-30 | End: 2020-06-04 | Stop reason: HOSPADM

## 2020-05-30 RX ORDER — DEXTROSE MONOHYDRATE 50 MG/ML
INJECTION, SOLUTION INTRAVENOUS CONTINUOUS
Status: DISCONTINUED | OUTPATIENT
Start: 2020-05-30 | End: 2020-06-02

## 2020-05-30 RX ORDER — MORPHINE SULFATE 4 MG/ML
2 INJECTION, SOLUTION INTRAMUSCULAR; INTRAVENOUS
Status: DISCONTINUED | OUTPATIENT
Start: 2020-05-30 | End: 2020-05-30

## 2020-05-30 RX ORDER — ACETAMINOPHEN 325 MG/1
650 TABLET ORAL EVERY 6 HOURS PRN
Status: DISCONTINUED | OUTPATIENT
Start: 2020-05-30 | End: 2020-06-04 | Stop reason: HOSPADM

## 2020-05-30 RX ORDER — PHENYLEPHRINE HYDROCHLORIDE 10 MG/ML
INJECTION, SOLUTION INTRAMUSCULAR; INTRAVENOUS; SUBCUTANEOUS PRN
Status: DISCONTINUED | OUTPATIENT
Start: 2020-05-30 | End: 2020-05-30 | Stop reason: SURG

## 2020-05-30 RX ORDER — ONDANSETRON 2 MG/ML
4 INJECTION INTRAMUSCULAR; INTRAVENOUS EVERY 4 HOURS PRN
Status: DISCONTINUED | OUTPATIENT
Start: 2020-05-30 | End: 2020-06-04 | Stop reason: HOSPADM

## 2020-05-30 RX ORDER — HALOPERIDOL 5 MG/ML
1 INJECTION INTRAMUSCULAR
Status: CANCELLED | OUTPATIENT
Start: 2020-05-30

## 2020-05-30 RX ORDER — SODIUM CHLORIDE, SODIUM LACTATE, POTASSIUM CHLORIDE, CALCIUM CHLORIDE 600; 310; 30; 20 MG/100ML; MG/100ML; MG/100ML; MG/100ML
INJECTION, SOLUTION INTRAVENOUS
Status: DISCONTINUED | OUTPATIENT
Start: 2020-05-30 | End: 2020-05-30 | Stop reason: SURG

## 2020-05-30 RX ORDER — HYDRALAZINE HYDROCHLORIDE 20 MG/ML
5 INJECTION INTRAMUSCULAR; INTRAVENOUS
Status: CANCELLED | OUTPATIENT
Start: 2020-05-30

## 2020-05-30 RX ORDER — CEFAZOLIN SODIUM 1 G/3ML
INJECTION, POWDER, FOR SOLUTION INTRAMUSCULAR; INTRAVENOUS PRN
Status: DISCONTINUED | OUTPATIENT
Start: 2020-05-30 | End: 2020-05-30 | Stop reason: SURG

## 2020-05-30 RX ORDER — ACETAMINOPHEN 325 MG/1
650 TABLET ORAL EVERY 6 HOURS
Status: DISCONTINUED | OUTPATIENT
Start: 2020-05-30 | End: 2020-06-04 | Stop reason: HOSPADM

## 2020-05-30 RX ORDER — OXYCODONE HYDROCHLORIDE 5 MG/1
5 TABLET ORAL
Status: DISCONTINUED | OUTPATIENT
Start: 2020-05-30 | End: 2020-05-30

## 2020-05-30 RX ORDER — ROCURONIUM BROMIDE 10 MG/ML
INJECTION, SOLUTION INTRAVENOUS PRN
Status: DISCONTINUED | OUTPATIENT
Start: 2020-05-30 | End: 2020-05-30 | Stop reason: SURG

## 2020-05-30 RX ORDER — OXYCODONE HYDROCHLORIDE 5 MG/1
2.5 TABLET ORAL
Status: DISCONTINUED | OUTPATIENT
Start: 2020-05-30 | End: 2020-05-30

## 2020-05-30 RX ORDER — ONDANSETRON 2 MG/ML
4 INJECTION INTRAMUSCULAR; INTRAVENOUS
Status: DISCONTINUED | OUTPATIENT
Start: 2020-05-30 | End: 2020-05-30 | Stop reason: HOSPADM

## 2020-05-30 RX ORDER — OXYCODONE HYDROCHLORIDE 5 MG/1
2.5 TABLET ORAL EVERY 4 HOURS PRN
Status: DISCONTINUED | OUTPATIENT
Start: 2020-05-30 | End: 2020-06-04 | Stop reason: HOSPADM

## 2020-05-30 RX ORDER — BUPIVACAINE HYDROCHLORIDE 2.5 MG/ML
INJECTION, SOLUTION EPIDURAL; INFILTRATION; INTRACAUDAL PRN
Status: DISCONTINUED | OUTPATIENT
Start: 2020-05-30 | End: 2020-05-30 | Stop reason: SURG

## 2020-05-30 RX ORDER — OXYCODONE HYDROCHLORIDE 5 MG/1
5 TABLET ORAL EVERY 4 HOURS PRN
Status: DISCONTINUED | OUTPATIENT
Start: 2020-05-30 | End: 2020-06-04 | Stop reason: HOSPADM

## 2020-05-30 RX ORDER — DEXAMETHASONE SODIUM PHOSPHATE 4 MG/ML
INJECTION, SOLUTION INTRA-ARTICULAR; INTRALESIONAL; INTRAMUSCULAR; INTRAVENOUS; SOFT TISSUE PRN
Status: DISCONTINUED | OUTPATIENT
Start: 2020-05-30 | End: 2020-05-30 | Stop reason: SURG

## 2020-05-30 RX ORDER — SODIUM CHLORIDE, SODIUM LACTATE, POTASSIUM CHLORIDE, CALCIUM CHLORIDE 600; 310; 30; 20 MG/100ML; MG/100ML; MG/100ML; MG/100ML
INJECTION, SOLUTION INTRAVENOUS CONTINUOUS
Status: CANCELLED | OUTPATIENT
Start: 2020-05-30

## 2020-05-30 RX ORDER — AMOXICILLIN 250 MG
2 CAPSULE ORAL 2 TIMES DAILY
Status: DISCONTINUED | OUTPATIENT
Start: 2020-05-30 | End: 2020-06-04 | Stop reason: HOSPADM

## 2020-05-30 RX ORDER — LIDOCAINE HYDROCHLORIDE 20 MG/ML
INJECTION, SOLUTION EPIDURAL; INFILTRATION; INTRACAUDAL; PERINEURAL PRN
Status: DISCONTINUED | OUTPATIENT
Start: 2020-05-30 | End: 2020-05-30 | Stop reason: SURG

## 2020-05-30 RX ORDER — ONDANSETRON 4 MG/1
4 TABLET, ORALLY DISINTEGRATING ORAL EVERY 4 HOURS PRN
Status: DISCONTINUED | OUTPATIENT
Start: 2020-05-30 | End: 2020-06-04 | Stop reason: HOSPADM

## 2020-05-30 RX ORDER — POTASSIUM CHLORIDE 20 MEQ/1
40 TABLET, EXTENDED RELEASE ORAL ONCE
Status: COMPLETED | OUTPATIENT
Start: 2020-05-30 | End: 2020-05-30

## 2020-05-30 RX ORDER — CALCIUM GLUCONATE 94 MG/ML
1 INJECTION, SOLUTION INTRAVENOUS ONCE
Status: DISCONTINUED | OUTPATIENT
Start: 2020-05-30 | End: 2020-05-30

## 2020-05-30 RX ORDER — POLYETHYLENE GLYCOL 3350 17 G/17G
1 POWDER, FOR SOLUTION ORAL
Status: DISCONTINUED | OUTPATIENT
Start: 2020-05-30 | End: 2020-06-04 | Stop reason: HOSPADM

## 2020-05-30 RX ORDER — TRANEXAMIC ACID 100 MG/ML
INJECTION, SOLUTION INTRAVENOUS PRN
Status: DISCONTINUED | OUTPATIENT
Start: 2020-05-30 | End: 2020-05-30 | Stop reason: SURG

## 2020-05-30 RX ORDER — HYDRALAZINE HYDROCHLORIDE 20 MG/ML
5 INJECTION INTRAMUSCULAR; INTRAVENOUS
Status: DISCONTINUED | OUTPATIENT
Start: 2020-05-30 | End: 2020-05-30 | Stop reason: HOSPADM

## 2020-05-30 RX ORDER — OXYCODONE HCL 5 MG/5 ML
5 SOLUTION, ORAL ORAL
Status: CANCELLED | OUTPATIENT
Start: 2020-05-30

## 2020-05-30 RX ORDER — SODIUM CHLORIDE, SODIUM LACTATE, POTASSIUM CHLORIDE, CALCIUM CHLORIDE 600; 310; 30; 20 MG/100ML; MG/100ML; MG/100ML; MG/100ML
INJECTION, SOLUTION INTRAVENOUS CONTINUOUS
Status: DISCONTINUED | OUTPATIENT
Start: 2020-05-30 | End: 2020-05-30 | Stop reason: HOSPADM

## 2020-05-30 RX ORDER — LANOLIN ALCOHOL/MO/W.PET/CERES
1000 CREAM (GRAM) TOPICAL DAILY
Status: ON HOLD | COMMUNITY
End: 2020-06-04 | Stop reason: SDUPTHER

## 2020-05-30 RX ORDER — OXYCODONE HCL 5 MG/5 ML
2.5 SOLUTION, ORAL ORAL
Status: CANCELLED | OUTPATIENT
Start: 2020-05-30

## 2020-05-30 RX ORDER — OXYCODONE HCL 5 MG/5 ML
2.5 SOLUTION, ORAL ORAL
Status: DISCONTINUED | OUTPATIENT
Start: 2020-05-30 | End: 2020-05-30 | Stop reason: HOSPADM

## 2020-05-30 RX ORDER — OXYCODONE HCL 5 MG/5 ML
5 SOLUTION, ORAL ORAL
Status: DISCONTINUED | OUTPATIENT
Start: 2020-05-30 | End: 2020-05-30 | Stop reason: HOSPADM

## 2020-05-30 RX ORDER — LEVOFLOXACIN 250 MG/1
250 TABLET, FILM COATED ORAL DAILY
Status: ON HOLD | COMMUNITY
End: 2020-06-04

## 2020-05-30 RX ORDER — HYDROMORPHONE HYDROCHLORIDE 1 MG/ML
0.25 INJECTION, SOLUTION INTRAMUSCULAR; INTRAVENOUS; SUBCUTANEOUS
Status: DISCONTINUED | OUTPATIENT
Start: 2020-05-30 | End: 2020-06-04 | Stop reason: HOSPADM

## 2020-05-30 RX ORDER — MIDODRINE HYDROCHLORIDE 5 MG/1
5 TABLET ORAL
Status: ON HOLD | COMMUNITY
End: 2020-06-04 | Stop reason: SDUPTHER

## 2020-05-30 RX ORDER — MIDODRINE HYDROCHLORIDE 5 MG/1
5 TABLET ORAL
Status: DISCONTINUED | OUTPATIENT
Start: 2020-05-30 | End: 2020-06-04 | Stop reason: HOSPADM

## 2020-05-30 RX ADMIN — CEFAZOLIN 2 G: 330 INJECTION, POWDER, FOR SOLUTION INTRAMUSCULAR; INTRAVENOUS at 11:51

## 2020-05-30 RX ADMIN — BUPIVACAINE HYDROCHLORIDE 30 ML: 2.5 INJECTION, SOLUTION EPIDURAL; INFILTRATION; INTRACAUDAL; PERINEURAL at 11:49

## 2020-05-30 RX ADMIN — SODIUM CHLORIDE, POTASSIUM CHLORIDE, SODIUM LACTATE AND CALCIUM CHLORIDE: 600; 310; 30; 20 INJECTION, SOLUTION INTRAVENOUS at 11:38

## 2020-05-30 RX ADMIN — FENTANYL CITRATE 25 MCG: 50 INJECTION, SOLUTION INTRAMUSCULAR; INTRAVENOUS at 12:03

## 2020-05-30 RX ADMIN — DEXTROSE MONOHYDRATE: 50 INJECTION, SOLUTION INTRAVENOUS at 18:13

## 2020-05-30 RX ADMIN — ACETAMINOPHEN 650 MG: 325 TABLET, FILM COATED ORAL at 18:13

## 2020-05-30 RX ADMIN — CARBIDOPA AND LEVODOPA 1 TABLET: 25; 100 TABLET ORAL at 20:59

## 2020-05-30 RX ADMIN — PHENYLEPHRINE HYDROCHLORIDE 100 MCG: 10 INJECTION INTRAVENOUS at 11:45

## 2020-05-30 RX ADMIN — TRANEXAMIC ACID 1000 MG: 100 INJECTION, SOLUTION INTRAVENOUS at 11:51

## 2020-05-30 RX ADMIN — LIDOCAINE HYDROCHLORIDE 60 MG: 20 INJECTION, SOLUTION EPIDURAL; INFILTRATION; INTRACAUDAL at 11:43

## 2020-05-30 RX ADMIN — CARBIDOPA AND LEVODOPA 1 TABLET: 25; 100 TABLET ORAL at 18:12

## 2020-05-30 RX ADMIN — PROPOFOL 30 MG: 10 INJECTION, EMULSION INTRAVENOUS at 13:11

## 2020-05-30 RX ADMIN — MIDODRINE HYDROCHLORIDE 5 MG: 5 TABLET ORAL at 18:12

## 2020-05-30 RX ADMIN — FENTANYL CITRATE 50 MCG: 50 INJECTION, SOLUTION INTRAMUSCULAR; INTRAVENOUS at 12:21

## 2020-05-30 RX ADMIN — SODIUM CHLORIDE, POTASSIUM CHLORIDE, SODIUM LACTATE AND CALCIUM CHLORIDE: 600; 310; 30; 20 INJECTION, SOLUTION INTRAVENOUS at 12:47

## 2020-05-30 RX ADMIN — FENTANYL CITRATE 75 MCG: 50 INJECTION, SOLUTION INTRAMUSCULAR; INTRAVENOUS at 11:43

## 2020-05-30 RX ADMIN — FENTANYL CITRATE 50 MCG: 50 INJECTION, SOLUTION INTRAMUSCULAR; INTRAVENOUS at 12:26

## 2020-05-30 RX ADMIN — IOHEXOL 100 ML: 350 INJECTION, SOLUTION INTRAVENOUS at 04:15

## 2020-05-30 RX ADMIN — PROPOFOL 80 MG: 10 INJECTION, EMULSION INTRAVENOUS at 11:43

## 2020-05-30 RX ADMIN — PHENYLEPHRINE HYDROCHLORIDE 100 MCG: 10 INJECTION INTRAVENOUS at 11:50

## 2020-05-30 RX ADMIN — SODIUM CHLORIDE, POTASSIUM CHLORIDE, SODIUM LACTATE AND CALCIUM CHLORIDE: 600; 310; 30; 20 INJECTION, SOLUTION INTRAVENOUS at 09:22

## 2020-05-30 RX ADMIN — CARBIDOPA AND LEVODOPA 1 TABLET: 25; 100 TABLET ORAL at 10:00

## 2020-05-30 RX ADMIN — FENTANYL CITRATE 50 MCG: 50 INJECTION, SOLUTION INTRAMUSCULAR; INTRAVENOUS at 12:16

## 2020-05-30 RX ADMIN — DOCUSATE SODIUM 50 MG AND SENNOSIDES 8.6 MG 2 TABLET: 8.6; 5 TABLET, FILM COATED ORAL at 18:12

## 2020-05-30 RX ADMIN — CALCIUM GLUCONATE 1 G: 98 INJECTION, SOLUTION INTRAVENOUS at 05:59

## 2020-05-30 RX ADMIN — PHENYLEPHRINE HYDROCHLORIDE 50 MCG/MIN: 10 INJECTION INTRAVENOUS at 11:51

## 2020-05-30 RX ADMIN — ONDANSETRON 4 MG: 2 INJECTION INTRAMUSCULAR; INTRAVENOUS at 13:12

## 2020-05-30 RX ADMIN — FLUDROCORTISONE ACETATE 0.05 MG: 0.1 TABLET ORAL at 09:53

## 2020-05-30 RX ADMIN — SUGAMMADEX 200 MG: 100 INJECTION, SOLUTION INTRAVENOUS at 13:21

## 2020-05-30 RX ADMIN — DEXAMETHASONE SODIUM PHOSPHATE 2 MG: 4 INJECTION, SOLUTION INTRA-ARTICULAR; INTRALESIONAL; INTRAMUSCULAR; INTRAVENOUS; SOFT TISSUE at 11:49

## 2020-05-30 RX ADMIN — ROCURONIUM BROMIDE 35 MG: 10 INJECTION, SOLUTION INTRAVENOUS at 11:43

## 2020-05-30 RX ADMIN — POTASSIUM CHLORIDE 40 MEQ: 1500 TABLET, EXTENDED RELEASE ORAL at 05:59

## 2020-05-30 SDOH — HEALTH STABILITY: MENTAL HEALTH: HOW OFTEN DO YOU HAVE A DRINK CONTAINING ALCOHOL?: NEVER

## 2020-05-30 ASSESSMENT — COGNITIVE AND FUNCTIONAL STATUS - GENERAL
DRESSING REGULAR UPPER BODY CLOTHING: A LOT
MOVING FROM LYING ON BACK TO SITTING ON SIDE OF FLAT BED: A LOT
MOVING TO AND FROM BED TO CHAIR: A LOT
HELP NEEDED FOR BATHING: A LOT
SUGGESTED CMS G CODE MODIFIER MOBILITY: CL
WALKING IN HOSPITAL ROOM: A LOT
MOBILITY SCORE: 13
DRESSING REGULAR LOWER BODY CLOTHING: TOTAL
TURNING FROM BACK TO SIDE WHILE IN FLAT BAD: A LITTLE
DAILY ACTIVITIY SCORE: 15
SUGGESTED CMS G CODE MODIFIER DAILY ACTIVITY: CK
TOILETING: A LOT
STANDING UP FROM CHAIR USING ARMS: A LOT
CLIMB 3 TO 5 STEPS WITH RAILING: A LOT

## 2020-05-30 ASSESSMENT — ENCOUNTER SYMPTOMS
SEIZURES: 0
BACK PAIN: 0
EYE REDNESS: 0
RESPIRATORY NEGATIVE: 1
BLURRED VISION: 0
SORE THROAT: 0
NEUROLOGICAL NEGATIVE: 1
FEVER: 0
SHORTNESS OF BREATH: 0
VOMITING: 0
ABDOMINAL PAIN: 0
CHILLS: 0
CARDIOVASCULAR NEGATIVE: 1
GASTROINTESTINAL NEGATIVE: 1
NECK PAIN: 0
COUGH: 0
HEADACHES: 0
PSYCHIATRIC NEGATIVE: 1
EYES NEGATIVE: 1
FALLS: 1
CONSTITUTIONAL NEGATIVE: 1
FOCAL WEAKNESS: 0

## 2020-05-30 ASSESSMENT — PATIENT HEALTH QUESTIONNAIRE - PHQ9
1. LITTLE INTEREST OR PLEASURE IN DOING THINGS: NOT AT ALL
SUM OF ALL RESPONSES TO PHQ9 QUESTIONS 1 AND 2: 0
2. FEELING DOWN, DEPRESSED, IRRITABLE, OR HOPELESS: NOT AT ALL

## 2020-05-30 ASSESSMENT — LIFESTYLE VARIABLES
EVER HAD A DRINK FIRST THING IN THE MORNING TO STEADY YOUR NERVES TO GET RID OF A HANGOVER: NO
TOTAL SCORE: 0
CONSUMPTION TOTAL: NEGATIVE
HAVE PEOPLE ANNOYED YOU BY CRITICIZING YOUR DRINKING: NO
ON A TYPICAL DAY WHEN YOU DRINK ALCOHOL HOW MANY DRINKS DO YOU HAVE: 0
EVER FELT BAD OR GUILTY ABOUT YOUR DRINKING: NO
DOES PATIENT WANT TO STOP DRINKING: NO
AVERAGE NUMBER OF DAYS PER WEEK YOU HAVE A DRINK CONTAINING ALCOHOL: 0
ALCOHOL_USE: NO
TOTAL SCORE: 0
HAVE YOU EVER FELT YOU SHOULD CUT DOWN ON YOUR DRINKING: NO
TOTAL SCORE: 0
HOW MANY TIMES IN THE PAST YEAR HAVE YOU HAD 5 OR MORE DRINKS IN A DAY: 0

## 2020-05-30 ASSESSMENT — PAIN SCALES - GENERAL: PAIN_LEVEL: 0

## 2020-05-30 NOTE — PROGRESS NOTES
Attending Hospitalist today is Dr. Soto.  Please call this physician for orders, updates, or questions.

## 2020-05-30 NOTE — OP REPORT
DATE OF OPERATION: 5/30/2020     PREOPERATIVE DIAGNOSIS: left displaced femoral neck fracture    POSTOPERATIVE DIAGNOSIS: Same    PROCEDURE PERFORMED: left hip hemiarthroplasty    SURGEON: Khang Huerta M.D.     ASSISTANT:  Peewee Read MD    ANESTHESIOLOGIST: Luis Enrique Boogie MD.    ANESTHESIA: General    ASA CLASSIFICATION: II    INDICATIONS: The patient is a 89 y.o. male with a left femoral neck fracture resulting from a ground level fall.  The patient denies antecedent pain, and was found to have a normal neurovascular exam and skin envelope.  Radiographs demonstrated the displaced femoral neck fracture.  Given these findings, the patient is an appropriately indicated candidate for surgical treatment of the femoral neck fracture with hemiarthroplasty.  I discussed the risks and benefits of the procedure, including the risks of infection, wound healing complication, neurovascular injury, instability, limb length discrepancy, need for revision surgery, and the medical risks of anesthesia including MI, stroke, and death.  Benefits include early mobilization, improved chance of union, and reduction in the medical risks of hip fractures.  Alternatives to surgery were also discussed, including non-operative management, which I did not recommend.  The patient was in agreement with the plan to proceed, and the informed consent was signed and documented.  I met with the patient pre-operatively and marked the operative extremity with their agreement.  We proceeded to the operating room.     WOUND CLASSIFICATION: Class I    SPECIMEN: None    ESTIMATED BLOOD LOSS: 150 mL    PROCEDURE:  The patient underwent anesthesia, and was positioned in the right lateral decubitus position with all bony prominences well padded and an axillary roll placed.  Sequential compression devices were employed.  The correct operative site was prepped and draped into a sterile field, and the surgical team scrubbed in.  A procedural pause was  conducted to verify correct patient, correct extremity, presence of the surgeons initials on the operative extremity, and administration of IV antibiotics, in this case, Ancef.    After generalized agreement, an anterolateral approach to the hip was performed.  The fascia was split in line with the incision.  The anterior aspect of the gluteus medius was elevated off the hip capsule, and a T capsulotomy performed.  The labrum was preserved.    The femoral head was then removed, and sized to a size 51.  We selected a size 51 bipolar shell.  The acetabulum was inspected and cleared of foreign material.  We then placed our femoral neck retractor, and sequentially reamed and broached the femur to a size 12 broach trial.  We selected a size 12 Smith and Nephew cemented Synergy stem.  We then cemented the stem using second generation cement technique after preparing the femur.    We trialed with a +0 mm inner head trial and size 51 mm bipolar shell trial, and were quite satisfied with limb lengths and soft tissue tension.  The implant was stable in all positions, dislocating only with maximal external rotation, flexion, and with assistance of a bone hook.  Upon removal of the trial however, the labrum delaminated from the acetabular rim.  We divided the labrum to allow reduction of the hip.    We then thoroughly cleaned the trunion, and impacted the real +0 mm head and 51 mm shell into place.  The hip was reduced, and the wound was flooded with a dilute betadine solution.  This was irrigated away after several minutes.  We attempted to repair the labrum, however the labrum was not of sufficient quality for meaningful repair.  As such, the affected portion of labrum was resected.  The capsule was closed with #1 Vicryl.  We performed our abductor repair with #5 Ticron sutures through bone tunnels.  The wound was then closed in layers, with #2 Quill and a few #1 Vicryl in the fascia, 2-0 vicryl in the subcutaneous tissue,  and staples in the skin.  Sterile dressings were applied, and the patient was transferred to PACU in stable condition.    The patient tolerated the procedure well. There were no apparent complications. All sponge, needle, and instrument counts were correct on two separate occasions. He was awakened, extubated, and transferred to the recovery room in satisfactory condition.     The use of Peewee Read MD as a surgical assistant was necessary for assistance with exposure, retraction, fracture reduction, instrumentation, and closure.      Post-Operative Plan:    1.  The patient should bear weight as tolerated on their operative extremity.  Gait aids (crutch or crutches, cane, walker) may be used as needed, and may be discontinued when no longer required.  2.  IV antibiotics - may be continued for 24 hours, but are not required.  3.  DVT prophylaxis - SCD's and Lovenox 40 mg SQ daily while inpatient.  The patient may transition to Aspirin 325 mg PO BID as an outpatient  4.  Discharge planning  ____________________________________   Khang Huerta M.D.   DD: 5/30/2020  2:31 PM

## 2020-05-30 NOTE — CONSULTS
DATE OF SERVICE:  05/30/2020    REQUESTING PHYSICIAN:  Katiuska Mccollum MD    CHIEF COMPLAINT:  Left hip pain.    HISTORY OF PRESENT ILLNESS:  The patient is 89 years old, lives at [facility].  He fell last night and injured his left hip.  He was seen in   the emergency room, found to have a femoral neck fracture.  Orthopedic   consultation was requested.  Pain is worse with movement.    ALLERGIES:  None.    MEDICATIONS:  None.    PAST MEDICAL HISTORY:  None.    PAST SURGICAL HISTORY:  None.    SOCIAL HISTORY:  The patient does not smoke, drink, or use drugs.  He lives at   [facility].    FAMILY HISTORY:  Negative.    REVIEW OF SYSTEMS:  No loss of consciousness, nausea, vomiting, diarrhea,   constipation, polyuria, dysuria, fevers, chills, weight loss, weight gain,   abdominal pain, chest pain or shortness of breath.    PHYSICAL EXAMINATION:  GENERAL:  The patient is in no acute distress.  VITAL SIGNS:  Blood pressure 138/83, heart rate 85, respirations 19,   temperature on presentation 97.9.  HEENT:  Normocephalic.  There are abrasions on his forehead.  NECK:  Supple, nontender.  CHEST AND ABDOMEN:  Nontender.  No labored breathing.  The patient is   cachectic.  EXTREMITIES:  The right lower and bilateral upper extremities without   tenderness or deformity.  Left lower extremity is shortened and externally   rotated.  Skin over the left hip is intact.  He is able to dorsiflex and   plantarflex his toes.    LABORATORY DATA:  Include white blood cell count of 12,200, hematocrit 33.8%,   platelet count 199,000.  Sodium 144, potassium 3.1, creatinine 0.79, calcium   6.6, albumin 2.8.    DIAGNOSTIC DATA:  CT scan of the head, by report, shows no fractures or   intracranial bleeding.    CT scan of the cervical, thoracic, and lumbar spine, by report, shows no   fractures or malalignment.    CT scan of the chest, abdomen, and pelvis shows a displaced left femoral neck    fracture.    Radiographs of the left hip and pelvis show a displaced left femoral neck   fracture.    ASSESSMENT:  Left femoral neck fracture, closed, displaced.    PLAN:  I recommend hemiarthroplasty.  Plan this for later today with Dr. Huerta as long as the patient is medically clear.  He has received calcium and   potassium to supplement.  A COVID-19 swab has been sent.  The risks of   surgery include bleeding, infection, neurovascular injury, pain, stiffness,   fracture, dislocation, leg length discrepancy, thromboembolic phenomena,   anesthetic and medical complications, etc.             ____________________________________     MD HUAN CANAS / FRANCISCO    DD:  05/30/2020 06:49:12  DT:  05/30/2020 07:35:20    D#:  [id number]  Job#:  [id number]

## 2020-05-30 NOTE — PROGRESS NOTES
Back from surgery post op vital stable, on ERAS protocol until 905pm.  10L of oxygen via oxymask.  Re instruction provided on IS however poor effort as the patient is drowsy.  Bed alarm, falls and decubitus precautions in place.  Prevena to surgical hip is clean and intact.  Patient has indwelling urinary catheter to be removed POD#1.  Tolerated diet in PACU per report.

## 2020-05-30 NOTE — ASSESSMENT & PLAN NOTE
Suspect this is chronic with no current evidence of bleed.  Monitor.  Transfuse as needed for hemoglobin less than 7 g/Fernie.

## 2020-05-30 NOTE — ED NOTES
Med Rec Updated and Complete per Pt's MAR from [facility]  Allergies Reviewed    Pt recently completed a 7-day course of Levofloxacin 250mg daily Starting 05/20/20 Ending 05/26/20.    Pt is on a maintenance dose of Fludrocortisone 0.05mg daily.

## 2020-05-30 NOTE — PROGRESS NOTES
Patient now has DI score of 50 call placed to Dr. Soto.  D/w MD agrees with proceeding to surgery, no acute changes as the patient has come to the floor other then some of his assessment data entered.     Spoke with RRT RN [phone] they will round, looked at score contribution mostly due to age, o2 2L, and neuro assessment.  Agree with proceeding to surgery.

## 2020-05-30 NOTE — ANESTHESIA PROCEDURE NOTES
Airway    Date/Time: 5/30/2020 11:44 AM  Performed by: Luis Enrique Boogie M.D.  Authorized by: Luis Enrique Boogie M.D.     Location:  OR  Urgency:  Elective  Difficult Airway: No    Indications for Airway Management:  Anesthesia      Spontaneous Ventilation: absent    Sedation Level:  Deep  Preoxygenated: Yes    Patient Position:  Sniffing  Mask Difficulty Assessment:  1 - vent by mask  Final Airway Type:  Endotracheal airway  Final Endotracheal Airway:  ETT  Cuffed: Yes    Technique Used for Successful ETT Placement:  Direct laryngoscopy    Insertion Site:  Oral  Blade Type:  Bobby  Laryngoscope Blade/Videolaryngoscope Blade Size:  4  ETT Size (mm):  7.0  Measured from:  Lips  ETT to Lips (cm):  23  Placement Verified by: auscultation and capnometry    Cormack-Lehane Classification:  Grade IIa - partial view of glottis  Number of Attempts at Approach:  1

## 2020-05-30 NOTE — ASSESSMENT & PLAN NOTE
On sinemet which will be continued.    Does apparently have some autonomic dysfunction currently on Midodrine and florinef  Patient still was somewhat soft systolic blood pressure, increase Florinef 0.1 mg daily with additional 0.05 mg once  Follow a.m. CMP

## 2020-05-30 NOTE — ANESTHESIA POSTPROCEDURE EVALUATION
Patient: Miguel Ángel Page    Procedure Summary     Date:  05/30/20 Room / Location:  [facility] 14 / SURGERY [facility]    Anesthesia Start:  1138 Anesthesia Stop:  1401    Procedure:  HEMIARTHROPLASTY, HIP (Left Hip) Diagnosis:  (Left femoral neck fracture)    Surgeon:  Khang Huerta M.D. Responsible Provider:  Luis Enrique Bogoie M.D.    Anesthesia Type:  general, peripheral nerve block ASA Status:  2          Final Anesthesia Type: general, peripheral nerve block  Last vitals  BP   Blood Pressure : 123/87    Temp   36.8 °C (98.2 °F)    Pulse   Pulse: (!) 101(RN notified)   Resp   16    SpO2   96 %      Anesthesia Post Evaluation    Patient location during evaluation: PACU  Patient participation: complete - patient participated  Level of consciousness: awake and alert  Pain score: 0    Airway patency: patent  Anesthetic complications: no  Cardiovascular status: hemodynamically stable  Respiratory status: acceptable  Hydration status: euvolemic    PONV: none           Nurse Pain Score: 0 (NPRS)

## 2020-05-30 NOTE — H&P
Hospital Medicine History & Physical Note    Date of Service  5/30/2020    Primary Care Physician  Oscar Dubois M.D.    Code Status  DNAR/DNI    Chief Complaint  Chief Complaint   Patient presents with   • Trauma Green     Mechanical GLF, pt tripped while getting out of bed. Pt reported significant pain to left hip and ribs to EMS and 50mcg fentanyl was administered. LLE externally rotated and shortened. Pt unable to report any sensory changes or perform any ROM. Pulses intact bilaterally. Pt currently having difficulty answering questions, unknown if baseline neuro status.  Pt denies hitting his head, no head trauma noted.       History of Presenting Illness  89 y.o. male with history of Parkinson's disease on Sinemet therapy, associated autonomic dysfunction on Florinef and Midodrine was apparently in his usual state of health until the day prior to admission.  He was attempting to get out of bed, when he tripped and fell.  He landed on his left side, and subsequently had severe pain to the left hip, made worse with any attempts at motion, and was unable to walk.  He was subsequently brought to the emergency department for further evaluation.  He denies headache or vision changes he has no fever or chills, no chest pain or shortness of breath, no abdominal pain, nausea vomiting, diarrhea or constipation.    Review of Systems  Review of Systems   Constitutional: Negative.    HENT: Negative.    Eyes: Negative.    Respiratory: Negative.    Cardiovascular: Negative.    Gastrointestinal: Negative.    Genitourinary: Negative.    Musculoskeletal: Positive for falls and joint pain.   Skin: Negative.    Neurological: Negative.    Endo/Heme/Allergies: Negative.    Psychiatric/Behavioral: Negative.        Past Medical History   has a past medical history of Parkinson disease (HCC).    Surgical History   has no past surgical history on file.     Family History  family history includes Heart Disease in his father and  mother.     Social History   reports that he has never smoked. He has never used smokeless tobacco. He reports that he does not drink alcohol or use drugs.    Allergies  Not on File    Medications  Prior to Admission Medications   Prescriptions Last Dose Informant Patient Reported? Taking?   carbidopa-levodopa (SINEMET)  MG Tab   Yes Yes   Sig: Take 1 Tab by mouth 4 times a day.   fludrocortisone (FLORINEF) 0.1 MG Tab   Yes Yes   Sig: Take 0.05 mg by mouth every day.   midodrine (PROAMATINE) 5 MG Tab   Yes Yes   Sig: Take 5 mg by mouth 3 times a day before meals.      Facility-Administered Medications: None       Physical Exam  Temp:  [36.6 °C (97.9 °F)] 36.6 °C (97.9 °F)  Pulse:  [80-92] 85  Resp:  [16-24] 19  BP: (138-162)/(83-94) 138/83  SpO2:  [92 %-95 %] 93 %    Physical Exam  Vitals signs and nursing note reviewed.   Constitutional:       General: He is not in acute distress.     Appearance: Normal appearance. He is not ill-appearing.      Comments: Cachectic   HENT:      Head: Normocephalic and atraumatic.      Nose: Nose normal.      Mouth/Throat:      Mouth: Mucous membranes are moist.      Pharynx: Oropharynx is clear. No oropharyngeal exudate or posterior oropharyngeal erythema.   Eyes:      Extraocular Movements: Extraocular movements intact.      Conjunctiva/sclera: Conjunctivae normal.      Pupils: Pupils are equal, round, and reactive to light.   Neck:      Musculoskeletal: Normal range of motion and neck supple. No muscular tenderness.   Cardiovascular:      Rate and Rhythm: Normal rate and regular rhythm.      Pulses: Normal pulses.      Heart sounds: Normal heart sounds. No murmur. No friction rub. No gallop.    Pulmonary:      Effort: Pulmonary effort is normal. No respiratory distress.      Breath sounds: Normal breath sounds. No wheezing, rhonchi or rales.   Abdominal:      General: Abdomen is flat. Bowel sounds are normal. There is no distension.      Palpations: Abdomen is soft. There is  no mass.      Tenderness: There is no abdominal tenderness.   Musculoskeletal: Normal range of motion.         General: No swelling or deformity.   Lymphadenopathy:      Cervical: No cervical adenopathy.   Skin:     General: Skin is warm and dry.   Neurological:      General: No focal deficit present.      Mental Status: He is alert and oriented to person, place, and time.      Cranial Nerves: No cranial nerve deficit.      Motor: No weakness.      Gait: Gait normal.   Psychiatric:         Mood and Affect: Mood normal.         Behavior: Behavior normal.         Laboratory:  Recent Labs     05/30/20 0311   WBC 12.2*   RBC 3.21*   HEMOGLOBIN 10.9*   HEMATOCRIT 33.8*   .3*   MCH 34.0*   MCHC 32.2*   RDW 52.3*   PLATELETCT 199   MPV 8.7*     Recent Labs     05/30/20 0311   SODIUM 144   POTASSIUM 3.1*   CHLORIDE 113*   CO2 18*   GLUCOSE 103*   BUN 27*   CREATININE 0.79   CALCIUM 6.6*     Recent Labs     05/30/20 0311   ALTSGPT <5   ASTSGOT 8*   ALKPHOSPHAT 44   TBILIRUBIN 0.4   GLUCOSE 103*         No results for input(s): NTPROBNP in the last 72 hours.      No results for input(s): TROPONINT in the last 72 hours.    Imaging:  CT-CHEST,ABDOMEN,PELVIS WITH   Final Result         1. No acute traumatic change in the chest, abdomen or pelvis.      2. Airspace opacities in the bilateral lower lobe, right more than left, atelectasis or consolidation      3. Acute angulated fracture of the left femoral neck      CT-LSPINE W/O PLUS RECONS   Final Result         1. No acute fracture appreciated in the lumbar spine      2. Moderate levoconvex lumbar curvature         CT-TSPINE W/O PLUS RECONS   Final Result         1. No acute fracture or malalignment appreciated in the thoracic spine      2. Old bilateral rib fractures.      3. Severe dextroconvex thoracic curvature.         CT-HEAD W/O   Final Result         1. No acute intracranial abnormality. No evidence of acute intracranial hemorrhage or mass lesion.                CT-CSPINE WITHOUT PLUS RECONS   Final Result         1. No acute fracture in the cervical spine is visualized.         DX-CHEST-LIMITED (1 VIEW)   Final Result         No acute cardiopulmonary abnormalities are identified.      DX-PELVIS-1 OR 2 VIEWS   Final Result         1. Acute angulated fracture of the left femoral neck.      DX-FEMUR-1 VIEW LEFT   Final Result         Acute angulated fracture of the left femoral neck.            Assessment/Plan:      * Closed fracture of neck of left femur (HCC)  Assessment & Plan  Plan for orthopedic evaluation.  Pain management as needed.  Orthopedics Dr. Villar has been consulted.  Monitor.    Parkinson disease (HCC)  Assessment & Plan  On sinemet which will be continued.  Does apparently have some autonomic dysfunction currently on Midodrine and florinef.       Hypokalemia  Assessment & Plan  Replacement.  Monitor.     Macrocytic anemia  Assessment & Plan  Suspect this is chronic with no current evidence of bleed.  Monitor.  Transfuse as needed for hemoglobin less than 7 g/Fernie.

## 2020-05-30 NOTE — PROGRESS NOTES
2 RN skin check complete with [place] PAYTON.   Devices in place scds, oxymask.   Skin assessed under devices yes.   Confirmed pressure ulcers found on none.  New potential pressure ulcers noted on none.  The following interventions in place Waffle mattress overlay, pillow support, q2 hours turns, incontinent care if need currently has an indwelling urinary catheter, thus placed mepilex and barrier paste to blanchable redness to sacrum.

## 2020-05-30 NOTE — ANESTHESIA PROCEDURE NOTES
Peripheral Block    Date/Time: 5/30/2020 11:46 AM  Performed by: Luis Enrique Boogie M.D.  Authorized by: Luis Enrique Boogie M.D.     Patient Location:  OR  Start Time:  5/30/2020 11:46 AM  End Time:  5/30/2020 11:50 AM  Reason for Block: at surgeon's request and post-op pain management    patient identified, IV checked, site marked, risks and benefits discussed, surgical consent, monitors and equipment checked, pre-op evaluation and timeout performed    Patient Position:  Supine  Prep: ChloraPrep    Monitoring:  Heart rate, continuous pulse ox and cardiac monitor  Block Region:  Lower Extremity  Lower Extremity - Block Type:  FEMORAL nerve block, Supra-Inguinal Fascia Iliaca approach    Laterality:  Left  Procedures: ultrasound guided  Image captured, interpreted and electronically stored.  Local Infiltration:  Lidocaine  Strength:  1 %  Dose:  3 ml  Block Type:  Single-shot  Needle Length:  100mm  Needle Gauge:  21 G  Needle Localization:  Ultrasound guidance  Injection Assessment:  Negative aspiration for heme, no paresthesia on injection, incremental injection and local visualized surrounding nerve on ultrasound  Evidence of intravascular injection: No     Suprainguinal Fascia Iliaca Block   With the patient supine, the US transducer was placed perpendicular to the ASIS of the operative side. The ASIS was identified at a point where the internal oblique, transversus abdominis and psoas major are stacked medial to the iliacus muscle. The plane in between was the fascia iliaca. A nerve block needle was advanced into the fascia iliaca and local anesthetic was injected deep/lateral to the fascia iliaca, just above the iliacus muscle.

## 2020-05-30 NOTE — ANESTHESIA TIME REPORT
Anesthesia Start and Stop Event Times     Date Time Event    5/30/2020 1122 Ready for Procedure     1138 Anesthesia Start     1401 Anesthesia Stop        Responsible Staff  05/30/20    Name Role Begin End    Luis Enrique Boogie M.D. Anesth 1138 1401        Preop Diagnosis (Free Text):  Pre-op Diagnosis     Left femoral neck fracture        Preop Diagnosis (Codes):    Post op Diagnosis  Closed displaced fracture of left femoral neck (HCC)      Premium Reason  E. Weekend    Comments:

## 2020-05-30 NOTE — CARE PLAN
Problem: Safety  Goal: Will remain free from falls  Outcome: PROGRESSING AS EXPECTED  Falls precautions.  Bed alarm.       Problem: Communication  Goal: The ability to communicate needs accurately and effectively will improve  Outcome: PROGRESSING SLOWER THAN EXPECTED   Patient has periods of confusion, is forgetful, will continue to reinforce plan of care.

## 2020-05-30 NOTE — PROGRESS NOTES
"Patient arrived to unit confused, medications and orders reviewed.   Patient is aware he is NPO for surgery.  IV fluids started.  Was incontinent of urine incontinent care rendered, and CHG done.  Patient states \"I want to be knocked out for surgery.\"  "

## 2020-05-30 NOTE — PROGRESS NOTES
Nabila Page    Relation: Daughter    Home: [phone]        Admitting and pre op called.    Daughter has concerns about general anaesthesia and would like to speak with surgery as well, d/w pre op Rn Elba.   Also requests alias be lifted.

## 2020-05-30 NOTE — ED PROVIDER NOTES
ED Provider Note    CHIEF COMPLAINT  Chief Complaint   Patient presents with   • Trauma Green     Mechanical GLF, pt tripped while getting out of bed. Pt reported significant pain to left hip and ribs to EMS and 50mcg fentanyl was administered. LLE externally rotated and shortened. Pt unable to report any sensory changes or perform any ROM. Pulses intact bilaterally. Pt currently having difficulty answering questions, unknown if baseline neuro status.  Pt denies hitting his head, no head trauma noted.       HPI  Eligio Means is a 89 y.o. male with history of Parkinson's disease who presents from his living facility after a ground-level mechanical fall this evening.  Per EMS report, the patient tripped while getting out of bed and fell onto his left side.  He denies hitting his head or any loss of consciousness.  He complains of moderate, nonradiating pain mainly just at the left hip.  No associated numbness or weakness in the extremity.  Patient is not on any blood thinning medications.  He is slow to answer questions however is reportedly at his baseline per EMS.  He did receive 50 mcg of fentanyl in route with improvement of symptoms.    REVIEW OF SYSTEMS  See HPI for further details.   Review of Systems   Constitutional: Negative for chills and fever.   HENT: Negative for sore throat.    Eyes: Negative for blurred vision and redness.   Respiratory: Negative for cough and shortness of breath.    Cardiovascular: Negative for chest pain and leg swelling.   Gastrointestinal: Negative for abdominal pain and vomiting.   Genitourinary: Negative for dysuria and urgency.   Musculoskeletal: Positive for falls and joint pain. Negative for back pain and neck pain.   Skin: Negative for rash.   Neurological: Negative for focal weakness, seizures and headaches.   Psychiatric/Behavioral: Negative for suicidal ideas.         PAST MEDICAL HISTORY   has a past medical history of Parkinson disease (HCC).    SOCIAL HISTORY  Social  "History     Tobacco Use   • Smoking status: Never Smoker   • Smokeless tobacco: Never Used   Substance and Sexual Activity   • Alcohol use: Never     Frequency: Never   • Drug use: Never   • Sexual activity: Not on file       SURGICAL HISTORY  patient denies any surgical history    CURRENT MEDICATIONS  Home Medications    **Home medications have not yet been reviewed for this encounter**         ALLERGIES  Not on File    PHYSICAL EXAM   VITAL SIGNS: /83   Pulse 85   Temp 36.6 °C (97.9 °F) (Temporal)   Resp 19   Ht 1.778 m (5' 10\")   Wt 58.1 kg (128 lb)   SpO2 93%   BMI 18.37 kg/m²      Physical Exam   Constitutional: He is oriented to person, place, and time and well-developed, well-nourished, and in no distress. No distress.   Cachectic, elderly male, no distress   HENT:   Head: Normocephalic and atraumatic.   Dried vomit around his mouth   Eyes: Pupils are equal, round, and reactive to light. Conjunctivae are normal.   Neck: Normal range of motion. Neck supple.   No midline cervical spine tenderness to palpation   Cardiovascular: Normal rate, regular rhythm, normal heart sounds and intact distal pulses.   2+ DP pulses bilaterally.   Pulmonary/Chest: Effort normal and breath sounds normal. No respiratory distress.   Abdominal: Soft. He exhibits no distension. There is no abdominal tenderness.   Musculoskeletal:         General: Tenderness present. No edema.      Comments: Pain and limited range of motion of the left hip.  No obvious deformity noted.   Neurological: He is alert and oriented to person, place, and time.   Moving all extremities spontaneously.  Motor and sensation are intact distal to injury.   Skin: Skin is warm and dry.   Psychiatric: Affect normal.         DIAGNOSTIC STUDIES      LABS  Personally reviewed by me  Labs Reviewed   CBC WITH DIFFERENTIAL - Abnormal; Notable for the following components:       Result Value    WBC 12.2 (*)     RBC 3.21 (*)     Hemoglobin 10.9 (*)     " Hematocrit 33.8 (*)     .3 (*)     MCH 34.0 (*)     MCHC 32.2 (*)     RDW 52.3 (*)     MPV 8.7 (*)     Neutrophils-Polys 89.00 (*)     Lymphocytes 4.50 (*)     Neutrophils (Absolute) 10.88 (*)     Lymphs (Absolute) 0.55 (*)     All other components within normal limits   COMP METABOLIC PANEL - Abnormal; Notable for the following components:    Potassium 3.1 (*)     Chloride 113 (*)     Co2 18 (*)     Glucose 103 (*)     Bun 27 (*)     Calcium 6.6 (*)     AST(SGOT) 8 (*)     Albumin 2.8 (*)     Total Protein 4.7 (*)     All other components within normal limits   DIAGNOSTIC ALCOHOL   COD (ADULT)   COMPONENT CELLULAR   ESTIMATED GFR   COVID/SARS COV-2    Narrative:     Is this test for diagnosis or screening?->Screen   SARS-COV-2, PCR (IN-HOUSE)    Narrative:     Is this test for diagnosis or screening?->Screen   ABO RH CONFIRM           RADIOLOGY  Personally reviewed by me  CT-CHEST,ABDOMEN,PELVIS WITH   Final Result         1. No acute traumatic change in the chest, abdomen or pelvis.      2. Airspace opacities in the bilateral lower lobe, right more than left, atelectasis or consolidation      3. Acute angulated fracture of the left femoral neck      CT-LSPINE W/O PLUS RECONS   Final Result         1. No acute fracture appreciated in the lumbar spine      2. Moderate levoconvex lumbar curvature         CT-TSPINE W/O PLUS RECONS   Final Result         1. No acute fracture or malalignment appreciated in the thoracic spine      2. Old bilateral rib fractures.      3. Severe dextroconvex thoracic curvature.         CT-HEAD W/O   Final Result         1. No acute intracranial abnormality. No evidence of acute intracranial hemorrhage or mass lesion.               CT-CSPINE WITHOUT PLUS RECONS   Final Result         1. No acute fracture in the cervical spine is visualized.         DX-CHEST-LIMITED (1 VIEW)   Final Result         No acute cardiopulmonary abnormalities are identified.      DX-PELVIS-1 OR 2 VIEWS    Final Result         1. Acute angulated fracture of the left femoral neck.      DX-FEMUR-1 VIEW LEFT   Final Result         Acute angulated fracture of the left femoral neck.          ED COURSE  Vitals:    05/30/20 0340 05/30/20 0341 05/30/20 0401 05/30/20 0501   BP: 157/87  144/87 138/83   Pulse: 91 92 85 85   Resp: 16 16 17 19   Temp:       TempSrc:       SpO2: 92% 92% 95% 93%   Weight:       Height:             Medications administered:  calcium gluconate, potassium        MEDICAL DECISION MAKING  Elderly male with history of Parkinson's disease who presents after a ground-level mechanical fall with left hip pain.  He is afebrile with normal vital signs on arrival.  He is slow to respond to questioning however appears at his baseline.  There is no other evidence of traumatic injury on exam.  Imaging does not show intracranial hemorrhage, C-spine fracture.  There is no evidence of intrathoracic or intra-abdominal trauma.  He does have evidence of a left femoral neck fracture.  There is no evidence of neurovascular compromise.  Patient's labs demonstrate mild hypokalemia and mild hypocalcemia which were repleted here in the department.    I discussed the case with Dr. Estrada, orthopedic surgeon on-call.  He recommends keeping the patient n.p.o. and admitting to the hospitalist service.    I discussed case with Dr. Neal, hospitalist on-call, accepts admission of the patient.  The patient is in stable condition at time of transfer the floor      IMPRESSION  (S72.002A) Left displaced femoral neck fracture (HCC)  (E83.51) Hypocalcemia  (E87.6) Hypokalemia    Disposition: Admit medicine, stable condition  Results, diagnoses, and treatment options were discussed with the patient and/or family. Patient verbalized understanding of plan of care.    Patient referred to primary care provider for monitoring and treatment of blood pressure.      New Prescriptions    No medications on file           Electronically signed by:  Katiuska Mccollum M.D., 5/30/2020 4:55 AM

## 2020-05-30 NOTE — OR NURSING
Pt resting with eyes closed, no signs of distress. VSS, on 10L via oxymask per [facility] protocol, refused fluids this time. Daughter Nabila is updated.

## 2020-05-30 NOTE — ANESTHESIA PREPROCEDURE EVALUATION
Parkinson's disease. Denies: MI/CHF/smoking/CVA/DM/CKD. Currently has altered mental status, but pt's daughter reports he has no hx of dementia and is relatively independent at home (could have some injury/hospital-induced delirium).      Relevant Problems   No relevant active problems       Physical Exam    Airway   Mallampati: II  TM distance: >3 FB  Neck ROM: full       Cardiovascular - normal exam  Rhythm: regular  Rate: normal  (-) murmur     Dental       Very poor dentition   Pulmonary - normal exam  Breath sounds clear to auscultation     Abdominal    Neurological - normal exam               Anesthesia Plan    ASA 2       Plan - general and peripheral nerve block     Peripheral nerve block will be post-op pain control  Airway plan will be ETT  (Pt's daughter was interested in spinal anesthesia. I discussed my concerns with a spinal in this particular patient (difficult placement due to altered mental status and pain, potential BP issues due to underlying autonomic dysfunction). I discussed my plan for a gentle general anesthetic and avoiding medications that could further cause altered mental status and to do peripheral nerve block for pain control. She agreed with this plan and all her questions/concerns were addressed.)      Induction: intravenous    Postoperative Plan: Postoperative administration of opioids is intended.    Pertinent diagnostic labs and testing reviewed    Informed Consent:    Anesthetic plan and risks discussed with patient and healthcare power of .    Use of blood products discussed with: patient and healthcare power of  whom consented to blood products.

## 2020-05-30 NOTE — ED TRIAGE NOTES
[place] Eighty  89 y.o. male  Chief Complaint   Patient presents with   • Trauma Green     Mechanical GLF, pt tripped while getting out of bed. Pt reported significant pain to left hip and ribs to EMS and 50mcg fentanyl was administered. LLE externally rotated and shortened. Pt unable to report any sensory changes or perform any ROM. Pulses intact bilaterally. Pt currently having difficulty answering questions, unknown if baseline neuro status.  Pt denies hitting his head, no head trauma noted.     Pt to CT, report given to bedside RN.

## 2020-05-30 NOTE — CONSULTS
DATE OF SERVICE:  05/30/2020    REQUESTING PHYSICIAN:  Katiuska Mccollum MD    CHIEF COMPLAINT:  Left hip pain.    HISTORY OF PRESENT ILLNESS:  The patient is 89 years old, lives at [facility].  He fell last night and injured his left hip.  He was seen in   the emergency room, found to have a femoral neck fracture.  Orthopedic   consultation was requested.  Pain is worse with movement.    ALLERGIES:  None.    MEDICATIONS:  None.    PAST MEDICAL HISTORY:  None.    PAST SURGICAL HISTORY:  None.    SOCIAL HISTORY:  The patient does not smoke, drink, or use drugs.  He lives at   [facility].    FAMILY HISTORY:  Negative.    REVIEW OF SYSTEMS:  No loss of consciousness, nausea, vomiting, diarrhea,   constipation, polyuria, dysuria, fevers, chills, weight loss, weight gain,   abdominal pain, chest pain or shortness of breath.    PHYSICAL EXAMINATION:  GENERAL:  The patient is in no acute distress.  VITAL SIGNS:  Blood pressure 138/83, heart rate 85, respirations 19,   temperature on presentation 97.9.  HEENT:  Normocephalic.  There are abrasions on his forehead.  NECK:  Supple, nontender.  CHEST AND ABDOMEN:  Nontender.  No labored breathing.  The patient is   cachectic.  EXTREMITIES:  The right lower and bilateral upper extremities without   tenderness or deformity.  Left lower extremity is shortened and externally   rotated.  Skin over the left hip is intact.  He is able to dorsiflex and   plantarflex his toes.    LABORATORY DATA:  Include white blood cell count of 12,200, hematocrit 33.8%,   platelet count 199,000.  Sodium 144, potassium 3.1, creatinine 0.79, calcium   6.6, albumin 2.8.    DIAGNOSTIC DATA:  CT scan of the head, by report, shows no fractures or   intracranial bleeding.    CT scan of the cervical, thoracic, and lumbar spine, by report, shows no   fractures or malalignment.    CT scan of the chest, abdomen, and pelvis shows a displaced left femoral neck    fracture.    Radiographs of the left hip and pelvis show a displaced left femoral neck   fracture.    ASSESSMENT:  Left femoral neck fracture, closed, displaced.    PLAN:  I recommend hemiarthroplasty.  Plan this for later today with Dr. Huerta as long as the patient is medically clear.  He has received calcium and   potassium to supplement.  A COVID-19 swab has been sent.  The risks of   surgery include bleeding, infection, neurovascular injury, pain, stiffness,   fracture, dislocation, leg length discrepancy, thromboembolic phenomena,   anesthetic and medical complications, etc.       ____________________________________     MD HUAN CANAS / FRANCISCO    DD:  05/30/2020 06:49:12  DT:  05/30/2020 07:35:20    D#:  [id number]  Job#:  [id number]

## 2020-05-31 LAB
ANION GAP SERPL CALC-SCNC: 8 MMOL/L (ref 7–16)
BASOPHILS # BLD AUTO: 0.4 % (ref 0–1.8)
BASOPHILS # BLD: 0.05 K/UL (ref 0–0.12)
BUN SERPL-MCNC: 23 MG/DL (ref 8–22)
CALCIUM SERPL-MCNC: 8.9 MG/DL (ref 8.5–10.5)
CHLORIDE SERPL-SCNC: 100 MMOL/L (ref 96–112)
CO2 SERPL-SCNC: 27 MMOL/L (ref 20–33)
CREAT SERPL-MCNC: 0.97 MG/DL (ref 0.5–1.4)
EOSINOPHIL # BLD AUTO: 0.01 K/UL (ref 0–0.51)
EOSINOPHIL NFR BLD: 0.1 % (ref 0–6.9)
ERYTHROCYTE [DISTWIDTH] IN BLOOD BY AUTOMATED COUNT: 50.7 FL (ref 35.9–50)
GLUCOSE SERPL-MCNC: 135 MG/DL (ref 65–99)
HCT VFR BLD AUTO: 34.4 % (ref 42–52)
HGB BLD-MCNC: 11.2 G/DL (ref 14–18)
IMM GRANULOCYTES # BLD AUTO: 0.06 K/UL (ref 0–0.11)
IMM GRANULOCYTES NFR BLD AUTO: 0.5 % (ref 0–0.9)
LYMPHOCYTES # BLD AUTO: 0.45 K/UL (ref 1–4.8)
LYMPHOCYTES NFR BLD: 3.5 % (ref 22–41)
MCH RBC QN AUTO: 33.2 PG (ref 27–33)
MCHC RBC AUTO-ENTMCNC: 32.6 G/DL (ref 33.7–35.3)
MCV RBC AUTO: 102.1 FL (ref 81.4–97.8)
MONOCYTES # BLD AUTO: 0.85 K/UL (ref 0–0.85)
MONOCYTES NFR BLD AUTO: 6.6 % (ref 0–13.4)
NEUTROPHILS # BLD AUTO: 11.45 K/UL (ref 1.82–7.42)
NEUTROPHILS NFR BLD: 88.9 % (ref 44–72)
NRBC # BLD AUTO: 0 K/UL
NRBC BLD-RTO: 0 /100 WBC
PLATELET # BLD AUTO: 219 K/UL (ref 164–446)
PMV BLD AUTO: 9.4 FL (ref 9–12.9)
POTASSIUM SERPL-SCNC: 4.8 MMOL/L (ref 3.6–5.5)
RBC # BLD AUTO: 3.37 M/UL (ref 4.7–6.1)
SODIUM SERPL-SCNC: 135 MMOL/L (ref 135–145)
WBC # BLD AUTO: 12.9 K/UL (ref 4.8–10.8)

## 2020-05-31 PROCEDURE — A4357 BEDSIDE DRAINAGE BAG: HCPCS | Performed by: HOSPITALIST

## 2020-05-31 PROCEDURE — 700105 HCHG RX REV CODE 258: Performed by: HOSPITALIST

## 2020-05-31 PROCEDURE — A9270 NON-COVERED ITEM OR SERVICE: HCPCS | Performed by: ORTHOPAEDIC SURGERY

## 2020-05-31 PROCEDURE — A9270 NON-COVERED ITEM OR SERVICE: HCPCS | Performed by: HOSPITALIST

## 2020-05-31 PROCEDURE — 700111 HCHG RX REV CODE 636 W/ 250 OVERRIDE (IP): Performed by: ORTHOPAEDIC SURGERY

## 2020-05-31 PROCEDURE — 97162 PT EVAL MOD COMPLEX 30 MIN: CPT

## 2020-05-31 PROCEDURE — 700102 HCHG RX REV CODE 250 W/ 637 OVERRIDE(OP): Performed by: HOSPITALIST

## 2020-05-31 PROCEDURE — 700102 HCHG RX REV CODE 250 W/ 637 OVERRIDE(OP): Performed by: ORTHOPAEDIC SURGERY

## 2020-05-31 PROCEDURE — 97166 OT EVAL MOD COMPLEX 45 MIN: CPT

## 2020-05-31 PROCEDURE — 99233 SBSQ HOSP IP/OBS HIGH 50: CPT | Performed by: HOSPITALIST

## 2020-05-31 PROCEDURE — 80048 BASIC METABOLIC PNL TOTAL CA: CPT

## 2020-05-31 PROCEDURE — 36415 COLL VENOUS BLD VENIPUNCTURE: CPT

## 2020-05-31 PROCEDURE — 770006 HCHG ROOM/CARE - MED/SURG/GYN SEMI*

## 2020-05-31 PROCEDURE — 85025 COMPLETE CBC W/AUTO DIFF WBC: CPT

## 2020-05-31 RX ORDER — FLUDROCORTISONE ACETATE 0.1 MG/1
0.05 TABLET ORAL ONCE
Status: COMPLETED | OUTPATIENT
Start: 2020-05-31 | End: 2020-05-31

## 2020-05-31 RX ORDER — FLUDROCORTISONE ACETATE 0.1 MG/1
0.1 TABLET ORAL DAILY
Status: DISCONTINUED | OUTPATIENT
Start: 2020-06-01 | End: 2020-06-04 | Stop reason: HOSPADM

## 2020-05-31 RX ADMIN — CARBIDOPA AND LEVODOPA 1 TABLET: 25; 100 TABLET ORAL at 05:25

## 2020-05-31 RX ADMIN — CARBIDOPA AND LEVODOPA 1 TABLET: 25; 100 TABLET ORAL at 12:23

## 2020-05-31 RX ADMIN — FLUDROCORTISONE ACETATE 0.05 MG: 0.1 TABLET ORAL at 12:24

## 2020-05-31 RX ADMIN — ENOXAPARIN SODIUM 40 MG: 100 INJECTION SUBCUTANEOUS at 07:40

## 2020-05-31 RX ADMIN — ACETAMINOPHEN 650 MG: 325 TABLET, FILM COATED ORAL at 05:25

## 2020-05-31 RX ADMIN — MIDODRINE HYDROCHLORIDE 5 MG: 5 TABLET ORAL at 05:25

## 2020-05-31 RX ADMIN — DEXTROSE MONOHYDRATE: 50 INJECTION, SOLUTION INTRAVENOUS at 19:39

## 2020-05-31 RX ADMIN — DOCUSATE SODIUM 50 MG AND SENNOSIDES 8.6 MG 2 TABLET: 8.6; 5 TABLET, FILM COATED ORAL at 05:25

## 2020-05-31 RX ADMIN — MIDODRINE HYDROCHLORIDE 5 MG: 5 TABLET ORAL at 12:23

## 2020-05-31 RX ADMIN — DEXTROSE MONOHYDRATE: 50 INJECTION, SOLUTION INTRAVENOUS at 06:30

## 2020-05-31 RX ADMIN — FLUDROCORTISONE ACETATE 0.05 MG: 0.1 TABLET ORAL at 05:25

## 2020-05-31 RX ADMIN — CARBIDOPA AND LEVODOPA 1 TABLET: 25; 100 TABLET ORAL at 17:39

## 2020-05-31 RX ADMIN — ACETAMINOPHEN 650 MG: 325 TABLET, FILM COATED ORAL at 17:39

## 2020-05-31 RX ADMIN — MIDODRINE HYDROCHLORIDE 5 MG: 5 TABLET ORAL at 17:39

## 2020-05-31 RX ADMIN — ACETAMINOPHEN 650 MG: 325 TABLET, FILM COATED ORAL at 12:24

## 2020-05-31 RX ADMIN — DOCUSATE SODIUM 50 MG AND SENNOSIDES 8.6 MG 2 TABLET: 8.6; 5 TABLET, FILM COATED ORAL at 17:39

## 2020-05-31 RX ADMIN — CARBIDOPA AND LEVODOPA 1 TABLET: 25; 100 TABLET ORAL at 19:36

## 2020-05-31 ASSESSMENT — COGNITIVE AND FUNCTIONAL STATUS - GENERAL
TURNING FROM BACK TO SIDE WHILE IN FLAT BAD: UNABLE
SUGGESTED CMS G CODE MODIFIER MOBILITY: CM
TOILETING: A LOT
DAILY ACTIVITIY SCORE: 12
MOVING TO AND FROM BED TO CHAIR: UNABLE
STANDING UP FROM CHAIR USING ARMS: A LOT
CLIMB 3 TO 5 STEPS WITH RAILING: TOTAL
EATING MEALS: A LOT
DRESSING REGULAR LOWER BODY CLOTHING: A LOT
MOVING FROM LYING ON BACK TO SITTING ON SIDE OF FLAT BED: UNABLE
PERSONAL GROOMING: A LOT
SUGGESTED CMS G CODE MODIFIER DAILY ACTIVITY: CL
WALKING IN HOSPITAL ROOM: TOTAL
MOBILITY SCORE: 7
HELP NEEDED FOR BATHING: A LOT
DRESSING REGULAR UPPER BODY CLOTHING: A LOT

## 2020-05-31 ASSESSMENT — ACTIVITIES OF DAILY LIVING (ADL): TOILETING: INDEPENDENT

## 2020-05-31 ASSESSMENT — GAIT ASSESSMENTS: GAIT LEVEL OF ASSIST: UNABLE TO PARTICIPATE

## 2020-05-31 NOTE — PROGRESS NOTES
[facility] Medicine Daily Progress Note    Date of Service  5/31/2020    Chief Complaint  89 y.o. male admitted 5/30/2020 with Parkinson's admitted with ground-level fall and left femur fracture    Hospital Course    89 y.o. male admitted 5/30/2020 with Parkinson's admitted with ground-level fall and left femur fracture.  Underwent successful hemiarthroplasty (Walker).          Interval Problem Update  Seen and examined. Chart reviewed, including labs and any pertinent imaging.  Dementia limits additional history  Doing well after procedure    Consultants/Specialty  Ortho    Code Status  DNR/DNI    Disposition  Inpatient    Review of Systems  Review of Systems   Unable to perform ROS: Dementia        Physical Exam  Temp:  [36.2 °C (97.1 °F)-37.2 °C (99 °F)] 36.2 °C (97.2 °F)  Pulse:  [] 89  Resp:  [10-17] 17  BP: ()/(59-87) 92/59  SpO2:  [91 %-98 %] 97 %    Physical Exam  Vitals signs and nursing note reviewed.   Constitutional:       General: He is not in acute distress.     Appearance: He is ill-appearing. He is not diaphoretic.   HENT:      Head: Normocephalic and atraumatic.      Nose: No congestion.      Mouth/Throat:      Mouth: Mucous membranes are moist.   Eyes:      General:         Right eye: No discharge.         Left eye: No discharge.      Extraocular Movements: Extraocular movements intact.      Conjunctiva/sclera: Conjunctivae normal.   Neck:      Musculoskeletal: Normal range of motion. No neck rigidity.   Cardiovascular:      Rate and Rhythm: Normal rate.      Pulses: Normal pulses.   Pulmonary:      Effort: Pulmonary effort is normal. No respiratory distress.      Breath sounds: No wheezing.   Abdominal:      General: Abdomen is flat. There is no distension.      Palpations: Abdomen is soft.      Tenderness: There is no abdominal tenderness.   Musculoskeletal: Normal range of motion.         General: Tenderness and signs of injury present.   Skin:     General: Skin is warm and dry.       Coloration: Skin is not jaundiced.   Neurological:      General: No focal deficit present.      Mental Status: He is alert.         Fluids    Intake/Output Summary (Last 24 hours) at 5/31/2020 1111  Last data filed at 5/31/2020 0600  Gross per 24 hour   Intake 1740 ml   Output 1300 ml   Net 440 ml       Laboratory  Recent Labs     05/30/20  0311 05/31/20  0035   WBC 12.2* 12.9*   RBC 3.21* 3.37*   HEMOGLOBIN 10.9* 11.2*   HEMATOCRIT 33.8* 34.4*   .3* 102.1*   MCH 34.0* 33.2*   MCHC 32.2* 32.6*   RDW 52.3* 50.7*   PLATELETCT 199 219   MPV 8.7* 9.4     Recent Labs     05/30/20 0311 05/31/20  0035   SODIUM 144 135   POTASSIUM 3.1* 4.8   CHLORIDE 113* 100   CO2 18* 27   GLUCOSE 103* 135*   BUN 27* 23*   CREATININE 0.79 0.97   CALCIUM 6.6* 8.9                   Imaging  DX-PELVIS-1 OR 2 VIEWS   Final Result      Status post left hip hemiarthroplasty      CT-CHEST,ABDOMEN,PELVIS WITH   Final Result         1. No acute traumatic change in the chest, abdomen or pelvis.      2. Airspace opacities in the bilateral lower lobe, right more than left, atelectasis or consolidation      3. Acute angulated fracture of the left femoral neck      CT-LSPINE W/O PLUS RECONS   Final Result         1. No acute fracture appreciated in the lumbar spine      2. Moderate levoconvex lumbar curvature         CT-TSPINE W/O PLUS RECONS   Final Result         1. No acute fracture or malalignment appreciated in the thoracic spine      2. Old bilateral rib fractures.      3. Severe dextroconvex thoracic curvature.         CT-HEAD W/O   Final Result         1. No acute intracranial abnormality. No evidence of acute intracranial hemorrhage or mass lesion.               CT-CSPINE WITHOUT PLUS RECONS   Final Result         1. No acute fracture in the cervical spine is visualized.         DX-CHEST-LIMITED (1 VIEW)   Final Result         No acute cardiopulmonary abnormalities are identified.      DX-PELVIS-1 OR 2 VIEWS   Final Result         1.  Acute angulated fracture of the left femoral neck.      DX-FEMUR-1 VIEW LEFT   Final Result         Acute angulated fracture of the left femoral neck.           Assessment/Plan  * Closed fracture of neck of left femur ([facility])  Assessment & Plan  Orthopedics consulted  Hemiarthroplasty 5/30  PT OT    Parkinson disease ([facility])  Assessment & Plan  On sinemet which will be continued.    Does apparently have some autonomic dysfunction currently on Midodrine and florinef  Patient still was somewhat soft systolic blood pressure, increase Florinef 0.1 mg daily with additional 0.05 mg once  Follow a.m. CMP    Hypokalemia  Assessment & Plan  Replacement.  Monitor.     Macrocytic anemia  Assessment & Plan  Suspect this is chronic with no current evidence of bleed.  Monitor.  Transfuse as needed for hemoglobin less than 7 g/Fernie.     VTE prophylaxis: Lovenox

## 2020-05-31 NOTE — THERAPY
"Occupational Therapy   Initial Evaluation     Patient Name: Miguel Ángel Page  Age:  89 y.o., Sex:  male  Medical Record #: [id number]  Today's Date: 5/31/2020       Precautions: Fall Risk, Weight Bearing As Tolerated Right Lower Extremity, \"abductor precautions\"  Comments: hx PD    Subjective    \"They did not help me to dress or bathe at [facility].\"    Assessment  Patient is 89 y.o. male s/p L hip hemiarthroplasty, WBAT. Additional factors influencing patient status / progress: history of Parkinson's disease, advanced age. Pt presents to OT eval with significantly decreased independence if pt report of PLOF I is accurate, states that [facility] staff only provided meals but otherwise he was independent for ADLs (would like to confirm PLOF with family or [facility] residence staff). Pt presents at [facility] for bed mobility and [facility] for all ADLs including self-feeding. Pt will benefit from acute OT intervention as well as post-acute placement to increase independence in ADLs prior to DC home. Pt may require long-term increase in assistance at home.      Objective       05/31/20 1406   Prior Living Situation   Prior Services Other (Comments)  ([facility])   Housing / Facility Assisted Living Residence;Independent Living Facility  ([facility])   Bathroom Set up Grab Bars;Shower Chair;Walk In Shower   Equipment Owned 4-Wheel Walker;Tub / Shower Seat;Grab Bar(s) In Tub / Shower;Grab Bar(s) By Toilet   Lives with - Patient's Self Care Capacity Alone and Able to Care For Self   Comments pt reports he was I for ADLs at [facility], may not be reliable historian   Prior Level of ADL Function   Comments pt reports PLOF I   Prior Level of IADL Function   Medication Management Independent   Laundry Requires Assist   Kitchen Mobility Independent   Finances Independent   Home Management Requires Assist   Shopping Requires Assist   Prior Level Of Mobility Independent With Device in Home   Comments reports [facility] provides meals " and housekeeping only   Precautions   Precautions Fall Risk;Weight Bearing As Tolerated Right Lower Extremity   Comments hx PD   Cognition    Speech/ Communication Delayed Responses  (very soft spoken)   Level of Consciousness Alert   Attention Impaired   Sequencing Impaired   Initiation Impaired   Comments poor initiation and follow through of motor movements despite verbal and tactile cues   Strength Upper Body   Gross Strength Generalized Weakness, Equal Bilaterally.    Upper Body Muscle Tone   Comments cachectic, very frail   Balance Assessment   Sitting Balance (Static) Poor +   Sitting Balance (Dynamic) Poor   Standing Balance (Static) Trace +   Standing Balance (Dynamic) Trace   Weight Shift Sitting Poor   Weight Shift Standing Absent   Comments w/FWW   Bed Mobility    Supine to Sit Maximal Assist   Scooting Maximal Assist   ADL Assessment   Eating Moderate Assist   Grooming Moderate Assist   Upper Body Dressing Moderate Assist   Lower Body Dressing Maximal Assist   Functional Mobility   Sit to Stand Maximal Assist   Bed, Chair, Wheelchair Transfer Total Assist   Transfer Method Stand Pivot   Mobility knees buckling during stand pivot   Comments rigid Parkinsonian movements, posterior lean, freezing   Short Term Goals   Short Term Goal # 1 pt will demo functional transfer with ModA   Short Term Goal # 2 pt will complete seated grooming ADLs with SPV   Short Term Goal # 3 pt will demo UB dress with Ilsa   Problem List   Problem List Decreased Active Daily Living Skills;Decreased Homemaking Skills;Decreased Activity Tolerance;Decreased Functional Mobility;Impaired Postural Control / Balance;Limited Knowledge of Post Op Precautions;Impaired Posture / Trunk Alignment;Decreased Upper Extremity Strength Right;Decreased Upper Extremity Strength Left       Plan    Recommend Occupational Therapy 3 times per week until therapy goals are met for the following treatments:  Adaptive Equipment, Self Care/Activities of  Daily Living, Therapeutic Activities and Therapeutic Exercises.    Discharge recommendations:  Recommend post-acute placement for additional occupational therapy services prior to discharge home.

## 2020-05-31 NOTE — DIETARY
Nutrition note:  Pt with BMI <19; however, admit wt was from other healthcare provider.    Called RN, pt did well with breakfast today (ate %).  He is having some confusion.   RN will attempt to obtain actual wt today, if pt able to stand.  RN unsure if bed scale working.   RD will monitor per dept policy.

## 2020-05-31 NOTE — THERAPY
"Physical Therapy   Initial Evaluation     Patient Name: Miguel Ángel Page  Age:  89 y.o., Sex:  male  Medical Record #: [id number]  Today's Date: 5/31/2020     Precautions: Fall Risk, Weight Bearing As Tolerated Right Lower Extremity, Other (\"abductor precautions only\" per PT order set. No OP-note in chart at time of evaluation.)    Assessment  Patient is 89 y.o. male admitted following GLF. Pt now s/p L hip hemiarthroplasty for L femoral neck fx. Pt with hx of Parkinson's. Pt presents to PT very rigid, with decreased AROM, with bradykinetic movements, and with poor sequencing and initiation. Pt required mod-max A for all mobility at this time and presents at high risk for falls. Recommend postacute placement prior to return home to maximize safety and functional independence.       Plan  Recommend Physical Therapy 4 times per week until therapy goals are met for the following treatments:  Bed Mobility, Gait Training, Neuro Re-Education / Balance, Self Care/Home Evaluation, Therapeutic Activities and Therapeutic Exercises  Discharge recommendations:  Recommend post-acute placement for continued physical therapy services prior to discharge home.          05/31/20 0204   Prior Living Situation   Prior Services Other (Comments)  (lives at [facility])   Housing / Facility Independent Living Facility   Equipment Owned 4-Wheel Walker   Lives with - Patient's Self Care Capacity Alone and Able to Care For Self   Comments pt reports meals are brought to him but he is otherwise independent with mobility and ADLs   Prior Level of Functional Mobility   Bed Mobility Independent   Transfer Status Independent   Ambulation Independent   Distance Ambulation (Feet)   (limited community)   Assistive Devices Used 4-Wheel Walker   Comments reports he participates in group exercise classes at [facility]   Balance Assessment   Sitting Balance (Static) Poor +   Sitting Balance (Dynamic) Poor   Standing Balance (Static) Trace +   Standing " Balance (Dynamic) Trace   Weight Shift Sitting Poor   Weight Shift Standing Absent   Comments standing with FWW. strong posterior lean, B knee flexion. requires heavy assist to bring trunk fwd. pt unable to acheive full upright or full knee ext. poor eccentric control when moving to sitting.    Gait Analysis   Gait Level Of Assist Unable to Participate   Bed Mobility    Supine to Sit Maximal Assist   Sit to Supine   (up in chair, RN present, requests pt in chair at this time.)   Scooting Maximal Assist   Functional Mobility   Sit to Stand Maximal Assist   Bed, Chair, Wheelchair Transfer Maximal Assist   Transfer Method Stand Pivot   Mobility pt with B knee buckling while pivot transferring. essentially sitting on therapist lap to transfer to chair.    Short Term Goals    Short Term Goal # 1 pt will perform supine <> sit without bed features with SPV in 6 visits for return to PLOF   Short Term Goal # 2 pt will perform all functional xfrs with SPV in 6 visits for return to PLOF   Short Term Goal # 3 pt will ambulate 150ft with FWW and SPV in 6 visits to access home environment   Anticipated Discharge Equipment   DC Equipment Unable To Determine At This Time

## 2020-05-31 NOTE — PROGRESS NOTES
Patient titrated off facemask oxygen Rn overnight was unable to completely wean.  Attempted to go to room air however saturation dropped as low as 83.  Started supplemental o2 via NC now 95% on 2L.  Encouraged use of IS and performed pulling low volume 500ml.    Ordered condom cath and drainage bag as plan for indwelling urinary catheter removal today once supplies arrive and patient had incontinence yesterday prior to surgery.    Updated daughter on plan of care, patient remains confused and is somewhat non compliant with requests and has poor retention of teaching.     Spoke with PT will plan to see patient today for therapy, was unable to mobilize overnight per report.   Plan of care d/w CM as well.

## 2020-05-31 NOTE — CARE PLAN
Problem: Infection  Goal: Will remain free from infection  Outcome: PROGRESSING AS EXPECTED  Note: Pt afebrile. Pt educated on signs and symptoms of infection.     Problem: Knowledge Deficit  Goal: Knowledge of disease process/condition, treatment plan, diagnostic tests, and medications will improve  Outcome: PROGRESSING AS EXPECTED

## 2020-05-31 NOTE — CARE PLAN
Problem: Infection  Goal: Will remain free from infection  Outcome: PROGRESSING AS EXPECTED  Afebrile, surgical site intact with prevena.      Problem: Pain Management  Goal: Pain level will decrease to patient's comfort goal  Outcome: PROGRESSING AS EXPECTED    Comfortable without PRN opiates.

## 2020-05-31 NOTE — PROGRESS NOTES
Indwelling urinary catheter removed, scan blood noted, patient has been confused so its possible he tugged on catheter.  Patient tolerated removal well, placed condom cath and urine collection bag as the patient was incontinent yesterday.   I discussed the DI score with Dr. Soto as well as noted in flowsheets.

## 2020-05-31 NOTE — PROGRESS NOTES
"   Orthopaedic PA Progress Note    Interval changes:did well overnight    ROS - Patient denies any new issues. No chest pain, dyspnea, or fever.  Pain well controlled.    BP (!) 92/59   Pulse 89   Temp 36.2 °C (97.2 °F) (Temporal)   Resp 17   Ht 1.778 m (5' 10\")   Wt 58.1 kg (128 lb)   SpO2 97%     Patient seen and examined  No acute distress  Breathing non labored  RRR  Surgical dressing is clean, dry, and intact. Patient clearly fires tibialis anterior, EHL, and gastrocnemius/soleus. Sensation is intact to light touch throughout superficial peroneal, deep peroneal, tibial, saphenous, and sural nerve distributions. Strong and palpable 2+ dorsalis pedis and posterior tibial pulses with capillary refill less than 2 seconds. No lower leg tenderness or discomfort.    Recent Labs     05/30/20  0311 05/31/20  0035   WBC 12.2* 12.9*   RBC 3.21* 3.37*   HEMOGLOBIN 10.9* 11.2*   HEMATOCRIT 33.8* 34.4*   .3* 102.1*   MCH 34.0* 33.2*   MCHC 32.2* 32.6*   RDW 52.3* 50.7*   PLATELETCT 199 219   MPV 8.7* 9.4       Active Hospital Problems    Diagnosis   • Closed fracture of neck of left femur ([facility]) [S72.002A]   • Macrocytic anemia [D53.9]   • Hypokalemia [E87.6]   • Parkinson disease ([facility]) [G20]       Assessment/Plan:  POD#1 S/P L hip Kuldeep  Wt bearing status - AT  PT/OT-initiated  Wound care:dressing left in place  Drains - no  Heck-no  Sutures/Staples out- 10-14 days post operatively  Antibiotics: complete  DVT Prophylaxis- TEDS/SCDs/Foot pumps.   Lovenox: Start 40 mg daily, Duration-until ambulatory > 150'  Future Procedures - none planned  Case Coordination for Discharge Planning - Disposition per Med  May transition to BID  mg PO as outpt   Follow-Up: needs appointment with Dr. Huerta at [facility] at 10-14 days post-op for re-evaluation, staple removal and radiographs.        "

## 2020-06-01 LAB
ALBUMIN SERPL BCP-MCNC: 3.3 G/DL (ref 3.2–4.9)
ALBUMIN/GLOB SERPL: 1.2 G/DL
ALP SERPL-CCNC: 53 U/L (ref 30–99)
ALT SERPL-CCNC: 5 U/L (ref 2–50)
ANION GAP SERPL CALC-SCNC: 11 MMOL/L (ref 7–16)
AST SERPL-CCNC: 27 U/L (ref 12–45)
BILIRUB SERPL-MCNC: 0.8 MG/DL (ref 0.1–1.5)
BUN SERPL-MCNC: 23 MG/DL (ref 8–22)
CALCIUM SERPL-MCNC: 8.7 MG/DL (ref 8.5–10.5)
CHLORIDE SERPL-SCNC: 95 MMOL/L (ref 96–112)
CO2 SERPL-SCNC: 26 MMOL/L (ref 20–33)
CREAT SERPL-MCNC: 0.95 MG/DL (ref 0.5–1.4)
ERYTHROCYTE [DISTWIDTH] IN BLOOD BY AUTOMATED COUNT: 47.8 FL (ref 35.9–50)
GLOBULIN SER CALC-MCNC: 2.7 G/DL (ref 1.9–3.5)
GLUCOSE SERPL-MCNC: 123 MG/DL (ref 65–99)
HCT VFR BLD AUTO: 34.9 % (ref 42–52)
HGB BLD-MCNC: 11.5 G/DL (ref 14–18)
MCH RBC QN AUTO: 33 PG (ref 27–33)
MCHC RBC AUTO-ENTMCNC: 33 G/DL (ref 33.7–35.3)
MCV RBC AUTO: 100 FL (ref 81.4–97.8)
PLATELET # BLD AUTO: 222 K/UL (ref 164–446)
PMV BLD AUTO: 9.6 FL (ref 9–12.9)
POTASSIUM SERPL-SCNC: 4.1 MMOL/L (ref 3.6–5.5)
PROT SERPL-MCNC: 6 G/DL (ref 6–8.2)
RBC # BLD AUTO: 3.49 M/UL (ref 4.7–6.1)
SODIUM SERPL-SCNC: 132 MMOL/L (ref 135–145)
WBC # BLD AUTO: 13 K/UL (ref 4.8–10.8)

## 2020-06-01 PROCEDURE — A9270 NON-COVERED ITEM OR SERVICE: HCPCS | Performed by: ORTHOPAEDIC SURGERY

## 2020-06-01 PROCEDURE — 94760 N-INVAS EAR/PLS OXIMETRY 1: CPT

## 2020-06-01 PROCEDURE — 85027 COMPLETE CBC AUTOMATED: CPT

## 2020-06-01 PROCEDURE — 700102 HCHG RX REV CODE 250 W/ 637 OVERRIDE(OP): Performed by: HOSPITALIST

## 2020-06-01 PROCEDURE — 36415 COLL VENOUS BLD VENIPUNCTURE: CPT

## 2020-06-01 PROCEDURE — 99232 SBSQ HOSP IP/OBS MODERATE 35: CPT | Performed by: HOSPITALIST

## 2020-06-01 PROCEDURE — 97530 THERAPEUTIC ACTIVITIES: CPT

## 2020-06-01 PROCEDURE — 700102 HCHG RX REV CODE 250 W/ 637 OVERRIDE(OP): Performed by: ORTHOPAEDIC SURGERY

## 2020-06-01 PROCEDURE — 92610 EVALUATE SWALLOWING FUNCTION: CPT

## 2020-06-01 PROCEDURE — 700105 HCHG RX REV CODE 258: Performed by: HOSPITALIST

## 2020-06-01 PROCEDURE — 80053 COMPREHEN METABOLIC PANEL: CPT

## 2020-06-01 PROCEDURE — 700105 HCHG RX REV CODE 258

## 2020-06-01 PROCEDURE — 700111 HCHG RX REV CODE 636 W/ 250 OVERRIDE (IP): Performed by: ORTHOPAEDIC SURGERY

## 2020-06-01 PROCEDURE — 770006 HCHG ROOM/CARE - MED/SURG/GYN SEMI*

## 2020-06-01 PROCEDURE — A9270 NON-COVERED ITEM OR SERVICE: HCPCS | Performed by: HOSPITALIST

## 2020-06-01 RX ORDER — SODIUM CHLORIDE, SODIUM LACTATE, POTASSIUM CHLORIDE, CALCIUM CHLORIDE 600; 310; 30; 20 MG/100ML; MG/100ML; MG/100ML; MG/100ML
INJECTION, SOLUTION INTRAVENOUS
Status: COMPLETED
Start: 2020-06-01 | End: 2020-06-01

## 2020-06-01 RX ADMIN — MIDODRINE HYDROCHLORIDE 5 MG: 5 TABLET ORAL at 08:48

## 2020-06-01 RX ADMIN — ENOXAPARIN SODIUM 40 MG: 100 INJECTION SUBCUTANEOUS at 08:48

## 2020-06-01 RX ADMIN — OXYCODONE 2.5 MG: 5 TABLET ORAL at 02:42

## 2020-06-01 RX ADMIN — CARBIDOPA AND LEVODOPA 1 TABLET: 25; 100 TABLET ORAL at 17:17

## 2020-06-01 RX ADMIN — DOCUSATE SODIUM 50 MG AND SENNOSIDES 8.6 MG 2 TABLET: 8.6; 5 TABLET, FILM COATED ORAL at 04:14

## 2020-06-01 RX ADMIN — CARBIDOPA AND LEVODOPA 1 TABLET: 25; 100 TABLET ORAL at 11:14

## 2020-06-01 RX ADMIN — MIDODRINE HYDROCHLORIDE 5 MG: 5 TABLET ORAL at 11:13

## 2020-06-01 RX ADMIN — ACETAMINOPHEN 650 MG: 325 TABLET, FILM COATED ORAL at 17:19

## 2020-06-01 RX ADMIN — SODIUM CHLORIDE, POTASSIUM CHLORIDE, SODIUM LACTATE AND CALCIUM CHLORIDE 1000 ML: 600; 310; 30; 20 INJECTION, SOLUTION INTRAVENOUS at 07:18

## 2020-06-01 RX ADMIN — DEXTROSE MONOHYDRATE: 50 INJECTION, SOLUTION INTRAVENOUS at 11:49

## 2020-06-01 RX ADMIN — DOCUSATE SODIUM 50 MG AND SENNOSIDES 8.6 MG 2 TABLET: 8.6; 5 TABLET, FILM COATED ORAL at 17:19

## 2020-06-01 RX ADMIN — ACETAMINOPHEN 650 MG: 325 TABLET, FILM COATED ORAL at 11:13

## 2020-06-01 RX ADMIN — CARBIDOPA AND LEVODOPA 1 TABLET: 25; 100 TABLET ORAL at 04:14

## 2020-06-01 RX ADMIN — ACETAMINOPHEN 650 MG: 325 TABLET, FILM COATED ORAL at 04:14

## 2020-06-01 RX ADMIN — CARBIDOPA AND LEVODOPA 1 TABLET: 25; 100 TABLET ORAL at 20:16

## 2020-06-01 RX ADMIN — MIDODRINE HYDROCHLORIDE 5 MG: 5 TABLET ORAL at 17:00

## 2020-06-01 RX ADMIN — FLUDROCORTISONE ACETATE 0.1 MG: 0.1 TABLET ORAL at 04:15

## 2020-06-01 ASSESSMENT — COGNITIVE AND FUNCTIONAL STATUS - GENERAL
CLIMB 3 TO 5 STEPS WITH RAILING: TOTAL
MOBILITY SCORE: 6
TURNING FROM BACK TO SIDE WHILE IN FLAT BAD: UNABLE
STANDING UP FROM CHAIR USING ARMS: TOTAL
MOVING TO AND FROM BED TO CHAIR: UNABLE
WALKING IN HOSPITAL ROOM: TOTAL
MOVING FROM LYING ON BACK TO SITTING ON SIDE OF FLAT BED: UNABLE
SUGGESTED CMS G CODE MODIFIER MOBILITY: CN

## 2020-06-01 ASSESSMENT — GAIT ASSESSMENTS: GAIT LEVEL OF ASSIST: UNABLE TO PARTICIPATE

## 2020-06-01 NOTE — DISCHARGE PLANNING
Care Transition Team Assessment  In the case of an emergency, pt's legal NOK is daughter Nabila Page     RNCM called pt's daughter and obtained the information used in this assessment. Pt verified accuracy of facesheet. Pt lives in  [facility].  [facility]. Prior to current hospitalization, pt was mosly independent in ADLS/IADLS, required set-up assist for shower and med management.  Pt has no financial concerns. Pt has a good support system. Pt denies any hx of substance use and denies any dx of mh. Pt will need SNF at discharge. Choice is [facility] and [facility] per daughter.    Information Source:daughter  Orientation : Disoriented to Event, Disoriented to Place, Disoriented to Time  Information Given By: Relative  Informant's Name: (Nabila Page)  Who is responsible for making decisions for patient? : Patient         Elopement Risk  Legal Hold: No  Ambulatory or Self Mobile in Wheelchair: No-Not an Elopement Risk  Elopement Risk: Not at Risk for Elopement    Interdisciplinary Discharge Planning  Does Admitting Nurse Feel This Could be a Complex Discharge?: No  Primary Care Physician: (Silvino)  Lives with - Patient's Self Care Capacity: (assisted living)  Patient or legal guardian wants to designate a caregiver (see row info): Yes  Caregiver name: Nabila  Caregiver relationship to patient: Daughter  Caregiver contact info: see demographics tab.  Support Systems: Family Member(s)  Housing / Facility: Assisted Living Residence  Name of Care Facility: ([facility])  Do You Take your Prescribed Medications Regularly: Yes  Mobility Issues: Yes  Prior Services: Continuous (24 Hour) Care Giving Per Service  Patient Expects to be Discharged to:: ([facility])  Durable Medical Equipment: Walker    Discharge Preparedness  What is your plan after discharge?: (assist living)  What are your discharge supports?: Child  Prior Functional Level: Ambulatory, Needs Assist with Activities of Daily Living,  Needs Assist with Medication Management, Uses Walker, Uses Wheelchair  Difficulity with ADLs: Bathing, Walking  Difficulity with IADLs: Cooking, Driving, Keeping track of finances, Laundry, Managing medication, Shopping    Functional Assesment  Prior Functional Level: Ambulatory, Needs Assist with Activities of Daily Living, Needs Assist with Medication Management, Uses Walker, Uses Wheelchair         Vision / Hearing Impairment  Right Eye Vision: Impaired  Left Eye Vision: Impaired  Hearing Impairment : No              Domestic Abuse  Have you ever been the victim of abuse or violence?: No  Physical Abuse or Sexual Abuse: No  Verbal Abuse or Emotional Abuse: No  Possible Abuse Reported to:: Not Applicable    Psychological Assessment  History of Substance Abuse: None  History of Psychiatric Problems: No         Anticipated Discharge Information  Anticipated discharge disposition: SNF

## 2020-06-01 NOTE — CARE PLAN
Problem: Infection  Goal: Will remain free from infection  Outcome: PROGRESSING AS EXPECTED  Surgical site free from s/s of infection. Hand  wipes at bedside for patient prior to eating.     Problem: Safety  Goal: Will remain free from injury  6/1/2020 1136 by Sara Samuels, Student  Outcome: PROGRESSING AS EXPECTED   Bed locked and in lowest position. Call light within reach. Pt educated to call for assistance.

## 2020-06-01 NOTE — CARE PLAN
Problem: Knowledge Deficit  Goal: Knowledge of disease process/condition, treatment plan, diagnostic tests, and medications will improve  Outcome: PROGRESSING AS EXPECTED     Problem: Bowel/Gastric:  Goal: Will not experience complications related to bowel motility  Outcome: PROGRESSING SLOWER THAN EXPECTED  Note: Pt receiving scheduled stool softeners. No BM since hospital admission.

## 2020-06-01 NOTE — THERAPY
"Physical Therapy   Daily Treatment     Patient Name: Miguel Ángel Page  Age:  89 y.o., Sex:  male  Medical Record #: [id number]  Today's Date: 6/1/2020     Precautions: (Pended) Fall Risk, Weight Bearing As Tolerated Right Lower Extremity(abductor precautions L )       Objective       06/01/20 1101   Precautions   Precautions Fall Risk;Weight Bearing As Tolerated Right Lower Extremity  (abductor precautions L )   Comments h/o Parkinson's   Cognition    Cognition / Consciousness X   Speech/ Communication Delayed Responses   Level of Consciousness Confused   Attention Impaired   Sequencing Impaired   Initiation Impaired   Comments Reaching out for IV pole despite it being so far away as to be not an option for support. Pt possibly hallucinating. Pt was asked how his vision is and he responded \"not good\", unable to qualify. Pt not able to follow cues to lean forward despite assist to position self. Pt unable to maintain flexed knee position despite assist.    Lower Body Muscle Tone   Lower Body Muscle Tone  X   Comments rigid, PD-like.    Vision   Vision Comments Pt reports having poor vision but unable to describe further.    Neurological Concerns   Neurological Concerns Yes   Comments stiff, rigid, checked with nsg about whether pt has been getting PD meds on his regular schedule. Nsg reports yes.    Balance   Comments pt leaning back, unable to maintain forward flexed posture at EOB despite assist to start position before attempting sit to stand. Leaning back with feet coming off the floor.    Gait Analysis   Gait Level Of Assist Unable to Participate   Bed Mobility    Supine to Sit Maximal Assist   Sit to Supine Maximal Assist   Scooting Maximal Assist   Functional Mobility   Sit to Stand Unable to Participate   Short Term Goals    Short Term Goal # 1 pt will perform supine <> sit without bed features with SPV in 6 visits for return to PLOF   Goal Outcome # 1 goal not met   Short Term Goal # 2 pt will perform all " functional xfrs with SPV in 6 visits for return to PLOF   Goal Outcome # 2 Goal not met   Short Term Goal # 3 pt will ambulate 150ft with FWW and SPV in 6 visits to access home environment   Goal Outcome # 3 Goal not met   Anticipated Discharge Equipment   DC Equipment Unable To Determine At This Time       Assessment    Today, pt is so rigid and stiff that he is unable to actively participate in attempted sit to stand. Per nsg, pt is getting his PD meds. PT will continue to follow. Pt will need a transitional care stay upon d/c.     Plan    Continue current treatment plan.    Discharge recommendations:  Recommend post-acute placement for continued physical therapy services prior to discharge home.

## 2020-06-01 NOTE — DISCHARGE PLANNING
Agency/Facility Name: [facility]  Spoke To: Scarlett  Outcome: Pt accepted. Bed available 6/8 maybe.    RN CM informed

## 2020-06-01 NOTE — DISCHARGE PLANNING
Received Choice form at 9950  Agency/Facility Name: [facility](1), [facility](2)  Referral sent per Choice form @ [phone]

## 2020-06-01 NOTE — PROGRESS NOTES
Pt remains very confused, demanding that his roommate be put out of the room. Pt reoriented to hospital, situation, time. Pt's condom cath came off again. Pt became very agitated and refused application of new condom cath. Brief in place.

## 2020-06-01 NOTE — PROGRESS NOTES
"S:  Seen and examined.  POD #2 s/p L hip karel.  Confused this AM per nursing.  No distress.    O: BP (!) 89/60   Pulse (!) 103   Temp 36.9 °C (98.5 °F) (Temporal)   Resp 18   Ht 1.778 m (5' 10\")   Wt 58.1 kg (128 lb)   SpO2 94% .      Intake/Output Summary (Last 24 hours) at 6/1/2020 0715  Last data filed at 6/1/2020 0226  Gross per 24 hour   Intake 781.23 ml   Output 1150 ml   Net -368.77 ml   .    Operative/injured extremity examined.  Compartments soft, distal light touch sensation intact, firing EHL/TA/GS/P.  Toes warm, well-perfused.  Incisional vac to suction.    Recent Labs     05/30/20  0311 05/31/20  0035 06/01/20  0430   WBC 12.2* 12.9* 13.0*   RBC 3.21* 3.37* 3.49*   HEMOGLOBIN 10.9* 11.2* 11.5*   HEMATOCRIT 33.8* 34.4* 34.9*   .3* 102.1* 100.0*   MCH 34.0* 33.2* 33.0   MCHC 32.2* 32.6* 33.0*   RDW 52.3* 50.7* 47.8   PLATELETCT 199 219 222   MPV 8.7* 9.4 9.6       A/P:    POD #2 s/p L hip karel    Antibiotics: None required  Activity: WBAT, abductor precautions operative extremity.  PT today.  Diet: General  DVT: Mechanical (SCDs) + Pharmacologic (Lovenox)  Dispo: D/C planning    "

## 2020-06-01 NOTE — PROGRESS NOTES
Reoriented patient to being in the hospital. Reoriented patient to current time ([id number]). Will continue to monitor.

## 2020-06-01 NOTE — RESPIRATORY CARE
Oxygen Rounds      Patient found on    O2 L/m:  _4________    Oxygen device:  ___nc_____   Spo2: _____100____%      Patient titrated to   O2 L/m: ___3_____   Oxygen device: ____nc_____   Spo2: _____98____%   Respiratory device skin site inspection completed.

## 2020-06-01 NOTE — PROGRESS NOTES
[facility] Medicine Daily Progress Note    Date of Service  6/1/2020    Chief Complaint  89 y.o. male admitted 5/30/2020 with Parkinson's admitted with ground-level fall and left femur fracture    Hospital Course    89 y.o. male admitted 5/30/2020 with Parkinson's admitted with ground-level fall and left femur fracture.  Underwent successful hemiarthroplasty (Walker).        Interval Problem Update  Seen and examined. Chart reviewed, including labs and any pertinent imaging.  Dementia limits additional history  Doing well after procedure  PT OT  Anticipate SNF    Consultants/Specialty  Ortho    Code Status  DNR/DNI    Disposition  Inpatient    Review of Systems  Review of Systems   Unable to perform ROS: Dementia      Physical Exam  Temp:  [36.1 °C (97 °F)-36.9 °C (98.5 °F)] 36.8 °C (98.3 °F)  Pulse:  [] 83  Resp:  [17-18] 18  BP: ()/(44-77) 112/61  SpO2:  [91 %-98 %] 98 %    Physical Exam  Vitals signs and nursing note reviewed.   Constitutional:       General: He is not in acute distress.     Appearance: He is ill-appearing. He is not diaphoretic.   HENT:      Head: Normocephalic and atraumatic.      Nose: No congestion.      Mouth/Throat:      Mouth: Mucous membranes are moist.   Eyes:      General:         Right eye: No discharge.         Left eye: No discharge.      Extraocular Movements: Extraocular movements intact.      Conjunctiva/sclera: Conjunctivae normal.   Neck:      Musculoskeletal: Normal range of motion. No neck rigidity.   Cardiovascular:      Rate and Rhythm: Normal rate.      Pulses: Normal pulses.   Pulmonary:      Effort: Pulmonary effort is normal. No respiratory distress.      Breath sounds: No wheezing.   Abdominal:      General: Abdomen is flat. There is no distension.      Palpations: Abdomen is soft.      Tenderness: There is no abdominal tenderness.   Musculoskeletal: Normal range of motion.         General: Tenderness and signs of injury present.   Skin:     General: Skin is  warm and dry.      Coloration: Skin is not jaundiced.   Neurological:      General: No focal deficit present.      Mental Status: He is alert.         Fluids    Intake/Output Summary (Last 24 hours) at 6/1/2020 1241  Last data filed at 6/1/2020 0800  Gross per 24 hour   Intake 671.23 ml   Output 800 ml   Net -128.77 ml       Laboratory  Recent Labs     05/30/20 0311 05/31/20  0035 06/01/20  0430   WBC 12.2* 12.9* 13.0*   RBC 3.21* 3.37* 3.49*   HEMOGLOBIN 10.9* 11.2* 11.5*   HEMATOCRIT 33.8* 34.4* 34.9*   .3* 102.1* 100.0*   MCH 34.0* 33.2* 33.0   MCHC 32.2* 32.6* 33.0*   RDW 52.3* 50.7* 47.8   PLATELETCT 199 219 222   MPV 8.7* 9.4 9.6     Recent Labs     05/30/20 0311 05/31/20  0035 06/01/20  0430   SODIUM 144 135 132*   POTASSIUM 3.1* 4.8 4.1   CHLORIDE 113* 100 95*   CO2 18* 27 26   GLUCOSE 103* 135* 123*   BUN 27* 23* 23*   CREATININE 0.79 0.97 0.95   CALCIUM 6.6* 8.9 8.7                   Imaging  DX-PELVIS-1 OR 2 VIEWS   Final Result      Status post left hip hemiarthroplasty      CT-CHEST,ABDOMEN,PELVIS WITH   Final Result         1. No acute traumatic change in the chest, abdomen or pelvis.      2. Airspace opacities in the bilateral lower lobe, right more than left, atelectasis or consolidation      3. Acute angulated fracture of the left femoral neck      CT-LSPINE W/O PLUS RECONS   Final Result         1. No acute fracture appreciated in the lumbar spine      2. Moderate levoconvex lumbar curvature         CT-TSPINE W/O PLUS RECONS   Final Result         1. No acute fracture or malalignment appreciated in the thoracic spine      2. Old bilateral rib fractures.      3. Severe dextroconvex thoracic curvature.         CT-HEAD W/O   Final Result         1. No acute intracranial abnormality. No evidence of acute intracranial hemorrhage or mass lesion.               CT-CSPINE WITHOUT PLUS RECONS   Final Result         1. No acute fracture in the cervical spine is visualized.         DX-CHEST-LIMITED (1  VIEW)   Final Result         No acute cardiopulmonary abnormalities are identified.      DX-PELVIS-1 OR 2 VIEWS   Final Result         1. Acute angulated fracture of the left femoral neck.      DX-FEMUR-1 VIEW LEFT   Final Result         Acute angulated fracture of the left femoral neck.           Assessment/Plan  * Closed fracture of neck of left femur (HCC)  Assessment & Plan  Orthopedics consulted  Hemiarthroplasty 5/30  PT OT    Parkinson disease (HCC)  Assessment & Plan  On sinemet which will be continued.    Does apparently have some autonomic dysfunction currently on Midodrine and florinef  Patient still was somewhat soft systolic blood pressure, increase Florinef 0.1 mg daily with additional 0.05 mg once  Follow a.m. CMP    Hypokalemia  Assessment & Plan  Replacement.  Monitor.     Macrocytic anemia  Assessment & Plan  Suspect this is chronic with no current evidence of bleed.  Monitor.  Transfuse as needed for hemoglobin less than 7 g/Fernie.     VTE prophylaxis: Lovenox

## 2020-06-01 NOTE — PROGRESS NOTES
While assisting pt with lunch, pt having extreme difficulty in feeding self, appears to be pocketing food as well. Order placed for speech eval. NPO per protocol.

## 2020-06-02 LAB
ALBUMIN SERPL BCP-MCNC: 3.1 G/DL (ref 3.2–4.9)
ALBUMIN/GLOB SERPL: 1.1 G/DL
ALP SERPL-CCNC: 54 U/L (ref 30–99)
ALT SERPL-CCNC: 5 U/L (ref 2–50)
ANION GAP SERPL CALC-SCNC: 8 MMOL/L (ref 7–16)
AST SERPL-CCNC: 36 U/L (ref 12–45)
BILIRUB SERPL-MCNC: 0.9 MG/DL (ref 0.1–1.5)
BUN SERPL-MCNC: 17 MG/DL (ref 8–22)
CALCIUM SERPL-MCNC: 8.7 MG/DL (ref 8.5–10.5)
CHLORIDE SERPL-SCNC: 96 MMOL/L (ref 96–112)
CO2 SERPL-SCNC: 29 MMOL/L (ref 20–33)
CREAT SERPL-MCNC: 0.79 MG/DL (ref 0.5–1.4)
ERYTHROCYTE [DISTWIDTH] IN BLOOD BY AUTOMATED COUNT: 47.2 FL (ref 35.9–50)
GLOBULIN SER CALC-MCNC: 2.7 G/DL (ref 1.9–3.5)
GLUCOSE SERPL-MCNC: 109 MG/DL (ref 65–99)
HCT VFR BLD AUTO: 31.1 % (ref 42–52)
HGB BLD-MCNC: 10.5 G/DL (ref 14–18)
MCH RBC QN AUTO: 33.2 PG (ref 27–33)
MCHC RBC AUTO-ENTMCNC: 33.8 G/DL (ref 33.7–35.3)
MCV RBC AUTO: 98.4 FL (ref 81.4–97.8)
PLATELET # BLD AUTO: 234 K/UL (ref 164–446)
PMV BLD AUTO: 9.6 FL (ref 9–12.9)
POTASSIUM SERPL-SCNC: 3.8 MMOL/L (ref 3.6–5.5)
PROT SERPL-MCNC: 5.8 G/DL (ref 6–8.2)
RBC # BLD AUTO: 3.16 M/UL (ref 4.7–6.1)
SODIUM SERPL-SCNC: 133 MMOL/L (ref 135–145)
WBC # BLD AUTO: 9 K/UL (ref 4.8–10.8)

## 2020-06-02 PROCEDURE — 700102 HCHG RX REV CODE 250 W/ 637 OVERRIDE(OP): Performed by: HOSPITALIST

## 2020-06-02 PROCEDURE — A9270 NON-COVERED ITEM OR SERVICE: HCPCS | Performed by: HOSPITALIST

## 2020-06-02 PROCEDURE — 36415 COLL VENOUS BLD VENIPUNCTURE: CPT

## 2020-06-02 PROCEDURE — 700102 HCHG RX REV CODE 250 W/ 637 OVERRIDE(OP): Performed by: ORTHOPAEDIC SURGERY

## 2020-06-02 PROCEDURE — 700105 HCHG RX REV CODE 258: Performed by: HOSPITALIST

## 2020-06-02 PROCEDURE — 770006 HCHG ROOM/CARE - MED/SURG/GYN SEMI*

## 2020-06-02 PROCEDURE — 700111 HCHG RX REV CODE 636 W/ 250 OVERRIDE (IP): Performed by: ORTHOPAEDIC SURGERY

## 2020-06-02 PROCEDURE — 99232 SBSQ HOSP IP/OBS MODERATE 35: CPT | Performed by: HOSPITALIST

## 2020-06-02 PROCEDURE — 80053 COMPREHEN METABOLIC PANEL: CPT

## 2020-06-02 PROCEDURE — A9270 NON-COVERED ITEM OR SERVICE: HCPCS | Performed by: ORTHOPAEDIC SURGERY

## 2020-06-02 PROCEDURE — 85027 COMPLETE CBC AUTOMATED: CPT

## 2020-06-02 PROCEDURE — A4357 BEDSIDE DRAINAGE BAG: HCPCS | Performed by: HOSPITALIST

## 2020-06-02 PROCEDURE — 92526 ORAL FUNCTION THERAPY: CPT

## 2020-06-02 RX ORDER — DEXTROSE MONOHYDRATE 50 MG/ML
INJECTION, SOLUTION INTRAVENOUS CONTINUOUS
Status: DISCONTINUED | OUTPATIENT
Start: 2020-06-02 | End: 2020-06-04

## 2020-06-02 RX ADMIN — MIDODRINE HYDROCHLORIDE 5 MG: 5 TABLET ORAL at 11:06

## 2020-06-02 RX ADMIN — ACETAMINOPHEN 650 MG: 325 TABLET, FILM COATED ORAL at 23:01

## 2020-06-02 RX ADMIN — CARBIDOPA AND LEVODOPA 1 TABLET: 25; 100 TABLET ORAL at 19:57

## 2020-06-02 RX ADMIN — MIDODRINE HYDROCHLORIDE 5 MG: 5 TABLET ORAL at 17:06

## 2020-06-02 RX ADMIN — DOCUSATE SODIUM 50 MG AND SENNOSIDES 8.6 MG 2 TABLET: 8.6; 5 TABLET, FILM COATED ORAL at 04:43

## 2020-06-02 RX ADMIN — CARBIDOPA AND LEVODOPA 1 TABLET: 25; 100 TABLET ORAL at 04:41

## 2020-06-02 RX ADMIN — DOCUSATE SODIUM 50 MG AND SENNOSIDES 8.6 MG 2 TABLET: 8.6; 5 TABLET, FILM COATED ORAL at 17:06

## 2020-06-02 RX ADMIN — MIDODRINE HYDROCHLORIDE 5 MG: 5 TABLET ORAL at 08:32

## 2020-06-02 RX ADMIN — ENOXAPARIN SODIUM 40 MG: 100 INJECTION SUBCUTANEOUS at 08:32

## 2020-06-02 RX ADMIN — ACETAMINOPHEN 650 MG: 325 TABLET, FILM COATED ORAL at 04:43

## 2020-06-02 RX ADMIN — ACETAMINOPHEN 650 MG: 325 TABLET, FILM COATED ORAL at 00:00

## 2020-06-02 RX ADMIN — FLUDROCORTISONE ACETATE 0.1 MG: 0.1 TABLET ORAL at 04:41

## 2020-06-02 RX ADMIN — DEXTROSE MONOHYDRATE: 50 INJECTION, SOLUTION INTRAVENOUS at 01:17

## 2020-06-02 RX ADMIN — ACETAMINOPHEN 650 MG: 325 TABLET, FILM COATED ORAL at 17:06

## 2020-06-02 RX ADMIN — CARBIDOPA AND LEVODOPA 1 TABLET: 25; 100 TABLET ORAL at 17:06

## 2020-06-02 RX ADMIN — DEXTROSE MONOHYDRATE: 50 INJECTION, SOLUTION INTRAVENOUS at 17:05

## 2020-06-02 RX ADMIN — CARBIDOPA AND LEVODOPA 1 TABLET: 25; 100 TABLET ORAL at 11:06

## 2020-06-02 RX ADMIN — ACETAMINOPHEN 650 MG: 325 TABLET, FILM COATED ORAL at 11:06

## 2020-06-02 NOTE — THERAPY
"Speech Language Pathology   Initial Assessment     Patient Name: Miguel Ángel Page  AGE:  89 y.o., SEX:  male  Medical Record #: [id number]  Today's Date: 6/1/2020     Precautions  Precautions: (P) Fall Risk, Weight Bearing As Tolerated Right Lower Extremity  Comments: (P) h/o Parkinson's    Subjective    Pt was alert, oriented x1 only, initially stating we were on a [place] wheel and then asking how long until he is able to \"get out of this parking lot.\" Pt also requested his iPad, pointing to the TV on the wall and reported seeing cars around him. RN informed.     Objective       06/01/20 1715   Prior Level Of Function   Comments 89 y/o M with Parkinson's admitted 5/30/20 with GLF and L femur fx, s/p hemiarthroplasty 5/30. CXR 5/30: \"No acute cardiopulmonary abnormalities are identified.\" CT head 5/30: \"No acute intracranial abnormality.\" PMHx includes Parkinson's disease.    Labial Function   Labial Structure At Rest Within Functional Limits   Labial Vowel Production / I /, / U / Within Functional Limits   Labial Sequence / I /, / U / Within Functional Limits   Frown, Pucker Within Functional Limits   Lingual Function   Lingual Structure At Rest Within Functional Limits   Lingual Protrude Minimal   Lingual Retract Within Functional Limits   Elevate In Mouth Minimal   Elevate Outside Mouth Within Functional Limits   Lateralization Minimal Right;Minimal Left   Lick Lips (Circular) Minimal   / Pa / 5X's Minimal   / Ta / 5X's Minimal   / Ka / 5X's Minimal   / [place] / 5X's Moderate   Jaw   Jaw Structure At Rest Within Functional Limits   Velar Function   Velar Structure At Rest Within Functional Limits   / A / Prolonged Severe   Laryngeal Function   Voice Quality Moderate  (hypophonic)   Excursion Upon Swallow Weak    Max Phonation Time (Seconds) 3   Oral Food Presentation   Ice Chips   (attempted though pt refused)   Single Swallow Mildly Thick (2) - (Nectar Thick)  Moderate  (delayed cough 1/2 sips)   Single Swallow " "Thin (0) Severe  (cough 1/1 sips)   Liquidised (3) Not Tested  (attempted though pt refused)   Self Feeding Needs Total Assistance   Dysphagia Strategies / Recommendations   Compensatory Strategies To Be Assessed   Diet / Liquid Recommendation NPO;Pre-Feeding Trials with SLP Only   Medication Administration  Crush all Medications in Puree  (in applesauce only )   Therapy Interventions Dysphagia Therapy By Speech Language Pathologist;Oral Motor Exercises;Pharyngeal / Laryngeal Exercises   Short Term Goals   Short Term Goal # 1 Patient will consume PO trials with SLP only with no overt s/sx of aspiration.        Assessment    Patient is 89 y.o. male with a diagnosis of Parkinson's admitted 5/30/20 with GLF and L femur fx, s/p hemiarthroplasty 5/30. CXR 5/30: \"No acute cardiopulmonary abnormalities are identified.\" CT head 5/30: \"No acute intracranial abnormality.\" Swallow function was assessed today per RN request as pt was noted to be coughing, pocketing food and unable to feed self at lunch, whereas before he was tolerating a regular diet. Due to confusion, participation was poor. Pt refused to trial more than one tsp of water and two tsps of mildly thick liquids. Overt coughing occurred on the tsp of water and delayed cough occurred after 2nd sip of mildly thick liquids. Pt adamantly refused to trial ice chips or applesauce, keeping his mouth closed tight on attempts to feed, even with encouragement and education. Recommend keeping pt NPO overnight (meds crushed in applesauce ok if pt will allow) with SLP reassessment in the morning.      Plan    Recommend Speech Therapy 3 times per week until therapy goals are met for the following treatments:  Dysphagia Training and Patient / Family / Caregiver Education.    Discharge recommendations:  Recommend inpatient transitional care services for continued speech therapy services.      "

## 2020-06-02 NOTE — CARE PLAN
Problem: Nutritional:  Goal: Achieve adequate nutritional intake  Description: Patient will consume ~50% of meals  Outcome: PROGRESSING AS EXPECTED

## 2020-06-02 NOTE — DIETARY
Nutrition Services: Update   Day 3 of admit.  Miguel Ángel Page is a 89 y.o. male with admitting DX of Femoral fracture    Pt is currently on regular, liquidized diet with mildly thick liquids and 1:1 supervision. Per chart pt PO varies 0-100%. Per chart pt is A&O x2 per SLP note, diet was resumed today. Wt 5/30: 58.06 kg via other healthcare provider scale - no measured wt. Spoke to RN. RN to obtain a measure wt as feasible. Pt does appear thin. Will add thickened Boost Plus BID    Malnutrition Risk: No criteria noted at this time.     Recommendations/Plan:  1. Encourage intake of meals  2. Document intake of all meals as % taken in ADL's to provide interdisciplinary communication across all shifts.   3. Please obtain a measured weight. Monitor weight.  4. Nutrition rep will continue to see patient for ongoing meal and snack preferences.    RD following

## 2020-06-02 NOTE — CARE PLAN
Problem: Safety  Goal: Will remain free from injury  Outcome: PROGRESSING AS EXPECTED  Bed locked and in lowest position. Call light within reach. Hourly rounding on pt. Pt close to nursing station. Q2 turns performed.      Problem: Communication  Goal: The ability to communicate needs accurately and effectively will improve  Outcome: PROGRESSING SLOWER THAN EXPECTED   Pt ineffectively able to use call light. Speech is soft spoken and requires a few attempts to understand. Hourly rounding to assess needs.

## 2020-06-02 NOTE — DISCHARGE PLANNING
Anticipated Discharge Disposition: [facility]    Action: LSW spoke w/ pt's dtr and updated her on pt's acceptance to [facility].   In IDT rounds, pt was said to be medically cleared to go to SNF.    [facility] does not have a bed available today but will possibly tomorrow.    Barriers to Discharge: Bed availability    Plan: f/u w/ [facility] tomorrow morning

## 2020-06-02 NOTE — THERAPY
"Speech Language Pathology  Daily Treatment     Patient Name: Miguel Ángel Page  Age:  89 y.o., Sex:  male  Medical Record #: [id number]  Today's Date: 6/2/2020     Precautions  Precautions: (P) Fall Risk, Heel Weight Bearing Right Lower Extremity  Comments: (P) hx Parkinson's    Assessment    Swallow function was reassessed today. Pt was A&Ox2, disoriented to place and time but remembered he fell and hurt his hip. Pt was presented with therapeutic trial textures as noted above, as well as 2 diced peaches. Oral phase was notable for prolonged bolus formation with suspected lingual pumping. Swallow was triggered w/in 3 seconds for liquids. Hyolaryngeal excursion was palpated as weak/reduced/sluggish. Pt swallowed 3x per bolus. Overt cough x1 occurred with 1/5 presentations of thin liquids via tsp and 1/5 ice chips. Wet/gurgly vocal quality noted in the remaining 4/5 trials of thin liquids, with cued throat clear/cough partially effective for vocal quality to return to baseline. Pt was not able to self-feed d/t BUE weakness. Occasional increased wet vocal quality noted with cup sips of mildly thick liquids, which resolved with a cued throat clear. No s/sx of aspiration noted with solids trialed.     Plan    1.) Start Liquidized diet/Mildly Thick liquids with 1:1 feeding  2.) Oral care after meals  3.) Ok for ice chips between meals after oral care if pt requests  4.) Crush meds in puree    Continue current treatment plan.    Discharge recommendations:  Recommend inpatient transitional care services for continued speech therapy services.        Subjective    \"I have to swallow 3 times.\"     Objective       06/02/20 0905   Dysphagia    Positioning / Behavior Modification Multiple Swallows;Modulate Rate or Bite Size;Self Monitoring;Cough / Clear after Swallow   Other Treatments PO trials of ice chips, thins, mildly thicks, liquidized solids, pureed solids   Diet / Liquid Recommendation Liquidised (3) - (Nectar Thick Full " Liquid);Mildly Thick (2) - (Nectar Thick)   Recommended Route of Medication Administration   Medication Administration  Crush all Medications in Puree   Short Term Goals   Short Term Goal # 1 Patient will consume PO trials with SLP only with no overt s/sx of aspiration.    Goal Outcome # 1 Goal met, new goal added   Short Term Goal # 1 B  Patient will consume meals of Liquidized solids/Mildly Thick liquids with 1:1 feeding and no s/sx of aspiration.    Short Term Goal # 2 Patient will complete 2-3 swallowing exercises x10-15 reps each with mod cues to improve swallow function.

## 2020-06-02 NOTE — PROGRESS NOTES
[facility] Medicine Daily Progress Note    Date of Service  6/2/2020    Chief Complaint  89 y.o. male admitted 5/30/2020 with Parkinson's admitted with ground-level fall and left femur fracture    Hospital Course    89 y.o. male admitted 5/30/2020 with Parkinson's admitted with ground-level fall and left femur fracture.  Underwent successful hemiarthroplasty (Walker).        Interval Problem Update  Seen and examined. Chart reviewed, including labs and any pertinent imaging.  Dementia limits additional history  Doing well after procedure  PT OT  Anticipate SNF    6/2: seen this morning , no acute changes overnight, no acute change in plan,[facility], no beds today, anticipate dc tomorrow  Vitals and labs reviewed and stable  Consultants/Specialty  Ortho    Code Status  DNR/DNI    Disposition  [facility] if bed available [facility]    Review of Systems  Review of Systems   Unable to perform ROS: Dementia      Physical Exam  Temp:  [36.6 °C (97.8 °F)-37 °C (98.6 °F)] 37 °C (98.6 °F)  Pulse:  [88-99] 88  Resp:  [16-20] 16  BP: (101-139)/(64-89) 101/64  SpO2:  [95 %-100 %] 99 %    Physical Exam  Vitals signs and nursing note reviewed.   Constitutional:       General: He is not in acute distress.     Appearance: He is ill-appearing. He is not diaphoretic.   HENT:      Head: Normocephalic and atraumatic.      Nose: No congestion.      Mouth/Throat:      Mouth: Mucous membranes are moist.   Eyes:      General:         Right eye: No discharge.         Left eye: No discharge.      Extraocular Movements: Extraocular movements intact.      Conjunctiva/sclera: Conjunctivae normal.   Neck:      Musculoskeletal: Normal range of motion. No neck rigidity.   Cardiovascular:      Rate and Rhythm: Normal rate.      Pulses: Normal pulses.   Pulmonary:      Effort: Pulmonary effort is normal. No respiratory distress.      Breath sounds: No wheezing.   Abdominal:      General: Abdomen is flat. There is no distension.      Palpations:  Abdomen is soft.      Tenderness: There is no abdominal tenderness.   Musculoskeletal: Normal range of motion.         General: Tenderness and signs of injury present.   Skin:     General: Skin is warm and dry.      Coloration: Skin is not jaundiced.   Neurological:      General: No focal deficit present.      Mental Status: He is alert.         Fluids  No intake or output data in the 24 hours ending 06/02/20 1514    Laboratory  Recent Labs     05/31/20  0035 06/01/20  0430 06/02/20  0507   WBC 12.9* 13.0* 9.0   RBC 3.37* 3.49* 3.16*   HEMOGLOBIN 11.2* 11.5* 10.5*   HEMATOCRIT 34.4* 34.9* 31.1*   .1* 100.0* 98.4*   MCH 33.2* 33.0 33.2*   MCHC 32.6* 33.0* 33.8   RDW 50.7* 47.8 47.2   PLATELETCT 219 222 234   MPV 9.4 9.6 9.6     Recent Labs     05/31/20 0035 06/01/20  0430 06/02/20  0507   SODIUM 135 132* 133*   POTASSIUM 4.8 4.1 3.8   CHLORIDE 100 95* 96   CO2 27 26 29   GLUCOSE 135* 123* 109*   BUN 23* 23* 17   CREATININE 0.97 0.95 0.79   CALCIUM 8.9 8.7 8.7                   Imaging  DX-PELVIS-1 OR 2 VIEWS   Final Result      Status post left hip hemiarthroplasty      CT-CHEST,ABDOMEN,PELVIS WITH   Final Result         1. No acute traumatic change in the chest, abdomen or pelvis.      2. Airspace opacities in the bilateral lower lobe, right more than left, atelectasis or consolidation      3. Acute angulated fracture of the left femoral neck      CT-LSPINE W/O PLUS RECONS   Final Result         1. No acute fracture appreciated in the lumbar spine      2. Moderate levoconvex lumbar curvature         CT-TSPINE W/O PLUS RECONS   Final Result         1. No acute fracture or malalignment appreciated in the thoracic spine      2. Old bilateral rib fractures.      3. Severe dextroconvex thoracic curvature.         CT-HEAD W/O   Final Result         1. No acute intracranial abnormality. No evidence of acute intracranial hemorrhage or mass lesion.               CT-CSPINE WITHOUT PLUS RECONS   Final Result         1.  No acute fracture in the cervical spine is visualized.         DX-CHEST-LIMITED (1 VIEW)   Final Result         No acute cardiopulmonary abnormalities are identified.      DX-PELVIS-1 OR 2 VIEWS   Final Result         1. Acute angulated fracture of the left femoral neck.      DX-FEMUR-1 VIEW LEFT   Final Result         Acute angulated fracture of the left femoral neck.           Assessment/Plan  * Closed fracture of neck of left femur (HCC)  Assessment & Plan  Orthopedics consulted  Hemiarthroplasty 5/30  PT OT- snf placement    Parkinson disease (HCC)  Assessment & Plan  On sinemet which will be continued.    Does apparently have some autonomic dysfunction currently on Midodrine and florinef  Patient still was somewhat soft systolic blood pressure, increase Florinef 0.1 mg daily with additional 0.05 mg once  Follow a.m. CMP; stable    Hypokalemia  Assessment & Plan  Replacement.  Monitor.     Macrocytic anemia  Assessment & Plan  Suspect this is chronic with no current evidence of bleed.  Monitor.  Transfuse as needed for hemoglobin less than 7 g/Fernie.     VTE prophylaxis: Lovenox    Discussed plan of care with patient, nursing and SW

## 2020-06-02 NOTE — DISCHARGE PLANNING
[facility]ency[facility] Name: [facility]  Spoke To: Yulia  Outcome: Pt accepted. Bed possibly available 6/3.    RN CM informed

## 2020-06-03 LAB
ALBUMIN SERPL BCP-MCNC: 2.7 G/DL (ref 3.2–4.9)
ALBUMIN/GLOB SERPL: 1 G/DL
ALP SERPL-CCNC: 50 U/L (ref 30–99)
ALT SERPL-CCNC: 5 U/L (ref 2–50)
ANION GAP SERPL CALC-SCNC: 6 MMOL/L (ref 7–16)
AST SERPL-CCNC: 23 U/L (ref 12–45)
BILIRUB SERPL-MCNC: 0.6 MG/DL (ref 0.1–1.5)
BUN SERPL-MCNC: 16 MG/DL (ref 8–22)
CALCIUM SERPL-MCNC: 8.5 MG/DL (ref 8.5–10.5)
CHLORIDE SERPL-SCNC: 92 MMOL/L (ref 96–112)
CO2 SERPL-SCNC: 27 MMOL/L (ref 20–33)
CREAT SERPL-MCNC: 0.86 MG/DL (ref 0.5–1.4)
ERYTHROCYTE [DISTWIDTH] IN BLOOD BY AUTOMATED COUNT: 46.3 FL (ref 35.9–50)
GLOBULIN SER CALC-MCNC: 2.7 G/DL (ref 1.9–3.5)
GLUCOSE SERPL-MCNC: 112 MG/DL (ref 65–99)
HCT VFR BLD AUTO: 28.7 % (ref 42–52)
HGB BLD-MCNC: 9.7 G/DL (ref 14–18)
MCH RBC QN AUTO: 33.6 PG (ref 27–33)
MCHC RBC AUTO-ENTMCNC: 33.8 G/DL (ref 33.7–35.3)
MCV RBC AUTO: 99.3 FL (ref 81.4–97.8)
PLATELET # BLD AUTO: 237 K/UL (ref 164–446)
PMV BLD AUTO: 9.6 FL (ref 9–12.9)
POTASSIUM SERPL-SCNC: 3.5 MMOL/L (ref 3.6–5.5)
PROT SERPL-MCNC: 5.4 G/DL (ref 6–8.2)
RBC # BLD AUTO: 2.89 M/UL (ref 4.7–6.1)
SODIUM SERPL-SCNC: 125 MMOL/L (ref 135–145)
WBC # BLD AUTO: 8.7 K/UL (ref 4.8–10.8)

## 2020-06-03 PROCEDURE — 92526 ORAL FUNCTION THERAPY: CPT

## 2020-06-03 PROCEDURE — 94760 N-INVAS EAR/PLS OXIMETRY 1: CPT

## 2020-06-03 PROCEDURE — 700111 HCHG RX REV CODE 636 W/ 250 OVERRIDE (IP): Performed by: ORTHOPAEDIC SURGERY

## 2020-06-03 PROCEDURE — 700105 HCHG RX REV CODE 258: Performed by: HOSPITALIST

## 2020-06-03 PROCEDURE — A9270 NON-COVERED ITEM OR SERVICE: HCPCS | Performed by: HOSPITALIST

## 2020-06-03 PROCEDURE — 97530 THERAPEUTIC ACTIVITIES: CPT | Mod: CQ

## 2020-06-03 PROCEDURE — 700102 HCHG RX REV CODE 250 W/ 637 OVERRIDE(OP): Performed by: HOSPITALIST

## 2020-06-03 PROCEDURE — 700102 HCHG RX REV CODE 250 W/ 637 OVERRIDE(OP): Performed by: ORTHOPAEDIC SURGERY

## 2020-06-03 PROCEDURE — 85027 COMPLETE CBC AUTOMATED: CPT

## 2020-06-03 PROCEDURE — 36415 COLL VENOUS BLD VENIPUNCTURE: CPT

## 2020-06-03 PROCEDURE — 770006 HCHG ROOM/CARE - MED/SURG/GYN SEMI*

## 2020-06-03 PROCEDURE — 80053 COMPREHEN METABOLIC PANEL: CPT

## 2020-06-03 PROCEDURE — A9270 NON-COVERED ITEM OR SERVICE: HCPCS | Performed by: ORTHOPAEDIC SURGERY

## 2020-06-03 RX ADMIN — CARBIDOPA AND LEVODOPA 1 TABLET: 25; 100 TABLET ORAL at 11:52

## 2020-06-03 RX ADMIN — CARBIDOPA AND LEVODOPA 1 TABLET: 25; 100 TABLET ORAL at 20:59

## 2020-06-03 RX ADMIN — MAGNESIUM HYDROXIDE 30 ML: 400 SUSPENSION ORAL at 17:00

## 2020-06-03 RX ADMIN — MIDODRINE HYDROCHLORIDE 5 MG: 5 TABLET ORAL at 08:24

## 2020-06-03 RX ADMIN — FLUDROCORTISONE ACETATE 0.1 MG: 0.1 TABLET ORAL at 04:30

## 2020-06-03 RX ADMIN — ACETAMINOPHEN 650 MG: 325 TABLET, FILM COATED ORAL at 16:58

## 2020-06-03 RX ADMIN — MIDODRINE HYDROCHLORIDE 5 MG: 5 TABLET ORAL at 17:00

## 2020-06-03 RX ADMIN — ENOXAPARIN SODIUM 40 MG: 100 INJECTION SUBCUTANEOUS at 08:24

## 2020-06-03 RX ADMIN — MIDODRINE HYDROCHLORIDE 5 MG: 5 TABLET ORAL at 11:53

## 2020-06-03 RX ADMIN — DOCUSATE SODIUM 50 MG AND SENNOSIDES 8.6 MG 2 TABLET: 8.6; 5 TABLET, FILM COATED ORAL at 17:00

## 2020-06-03 RX ADMIN — ACETAMINOPHEN 650 MG: 325 TABLET, FILM COATED ORAL at 04:30

## 2020-06-03 RX ADMIN — CARBIDOPA AND LEVODOPA 1 TABLET: 25; 100 TABLET ORAL at 16:58

## 2020-06-03 RX ADMIN — CARBIDOPA AND LEVODOPA 1 TABLET: 25; 100 TABLET ORAL at 04:30

## 2020-06-03 RX ADMIN — ACETAMINOPHEN 650 MG: 325 TABLET, FILM COATED ORAL at 11:53

## 2020-06-03 RX ADMIN — DOCUSATE SODIUM 50 MG AND SENNOSIDES 8.6 MG 2 TABLET: 8.6; 5 TABLET, FILM COATED ORAL at 04:30

## 2020-06-03 RX ADMIN — DEXTROSE MONOHYDRATE: 50 INJECTION, SOLUTION INTRAVENOUS at 04:32

## 2020-06-03 ASSESSMENT — COGNITIVE AND FUNCTIONAL STATUS - GENERAL
MOVING FROM LYING ON BACK TO SITTING ON SIDE OF FLAT BED: UNABLE
WALKING IN HOSPITAL ROOM: TOTAL
STANDING UP FROM CHAIR USING ARMS: TOTAL
TURNING FROM BACK TO SIDE WHILE IN FLAT BAD: UNABLE
SUGGESTED CMS G CODE MODIFIER MOBILITY: CN
MOVING TO AND FROM BED TO CHAIR: UNABLE
MOBILITY SCORE: 6
CLIMB 3 TO 5 STEPS WITH RAILING: TOTAL

## 2020-06-03 ASSESSMENT — GAIT ASSESSMENTS: GAIT LEVEL OF ASSIST: UNABLE TO PARTICIPATE

## 2020-06-03 NOTE — DISCHARGE PLANNING
[facility] Name: [facility]  Spoke To: Yulia  Outcome: No answer. Left message.    LSW informed

## 2020-06-03 NOTE — RESPIRATORY CARE
Oxygen Rounds      Patient found on    O2 L/m:  ___2______    Oxygen device:  ____nc____   Spo2: _____95____%      Respiratory device skin site inspection completed.

## 2020-06-03 NOTE — PROGRESS NOTES
"   Orthopaedic PA Progress Note    Interval changes:Retsing comfortably    ROS - Patient denies any new issues. No chest pain, dyspnea, or fever.  Pain well controlled.    BP (!) 92/52   Pulse 90   Temp 36.9 °C (98.5 °F) (Temporal)   Resp 16   Ht 1.778 m (5' 10\")   Wt 58.1 kg (128 lb)   SpO2 96%     Patient seen and examined  No acute distress  Breathing non labored  RRR  Surgical dressing is clean, dry, and intact. Patient clearly fires tibialis anterior, EHL, and gastrocnemius/soleus. Sensation is intact to light touch throughout superficial peroneal, deep peroneal, tibial, saphenous, and sural nerve distributions. Strong and palpable 2+ dorsalis pedis and posterior tibial pulses with capillary refill less than 2 seconds. No lower leg tenderness or discomfort.    Recent Labs     05/31/20  0035 06/01/20  0430 06/02/20  0507   WBC 12.9* 13.0* 9.0   RBC 3.37* 3.49* 3.16*   HEMOGLOBIN 11.2* 11.5* 10.5*   HEMATOCRIT 34.4* 34.9* 31.1*   .1* 100.0* 98.4*   MCH 33.2* 33.0 33.2*   MCHC 32.6* 33.0* 33.8   RDW 50.7* 47.8 47.2   PLATELETCT 219 222 234   MPV 9.4 9.6 9.6       Active Hospital Problems    Diagnosis   • Closed fracture of neck of left femur ([facility]) [S72.002A]   • Macrocytic anemia [D53.9]   • Hypokalemia [E87.6]   • Parkinson disease ([facility]) [G20]     A/P:     POD #3 s/p L hip karel     Antibiotics: None required  Activity: WBAT, abductor precautions operative extremity.  PT today.  Diet: General  DVT: Mechanical (SCDs) + Pharmacologic (Lovenox)  Dispo: D/C planning      "

## 2020-06-03 NOTE — CARE PLAN
Problem: Communication  Goal: The ability to communicate needs accurately and effectively will improve  Outcome: PROGRESSING AS EXPECTED  Patient demonstrates proper use of call light and is able to voice needs     Problem: Safety  Goal: Will remain free from falls  Outcome: PROGRESSING AS EXPECTED  Patient is unable to get out of bed without max assistance. Bed in lowest and locked position, bed alarm on, patient close to nursing station, hourly rounding in place.     Problem: Bowel/Gastric:  Goal: Normal bowel function is maintained or improved  Outcome: PROGRESSING SLOWER THAN EXPECTED  Patient has not had a bowel movement since arriving to hospital. Will educate patient on bowel care protocol.

## 2020-06-03 NOTE — THERAPY
Speech Language Pathology  Daily Treatment     Patient Name: Miguel Ángel Page  Age:  89 y.o., Sex:  male  Medical Record #: [id number]  Today's Date: 6/3/2020     Precautions: Fall Risk, Heel Weight Bearing Right Lower Extremity  Comments: hx Parkinson's      Assessment    Patient AAOx3 with confusion regarding day/month. Vocal quality significantly  hypophonic, decreasing overall intelligibility. Patient consumed items from LQ3/MT2 meal tray given 1:1 feeding. Initially, patient consumed items from meal tray with no overt s/sx of aspiration. However, 25 minutes into meal pt had subtle throat clear x2 and slight increase in wet vocal quality. Patient followed 1:1 cues for volitional second swallow which eliminated wet vocal quality. Patient with prolonged and reduced rotary chew with trials of soft solids. Oral prep/onset of pharyngeal swallow min-mod delayed with pureed consistencies and mildly delayed with MTL. Laryngeal elevation adequate but sluggish to palpation.     Plan    Continue current treatment plan of dysphagia therapy 3x/week.     Recommend slight upgrade to PU4/MT2 diet given 1:1 feeding and adherence to posted swallow strategies.     Discharge recommendations: Recommend inpatient transitional care services for continued speech therapy services.        Objective       06/03/20 0946   Vitals   O2 (LPM) 2   O2 Delivery Device Nasal Cannula   Dysphagia    Positioning / Behavior Modification Modulate Rate or Bite Size;Self Monitoring;Cough / Clear after Swallow;Multiple Swallows   Other Treatments LQ3/MT2 meal tray: 100% boost, 1 bowl liquidized item, and PO trials of 1/2 fruit cup   Diet / Liquid Recommendation Puree (4);Mildly Thick (2) - (Nectar Thick)   Skilled Intervention Compensatory Strategies;Verbal Cueing   Recommended Route of Medication Administration   Medication Administration  Crush all Medications in Puree   Short Term Goals   Short Term Goal # 2 Patient will complete 2-3 swallowing  exercises x10-15 reps each with mod cues to improve swallow function.    Goal Outcome # 2  Progressing slower than expected   Short Term Goal # 3 Patient will consume meals of pureed solids/Mildly Thick liquids with 1:1 feeding and no s/sx of aspiration.

## 2020-06-04 VITALS
RESPIRATION RATE: 18 BRPM | HEART RATE: 99 BPM | SYSTOLIC BLOOD PRESSURE: 121 MMHG | TEMPERATURE: 98 F | HEIGHT: 70 IN | BODY MASS INDEX: 18.32 KG/M2 | WEIGHT: 128 LBS | DIASTOLIC BLOOD PRESSURE: 68 MMHG | OXYGEN SATURATION: 97 %

## 2020-06-04 PROBLEM — E87.6 HYPOKALEMIA: Status: RESOLVED | Noted: 2020-05-30 | Resolved: 2020-06-04

## 2020-06-04 LAB
ALBUMIN SERPL BCP-MCNC: 2.8 G/DL (ref 3.2–4.9)
ALBUMIN/GLOB SERPL: 1 G/DL
ALP SERPL-CCNC: 56 U/L (ref 30–99)
ALT SERPL-CCNC: 8 U/L (ref 2–50)
ANION GAP SERPL CALC-SCNC: 9 MMOL/L (ref 7–16)
AST SERPL-CCNC: 21 U/L (ref 12–45)
BILIRUB SERPL-MCNC: 0.8 MG/DL (ref 0.1–1.5)
BUN SERPL-MCNC: 21 MG/DL (ref 8–22)
CALCIUM SERPL-MCNC: 8.7 MG/DL (ref 8.5–10.5)
CHLORIDE SERPL-SCNC: 97 MMOL/L (ref 96–112)
CO2 SERPL-SCNC: 28 MMOL/L (ref 20–33)
CREAT SERPL-MCNC: 0.86 MG/DL (ref 0.5–1.4)
ERYTHROCYTE [DISTWIDTH] IN BLOOD BY AUTOMATED COUNT: 47.3 FL (ref 35.9–50)
GLOBULIN SER CALC-MCNC: 2.8 G/DL (ref 1.9–3.5)
GLUCOSE SERPL-MCNC: 127 MG/DL (ref 65–99)
HCT VFR BLD AUTO: 29.9 % (ref 42–52)
HGB BLD-MCNC: 10 G/DL (ref 14–18)
MCH RBC QN AUTO: 33.6 PG (ref 27–33)
MCHC RBC AUTO-ENTMCNC: 33.4 G/DL (ref 33.7–35.3)
MCV RBC AUTO: 100.3 FL (ref 81.4–97.8)
PLATELET # BLD AUTO: 269 K/UL (ref 164–446)
PMV BLD AUTO: 9.5 FL (ref 9–12.9)
POTASSIUM SERPL-SCNC: 3.8 MMOL/L (ref 3.6–5.5)
PROT SERPL-MCNC: 5.6 G/DL (ref 6–8.2)
RBC # BLD AUTO: 2.98 M/UL (ref 4.7–6.1)
SODIUM SERPL-SCNC: 134 MMOL/L (ref 135–145)
WBC # BLD AUTO: 11.5 K/UL (ref 4.8–10.8)

## 2020-06-04 PROCEDURE — A9270 NON-COVERED ITEM OR SERVICE: HCPCS | Performed by: HOSPITALIST

## 2020-06-04 PROCEDURE — 36415 COLL VENOUS BLD VENIPUNCTURE: CPT

## 2020-06-04 PROCEDURE — 700111 HCHG RX REV CODE 636 W/ 250 OVERRIDE (IP): Performed by: ORTHOPAEDIC SURGERY

## 2020-06-04 PROCEDURE — 85027 COMPLETE CBC AUTOMATED: CPT

## 2020-06-04 PROCEDURE — 700105 HCHG RX REV CODE 258: Performed by: HOSPITALIST

## 2020-06-04 PROCEDURE — 700102 HCHG RX REV CODE 250 W/ 637 OVERRIDE(OP): Performed by: ORTHOPAEDIC SURGERY

## 2020-06-04 PROCEDURE — 700102 HCHG RX REV CODE 250 W/ 637 OVERRIDE(OP): Performed by: HOSPITALIST

## 2020-06-04 PROCEDURE — 80053 COMPREHEN METABOLIC PANEL: CPT

## 2020-06-04 PROCEDURE — 94760 N-INVAS EAR/PLS OXIMETRY 1: CPT

## 2020-06-04 PROCEDURE — A9270 NON-COVERED ITEM OR SERVICE: HCPCS | Performed by: ORTHOPAEDIC SURGERY

## 2020-06-04 PROCEDURE — 99239 HOSP IP/OBS DSCHRG MGMT >30: CPT | Performed by: HOSPITALIST

## 2020-06-04 RX ORDER — MIDODRINE HYDROCHLORIDE 5 MG/1
5 TABLET ORAL
Qty: 60 TAB
Start: 2020-06-04

## 2020-06-04 RX ORDER — ACETAMINOPHEN 325 MG/1
650 TABLET ORAL EVERY 6 HOURS PRN
Qty: 30 TAB | Refills: 0
Start: 2020-06-04 | End: 2020-06-28 | Stop reason: SDUPTHER

## 2020-06-04 RX ORDER — FLUDROCORTISONE ACETATE 0.1 MG/1
0.1 TABLET ORAL DAILY
Qty: 30 TAB
Start: 2020-06-05 | End: 2020-10-20

## 2020-06-04 RX ORDER — ASPIRIN 325 MG
325 TABLET ORAL 2 TIMES DAILY
Qty: 42 TAB | Refills: 0
Start: 2020-06-04 | End: 2020-06-25

## 2020-06-04 RX ORDER — LANOLIN ALCOHOL/MO/W.PET/CERES
1000 CREAM (GRAM) TOPICAL DAILY
Qty: 30 TAB | Refills: 3
Start: 2020-06-04 | End: 2020-10-22 | Stop reason: SDUPTHER

## 2020-06-04 RX ADMIN — FLUDROCORTISONE ACETATE 0.1 MG: 0.1 TABLET ORAL at 04:38

## 2020-06-04 RX ADMIN — DOCUSATE SODIUM 50 MG AND SENNOSIDES 8.6 MG 2 TABLET: 8.6; 5 TABLET, FILM COATED ORAL at 04:37

## 2020-06-04 RX ADMIN — ACETAMINOPHEN 650 MG: 325 TABLET, FILM COATED ORAL at 04:38

## 2020-06-04 RX ADMIN — DEXTROSE MONOHYDRATE: 50 INJECTION, SOLUTION INTRAVENOUS at 04:38

## 2020-06-04 RX ADMIN — MIDODRINE HYDROCHLORIDE 5 MG: 5 TABLET ORAL at 11:54

## 2020-06-04 RX ADMIN — CARBIDOPA AND LEVODOPA 1 TABLET: 25; 100 TABLET ORAL at 11:54

## 2020-06-04 RX ADMIN — ACETAMINOPHEN 650 MG: 325 TABLET, FILM COATED ORAL at 11:54

## 2020-06-04 RX ADMIN — MIDODRINE HYDROCHLORIDE 5 MG: 5 TABLET ORAL at 04:39

## 2020-06-04 RX ADMIN — ENOXAPARIN SODIUM 40 MG: 100 INJECTION SUBCUTANEOUS at 08:47

## 2020-06-04 RX ADMIN — CARBIDOPA AND LEVODOPA 1 TABLET: 25; 100 TABLET ORAL at 04:38

## 2020-06-04 NOTE — PROGRESS NOTES
"   Orthopaedic Progress Note    Interval changes:  Patient doing well  Provina pulled off by patient and mepilex AG dressing placed   Cleared by ortho for DC to SNF pending medicine clearance    ROS - Patient denies any new issues.  Pain well controlled.    BP (!) 97/60   Pulse 82   Temp 36.6 °C (97.9 °F) (Temporal)   Resp 16   Ht 1.778 m (5' 10\")   Wt 58.1 kg (128 lb)   SpO2 96%       Patient seen and examined  No acute distress  Breathing non labored  RRR  RLE mepilex ag dressing is clean, dry, and intact. Patient clearly fires tibialis anterior, EHL, and gastrocnemius/soleus. Sensation is intact to light touch throughout superficial peroneal, deep peroneal, tibial, saphenous, and sural nerve distributions. Strong and palpable 2+ dorsalis pedis and posterior tibial pulses with capillary refill less than 2 seconds. No lower leg tenderness or discomfort.       Recent Labs     06/01/20  0430 06/02/20  0507 06/03/20  0526   WBC 13.0* 9.0 8.7   RBC 3.49* 3.16* 2.89*   HEMOGLOBIN 11.5* 10.5* 9.7*   HEMATOCRIT 34.9* 31.1* 28.7*   .0* 98.4* 99.3*   MCH 33.0 33.2* 33.6*   MCHC 33.0* 33.8 33.8   RDW 47.8 47.2 46.3   PLATELETCT 222 234 237   MPV 9.6 9.6 9.6       Active Hospital Problems    Diagnosis   • Closed fracture of neck of left femur ([facility]) [S72.002A]   • Macrocytic anemia [D53.9]   • Hypokalemia [E87.6]   • Parkinson disease ([facility]) [G20]       Assessment/Plan:  Provina pulled off by patient and mepilex AG dressing placed   Cleared by ortho for DC to SNF pending medicine clearance  POD#4 S/P left hip hemiarthroplasty  Wt bearing status - WBAT  Wound care/Drains - mepilex ag left in place  Future Procedures - none planned   Lovenox: Start 5/31, Duration-until ambulatory > 150'  Sutures/Staples out- 10-14 days post operatively  PT/OT-initiated  Antibiotics: completed  DVT Prophylaxis- TEDS/SCDs/Foot pumps  Heck-none  Case Coordination for Discharge Planning - Disposition SNF   "

## 2020-06-04 NOTE — THERAPY
Missed Therapy     Patient Name: Miguel Ángel Page  Age:  89 y.o., Sex:  male  Medical Record #: [id number]  Today's Date: 6/4/2020    Discussed missed therapy with RN    Attempted to see for OT session. Pt d/cing to SNF at noon; will hold but continue to follow in case of change in d/c plan.

## 2020-06-04 NOTE — RESPIRATORY CARE
Oxygen Rounds      Patient found on    O2 L/m:  2  Oxygen device:  Nasal cannula  Spo2: 97%         Respiratory device skin site inspection completed.

## 2020-06-04 NOTE — DISCHARGE INSTRUCTIONS
Discharge Instructions    Discharged to other by medical transportation with escort. Discharged via wheelchair, hospital escort: Yes.  Special equipment needed: Not Applicable    Be sure to schedule a follow-up appointment with your primary care doctor or any specialists as instructed.     Discharge Plan:   Diet Plan: Discussed  Activity Level: Discussed  Confirmed Follow up Appointment: Patient to Call and Schedule Appointment  Confirmed Symptoms Management: Discussed  Medication Reconciliation Updated: Yes    I understand that a diet low in cholesterol, fat, and sodium is recommended for good health. Unless I have been given specific instructions below for another diet, I accept this instruction as my diet prescription.   Other diet: Puree/Midly thickened liquids/Medications crushed in applesauce.     Special Instructions: None    · Is patient discharged on Warfarin / Coumadin?   No     Depression / Suicide Risk    As you are discharged from this [facility] facility, it is important to learn how to keep safe from harming yourself.    Recognize the warning signs:  · Abrupt changes in personality, positive or negative- including increase in energy   · Giving away possessions  · Change in eating patterns- significant weight changes-  positive or negative  · Change in sleeping patterns- unable to sleep or sleeping all the time   · Unwillingness or inability to communicate  · Depression  · Unusual sadness, discouragement and loneliness  · Talk of wanting to die  · Neglect of personal appearance   · Rebelliousness- reckless behavior  · Withdrawal from people/activities they love  · Confusion- inability to concentrate     If you or a loved one observes any of these behaviors or has concerns about self-harm, here's what you can do:  · Talk about it- your feelings and reasons for harming yourself  · Remove any means that you might use to hurt yourself (examples: pills, rope, extension cords, firearm)  · Get professional  help from the community (Mental Health, Substance Abuse, psychological counseling)  · Do not be alone:Call your Safe Contact- someone whom you trust who will be there for you.  · Call your local CRISIS HOTLINE 110-2818 or 908-422-2033  · Call your local Children's Mobile Crisis Response Team [place] (742) 098-4545 or www.[employer]  · Call the toll free National Suicide Prevention Hotlines   · National Suicide Prevention Lifeline 540-991-QUTU (8450)  · National Hope Line Network 800-SUICIDE (810-6284)

## 2020-06-04 NOTE — DISCHARGE SUMMARY
Discharge Summary    CHIEF COMPLAINT ON ADMISSION  Chief Complaint   Patient presents with   • Trauma Green     Mechanical GLF, pt tripped while getting out of bed. Pt reported significant pain to left hip and ribs to EMS and 50mcg fentanyl was administered. LLE externally rotated and shortened. Pt unable to report any sensory changes or perform any ROM. Pulses intact bilaterally. Pt currently having difficulty answering questions, unknown if baseline neuro status.  Pt denies hitting his head, no head trauma noted.       Reason for Admission  trauma green     CODE STATUS  DNAR/DNI    HPI & HOSPITAL COURSE  This is a 89 y.o. male with a PMHx significant for Parkinson's Disease and associated autonomic dysreflexia. He presented for a left femur fracture following a ground level fall. He was seen by orthopedic surgery on admission and taken for hemiarthroplasty on 5/30/20 with Dr. Huerta. He did experience a few low blood pressures, which was a chronic problem. This was managed with Florinef and Midodrine. H/H was monitored and remained stable . Discharge to a SNF was recommended by PT/OT. Once cleared by orthopedic surgery, [facility]. The morning of discharge, patient reported no pain. He was calm, alert and oriented x3 and surgical site was clean and dry.        Therefore, he is discharged in good and stable condition to skilled nursing facility.    The patient met 2-midnight criteria for an inpatient stay at the time of discharge.      FOLLOW UP ITEMS POST DISCHARGE  Follow-up with Dr. Huerta in 10 days for post-op for re-evaluation, staple removal and radiographs    DISCHARGE DIAGNOSES  Principal Problem:    Closed fracture of neck of left femur (HCC) POA: Unknown  Active Problems:    Macrocytic anemia POA: Unknown    Parkinson disease (HCC) POA: Unknown  Resolved Problems:    Hypokalemia POA: Unknown      FOLLOW UP  No future appointments.  No follow-up provider  specified.    MEDICATIONS ON DISCHARGE     Medication List      START taking these medications      Instructions   acetaminophen 325 MG Tabs  Commonly known as:  TYLENOL   Take 2 Tabs by mouth every 6 hours as needed (Mild Pain; (Pain scale 1-3); Temp greater than 100.5 F).  Dose:  650 mg     aspirin 325 MG Tabs  Commonly known as:  ASA   Take 1 Tab by mouth 2 Times a Day for 21 days.  Dose:  325 mg        CHANGE how you take these medications      Instructions   carbidopa-levodopa  MG Tabs  What changed:    · when to take this  · additional instructions  Commonly known as:  SINEMET   Take 1 Tab by mouth 4 times a day.  Dose:  1 Tab     fludrocortisone 0.1 MG Tabs  Start taking on:  June 5, 2020  What changed:    · how much to take  · additional instructions  Commonly known as:  FLORINEF   Take 1 Tab by mouth every day.  Dose:  0.1 mg        CONTINUE taking these medications      Instructions   midodrine 5 MG Tabs  Commonly known as:  PROAMATINE   Take 1 Tab by mouth 3 times a day before meals.  Dose:  5 mg     vitamin B-12 1000 MCG Tabs   Take 1 Tab by mouth every day.  Dose:  1,000 mcg        STOP taking these medications    Levaquin 250 MG Tabs  Generic drug:  levoFLOXacin     PreserVision AREDS 2+Multi Vit Caps            Allergies  Allergies   Allergen Reactions   • Tramadol        DIET  Orders Placed This Encounter   Procedures   • Diet Order Regular     Standing Status:   Standing     Number of Occurrences:   1     Order Specific Question:   Diet:     Answer:   Regular [1]     Order Specific Question:   Texture Modifier     Answer:   Level 4 - Pureed (Dysphagia 1)     Order Specific Question:   Liquid level     Answer:   Level 2 - Mildly Thick     Order Specific Question:   Miscellaneous modifications:     Answer:   SLP - 1:1 Supervision by Nursing [21]     Order Specific Question:   Miscellaneous modifications:     Answer:   SLP - Deliver to Nursing Station [22]       ACTIVITY  As tolerated and  directed by skilled nursing.  Weight bearing as tolerated    LINES, DRAINS, AND WOUNDS  This is an automated list. Peripheral IVs will be removed prior to discharge.  Peripheral IV 05/30/20 20 G Right Forearm (Active)   Site Assessment Clean;Dry;Intact 06/03/20 2000   Dressing Type Transparent 06/03/20 2000   Line Status Infusing 06/03/20 2000   Dressing Status Dry;Intact;Old drainage 06/03/20 2000   Dressing Intervention N/A 06/03/20 2000   Date Primary Tubing Changed 06/02/20 06/03/20 2000   NEXT Primary Tubing Change  06/06/20 06/02/20 2100   Infiltration Grading ([facility]) 0 06/03/20 2000   Phlebitis Scale ([facility] Only) 0 06/03/20 2000       Wound 05/30/20 Incision Hip Left Prevena (Active)   Site Assessment TARUN 06/03/20 2000   Periwound Assessment TARUN 06/03/20 2000   Margins TARUN 06/03/20 2000   Closure TARUN 06/03/20 2000   Drainage Amount None 06/03/20 2000   Drainage Description Serosanguineous 06/03/20 1300   Treatments Site care 06/03/20 1300   Dressing Options Mepilex Silver 06/03/20 2000   Dressing Changed Changed 06/03/20 1300   Dressing Status Clean;Dry;Intact 06/03/20 2000       Peripheral IV 05/30/20 20 G Right Forearm (Active)   Site Assessment Clean;Dry;Intact 06/03/20 2000   Dressing Type Transparent 06/03/20 2000   Line Status Infusing 06/03/20 2000   Dressing Status Dry;Intact;Old drainage 06/03/20 2000   Dressing Intervention N/A 06/03/20 2000   Date Primary Tubing Changed 06/02/20 06/03/20 2000   NEXT Primary Tubing Change  06/06/20 06/02/20 2100   Infiltration Grading ([facility]) 0 06/03/20 2000   Phlebitis Scale ([facility] Only) 0 06/03/20 2000               MENTAL STATUS ON TRANSFER  Level of Consciousness: Alert  Orientation : Oriented x 4  Speech: Speech Clear    CONSULTATIONS  Orthopedic Surgery, Dr. Huerta    PROCEDURES  5/30/2020: left hip hemiarthroplasty by Dr. Huerta     LABORATORY  Lab Results   Component Value Date    SODIUM 134 (L) 06/04/2020    POTASSIUM 3.8 06/04/2020     CHLORIDE 97 06/04/2020    CO2 28 06/04/2020    GLUCOSE 127 (H) 06/04/2020    BUN 21 06/04/2020    CREATININE 0.86 06/04/2020        Lab Results   Component Value Date    WBC 11.5 (H) 06/04/2020    HEMOGLOBIN 10.0 (L) 06/04/2020    HEMATOCRIT 29.9 (L) 06/04/2020    PLATELETCT 269 06/04/2020        Total time of the discharge process exceeds 35 minutes.

## 2020-06-04 NOTE — DISCHARGE PLANNING
Anticipated Discharge Disposition: [facility] today at 1200    Action: Pt is set to be picked up by [facility] in a w/c van today at 1200.      LSW called and informed pt's zanderrNabila.    Barriers to Discharge: N/A    Plan: Transfer packet to be placed in pt's chart.

## 2020-06-04 NOTE — PROGRESS NOTES
WILLIAMS reviewed with patient. Report given to Hany PAYTON. All questions answered. All belongings taken with patient. IV d/c'd.

## 2020-06-04 NOTE — CARE PLAN
Problem: Safety  Goal: Will remain free from injury  Note: Call light and belongings within reach, bed down and locked, hourly rounding in progress. Treaded socks in use, no DME at bedside. Bed alarm in use, pt calls appropriately.       Problem: Infection  Goal: Will remain free from infection  Note: Standard precautions in place.

## 2020-06-05 NOTE — DOCUMENTATION QUERY
[facility]                                                                       Query Response Note      PATIENT:               HELADIO PINEDA  ACCT #:                  [id number]  MRN:                     [id number]  :                      [id number]  ADMIT DATE:       2020 2:56 AM  DISCH DATE:        2020 12:43 PM  RESPONDING  PROVIDER #:        [id number]           QUERY TEXT:    Altered mental status and confused is documented in the Medical Record.  Can a more specific diagnosis be provided?    NOTE:  If the appropriate response is not listed below, please respond with a new note.    The patient's Clinical Indicators include:   Anesthesia Pre-Procedure: Altered mental status, no hx dementia (could have injury/hospital-induced delirium).   Potassium: 3.1; Chloride: 113; Co2: 18; Calcium: 6.6   RN Note: Remains confused somewhat non compliant has poor retention of teaching.     Orthopedic Note: Confused this AM per nursing.   MD Note: Dementia limits additional history   Treatment: Replete potassium/calcium; left hip hemiarthroplasty; reorientation; lab/imaging  Risk Factors: Age; parkinson's; post-op hemiarthroplasty; hypocalcemia; hypokalemia; dementia  Options provided:   -- Acute metabolic encephalopathy   -- Acute encephalopathy due to other medical condition, (please specify other medical condition)   -- Delirium due to known physiological condition, (please specify the known physiological condition)   -- Delirium due to general medical condition   -- Delirium due to multiple etiologies   -- Delirium due to dementia   -- Delirium of unknown etiology   -- Unable to determine      Query created by: Caitlyn Rowe on 2020 9:16 AM    RESPONSE TEXT:    Acute metabolic encephalopathy          Electronically signed by:  JULIO BANERJEE MD 2020 10:32 AM

## 2020-06-05 NOTE — PROGRESS NOTES
"   Orthopaedic Progress Note    Interval changes:  Patient doing well  Mepilex AG dressing CDI  Cleared by ortho for DC to SNF pending medicine clearance    ROS - Patient denies any new issues.  Pain well controlled.    /68   Pulse 99   Temp 36.7 °C (98 °F) (Temporal)   Resp 18   Ht 1.778 m (5' 10\")   Wt 58.1 kg (128 lb)   SpO2 97%       Patient seen and examined  No acute distress  Breathing non labored  RRR  RLE mepilex ag dressing is clean, dry, and intact. Patient clearly fires tibialis anterior, EHL, and gastrocnemius/soleus. Sensation is intact to light touch throughout superficial peroneal, deep peroneal, tibial, saphenous, and sural nerve distributions. Strong and palpable 2+ dorsalis pedis and posterior tibial pulses with capillary refill less than 2 seconds. No lower leg tenderness or discomfort.       Recent Labs     06/02/20  0507 06/03/20  0526 06/04/20  0523   WBC 9.0 8.7 11.5*   RBC 3.16* 2.89* 2.98*   HEMOGLOBIN 10.5* 9.7* 10.0*   HEMATOCRIT 31.1* 28.7* 29.9*   MCV 98.4* 99.3* 100.3*   MCH 33.2* 33.6* 33.6*   MCHC 33.8 33.8 33.4*   RDW 47.2 46.3 47.3   PLATELETCT 234 237 269   MPV 9.6 9.6 9.5       Active Hospital Problems    Diagnosis   • Closed fracture of neck of left femur ([facility]) [S72.002A]   • Macrocytic anemia [D53.9]   • Parkinson disease ([facility]) [G20]       Assessment/Plan:  Cleared by ortho for DC to SNF pending medicine clearance  POD#5 S/P left hip hemiarthroplasty  Wt bearing status - WBAT  Wound care/Drains - mepilex ag left in place  Future Procedures - none planned   Lovenox: Start 5/31, Duration-until ambulatory > 150'  Sutures/Staples out- 10-14 days post operatively  PT/OT-initiated  Antibiotics: completed  DVT Prophylaxis- TEDS/SCDs/Foot pumps  Heck-none  Case Coordination for Discharge Planning - Disposition SNF   "

## 2020-06-28 RX ORDER — ACETAMINOPHEN 325 MG/1
650 TABLET ORAL EVERY 6 HOURS PRN
Qty: 360 TAB | Refills: 3 | Status: SHIPPED | OUTPATIENT
Start: 2020-06-28

## 2020-06-30 ENCOUNTER — PATIENT MESSAGE (OUTPATIENT)
Dept: MEDICAL GROUP | Facility: MEDICAL CENTER | Age: 85
End: 2020-06-30

## 2020-06-30 NOTE — TELEPHONE ENCOUNTER
I called daughter and made him a VV appointment for 7/7/2020, is this appointment okay to be a VV due to his circumstances?

## 2020-07-07 ENCOUNTER — TELEMEDICINE (OUTPATIENT)
Dept: MEDICAL GROUP | Facility: MEDICAL CENTER | Age: 85
End: 2020-07-07
Payer: MEDICARE

## 2020-07-07 VITALS
DIASTOLIC BLOOD PRESSURE: 70 MMHG | SYSTOLIC BLOOD PRESSURE: 112 MMHG | HEART RATE: 78 BPM | BODY MASS INDEX: 19.29 KG/M2 | HEIGHT: 64 IN | TEMPERATURE: 97.2 F | OXYGEN SATURATION: 93 % | WEIGHT: 113 LBS

## 2020-07-07 DIAGNOSIS — Z96.649 S/P HIP HEMIARTHROPLASTY: ICD-10-CM

## 2020-07-07 DIAGNOSIS — M25.559 HIP PAIN: ICD-10-CM

## 2020-07-07 DIAGNOSIS — R29.818 PARKINSONIAN FEATURES: ICD-10-CM

## 2020-07-07 PROBLEM — S72.002A CLOSED FRACTURE OF NECK OF LEFT FEMUR (HCC): Status: RESOLVED | Noted: 2020-05-30 | Resolved: 2020-07-07

## 2020-07-07 PROBLEM — G20.A1 PARKINSON DISEASE (HCC): Status: RESOLVED | Noted: 2020-05-30 | Resolved: 2020-07-07

## 2020-07-07 PROBLEM — D53.9 MACROCYTIC ANEMIA: Status: RESOLVED | Noted: 2020-05-30 | Resolved: 2020-07-07

## 2020-07-07 PROBLEM — N39.0 UTI (URINARY TRACT INFECTION): Status: ACTIVE | Noted: 2020-07-07

## 2020-07-07 PROCEDURE — 99214 OFFICE O/P EST MOD 30 MIN: CPT | Mod: 95,CR | Performed by: INTERNAL MEDICINE

## 2020-07-07 RX ORDER — DOCUSATE SODIUM 100 MG/1
100 CAPSULE, LIQUID FILLED ORAL
Qty: 90 CAP | Refills: 3 | Status: SHIPPED | OUTPATIENT
Start: 2020-07-07

## 2020-07-07 RX ORDER — HYDROCODONE BITARTRATE AND ACETAMINOPHEN 5; 325 MG/1; MG/1
0.5 TABLET ORAL NIGHTLY PRN
Qty: 60 TAB | Refills: 0 | Status: SHIPPED | OUTPATIENT
Start: 2020-07-07 | End: 2020-08-06

## 2020-07-07 RX ORDER — OXYCODONE AND ACETAMINOPHEN 2.5; 325 MG/1; MG/1
1 TABLET ORAL NIGHTLY PRN
Qty: 30 TAB | Refills: 0 | Status: SHIPPED | OUTPATIENT
Start: 2020-07-07 | End: 2020-07-07

## 2020-07-07 ASSESSMENT — FIBROSIS 4 INDEX: FIB4 SCORE: 2.51

## 2020-07-07 NOTE — PROGRESS NOTES
Telemedicine Visit: Established Patient     This encounter was conducted via zoom  Verbal consent was obtained. Patient's identity was verified.    Subjective:   CC: hip pain   Miguel Ángel Page is a 89 y.o. male presenting for evaluation and management of: hip pain     Telemedicine appointment with patient and his daughter, patient is at [address] [place] [address].  Hospital records reviewed patient was at [facility] for 3 weeks where he received physical therapy, Occupational Therapy, currently back at [address] [place] [address] where he was before the fall and is now receiving home health therapy and physical therapy  5/28/20 hospital admission, 6/4/20 hospital discharge patient admitted ground-level fall, admitted by orthopedic surgery, cleared for discharge to [facility] nursing facility  5/28/20 emergency room note transferred from assisted living facility ground-level fall, x-rays show acute angulated fracture left femoral neck  5/28/20 x-ray pelvis and femur left, angulated fracture left femoral neck  5/28/20 CT chest, abdomen, pelvis with, no acute traumatic changes, abdomen, pelvis, airspace opacities bilateral lower lobe right greater than left atelectasis or consolidation, acute angulated fracture left femoral  5/28/20 CT lumbar spine without plus reconstruction no acute fracture moderate levoconvex lumbar curvature  5/28/20 CT thoracic spine without plus reconstruction, no acute fracture, old bilateral rib fractures, severe dextroconvex thoracic curvature  5/30/20 x-ray pelvis acute angulated fracture left femoral neck  5/30/20  orthopedic operative note left hip hemiarthroplasty  6/4/20 hgb 10,hct 30  Patient currently lives in a supervised setting at [address] [place] [address], is being socially isolated as with all residence to prevent COVID transmission, no one at the facility has had COVID.  Patient needs assistance with transfers, bathing, ambulates with walker with assistance, meal preparation but is able to  feed himself, needs help with dressing.  Denies anxiety or depression.  No side effects with oxycodone 2.5 mg taking once a week, typically will take this at night, does not have any daytime drowsiness, sedation, memory loss, confusion if he takes the oxycodone at night.  During the day he will take Tylenol which will be sufficient.  Parkinson's on Sinemet 25/100 4 times daily stable, orthostatic hypotension on midodrine 5 mg 3 times daily and fludrocortisone 0.1 mg daily       Allergies   Allergen Reactions   • Tramadol    • Ultram [Tramadol Hcl]      Patient states no allergy - not sure of reaction.       Current medicines (including changes today)  Current Outpatient Medications   Medication Sig Dispense Refill   • oxycodone-acetaminophen (PERCOCET) 2.5-325 MG per tablet Take 1 Tab by mouth at bedtime as needed (pain) for up to 30 days. 30 Tab 0   • acetaminophen (TYLENOL) 325 MG Tab Take 2 Tabs by mouth every 6 hours as needed for Moderate Pain or Fever (Mild Pain; (Pain scale 1-3); Temp greater than 100.5 F). 360 Tab 3   • carbidopa-levodopa (SINEMET)  MG Tab Take 1 Tab by mouth 4 times a day. 90 Tab    • fludrocortisone (FLORINEF) 0.1 MG Tab Take 1 Tab by mouth every day. 30 Tab    • midodrine (PROAMATINE) 5 MG Tab Take 1 Tab by mouth 3 times a day before meals. 60 Tab    • Cyanocobalamin (VITAMIN B-12) 1000 MCG Tab Take 1 Tab by mouth every day. 30 Tab 3   • tamsulosin (FLOMAX) 0.4 MG capsule Take 1 Cap by mouth ONE-HALF HOUR AFTER BREAKFAST. 30 Cap 1   • Multiple Vitamins-Minerals (PRESERVISION AREDS 2) Chew Tab Take 2 Tabs by mouth 2 Times a Day. 120 Tab 11   • triamcinolone (NASACORT) 55 MCG/ACT nasal inhaler Spray 1 Spray in nose every day.     • ibuprofen (MOTRIN) 600 MG Tab Take 600 mg by mouth every 6 hours as needed.     • cyanocobalamin (VITAMIN B12) 1000 MCG Tab Take 1 Tab by mouth every day. 90 Tab 1     No current facility-administered medications for this visit.        Patient Active  "Problem List    Diagnosis Date Noted   • Orthostatic hypotension 06/15/2019     Priority: Medium   • Closed fracture of neck of left femur (HCC) 05/30/2020   • Macrocytic anemia 05/30/2020   • Parkinson disease (HCC) 05/30/2020   • History pleural effusion 06/21/2019   • Anemia 06/21/2019   • BPH (benign prostatic hyperplasia) 03/29/2018   • Parkinsonian features 03/29/2018   • At risk for falls 03/29/2018   • Shoulder pain, right 02/27/2013   • Constipation 12/03/2012   • Abnormal PSA 06/18/2012   • Hip pain, right 06/08/2012   • Low back pain 03/12/2012   • Preventative health care 04/05/2010   • Dyslipidemia 04/05/2010   • Cataract 04/05/2010       Family History   Problem Relation Age of Onset   • Heart Disease Mother    • Heart Disease Father        He  has a past medical history of Cough, Orthostatic hypotension, Parkinson disease (HCC), Parkinson's disease (HCC), Pleural effusion, Pneumothorax, and Rib fractures. He also has no past medical history of Painful breathing, Shortness of breath, Sputum production, or Wheezing.  He  has a past surgical history that includes pr partial hip replacement (Left, 5/30/2020).              Health Maintenance Summary                Annual Wellness Visit Overdue 9/14/[birth year]28     IMM DTaP/Tdap/Td Vaccine Overdue 9/14/1947     IMM ZOSTER VACCINES Overdue 9/14/1978     IMM INFLUENZA Next Due 9/1/2020      Done 10/14/2019 Imm Admin: Influenza Vaccine Adult HD     Patient has more history with this topic...          Patient Care Team:  Oscar Dubois M.D. as PCP - General (Internal Medicine)  [facility] as Home Health Provider  NAZARIO Loving as   Oscar Dubois M.D.         Objective:   /70 (BP Location: Left arm, Patient Position: Sitting, BP Cuff Size: Adult) Comment: 7/2/2020  Pulse 78   Temp 36.2 °C (97.2 °F)   Ht 1.626 m (5' 4\")   Wt 51.3 kg (113 lb)   SpO2 93%   BMI 19.40 kg/m²     Physical Exam:   Constitutional: Alert, no " distress, well-groomed.  Skin: No rashes in visible areas.  Eye: Round. Conjunctiva clear, lids normal. No icterus.   ENMT: Lips pink without lesions, good dentition, moist mucous membranes. Phonation normal.  Neck: No masses, no thyromegaly. Moves freely without pain.  Respiratory: Unlabored respiratory effort, no cough or audible wheeze  Psych: Alert and oriented x3, normal affect and mood.       Assessment and Plan:   The following treatment plan was discussed:   Assessment  #1 status post left hip fracture, and left hip hemiarthroplasty 5/30/20 by Dr. Huerta orthopedics, went to skilled nursing completed 3 weeks of therapy and is back at [facility]    #2 left hip pain status post fall is taking Tylenol during the day which is helping, and will take oxycodone 2.5 mg at night for hip pain, he is only taking this once or twice per week without side effects of drowsiness, sedation, memory loss, confusion    #3 intermittent opiate therapy subacute in nature on oxycodone 2.5 mg at night has been on this since discharge from the hospital to skilled nursing for the past 4 weeks    #4 parkinsonian features on Sinemet 25/100 4 times daily    #5 orthostatic hypotension on midodrine and Florinef stable    #6 postoperative anemia 6/4/20 hgb 10,hct 30           Plan  #1 Hospital records reviewed    #2 falling precautions, continue physical therapy, home health    #3 refill oxycodone 2.5 mg at night as needed sent to pharmacy insurance does not cover this according to the pharmacy, I had to change the prescription to hydrocodone 5 mg take half a tablet at night as needed for pain, will notify daughter of the change, monitor for constipation, drowsiness, sedation, memory loss, falls, confusion, notify me if that happens, okay to take MiraLAX daily as needed, order to [address] [place] [address]    #4     #5 continue Sinemet, midodrine, Florinef    #6 we will follow-up anemia labs at a later date since he is stable currently,  minimizing travel outside of his residence due to COVID pandemic    #7 follow-up 3 to 6 months telemedicine appointment is fine

## 2020-07-09 ENCOUNTER — TELEPHONE (OUTPATIENT)
Dept: MEDICAL GROUP | Facility: MEDICAL CENTER | Age: 85
End: 2020-07-09

## 2020-07-09 NOTE — TELEPHONE ENCOUNTER
Mago @ [facility] called and needs STAT orders for Miguel Ángel Page.     Wants d.c for oxy, needs to states d/c oxy and needs new order for Hydrodone 5/325 0.5 tab.  Wants order to d/c Metamucil and needs order for Miralax (that's what the dtr brought in).

## 2020-07-24 ENCOUNTER — TELEPHONE (OUTPATIENT)
Dept: MEDICAL GROUP | Facility: MEDICAL CENTER | Age: 85
End: 2020-07-24

## 2020-07-24 DIAGNOSIS — N39.0 URINARY TRACT INFECTION WITHOUT HEMATURIA, SITE UNSPECIFIED: ICD-10-CM

## 2020-08-13 ENCOUNTER — TELEPHONE (OUTPATIENT)
Dept: MEDICAL GROUP | Facility: MEDICAL CENTER | Age: 85
End: 2020-08-13

## 2020-08-13 NOTE — TELEPHONE ENCOUNTER
Please print out the urinalysis result and fax to [facility].  The urinalysis is negative for infection.

## 2020-08-13 NOTE — TELEPHONE ENCOUNTER
----- Message from Oscar Dubois M.D. sent at 8/13/2020  8:40 AM PDT -----  Please print out the urinalysis result and fax to [facility].  The urinalysis is negative for infection.

## 2020-09-07 ENCOUNTER — TELEPHONE (OUTPATIENT)
Dept: MEDICAL GROUP | Facility: MEDICAL CENTER | Age: 85
End: 2020-09-07

## 2020-09-07 DIAGNOSIS — R39.15 URINARY URGENCY: ICD-10-CM

## 2020-09-08 ENCOUNTER — PATIENT MESSAGE (OUTPATIENT)
Dept: MEDICAL GROUP | Facility: MEDICAL CENTER | Age: 85
End: 2020-09-08

## 2020-09-08 DIAGNOSIS — R29.818 PARKINSONIAN FEATURES: ICD-10-CM

## 2020-09-08 DIAGNOSIS — Z91.81 AT HIGH RISK FOR FALLS: ICD-10-CM

## 2020-09-08 DIAGNOSIS — R26.9 GAIT DISORDER: ICD-10-CM

## 2020-09-08 DIAGNOSIS — R29.898 WEAKNESS OF LOWER EXTREMITY, UNSPECIFIED LATERALITY: ICD-10-CM

## 2020-09-09 NOTE — TELEPHONE ENCOUNTER
----- Message from Gwen Bowman, Med Ass't sent at 9/8/2020 11:30 AM PDT -----  Regarding: FW: Non-Urgent Medical Question  Contact: [phone]    ----- Message -----  From: Miguel Ángel Page  Sent: 9/8/2020  10:58 AM PDT  To: Angelica Acevedo [facility]  Subject: Non-Urgent Medical Question                      Nichelle Dubois,  I'm writing to request that you place an Order for physical therapy sessions at [facility] (located inside my residential home ([facility]) to begin next week, September 14th. I've had [facility] working with me but their coverage has ended and I really need more help as I try and recover from my hip surgery at the end of May. If you have any questions, please call my Daughter Nabila at [phone].   Thank you.  Miguel Ángel Page

## 2020-09-10 ENCOUNTER — TELEPHONE (OUTPATIENT)
Dept: MEDICAL GROUP | Facility: MEDICAL CENTER | Age: 85
End: 2020-09-10

## 2020-09-10 NOTE — TELEPHONE ENCOUNTER
Iliana called from [facility] and needs orders for PT and a UA. Pt is symptomatic.

## 2020-09-10 NOTE — TELEPHONE ENCOUNTER
The order was printed to fax and placed on the counter on September 7, please see the telephone encounter and if they did not receive the order, please print the order out again to fax

## 2020-09-19 ENCOUNTER — HOSPITAL ENCOUNTER (OUTPATIENT)
Facility: MEDICAL CENTER | Age: 85
End: 2020-09-19
Attending: INTERNAL MEDICINE
Payer: MEDICARE

## 2020-09-19 PROCEDURE — 81001 URINALYSIS AUTO W/SCOPE: CPT

## 2020-09-19 PROCEDURE — 87086 URINE CULTURE/COLONY COUNT: CPT

## 2020-09-20 LAB
APPEARANCE UR: CLEAR
BACTERIA #/AREA URNS HPF: NEGATIVE /HPF
BILIRUB UR QL STRIP.AUTO: NEGATIVE
COLOR UR: YELLOW
EPI CELLS #/AREA URNS HPF: ABNORMAL /HPF
GLUCOSE UR STRIP.AUTO-MCNC: NEGATIVE MG/DL
HYALINE CASTS #/AREA URNS LPF: ABNORMAL /LPF
KETONES UR STRIP.AUTO-MCNC: ABNORMAL MG/DL
LEUKOCYTE ESTERASE UR QL STRIP.AUTO: ABNORMAL
MICRO URNS: ABNORMAL
MUCOUS THREADS #/AREA URNS HPF: ABNORMAL /HPF
NITRITE UR QL STRIP.AUTO: NEGATIVE
PH UR STRIP.AUTO: 5 [PH] (ref 5–8)
PROT UR QL STRIP: 100 MG/DL
RBC # URNS HPF: ABNORMAL /HPF
RBC UR QL AUTO: NEGATIVE
SP GR UR STRIP.AUTO: 1.02
UROBILINOGEN UR STRIP.AUTO-MCNC: 0.2 MG/DL
WBC #/AREA URNS HPF: ABNORMAL /HPF

## 2020-09-21 ENCOUNTER — TELEPHONE (OUTPATIENT)
Dept: MEDICAL GROUP | Facility: MEDICAL CENTER | Age: 85
End: 2020-09-21

## 2020-09-22 ENCOUNTER — TELEPHONE (OUTPATIENT)
Dept: MEDICAL GROUP | Facility: MEDICAL CENTER | Age: 85
End: 2020-09-22

## 2020-09-22 LAB
BACTERIA UR CULT: NORMAL
SIGNIFICANT IND 70042: NORMAL
SITE SITE: NORMAL
SOURCE SOURCE: NORMAL

## 2020-09-22 NOTE — TELEPHONE ENCOUNTER
----- Message from Oscar Dubois M.D. sent at 9/21/2020  5:36 PM PDT -----  Please print and send urine culture result to [address] [place] [address], there is no evidence of infection

## 2020-10-02 ENCOUNTER — APPOINTMENT (OUTPATIENT)
Dept: RADIOLOGY | Facility: MEDICAL CENTER | Age: 85
DRG: 377 | End: 2020-10-02
Attending: EMERGENCY MEDICINE
Payer: MEDICARE

## 2020-10-02 ENCOUNTER — HOSPITAL ENCOUNTER (INPATIENT)
Facility: MEDICAL CENTER | Age: 85
LOS: 5 days | DRG: 377 | End: 2020-10-08
Attending: EMERGENCY MEDICINE | Admitting: HOSPITALIST
Payer: MEDICARE

## 2020-10-02 DIAGNOSIS — J69.0 ASPIRATION PNEUMONIA, UNSPECIFIED ASPIRATION PNEUMONIA TYPE, UNSPECIFIED LATERALITY, UNSPECIFIED PART OF LUNG (HCC): ICD-10-CM

## 2020-10-02 DIAGNOSIS — D64.9 ANEMIA, UNSPECIFIED TYPE: ICD-10-CM

## 2020-10-02 DIAGNOSIS — K92.2 GASTROINTESTINAL HEMORRHAGE, UNSPECIFIED GASTROINTESTINAL HEMORRHAGE TYPE: ICD-10-CM

## 2020-10-02 PROBLEM — K92.0 HEMATEMESIS: Status: ACTIVE | Noted: 2020-10-02

## 2020-10-02 LAB
ABO GROUP BLD: NORMAL
ALBUMIN SERPL BCP-MCNC: 3 G/DL (ref 3.2–4.9)
ALBUMIN/GLOB SERPL: 1.4 G/DL
ALP SERPL-CCNC: 59 U/L (ref 30–99)
ALT SERPL-CCNC: 9 U/L (ref 2–50)
ANION GAP SERPL CALC-SCNC: 14 MMOL/L (ref 7–16)
APTT PPP: 25.7 SEC (ref 24.7–36)
AST SERPL-CCNC: 14 U/L (ref 12–45)
BASOPHILS # BLD AUTO: 0.4 % (ref 0–1.8)
BASOPHILS # BLD: 0.03 K/UL (ref 0–0.12)
BILIRUB SERPL-MCNC: <0.2 MG/DL (ref 0.1–1.5)
BLD GP AB SCN SERPL QL: NORMAL
BUN SERPL-MCNC: 61 MG/DL (ref 8–22)
CALCIUM SERPL-MCNC: 8.2 MG/DL (ref 8.5–10.5)
CHLORIDE SERPL-SCNC: 104 MMOL/L (ref 96–112)
CO2 SERPL-SCNC: 21 MMOL/L (ref 20–33)
COVID ORDER STATUS COVID19: NORMAL
CREAT SERPL-MCNC: 1.01 MG/DL (ref 0.5–1.4)
EOSINOPHIL # BLD AUTO: 0.31 K/UL (ref 0–0.51)
EOSINOPHIL NFR BLD: 3.7 % (ref 0–6.9)
ERYTHROCYTE [DISTWIDTH] IN BLOOD BY AUTOMATED COUNT: 53.8 FL (ref 35.9–50)
FERRITIN SERPL-MCNC: 41 NG/ML (ref 22–322)
FOLATE SERPL-MCNC: 8.1 NG/ML
GLOBULIN SER CALC-MCNC: 2.2 G/DL (ref 1.9–3.5)
GLUCOSE SERPL-MCNC: 95 MG/DL (ref 65–99)
HCT VFR BLD AUTO: 26 % (ref 42–52)
HCT VFR BLD AUTO: 27.1 % (ref 42–52)
HCT VFR BLD AUTO: 27.3 % (ref 42–52)
HCT VFR BLD AUTO: 31.1 % (ref 42–52)
HGB BLD-MCNC: 8.1 G/DL (ref 14–18)
HGB BLD-MCNC: 8.4 G/DL (ref 14–18)
HGB BLD-MCNC: 8.5 G/DL (ref 14–18)
HGB BLD-MCNC: 9.4 G/DL (ref 14–18)
HGB RETIC QN AUTO: 33.6 PG/CELL (ref 29–35)
IMM GRANULOCYTES # BLD AUTO: 0.03 K/UL (ref 0–0.11)
IMM GRANULOCYTES NFR BLD AUTO: 0.4 % (ref 0–0.9)
IMM RETICS NFR: 10.7 % (ref 9.3–17.4)
INR PPP: 1.25 (ref 0.87–1.13)
IRON SATN MFR SERPL: 67 % (ref 15–55)
IRON SERPL-MCNC: 159 UG/DL (ref 50–180)
LDH SERPL L TO P-CCNC: 101 U/L (ref 107–266)
LIPASE SERPL-CCNC: 61 U/L (ref 11–82)
LYMPHOCYTES # BLD AUTO: 0.91 K/UL (ref 1–4.8)
LYMPHOCYTES NFR BLD: 10.8 % (ref 22–41)
MCH RBC QN AUTO: 29.1 PG (ref 27–33)
MCHC RBC AUTO-ENTMCNC: 30.2 G/DL (ref 33.7–35.3)
MCV RBC AUTO: 96.3 FL (ref 81.4–97.8)
MONOCYTES # BLD AUTO: 0.98 K/UL (ref 0–0.85)
MONOCYTES NFR BLD AUTO: 11.6 % (ref 0–13.4)
NEUTROPHILS # BLD AUTO: 6.2 K/UL (ref 1.82–7.42)
NEUTROPHILS NFR BLD: 73.1 % (ref 44–72)
NRBC # BLD AUTO: 0 K/UL
NRBC BLD-RTO: 0 /100 WBC
PLATELET # BLD AUTO: 296 K/UL (ref 164–446)
PMV BLD AUTO: 9.5 FL (ref 9–12.9)
POTASSIUM SERPL-SCNC: 4.7 MMOL/L (ref 3.6–5.5)
PROCALCITONIN SERPL-MCNC: 0.14 NG/ML
PROT SERPL-MCNC: 5.2 G/DL (ref 6–8.2)
PROTHROMBIN TIME: 16.1 SEC (ref 12–14.6)
RBC # BLD AUTO: 3.23 M/UL (ref 4.7–6.1)
RETICS # AUTO: 0.02 M/UL (ref 0.04–0.06)
RETICS/RBC NFR: 0.9 % (ref 0.8–2.1)
RH BLD: NORMAL
SARS-COV-2 RDRP RESP QL NAA+PROBE: NOTDETECTED
SODIUM SERPL-SCNC: 139 MMOL/L (ref 135–145)
SPECIMEN SOURCE: NORMAL
TIBC SERPL-MCNC: 236 UG/DL (ref 250–450)
UIBC SERPL-MCNC: 77 UG/DL (ref 110–370)
WBC # BLD AUTO: 8.5 K/UL (ref 4.8–10.8)

## 2020-10-02 PROCEDURE — 86901 BLOOD TYPING SEROLOGIC RH(D): CPT

## 2020-10-02 PROCEDURE — C9113 INJ PANTOPRAZOLE SODIUM, VIA: HCPCS | Performed by: STUDENT IN AN ORGANIZED HEALTH CARE EDUCATION/TRAINING PROGRAM

## 2020-10-02 PROCEDURE — 36415 COLL VENOUS BLD VENIPUNCTURE: CPT

## 2020-10-02 PROCEDURE — 85025 COMPLETE CBC W/AUTO DIFF WBC: CPT

## 2020-10-02 PROCEDURE — 83540 ASSAY OF IRON: CPT

## 2020-10-02 PROCEDURE — 83615 LACTATE (LD) (LDH) ENZYME: CPT

## 2020-10-02 PROCEDURE — 99220 PR INITIAL OBSERVATION CARE,LEVL III: CPT | Performed by: STUDENT IN AN ORGANIZED HEALTH CARE EDUCATION/TRAINING PROGRAM

## 2020-10-02 PROCEDURE — 700102 HCHG RX REV CODE 250 W/ 637 OVERRIDE(OP): Performed by: STUDENT IN AN ORGANIZED HEALTH CARE EDUCATION/TRAINING PROGRAM

## 2020-10-02 PROCEDURE — U0004 COV-19 TEST NON-CDC HGH THRU: HCPCS

## 2020-10-02 PROCEDURE — 71045 X-RAY EXAM CHEST 1 VIEW: CPT

## 2020-10-02 PROCEDURE — C9113 INJ PANTOPRAZOLE SODIUM, VIA: HCPCS | Performed by: EMERGENCY MEDICINE

## 2020-10-02 PROCEDURE — 99285 EMERGENCY DEPT VISIT HI MDM: CPT

## 2020-10-02 PROCEDURE — 96376 TX/PRO/DX INJ SAME DRUG ADON: CPT

## 2020-10-02 PROCEDURE — 700111 HCHG RX REV CODE 636 W/ 250 OVERRIDE (IP): Performed by: STUDENT IN AN ORGANIZED HEALTH CARE EDUCATION/TRAINING PROGRAM

## 2020-10-02 PROCEDURE — A9270 NON-COVERED ITEM OR SERVICE: HCPCS | Performed by: STUDENT IN AN ORGANIZED HEALTH CARE EDUCATION/TRAINING PROGRAM

## 2020-10-02 PROCEDURE — 85046 RETICYTE/HGB CONCENTRATE: CPT

## 2020-10-02 PROCEDURE — C9803 HOPD COVID-19 SPEC COLLECT: HCPCS | Performed by: STUDENT IN AN ORGANIZED HEALTH CARE EDUCATION/TRAINING PROGRAM

## 2020-10-02 PROCEDURE — 86900 BLOOD TYPING SEROLOGIC ABO: CPT

## 2020-10-02 PROCEDURE — 700102 HCHG RX REV CODE 250 W/ 637 OVERRIDE(OP): Performed by: HOSPITALIST

## 2020-10-02 PROCEDURE — G0378 HOSPITAL OBSERVATION PER HR: HCPCS

## 2020-10-02 PROCEDURE — 85610 PROTHROMBIN TIME: CPT

## 2020-10-02 PROCEDURE — 85014 HEMATOCRIT: CPT | Mod: 91

## 2020-10-02 PROCEDURE — 82746 ASSAY OF FOLIC ACID SERUM: CPT

## 2020-10-02 PROCEDURE — 80053 COMPREHEN METABOLIC PANEL: CPT

## 2020-10-02 PROCEDURE — 85730 THROMBOPLASTIN TIME PARTIAL: CPT

## 2020-10-02 PROCEDURE — 86850 RBC ANTIBODY SCREEN: CPT

## 2020-10-02 PROCEDURE — 84145 PROCALCITONIN (PCT): CPT

## 2020-10-02 PROCEDURE — 700105 HCHG RX REV CODE 258: Performed by: EMERGENCY MEDICINE

## 2020-10-02 PROCEDURE — 700111 HCHG RX REV CODE 636 W/ 250 OVERRIDE (IP): Performed by: EMERGENCY MEDICINE

## 2020-10-02 PROCEDURE — 85018 HEMOGLOBIN: CPT | Mod: 91

## 2020-10-02 PROCEDURE — A9270 NON-COVERED ITEM OR SERVICE: HCPCS | Performed by: HOSPITALIST

## 2020-10-02 PROCEDURE — 82728 ASSAY OF FERRITIN: CPT

## 2020-10-02 PROCEDURE — 96374 THER/PROPH/DIAG INJ IV PUSH: CPT

## 2020-10-02 PROCEDURE — 92610 EVALUATE SWALLOWING FUNCTION: CPT

## 2020-10-02 PROCEDURE — 83550 IRON BINDING TEST: CPT

## 2020-10-02 PROCEDURE — 83690 ASSAY OF LIPASE: CPT

## 2020-10-02 RX ORDER — PHYTONADIONE 5 MG/1
5 TABLET ORAL ONCE
Status: COMPLETED | OUTPATIENT
Start: 2020-10-02 | End: 2020-10-02

## 2020-10-02 RX ORDER — POLYETHYLENE GLYCOL 3350 17 G/17G
1 POWDER, FOR SOLUTION ORAL
Status: DISCONTINUED | OUTPATIENT
Start: 2020-10-02 | End: 2020-10-08 | Stop reason: HOSPADM

## 2020-10-02 RX ORDER — AMOXICILLIN 250 MG
2 CAPSULE ORAL 2 TIMES DAILY
Status: DISCONTINUED | OUTPATIENT
Start: 2020-10-02 | End: 2020-10-08 | Stop reason: HOSPADM

## 2020-10-02 RX ORDER — PANTOPRAZOLE SODIUM 40 MG/10ML
80 INJECTION, POWDER, LYOPHILIZED, FOR SOLUTION INTRAVENOUS ONCE
Status: COMPLETED | OUTPATIENT
Start: 2020-10-02 | End: 2020-10-02

## 2020-10-02 RX ORDER — SODIUM CHLORIDE 9 MG/ML
1000 INJECTION, SOLUTION INTRAVENOUS ONCE
Status: COMPLETED | OUTPATIENT
Start: 2020-10-02 | End: 2020-10-02

## 2020-10-02 RX ORDER — BISACODYL 10 MG
10 SUPPOSITORY, RECTAL RECTAL
Status: DISCONTINUED | OUTPATIENT
Start: 2020-10-02 | End: 2020-10-08 | Stop reason: HOSPADM

## 2020-10-02 RX ORDER — ACETAMINOPHEN 325 MG/1
650 TABLET ORAL EVERY 6 HOURS PRN
Status: DISCONTINUED | OUTPATIENT
Start: 2020-10-02 | End: 2020-10-08 | Stop reason: HOSPADM

## 2020-10-02 RX ORDER — PANTOPRAZOLE SODIUM 40 MG/10ML
40 INJECTION, POWDER, LYOPHILIZED, FOR SOLUTION INTRAVENOUS 2 TIMES DAILY
Status: DISCONTINUED | OUTPATIENT
Start: 2020-10-02 | End: 2020-10-04

## 2020-10-02 RX ORDER — FLUDROCORTISONE ACETATE 0.1 MG/1
0.1 TABLET ORAL DAILY
Status: DISCONTINUED | OUTPATIENT
Start: 2020-10-02 | End: 2020-10-08 | Stop reason: HOSPADM

## 2020-10-02 RX ORDER — MIDODRINE HYDROCHLORIDE 5 MG/1
5 TABLET ORAL
Status: DISCONTINUED | OUTPATIENT
Start: 2020-10-02 | End: 2020-10-08 | Stop reason: HOSPADM

## 2020-10-02 RX ADMIN — PANTOPRAZOLE SODIUM 40 MG: 40 INJECTION, POWDER, LYOPHILIZED, FOR SOLUTION INTRAVENOUS at 17:35

## 2020-10-02 RX ADMIN — CARBIDOPA AND LEVODOPA 1 TABLET: 25; 100 TABLET ORAL at 08:40

## 2020-10-02 RX ADMIN — PHYTONADIONE 5 MG: 5 TABLET ORAL at 13:33

## 2020-10-02 RX ADMIN — CARBIDOPA AND LEVODOPA 1 TABLET: 25; 100 TABLET ORAL at 13:33

## 2020-10-02 RX ADMIN — PANTOPRAZOLE SODIUM 40 MG: 40 INJECTION, POWDER, LYOPHILIZED, FOR SOLUTION INTRAVENOUS at 06:19

## 2020-10-02 RX ADMIN — MIDODRINE HYDROCHLORIDE 5 MG: 5 TABLET ORAL at 17:35

## 2020-10-02 RX ADMIN — CARBIDOPA AND LEVODOPA 1 TABLET: 25; 100 TABLET ORAL at 21:20

## 2020-10-02 RX ADMIN — CARBIDOPA AND LEVODOPA 1 TABLET: 25; 100 TABLET ORAL at 17:34

## 2020-10-02 RX ADMIN — FLUDROCORTISONE ACETATE 0.1 MG: 0.1 TABLET ORAL at 06:19

## 2020-10-02 RX ADMIN — PANTOPRAZOLE SODIUM 80 MG: 40 INJECTION, POWDER, LYOPHILIZED, FOR SOLUTION INTRAVENOUS at 01:50

## 2020-10-02 RX ADMIN — SODIUM CHLORIDE 1000 ML: 9 INJECTION, SOLUTION INTRAVENOUS at 00:56

## 2020-10-02 RX ADMIN — DOCUSATE SODIUM 50 MG AND SENNOSIDES 8.6 MG 2 TABLET: 8.6; 5 TABLET, FILM COATED ORAL at 06:19

## 2020-10-02 RX ADMIN — DOCUSATE SODIUM 50 MG AND SENNOSIDES 8.6 MG 2 TABLET: 8.6; 5 TABLET, FILM COATED ORAL at 17:34

## 2020-10-02 RX ADMIN — MIDODRINE HYDROCHLORIDE 5 MG: 5 TABLET ORAL at 06:19

## 2020-10-02 RX ADMIN — MIDODRINE HYDROCHLORIDE 5 MG: 5 TABLET ORAL at 13:33

## 2020-10-02 ASSESSMENT — ENCOUNTER SYMPTOMS
ORTHOPNEA: 0
DIARRHEA: 0
CHILLS: 0
SHORTNESS OF BREATH: 0
RESPIRATORY NEGATIVE: 1
DEPRESSION: 0
NAUSEA: 1
WHEEZING: 0
MYALGIAS: 0
WEIGHT LOSS: 0
CONSTIPATION: 0
NAUSEA: 0
CARDIOVASCULAR NEGATIVE: 1
VOMITING: 1
PALPITATIONS: 0
BLURRED VISION: 0
WEAKNESS: 1
NEUROLOGICAL NEGATIVE: 1
SORE THROAT: 0
DIZZINESS: 0
BLOOD IN STOOL: 0
HEMOPTYSIS: 0
LOSS OF CONSCIOUSNESS: 0
ABDOMINAL PAIN: 0
EYES NEGATIVE: 1
MUSCULOSKELETAL NEGATIVE: 1
PSYCHIATRIC NEGATIVE: 1
COUGH: 0
EYE PAIN: 0
FEVER: 0

## 2020-10-02 ASSESSMENT — COGNITIVE AND FUNCTIONAL STATUS - GENERAL
PERSONAL GROOMING: A LOT
DRESSING REGULAR LOWER BODY CLOTHING: A LOT
CLIMB 3 TO 5 STEPS WITH RAILING: A LOT
EATING MEALS: A LOT
STANDING UP FROM CHAIR USING ARMS: A LOT
MOVING FROM LYING ON BACK TO SITTING ON SIDE OF FLAT BED: A LOT
SUGGESTED CMS G CODE MODIFIER DAILY ACTIVITY: CL
TURNING FROM BACK TO SIDE WHILE IN FLAT BAD: A LOT
SUGGESTED CMS G CODE MODIFIER MOBILITY: CL
DAILY ACTIVITIY SCORE: 12
WALKING IN HOSPITAL ROOM: A LOT
MOVING TO AND FROM BED TO CHAIR: A LOT
MOBILITY SCORE: 12
TOILETING: A LOT
HELP NEEDED FOR BATHING: A LOT
DRESSING REGULAR UPPER BODY CLOTHING: A LOT

## 2020-10-02 ASSESSMENT — LIFESTYLE VARIABLES
EVER FELT BAD OR GUILTY ABOUT YOUR DRINKING: NO
HOW MANY TIMES IN THE PAST YEAR HAVE YOU HAD 5 OR MORE DRINKS IN A DAY: 0
EVER HAD A DRINK FIRST THING IN THE MORNING TO STEADY YOUR NERVES TO GET RID OF A HANGOVER: NO
ALCOHOL_USE: YES
TOTAL SCORE: 0
AVERAGE NUMBER OF DAYS PER WEEK YOU HAVE A DRINK CONTAINING ALCOHOL: 1
HAVE PEOPLE ANNOYED YOU BY CRITICIZING YOUR DRINKING: NO
HAVE YOU EVER FELT YOU SHOULD CUT DOWN ON YOUR DRINKING: NO
TOTAL SCORE: 0
ON A TYPICAL DAY WHEN YOU DRINK ALCOHOL HOW MANY DRINKS DO YOU HAVE: 2
CONSUMPTION TOTAL: NEGATIVE
DOES PATIENT WANT TO STOP DRINKING: NO
TOTAL SCORE: 0

## 2020-10-02 ASSESSMENT — PAIN DESCRIPTION - PAIN TYPE: TYPE: ACUTE PAIN

## 2020-10-02 ASSESSMENT — FIBROSIS 4 INDEX: FIB4 SCORE: 2.54

## 2020-10-02 NOTE — H&P
Hospital Medicine History & Physical Note    Date of Service  10/2/2020    Primary Care Physician  Oscar JORGE A Dubois M.D.    Consultants  None    Code Status  Full Code    Chief Complaint  Chief Complaint   Patient presents with   • Upper GI Bleed     BIB EMS FROM [facility] AFTER VOMITTING BLOOD X1       History of Presenting Illness  89 y.o. male who presented 10/2/2020 after being sent from [facility] for possible hematemasis. Per pt, he was sitting eating dinner and had some chocolate cake when he started to feel sick and vomited. Per report, nurse does not believe that it was only food contents, but was rather coffee ground emesis. When questioned, pt denies any recent NSAID use, abdominal pain, prior vomiting, blood in stool, or history of GI bleed in the past. He is no longer nauseous and is not vomiting. In the ED, pt noted to have mild decrease in Hb from baseline. He is additionally with tachycardia, and stool guaic performed by ED attending was positive. Pt otherwise denies any further complaints.     Review of Systems  Review of Systems   Constitutional: Negative for chills, fever and weight loss.   HENT: Negative for hearing loss and sore throat.    Eyes: Negative for blurred vision and pain.   Respiratory: Negative for cough, hemoptysis, shortness of breath and wheezing.    Cardiovascular: Negative for chest pain, palpitations, orthopnea and leg swelling.   Gastrointestinal: Positive for vomiting. Negative for abdominal pain, blood in stool, constipation, diarrhea and nausea.        Vomiting dark liquid   Genitourinary: Negative for dysuria and hematuria.   Musculoskeletal: Negative for joint pain and myalgias.   Skin: Negative for rash.   Neurological: Positive for weakness. Negative for dizziness and loss of consciousness.   Psychiatric/Behavioral: Negative for depression and suicidal ideas.       Past Medical History   has a past medical history of Back pain, Cough, Orthostatic  hypotension, Parkinson disease (HCC), Parkinson's disease (HCC), Pleural effusion, Pneumothorax, and Rib fractures.    Surgical History   has a past surgical history that includes pr partial hip replacement (Left, 5/30/2020) and other orthopedic surgery.     Family History  family history includes Heart Disease in his father and mother.     Social History   reports that he has never smoked. He has never used smokeless tobacco. He reports that he does not drink alcohol or use drugs.    Allergies  Allergies   Allergen Reactions   • Tramadol    • Ultram [Tramadol Hcl]      Patient states no allergy - not sure of reaction.       Medications  Prior to Admission Medications   Prescriptions Last Dose Informant Patient Reported? Taking?   Cyanocobalamin (VITAMIN B-12) 1000 MCG Tab   No No   Sig: Take 1 Tab by mouth every day.   Multiple Vitamins-Minerals (PRESERVISION AREDS 2) Chew Tab   No No   Sig: Take 2 Tabs by mouth 2 Times a Day.   acetaminophen (TYLENOL) 325 MG Tab   No No   Sig: Take 2 Tabs by mouth every 6 hours as needed for Moderate Pain or Fever (Mild Pain; (Pain scale 1-3); Temp greater than 100.5 F).   carbidopa-levodopa (SINEMET)  MG Tab   No No   Sig: Take 1 Tab by mouth 4 times a day.   cyanocobalamin (VITAMIN B12) 1000 MCG Tab   No No   Sig: Take 1 Tab by mouth every day.   docusate sodium (COLACE) 100 MG Cap   No No   Sig: Take 1 Cap by mouth 1 time daily as needed for Constipation.   fludrocortisone (FLORINEF) 0.1 MG Tab   No No   Sig: Take 1 Tab by mouth every day.   midodrine (PROAMATINE) 5 MG Tab   No No   Sig: Take 1 Tab by mouth 3 times a day before meals.   tamsulosin (FLOMAX) 0.4 MG capsule   No No   Sig: Take 1 Cap by mouth ONE-HALF HOUR AFTER BREAKFAST.   triamcinolone (NASACORT) 55 MCG/ACT nasal inhaler   Yes No   Sig: Spray 1 Spray in nose every day.      Facility-Administered Medications: None       Physical Exam  Temp:  [36.6 °C (97.9 °F)] 36.6 °C (97.9 °F)  Pulse:  [] 105  Resp:   [16-22] 16  BP: ()/(52-71) 111/62  SpO2:  [91 %-99 %] 97 %    Physical Exam  Vitals signs (Cachetic) and nursing note reviewed.   Constitutional:       General: He is not in acute distress.  HENT:      Head: Normocephalic and atraumatic.      Mouth/Throat:      Mouth: Mucous membranes are dry.      Comments: Poor dentition, dark dried substance surrounding oral opening  Eyes:      Extraocular Movements: Extraocular movements intact.   Cardiovascular:      Rate and Rhythm: Regular rhythm. Tachycardia present.      Heart sounds: No murmur. No gallop.    Pulmonary:      Effort: Pulmonary effort is normal.      Breath sounds: Normal breath sounds. No wheezing, rhonchi or rales.   Abdominal:      General: Abdomen is flat. Bowel sounds are normal. There is no distension.      Palpations: Abdomen is soft.      Tenderness: There is no abdominal tenderness.   Genitourinary:     Rectum: Guaiac result positive.   Musculoskeletal: Normal range of motion.         General: Deformity present.      Right lower leg: No edema.      Left lower leg: No edema.   Neurological:      General: No focal deficit present.      Mental Status: He is alert and oriented to person, place, and time.         Laboratory:  Recent Labs     10/02/20  0035   WBC 8.5   RBC 3.23*   HEMOGLOBIN 9.4*   HEMATOCRIT 31.1*   MCV 96.3   MCH 29.1   MCHC 30.2*   RDW 53.8*   PLATELETCT 296   MPV 9.5     Recent Labs     10/02/20  0035   SODIUM 139   POTASSIUM 4.7   CHLORIDE 104   CO2 21   GLUCOSE 95   BUN 61*   CREATININE 1.01   CALCIUM 8.2*     Recent Labs     10/02/20  0035   ALTSGPT 9   ASTSGOT 14   ALKPHOSPHAT 59   TBILIRUBIN <0.2   LIPASE 61   GLUCOSE 95     Recent Labs     10/02/20  0146   APTT 25.7   INR 1.25*     No results for input(s): NTPROBNP in the last 72 hours.      No results for input(s): TROPONINT in the last 72 hours.    Imaging:  DX-CHEST-PORTABLE (1 VIEW)   Final Result      1.  Mild right basilar opacity which could represent atelectasis  and/or pneumonitis.   2.  Cardiomegaly.   3.  No evidence of pneumomediastinum.            Assessment/Plan:  I anticipate this patient is appropriate for observation status at this time.    Hematemesis- (present on admission)  Assessment & Plan  Pt with possible hematemasis  Noted to have mild tachycardia, but otherwise no signs of hemodynamic instability  Guaic performed by ED attending positive  Denies any recent NSAID use or prior history of bleeding in the past  - Unclear if actual hematemesis, admit for observation  - will c/w Protonix 40mg BID IV as precautionary  - f/u with repeat CBC  - monitor for signs of hemodynamic instability  - Consider GI consult if further signs of GI bleeding       Anemia- (present on admission)  Assessment & Plan  Hb mildly decreased from baseline  - Type and screen  - Monitor vitals for signs of hemodynamic instability  - Repeat CBC ordered at 1200    Orthostatic hypotension- (present on admission)  Assessment & Plan  C/w home Fludrocortisone and midodrine    Parkinsonian features- (present on admission)  Assessment & Plan  C/w home PO medications

## 2020-10-02 NOTE — THERAPY
"Speech Language Pathology   Clinical Swallow Evaluation     Patient Name: Miguel Ángel Page  AGE:  89 y.o., SEX:  male  Medical Record #: [id number]  Today's Date: 10/2/2020       Precautions: Fall Risk, Swallow Precautions ( See Comments)    Assessment    Patient is 89 y.o. male with a diagnosis of upper GI bleed. PMHx: Back pain, Cough, Orthostatic hypotension, Parkinson disease (HCC), Parkinson's disease (HCC), Pleural effusion, Pneumothorax, and Rib fractures. CXR on 10/2: Mild right basilar opacity which could represent atelectasis and/or pneumonitis. FEES performed 6/14/19 revealed: \"mild oropharyngeal dysphagia as evidenced by delayed onset of swallow with premature spillage of bolus to the level of the pyriform sinuses with intermittent penetration over laryngeal rim at the interarytenoid space resulting in deep laryngeal penetration and trickle aspiration with thin liquids which was silent in nature.\" Additional factors influencing patient status/progress: hx of Parkinson's and dysphagia.    Patient was seen on this date for a clinical swallow evaluation. Pt currently on clear liquids. Patient reported he was eating a regular diet at outside facility. Patient AAOx2 with confusion regarding reason for admit and day/month/year. Vocal quality and speech patterns most consistent with dx of Parkinson's (hypophonic and breath support adequate for phrase length material). Vocal quality was wet prior to PO intake but cleared with cues for throat clear and cough. Volitional cough was reduced in strength. Oral motor examination revealed weak lingual and labial strength and reduced ROM. PO trials were administered and consisted of single ice chips, thickened clear liquids, and un-thickened water. Onset of swallow trigger min-moderately delayed with lip tremors concerning for tongue pumping and/or reduced bolus control. Laryngeal elevation complete but sluggish to palpation. Increase wet vocal quality in 1/2 trials of " ice chips. With trials of thin liquids, patient had immediate throat clear x2 and delayed throat clear x1 concerning for penetration/aspiration. Controlled small and single sips of MTL resulted in no overt s/sx of aspiration.     Plan    Given hx of dysphagia and current CXR would recommend downgrade to mildly thick, clear liquid diet given 1:1 supervision and assistance with feeding as needed. MD updated - plan is for possible EGD tomorrow. FEES to be performed once cleared by GI to further assess oropharyngeal function and rule out aspiration. SLP following.     Recommend Speech Therapy 3 times per week until therapy goals are met for the following treatments:  Dysphagia Training and Patient / Family / Caregiver Education.    Discharge Recommendations: Recommend post-acute placement for additional speech therapy services prior to discharge home       Objective       10/02/20 1517   Vitals   O2 (LPM) 2   O2 Delivery Device Nasal Cannula   Prior Level Of Function   Communication Impaired  (Hx of Parkinson's disease)   Swallow Impaired  (hx of dysphagia)   Dentition Intact   Dentures None   Hearing Within Functional Limits for Evaluation   Vision Within Functional Limits for Evaluation   Patient's Primary Language English   Oral Motor Eval    Is Patient Able to Complete Oral Motor Eval Yes but Impaired   Labial Function   Labial Structure At Rest Within Functional Limits   Labial Vowel Production / I /, / U / Moderate   Labial Sequence / I /, / U / Moderate   Lingual Function   Lingual Structure At Rest Within Functional Limits   Lingual Protrude Moderate   Lingual Retract Moderate   Elevate In Mouth Moderate   Lateralization Moderate Right;Moderate Left   / Pa / 5X's Minimal   / Ta / 5X's Minimal   Velar Function   Velar Structure At Rest Within Functional Limits   / A / Prolonged Minimal   / A /  5X's Minimal   Laryngeal Function   Voice Quality Moderate   Volutional Cough Moderate   Excursion Upon Swallow Complete    Oral Food Presentation   Ice Chips Minimal   Single Swallow Slightly Thick (1)  Minimal   Single Swallow Thin (0) Moderate   Self Feeding Needs Assistance   Dysphagia Strategies / Recommendations   Compensatory Strategies Strict 1:1 Feeding;No Straws;Single Sips / Bites   Diet / Liquid Recommendation Mildly Thick (2) - (Nectar Thick)  (Clear liquids - mildly thick )   Medication Administration  Crush all Medications in Puree   Short Term Goals   Short Term Goal # 1 Patient will consume thickened clear liquids with no overt s/sx of aspiration given 1:1 feeding.

## 2020-10-02 NOTE — PROGRESS NOTES
Patient seen and examined today  Awake and alert  Please see original H&P by Dr Nasir Talavera for specific information.   Admitted for possible GIB, hb around same as previous checks, will check iron, ferritin, vit b12, folate, retic count, ldh, fobt, patient denies melena, hematochezia, hemophthisis, hematemesis.   Patient would like to continue conservative management for now and avoid aggressive intervention.   Will continue monitoring his h/h, patient has h/o chronc hypotension he is midodrine. CLD for now.   Plan discussed with nurse staff and during multidisciplinary rounds.    Addendum: hb dropped to 8.1, patient agreed with endoscopy if needed.   Patient also probably aspirating, I have discussed with speech and plan of possible FEES.   GI has been consulted.   Need to be npo after NPO.

## 2020-10-02 NOTE — ED TRIAGE NOTES
"Chief Complaint   Patient presents with   • Upper GI Bleed     BIB EMS FROM [facility] AFTER VOMITTING BLOOD X1       GIVEN 250ML NS AND 4 ZOFRAN PTA    BP (!) 90/52   Pulse 100   Temp 36.6 °C (97.9 °F) (Oral)   Resp 18   Ht 1.778 m (5' 10\")   Wt 54.4 kg (120 lb)   SpO2 91%   BMI 17.22 kg/m²     "

## 2020-10-02 NOTE — DIETARY
"Nutrition services: Day 0 of admit.  Miguel Ángel Page is a 89 y.o. male with admitting DX of hematemesis.  Consult received for low BMI (<19).    Spoke with patient at bedside. Upon visual inspection, pt noted with mild scooping of temples (mild muscle wasting) and hollowing of buccal region (mild to moderate fat wasting); however, unable to determine whether wasting related to natural aging/facial structure or true malnourishment. Pt appeared uncomfortable, did not perform complete nutrition focused physical examination at this time.     Pt appears fatigued during interview, hard to hear as pt speaking softly. Pt reports UBW of ~ 124-127 lb but unable to pinpoint when he last recalls being at this weight range. Current body weight is stated- unable to quantify weight loss at this time.     Patient relays adequate intake of 3 meals per day + Boost at [facility] residence prior to admit. Patient relays current appetite is normal. Pt would like to receive Boost supplement (thor) once diet advanced.     Assessment:  Height: 177.8 cm (5' 10\")  Weight: 54.4 kg (120 lb) stated wt- texted CNA/RN to request measured wt- RN agreeable, RD appreciates.   Body mass index is 17.22 kg/m²., BMI classification: underweight  Diet/Intake: Clear liquid diet.     Evaluation:   1. PMH: Parkinson's disease   2. MAR: levadopa, NS infusion (complete)  3. Labs: BUN 61 (H)  4. Per chart review, pt admitted after noted with coffee-ground appearing emesis. Guaic positive, GI consult will be considered per MD if further signs of GI bleeding.     Malnutrition Risk: Unable to determine at this time. Will continue to monitor.    Recommendations/Plan:  1. Advance diet per MD.  2. Once diet advanced past clear liquids, will order Boost supplement with breakfast meal per pt request.   3. Encourage intake of meals.   4. Document intake of all meals as % taken in ADL's to provide interdisciplinary communication across all shifts. "   5. Monitor weight.  6. Nutrition rep will continue to see patient for ongoing meal and snack preferences.     RD following.

## 2020-10-02 NOTE — ED PROVIDER NOTES
ED Provider Note    Scribed for Radha Kruse M.D. by Lewis Mac. 10/2/2020, 12:52 AM.    Primary care provider: Oscar Dubois M.D.  Means of arrival: EMS  History obtained from: patient  History limited by: none    CHIEF COMPLAINT  Chief Complaint   Patient presents with   • Upper GI Bleed     BIB EMS FROM [facility] AFTER VOMITTING BLOOD X1       HPI  Miguel Ángel Page is a 89 y.o. male who presents to the Emergency Department via EMS for a possible upper GI bleed onset today. The patient was brought to this facility after experiencing x1 episode of hematemesis today at [facility]. He denies any bloody stool or pain. No alleviating factors noted.  Patient denies abdominal pain, has no prior history of GI bleed.  Per nursing staff was noted to have coffee-ground emesis on arrival to the emergency department.    REVIEW OF SYSTEMS  Review of Systems   Cardiovascular: Negative for chest pain.   Gastrointestinal: Positive for vomiting ( hematemesis). Negative for blood in stool.   Musculoskeletal: Negative for myalgias.   All other systems reviewed and are negative.      PAST MEDICAL HISTORY   has a past medical history of Back pain, Cough, Orthostatic hypotension, Parkinson disease (HCC), Parkinson's disease (HCC), Pleural effusion, Pneumothorax, and Rib fractures.    SURGICAL HISTORY   has a past surgical history that includes partial hip replacement (Left, 5/30/2020) and other orthopedic surgery.    SOCIAL HISTORY  Social History     Tobacco Use   • Smoking status: Never Smoker   • Smokeless tobacco: Never Used   • Tobacco comment: quit 35 yrs/ pt unsure how long ago and how much tobacco used    Substance Use Topics   • Alcohol use: Never     Frequency: Never     Comment: Occasionally   • Drug use: Never      Social History     Substance and Sexual Activity   Drug Use Never       FAMILY HISTORY  Family History   Problem Relation Age of Onset   • Heart Disease Mother    • Heart Disease  "Father        CURRENT MEDICATIONS  Home Medications     Reviewed by Isabell Null R.N. (Registered Nurse) on 10/02/20 at 0032  Med List Status: <None>   Medication Last Dose Status   acetaminophen (TYLENOL) 325 MG Tab  Active   carbidopa-levodopa (SINEMET)  MG Tab  Active   Cyanocobalamin (VITAMIN B-12) 1000 MCG Tab  Active   cyanocobalamin (VITAMIN B12) 1000 MCG Tab  Active   docusate sodium (COLACE) 100 MG Cap  Active   fludrocortisone (FLORINEF) 0.1 MG Tab  Active   midodrine (PROAMATINE) 5 MG Tab  Active   Multiple Vitamins-Minerals (PRESERVISION AREDS 2) Chew Tab  Active   tamsulosin (FLOMAX) 0.4 MG capsule  Active   triamcinolone (NASACORT) 55 MCG/ACT nasal inhaler  Active                ALLERGIES  Allergies   Allergen Reactions   • Tramadol    • Ultram [Tramadol Hcl]      Patient states no allergy - not sure of reaction.       PHYSICAL EXAM  VITAL SIGNS: BP (!) 90/52   Pulse 100   Temp 36.6 °C (97.9 °F) (Oral)   Resp 18   Ht 1.778 m (5' 10\")   Wt 54.4 kg (120 lb)   SpO2 91%   BMI 17.22 kg/m²   Vitals reviewed by myself.  Physical Exam  Nursing note and vitals reviewed.  Constitutional: Well-developed and well-nourished. No acute distress.   HENT: Head is normocephalic and atraumatic.  Eyes: extra-ocular movements intact  Cardiovascular: Regular rate and regular rhythm. No murmur heard.  Pulmonary/Chest: Breath sounds normal. No wheezes or rales.   Abdominal: Soft and non-tender. No distention.    Musculoskeletal: Extremities exhibit normal range of motion without edema or tenderness.   Neurological: Awake and alert  Skin: Skin is warm and dry. No rash.   Rectal: Stool not melanotic but is trace guaiac positive    DIAGNOSTIC STUDIES /  LABS  Labs Reviewed   CBC WITH DIFFERENTIAL - Abnormal; Notable for the following components:       Result Value    RBC 3.23 (*)     Hemoglobin 9.4 (*)     Hematocrit 31.1 (*)     MCHC 30.2 (*)     RDW 53.8 (*)     Neutrophils-Polys 73.10 (*)     " Lymphocytes 10.80 (*)     Lymphs (Absolute) 0.91 (*)     Monos (Absolute) 0.98 (*)     All other components within normal limits    Narrative:     Indicate which anticoagulants the patient is on:->UNKNOWN   COMP METABOLIC PANEL - Abnormal; Notable for the following components:    Bun 61 (*)     Calcium 8.2 (*)     Albumin 3.0 (*)     Total Protein 5.2 (*)     All other components within normal limits    Narrative:     Indicate which anticoagulants the patient is on:->UNKNOWN   PROTHROMBIN TIME - Abnormal; Notable for the following components:    PT 16.1 (*)     INR 1.25 (*)     All other components within normal limits   LIPASE    Narrative:     Indicate which anticoagulants the patient is on:->UNKNOWN   COD (ADULT)   APTT   ESTIMATED GFR    Narrative:     Indicate which anticoagulants the patient is on:->UNKNOWN   COVID/SARS COV-2       All labs reviewed by me.        RADIOLOGY  DX-CHEST-PORTABLE (1 VIEW)   Final Result      1.  Mild right basilar opacity which could represent atelectasis and/or pneumonitis.   2.  Cardiomegaly.   3.  No evidence of pneumomediastinum.        The radiologist's interpretation of all radiological studies have been reviewed by me.      REASSESSMENT  12:56 AM - Patient seen and examined at bedside. Discussed plan of care, including medication to treat, as well as labs and imaging to evalute. Patient agrees to the plan of care.    HYDRATION: Based on the patient's presentation of Hypotension the patient was given IV fluids. IV Hydration was used because oral hydration was not adequate alone. Upon recheck following hydration, the patient was feels improved.    1:01 AM - Rectal exam performed with nurse chaperone at this time. See physical exam for details.      COURSE & MEDICAL DECISION MAKING  Nursing notes, VS, PMSFHx reviewed in chart.    Patient is a 89-year-old male who comes in for evaluation of hematemesis.  Differential diagnosis includes gastritis, anemia, electrolyte disturbance,  dehydration.  Diagnostic work-up includes labs and chest x-ray to assess for pneumomediastinum.    Patient is initial vitals are notable for mild borderline hypotension.  Patient is treated with IV fluids after which he feels improved.  Labs returned notable for hemoglobin of 9.4, this is around patient's baseline, however he was noted to be guaiac positive and have an episode of gross hematemesis at his facility.  Therefore I am concerned about upper GI bleed.  He is started on pantoprazole and I will hospitalize him for ongoing hemoglobin trending and possible EGD if hemoglobin trends down.  Patient is amenable to this plan.  Discussed the case with . Ilan was accepted patient for hospitalization.  Patient is in guarded condition.      FINAL IMPRESSION  1. Gastrointestinal hemorrhage, unspecified gastrointestinal hemorrhage type    2. Anemia, unspecified type          I, Lewis Mac (Alexisibtonya), am scribing for, and in the presence of, Radha Kruse M.D..    Electronically signed by: Lewis Mac (Franca), 10/2/2020    IRadha M.D. personally performed the services described in this documentation, as scribed by Lewis Mac in my presence, and it is both accurate and complete. C    The note accurately reflects work and decisions made by me.  Radha Kruse M.D.  10/2/2020  6:47 AM

## 2020-10-02 NOTE — CARE PLAN
Problem: Nutritional:  Goal: Achieve adequate nutritional intake  Description: Patient will consume >50% of meals on diet > clear liquids  Outcome: NOT MET   Patient currently on clear liquid diet. Once diet advanced, start Boost Plus supplement (chocolate) with breakfast to follow pt's home regimen. See RD note.    RD following.

## 2020-10-02 NOTE — CONSULTS
Date of service: 10/2/2020    Attending Physician: DANIELLE Dela Cruz.*    History of Present Illness: Miguel Ángel Page is a 89 y.o. male here for hematemesis.    89 year old sent in by nursing home for vomiting chocolate cake and maybe blood also. He felt sick after eating the cake and nauseous.    Initially reluctant for invasive procedures he is now willing. Slight drop in hemoglobin. Decrease form 9.4 to 8.1 with hydration. Unlikely iron sat at 67 percent?     Review of Systems:   Review of Systems   Constitutional: Positive for malaise/fatigue.   HENT: Negative.    Eyes: Negative.    Respiratory: Negative.    Cardiovascular: Negative.    Gastrointestinal: Positive for nausea and vomiting.   Musculoskeletal: Negative.    Neurological: Negative.    Endo/Heme/Allergies: Negative.    Psychiatric/Behavioral: Negative.        Current Facility-Administered Medications   Medication Dose Route Frequency Provider Last Rate Last Dose   • acetaminophen (TYLENOL) tablet 650 mg  650 mg Oral Q6HRS PRN DANIELLE Tam.KAM.       • carbidopa-levodopa (SINEMET)  MG tablet 1 Tab  1 Tab Oral 4X/DAY Nasir Talavera, M.D.   1 Tab at 10/02/20 1333   • fludrocortisone (FLORINEF) tablet 0.1 mg  0.1 mg Oral DAILY Nasir Talavera, M.D.   0.1 mg at 10/02/20 0619   • midodrine (PROAMATINE) tablet 5 mg  5 mg Oral TID AC Naisr Talavera, M.D.   5 mg at 10/02/20 1333   • senna-docusate (PERICOLACE or SENOKOT S) 8.6-50 MG per tablet 2 Tab  2 Tab Oral BID Nasir Talavera, M.D.   2 Tab at 10/02/20 0619    And   • polyethylene glycol/lytes (MIRALAX) PACKET 1 Packet  1 Packet Oral QDAY PRN Nasir Talavera M.D.        And   • magnesium hydroxide (MILK OF MAGNESIA) suspension 30 mL  30 mL Oral QDAY PRN Nasir Talavera M.DTheodore        And   • bisacodyl (DULCOLAX) suppository 10 mg  10 mg Rectal QDAY PRN Nasir Talavera M.D.       • pantoprazole (PROTONIX) injection 40 mg  40 mg Intravenous BID Nasir Talavera, M.D.   40 mg at 10/02/20 0619       Social History     Tobacco Use   • Smoking  status: Never Smoker   • Smokeless tobacco: Never Used   • Tobacco comment: quit 35 yrs/ pt unsure how long ago and how much tobacco used    Substance Use Topics   • Alcohol use: Never     Frequency: Never     Comment: Occasionally   • Drug use: Never        Past Medical History:   Diagnosis Date   • Back pain    • Cough    • Orthostatic hypotension    • Parkinson disease (HCC)    • Parkinson's disease (HCC)    • Pleural effusion    • Pneumothorax    • Rib fractures        Past Surgical History:   Procedure Laterality Date   • PB PARTIAL HIP REPLACEMENT Left 5/30/2020    Procedure: HEMIARTHROPLASTY, HIP;  Surgeon: Khang Huerta M.D.;  [facility]: SURGERY [facility];  Service: Orthopedics   • OTHER ORTHOPEDIC SURGERY         Allergies: Tramadol and Ultram [tramadol hcl]    Family History   Problem Relation Age of Onset   • Heart Disease Mother    • Heart Disease Father        Vitals:    10/02/20 1100   BP:    Pulse:    Resp:    Temp:    SpO2: 95%        Physical Examination:     Physical Exam   Constitutional: He is oriented to person, place, and time. No distress.   Frail and pale   HENT:   Head: Atraumatic.   Eyes: No scleral icterus.   Neck: No tracheal deviation present.   Cardiovascular: Normal rate and regular rhythm.   Pulmonary/Chest: Effort normal. No stridor. No respiratory distress.   Abdominal: He exhibits no distension. There is no abdominal tenderness. There is no rebound and no guarding.   Musculoskeletal: Normal range of motion.   Neurological: He is alert and oriented to person, place, and time.   Skin: Skin is warm and dry.   Psychiatric: Mood and affect normal.         Lab Results   Component Value Date/Time    PROTHROMBTM 16.1 (H) 10/02/2020 01:46 AM    INR 1.25 (H) 10/02/2020 01:46 AM      Lab Results   Component Value Date/Time    WBC 8.5 10/02/2020 12:35 AM    RBC 3.23 (L) 10/02/2020 12:35 AM    HEMOGLOBIN 8.1 (L) 10/02/2020 02:11 PM    HEMATOCRIT 26.0 (L) 10/02/2020 02:11 PM    MCV 96.3  10/02/2020 12:35 AM    MCH 29.1 10/02/2020 12:35 AM    MCHC 30.2 (L) 10/02/2020 12:35 AM    MPV 9.5 10/02/2020 12:35 AM    NEUTSPOLYS 73.10 (H) 10/02/2020 12:35 AM    LYMPHOCYTES 10.80 (L) 10/02/2020 12:35 AM    MONOCYTES 11.60 10/02/2020 12:35 AM    EOSINOPHILS 3.70 10/02/2020 12:35 AM    EOSINOPHILS 10 08/20/2019 03:53 PM    BASOPHILS 0.40 10/02/2020 12:35 AM      Lab Results   Component Value Date/Time    SODIUM 139 10/02/2020 12:35 AM    POTASSIUM 4.7 10/02/2020 12:35 AM    CHLORIDE 104 10/02/2020 12:35 AM    CO2 21 10/02/2020 12:35 AM    GLUCOSE 95 10/02/2020 12:35 AM    BUN 61 (H) 10/02/2020 12:35 AM    CREATININE 1.01 10/02/2020 12:35 AM      Recent Labs     10/02/20  0035 10/02/20  0146   ASTSGOT 14  --    ALTSGPT 9  --    TBILIRUBIN <0.2  --    ALKPHOSPHAT 59  --    GLOBULIN 2.2  --    INR  --  1.25*       Imaging:   Dx-chest-portable (1 View)    Result Date: 10/2/2020  10/2/2020 1:21 AM HISTORY/REASON FOR EXAM:  vomiting blood, boaerhave?. TECHNIQUE/EXAM DESCRIPTION AND NUMBER OF VIEWS: Single portable view of the chest. COMPARISON: 6/23/2019 FINDINGS: Cardiac silhouette is enlarged. Aortic calcified atherosclerotic plaque. No definite pneumomediastinum. Mild right basilar opacity could represent atelectasis and/or mild pneumonitis. There is no significant pleural effusion or pneumothorax. Osteopenia and scoliosis.     1.  Mild right basilar opacity which could represent atelectasis and/or pneumonitis. 2.  Cardiomegaly. 3.  No evidence of pneumomediastinum.            Assessment and Plan:  Hematemesis  Anemia HGB 8    EGD

## 2020-10-02 NOTE — PROGRESS NOTES
0530: Pt arrived to unit. Pt unable to ambulate to bed from [facility]. Slide used. Admission profile completed. Waffle mattess placed. Bed alarm placed. Two RN skin check completed. No skin concerns noted.    Kendrick Silvestre RN.

## 2020-10-03 ENCOUNTER — ANESTHESIA (OUTPATIENT)
Dept: SURGERY | Facility: MEDICAL CENTER | Age: 85
DRG: 377 | End: 2020-10-03
Payer: MEDICARE

## 2020-10-03 ENCOUNTER — ANESTHESIA EVENT (OUTPATIENT)
Dept: SURGERY | Facility: MEDICAL CENTER | Age: 85
DRG: 377 | End: 2020-10-03
Payer: MEDICARE

## 2020-10-03 LAB
ANION GAP SERPL CALC-SCNC: 10 MMOL/L (ref 7–16)
BUN SERPL-MCNC: 68 MG/DL (ref 8–22)
CALCIUM SERPL-MCNC: 8.9 MG/DL (ref 8.5–10.5)
CHLORIDE SERPL-SCNC: 106 MMOL/L (ref 96–112)
CO2 SERPL-SCNC: 22 MMOL/L (ref 20–33)
CREAT SERPL-MCNC: 0.98 MG/DL (ref 0.5–1.4)
GLUCOSE BLD-MCNC: 114 MG/DL (ref 65–99)
GLUCOSE SERPL-MCNC: 97 MG/DL (ref 65–99)
HCT VFR BLD AUTO: 28 % (ref 42–52)
HCT VFR BLD AUTO: 28.3 % (ref 42–52)
HEMOCCULT STL QL: NEGATIVE
HEMOCCULT STL QL: POSITIVE
HGB BLD-MCNC: 8.8 G/DL (ref 14–18)
HGB BLD-MCNC: 9 G/DL (ref 14–18)
POTASSIUM SERPL-SCNC: 4.1 MMOL/L (ref 3.6–5.5)
SODIUM SERPL-SCNC: 138 MMOL/L (ref 135–145)

## 2020-10-03 PROCEDURE — 36415 COLL VENOUS BLD VENIPUNCTURE: CPT

## 2020-10-03 PROCEDURE — 96376 TX/PRO/DX INJ SAME DRUG ADON: CPT

## 2020-10-03 PROCEDURE — C9113 INJ PANTOPRAZOLE SODIUM, VIA: HCPCS | Performed by: STUDENT IN AN ORGANIZED HEALTH CARE EDUCATION/TRAINING PROGRAM

## 2020-10-03 PROCEDURE — 770006 HCHG ROOM/CARE - MED/SURG/GYN SEMI*

## 2020-10-03 PROCEDURE — 99232 SBSQ HOSP IP/OBS MODERATE 35: CPT | Performed by: HOSPITALIST

## 2020-10-03 PROCEDURE — 700102 HCHG RX REV CODE 250 W/ 637 OVERRIDE(OP): Performed by: STUDENT IN AN ORGANIZED HEALTH CARE EDUCATION/TRAINING PROGRAM

## 2020-10-03 PROCEDURE — 82962 GLUCOSE BLOOD TEST: CPT

## 2020-10-03 PROCEDURE — 80048 BASIC METABOLIC PNL TOTAL CA: CPT

## 2020-10-03 PROCEDURE — A9270 NON-COVERED ITEM OR SERVICE: HCPCS | Performed by: STUDENT IN AN ORGANIZED HEALTH CARE EDUCATION/TRAINING PROGRAM

## 2020-10-03 PROCEDURE — 85018 HEMOGLOBIN: CPT | Mod: 91

## 2020-10-03 PROCEDURE — 85014 HEMATOCRIT: CPT | Mod: 91

## 2020-10-03 PROCEDURE — G0378 HOSPITAL OBSERVATION PER HR: HCPCS

## 2020-10-03 PROCEDURE — 82272 OCCULT BLD FECES 1-3 TESTS: CPT | Mod: 91

## 2020-10-03 PROCEDURE — 700111 HCHG RX REV CODE 636 W/ 250 OVERRIDE (IP): Performed by: STUDENT IN AN ORGANIZED HEALTH CARE EDUCATION/TRAINING PROGRAM

## 2020-10-03 RX ADMIN — FLUDROCORTISONE ACETATE 0.1 MG: 0.1 TABLET ORAL at 11:53

## 2020-10-03 RX ADMIN — MIDODRINE HYDROCHLORIDE 5 MG: 5 TABLET ORAL at 18:39

## 2020-10-03 RX ADMIN — DOCUSATE SODIUM 50 MG AND SENNOSIDES 8.6 MG 2 TABLET: 8.6; 5 TABLET, FILM COATED ORAL at 18:37

## 2020-10-03 RX ADMIN — PANTOPRAZOLE SODIUM 40 MG: 40 INJECTION, POWDER, LYOPHILIZED, FOR SOLUTION INTRAVENOUS at 18:39

## 2020-10-03 RX ADMIN — CARBIDOPA AND LEVODOPA 1 TABLET: 25; 100 TABLET ORAL at 18:37

## 2020-10-03 RX ADMIN — CARBIDOPA AND LEVODOPA 1 TABLET: 25; 100 TABLET ORAL at 11:53

## 2020-10-03 RX ADMIN — MIDODRINE HYDROCHLORIDE 5 MG: 5 TABLET ORAL at 11:54

## 2020-10-03 RX ADMIN — DOCUSATE SODIUM 50 MG AND SENNOSIDES 8.6 MG 2 TABLET: 8.6; 5 TABLET, FILM COATED ORAL at 11:53

## 2020-10-03 RX ADMIN — PANTOPRAZOLE SODIUM 40 MG: 40 INJECTION, POWDER, LYOPHILIZED, FOR SOLUTION INTRAVENOUS at 05:47

## 2020-10-03 RX ADMIN — CARBIDOPA AND LEVODOPA 1 TABLET: 25; 100 TABLET ORAL at 20:59

## 2020-10-03 ASSESSMENT — ENCOUNTER SYMPTOMS
DOUBLE VISION: 0
CLAUDICATION: 0
BACK PAIN: 0
FEVER: 0
DIZZINESS: 0
PND: 0
NAUSEA: 0
HEADACHES: 0
COUGH: 0
VOMITING: 0
DEPRESSION: 0
HEARTBURN: 0
HEMOPTYSIS: 0
BRUISES/BLEEDS EASILY: 0
CHILLS: 0
MYALGIAS: 0
PALPITATIONS: 0
WHEEZING: 0
BLURRED VISION: 0

## 2020-10-03 ASSESSMENT — FIBROSIS 4 INDEX: FIB4 SCORE: 1.45

## 2020-10-03 NOTE — PROGRESS NOTES
Gastroenterology Progress Note     Author: @[facility]@   Date & Time Created: 10/3/2020 10:36 AM    Chief Complaint:  None, less responsive than yesterday    Interval History:  BUN elevated appears dehydrated. Concern for tolerating anesthesia by medical / anesthesia team although BP, and O2 sat are normal and he is alert. Not communicative.    Review of Systems:  Review of Systems   Unable to perform ROS: Acuity of condition   Constitutional: Positive for malaise/fatigue. Negative for chills and fever.   HENT: Positive for hearing loss.        Physical Exam:  Physical Exam  Constitutional:       Appearance: He is not toxic-appearing or diaphoretic.   Eyes:      General: No scleral icterus.  Cardiovascular:      Rate and Rhythm: Tachycardia present.   Pulmonary:      Effort: Pulmonary effort is normal. No respiratory distress.      Breath sounds: No stridor.   Abdominal:      General: There is no distension.      Tenderness: There is no abdominal tenderness. There is no guarding or rebound.   Skin:     General: Skin is warm.      Coloration: Skin is pale. Skin is not jaundiced.   Neurological:      General: No focal deficit present.   Psychiatric:      Comments: Not very responsive but answers in barely audible voice         Labs:          Recent Labs     10/02/20  0035 10/03/20  0024   SODIUM 139 138   POTASSIUM 4.7 4.1   CHLORIDE 104 106   CO2 21 22   BUN 61* 68*   CREATININE 1.01 0.98   CALCIUM 8.2* 8.9     Recent Labs     10/02/20  0035 10/03/20  0024   ALTSGPT 9  --    ASTSGOT 14  --    ALKPHOSPHAT 59  --    TBILIRUBIN <0.2  --    LIPASE 61  --    GLUCOSE 95 97     Recent Labs     10/02/20  0035 10/02/20  0146  10/02/20  1411 10/02/20  2007 10/03/20  0024 10/03/20  0405   RBC 3.23*  --   --   --   --   --   --    HEMOGLOBIN 9.4*  --    < > 8.1* 8.5* 8.8* 9.0*   HEMATOCRIT 31.1*  --    < > 26.0* 27.3* 28.3* 28.0*   PLATELETCT 296  --   --   --   --   --   --    PROTHROMBTM  --  16.1*  --   --   --   --   --    APTT   --  25.7  --   --   --   --   --    INR  --  1.25*  --   --   --   --   --    IRON  --   --   --  159  --   --   --    FERRITIN  --   --   --  41.0  --   --   --    TOTIRONBC  --   --   --  236*  --   --   --     < > = values in this interval not displayed.     Recent Labs     10/02/20  0035   WBC 8.5   NEUTSPOLYS 73.10*   LYMPHOCYTES 10.80*   MONOCYTES 11.60   EOSINOPHILS 3.70   BASOPHILS 0.40   ASTSGOT 14   ALTSGPT 9   ALKPHOSPHAT 59   TBILIRUBIN <0.2     Hemodynamics:  Temp (24hrs), Av.7 °C (98 °F), Min:36.2 °C (97.2 °F), Max:37.2 °C (98.9 °F)  Temperature: 37.2 °C (98.9 °F)  Pulse  Av.1  Min: 81  Max: 114   Blood Pressure : 118/76     Respiratory:    Respiration: 16, Pulse Oximetry: 97 %        RUL Breath Sounds: Diminished, RML Breath Sounds: Diminished, RLL Breath Sounds: Diminished, ZEV Breath Sounds: Diminished, LLL Breath Sounds: Diminished  Fluids:  No intake or output data in the 24 hours ending 10/03/20 1036     GI/Nutrition:  Orders Placed This Encounter   Procedures   • Diet NPO     Standing Status:   Standing     Number of Occurrences:   8     Order Specific Question:   Restrict to:     Answer:   Strict [1]     Medical Decision Making, by Problem:  Active Hospital Problems    Diagnosis   • Hematemesis [K92.0]   • Anemia [D64.9]   • Orthostatic hypotension [I95.1]   • Parkinsonian features [R25.9]       Plan:  1. Extensive discussion with anesthesia regarding risk and overall status.  2. Extensive discussion with daughter regarding pro and cons of looking and anticipated most likely findings. Worst case scenario of  malignancy discussed vs. acid peptic disorders. No surgery would be acceptable in case of malignancy and PPI would be instituted for acid perptic disorders.    3. Decision by daughter in agrement with medical team GI / anesthesia to continue watchful waiting and bring patient back to previous lveel of functioning and then back to [facility].    4. Appears tachy ~ 100 but with good blood  pressure and O2 sat.  BUN elevated.    Appears dehydrated and could benefit for more aggressive hydration.      Have cancelled EGD per family and will sign off.  Continue PPI for a few months and monitor CBC in primary care setting.      Quality-Core Measures

## 2020-10-03 NOTE — PROGRESS NOTES
Report received from Day Shift RN at 1915. Assumed care of patient. Plan of care discussed, questions answered. Assessment completed. VSS, A&O x4, denies pain. PRN med given. Call light in reach. Patient educated on use of call light for assistance.    Surgery tomorrow at 1315.

## 2020-10-03 NOTE — PROGRESS NOTES
[facility] Medicine Daily Progress Note    Date of Service  10/3/2020    Chief Complaint  89 y.o. male admitted 10/2/2020 with GIB anemia      Interval Problem Update  Patient is resting in bed, back for GI lab EGD cancelled due to patient and patient's family would like to continue conservative management.       Consultants/Specialty  GI    Code Status  Full Code    Disposition  Back to facility in 1-2 days.     Review of Systems  Review of Systems   Constitutional: Negative for chills and fever.   HENT: Negative for congestion.    Eyes: Negative for blurred vision and double vision.   Respiratory: Negative for cough, hemoptysis and wheezing.    Cardiovascular: Negative for chest pain, palpitations, claudication, leg swelling and PND.   Gastrointestinal: Negative for heartburn, nausea and vomiting.   Genitourinary: Negative for hematuria and urgency.   Musculoskeletal: Negative for back pain and myalgias.   Skin: Negative for rash.   Neurological: Negative for dizziness and headaches.   Endo/Heme/Allergies: Does not bruise/bleed easily.   Psychiatric/Behavioral: Negative for depression.        Physical Exam  Temp:  [36.2 °C (97.2 °F)-37.2 °C (98.9 °F)] 37.2 °C (98.9 °F)  Pulse:  [] 114  Resp:  [15-16] 16  BP: ()/(48-76) 118/76  SpO2:  [96 %-98 %] 97 %    Physical Exam  Vitals signs and nursing note reviewed.   Constitutional:       Appearance: Normal appearance.   HENT:      Head: Normocephalic.      Nose: Nose normal.      Mouth/Throat:      Mouth: Mucous membranes are moist.      Pharynx: Oropharynx is clear.   Eyes:      Conjunctiva/sclera: Conjunctivae normal.      Pupils: Pupils are equal, round, and reactive to light.   Neck:      Musculoskeletal: Normal range of motion and neck supple.   Cardiovascular:      Rate and Rhythm: Normal rate and regular rhythm.      Pulses: Normal pulses.      Heart sounds: Normal heart sounds.   Pulmonary:      Effort: Pulmonary effort is normal.      Breath sounds:  Normal breath sounds.   Abdominal:      General: Bowel sounds are normal. There is no distension.      Palpations: Abdomen is soft.   Musculoskeletal: Normal range of motion.   Skin:     General: Skin is warm.      Capillary Refill: Capillary refill takes less than 2 seconds.   Neurological:      General: No focal deficit present.      Mental Status: He is alert.      Cranial Nerves: No cranial nerve deficit.   Psychiatric:         Mood and Affect: Mood normal.         Fluids  No intake or output data in the 24 hours ending 10/03/20 1305    Laboratory  Recent Labs     10/02/20  0035  10/02/20  2007 10/03/20  0024 10/03/20  0405   WBC 8.5  --   --   --   --    RBC 3.23*  --   --   --   --    HEMOGLOBIN 9.4*   < > 8.5* 8.8* 9.0*   HEMATOCRIT 31.1*   < > 27.3* 28.3* 28.0*   MCV 96.3  --   --   --   --    MCH 29.1  --   --   --   --    MCHC 30.2*  --   --   --   --    RDW 53.8*  --   --   --   --    PLATELETCT 296  --   --   --   --    MPV 9.5  --   --   --   --     < > = values in this interval not displayed.     Recent Labs     10/02/20  0035 10/03/20  0024   SODIUM 139 138   POTASSIUM 4.7 4.1   CHLORIDE 104 106   CO2 21 22   GLUCOSE 95 97   BUN 61* 68*   CREATININE 1.01 0.98   CALCIUM 8.2* 8.9     Recent Labs     10/02/20  0146   APTT 25.7   INR 1.25*               Imaging  DX-CHEST-PORTABLE (1 VIEW)   Final Result      1.  Mild right basilar opacity which could represent atelectasis and/or pneumonitis.   2.  Cardiomegaly.   3.  No evidence of pneumomediastinum.           Assessment/Plan  Hematemesis- (present on admission)  Assessment & Plan  GI consulted, plan was for egd today but patient and patient's daughter changed their mind and would like to continue conservative management.   Is remains stable possible dc tomorrow back to facility.        Anemia- (present on admission)  Assessment & Plan  Probably due to iron def, ferritin is low.   Will need to start on iron supplement.     Orthostatic hypotension-  (present on admission)  Assessment & Plan  C/w home Fludrocortisone and midodrine    Parkinsonian features- (present on admission)  Assessment & Plan  C/w home PO medications         VTE prophylaxis: scd's.     Case and plan of care discussed with nurse staff and during multidisciplinary rounds.

## 2020-10-03 NOTE — RESPIRATORY CARE
COPD EDUCATION by COPD CLINICAL EDUCATOR  10/3/2020 at 8:07 AM by Margo Borges, TRISTA     Patient reviewed by COPD education team. Patient does not have a history or diagnosis of COPD and is a non-smoker.  Therefore does not qualify for the COPD program.

## 2020-10-03 NOTE — DISCHARGE PLANNING
"Care Transition Team Assessment    Anticipated Discharge Disposition: Pending Medical Team Collaboration. Patient came from [facility]- verified with daughter.    Action: RN CM to patient's bedside to complete CTT assessment. Patient was slow to respond, barely audible, but responds to commands. This RN received consent from patient to speak to Nabila Page at [phone]. Nabila states that the patient came from [address] [place] [address] [facility] and was getting OT/PT with [facility] 3x a week at this facility. Nabila states that prior to this hospitalization that patient require assistance with everything but eating: \"he's slow with eating, and it can be messy but he can feed himself\" She states that due to the patient's difficulty with communicating, that she is the primary contact for staff to reach. This RN verified facesheet information with the daughter. The patient receives his medications through [facility] and the medications are managed by [facility]. Daughter aware that the patient has Medicare IMM information at his beside.     Barriers to Discharge: Medical clearance. PT and OT notes.    Plan: follow and assist with DC needs.     Information Source  Orientation : Unable to Assess(per chart patient's baseline is difficult to understand)  Information Given By: Patient, Relative(Nabila (daughter))  Informant's Name: Nabila Page  Who is responsible for making decisions for patient? : Patient         Elopement Risk  Legal Hold: No  Ambulatory or Self Mobile in Wheelchair: No-Not an Elopement Risk  Elopement Risk: Not at Risk for Elopement    Interdisciplinary Discharge Planning  Primary Care Physician: Silvino LR  Lives with - Patient's Self Care Capacity: ([facility])  Patient or legal guardian wants to designate a caregiver: No  Support Systems: Children  Housing / Facility: 1 Story Apartment / Condo  Name of Care Facility: [facility]  Do " You Take your Prescribed Medications Regularly: Yes  Able to Return to Previous ADL's: Future Time w/Therapy  Mobility Issues: Yes  Prior Services: Home With Outpatient Therapy(PT/OT with [facility] 3x/week at [facility] )  Patient Prefers to be Discharged to:: [facility]   Assistance Needed: Yes    Discharge Preparedness  What is your plan after discharge?: Uncertain - pending medical team collaboration(PT and OT needs to see)  What are your discharge supports?: Child  Prior Functional Level: Needs Assist with Activities of Daily Living, Needs Assist with Medication Management  Difficulity with ADLs: Bathing, Brushing teeth, Dressing, Toileting, Walking  Difficulity with IADLs: Cooking, Driving, Keeping track of finances, Managing medication, Laundry, Using the telephone or computer, Shopping    Functional Assesment  Prior Functional Level: Needs Assist with Activities of Daily Living, Needs Assist with Medication Management    Finances  Financial Barriers to Discharge: No  Prescription Coverage: Yes    Vision / Hearing Impairment  Right Eye Vision: Impaired, Wears Glasses  Left Eye Vision: Impaired, Wears Glasses         Advance Directive  Advance Directive?: POLST    Domestic Abuse  Have you ever been the victim of abuse or violence?: No  Physical Abuse or Sexual Abuse: No  Verbal Abuse or Emotional Abuse: No  Possible Abuse/Neglect Reported to:: Not Applicable         Discharge Risks or Barriers  Discharge risks or barriers?: Post-acute placement / services  Patient risk factors: Vulnerable adult    Anticipated Discharge Information  Discharge Address: [address] [place] [address] [address] [facility] [address] [address]  Discharge Contact Phone Number: [phone]

## 2020-10-03 NOTE — CARE PLAN
Problem: Communication  Goal: The ability to communicate needs accurately and effectively will improve  Outcome: PROGRESSING AS EXPECTED  Note: Plan of care discussed with patient, questions answered. Patient encouraged to verbalize feelings and concerns. Verbalized understanding.   Problem: Safety  Goal: Will remain free from injury  Outcome: PROGRESSING AS EXPECTED  Note: Call light in reach, bed in lowest and locked position, necessary belongings in reach. Patient educated on use of call light for assistance. Verbalized understanding.   Problem: Skin Integrity  Goal: Risk for impaired skin integrity will decrease  Outcome: PROGRESSING AS EXPECTED  Note: Patient turned as appropriate. Assessment of skin completed. Verbalized understanding.

## 2020-10-03 NOTE — PROGRESS NOTES
Patient returned to unit after pre-op, EGD cancelled. Patient awake, oriented, slow to response which is patient's baseline. Neuro assessment at baseline. Tends to be less responsive in the morning after waking up, and progressively becomes more alert throughout the day. Patient in bed, resting comfortably. Will continue to monitor.

## 2020-10-03 NOTE — ANESTHESIA PREPROCEDURE EVALUATION
89 y/o male w/ possible hematemesis.    Relevant Problems   Other   (+) Anemia   (+) Hematemesis   (+) Orthostatic hypotension   (+) Parkinsonian features       Physical Exam    Airway   Mallampati: II  TM distance: >3 FB  Neck ROM: full       Cardiovascular - normal exam  Rhythm: regular  Rate: normal  (-) murmur     Dental - normal exam           Pulmonary - normal exam  Breath sounds clear to auscultation     Abdominal    Neurological - abnormal exam    Comments: Patient following verbal instructions; NAD; non-verbal and lethargic; upper extremity strength 5/5; unable to assess lower extremity strength; reported to be more alert last pm.           Anesthesia Plan    ASA 2       Plan - MAC           Plan Factors:   Patient was not previously instructed to abstain from smoking on day of procedure.  Patient did not smoke on day of procedure.      Induction: intravenous    Postoperative Plan: Postoperative administration of opioids is intended.    Pertinent diagnostic labs and testing reviewed    Informed Consent:    Anesthetic plan and risks discussed with patient.    Use of blood products discussed with: patient whom consented to blood products.

## 2020-10-04 ENCOUNTER — APPOINTMENT (OUTPATIENT)
Dept: RADIOLOGY | Facility: MEDICAL CENTER | Age: 85
DRG: 377 | End: 2020-10-04
Attending: HOSPITALIST
Payer: MEDICARE

## 2020-10-04 LAB
APPEARANCE UR: CLEAR
BACTERIA #/AREA URNS HPF: NEGATIVE /HPF
BASOPHILS # BLD AUTO: 0.5 % (ref 0–1.8)
BASOPHILS # BLD: 0.08 K/UL (ref 0–0.12)
BILIRUB UR QL STRIP.AUTO: NEGATIVE
COLOR UR: YELLOW
EOSINOPHIL # BLD AUTO: 0.1 K/UL (ref 0–0.51)
EOSINOPHIL NFR BLD: 0.6 % (ref 0–6.9)
EPI CELLS #/AREA URNS HPF: NEGATIVE /HPF
ERYTHROCYTE [DISTWIDTH] IN BLOOD BY AUTOMATED COUNT: 52.8 FL (ref 35.9–50)
GLUCOSE UR STRIP.AUTO-MCNC: NEGATIVE MG/DL
HCT VFR BLD AUTO: 22.5 % (ref 42–52)
HCT VFR BLD AUTO: 23.3 % (ref 42–52)
HCT VFR BLD AUTO: 24.8 % (ref 42–52)
HGB BLD-MCNC: 7.1 G/DL (ref 14–18)
HGB BLD-MCNC: 7.2 G/DL (ref 14–18)
HGB BLD-MCNC: 7.7 G/DL (ref 14–18)
HYALINE CASTS #/AREA URNS LPF: ABNORMAL /LPF
IMM GRANULOCYTES # BLD AUTO: 0.1 K/UL (ref 0–0.11)
IMM GRANULOCYTES NFR BLD AUTO: 0.6 % (ref 0–0.9)
KETONES UR STRIP.AUTO-MCNC: NEGATIVE MG/DL
LACTATE BLD-SCNC: 1.4 MMOL/L (ref 0.5–2)
LEUKOCYTE ESTERASE UR QL STRIP.AUTO: NEGATIVE
LYMPHOCYTES # BLD AUTO: 0.55 K/UL (ref 1–4.8)
LYMPHOCYTES NFR BLD: 3.4 % (ref 22–41)
MCH RBC QN AUTO: 28.8 PG (ref 27–33)
MCHC RBC AUTO-ENTMCNC: 31 G/DL (ref 33.7–35.3)
MCV RBC AUTO: 92.9 FL (ref 81.4–97.8)
MICRO URNS: ABNORMAL
MONOCYTES # BLD AUTO: 1.18 K/UL (ref 0–0.85)
MONOCYTES NFR BLD AUTO: 7.3 % (ref 0–13.4)
NEUTROPHILS # BLD AUTO: 14.2 K/UL (ref 1.82–7.42)
NEUTROPHILS NFR BLD: 87.6 % (ref 44–72)
NITRITE UR QL STRIP.AUTO: NEGATIVE
NRBC # BLD AUTO: 0 K/UL
NRBC BLD-RTO: 0 /100 WBC
PH UR STRIP.AUTO: 5 [PH] (ref 5–8)
PLATELET # BLD AUTO: 277 K/UL (ref 164–446)
PMV BLD AUTO: 9.6 FL (ref 9–12.9)
PROCALCITONIN SERPL-MCNC: 0.31 NG/ML
PROT UR QL STRIP: 30 MG/DL
RBC # BLD AUTO: 2.67 M/UL (ref 4.7–6.1)
RBC # URNS HPF: ABNORMAL /HPF
RBC UR QL AUTO: ABNORMAL
SP GR UR STRIP.AUTO: 1.02
UROBILINOGEN UR STRIP.AUTO-MCNC: 0.2 MG/DL
WBC # BLD AUTO: 16.2 K/UL (ref 4.8–10.8)
WBC #/AREA URNS HPF: ABNORMAL /HPF

## 2020-10-04 PROCEDURE — 85014 HEMATOCRIT: CPT

## 2020-10-04 PROCEDURE — 700102 HCHG RX REV CODE 250 W/ 637 OVERRIDE(OP): Performed by: STUDENT IN AN ORGANIZED HEALTH CARE EDUCATION/TRAINING PROGRAM

## 2020-10-04 PROCEDURE — 770006 HCHG ROOM/CARE - MED/SURG/GYN SEMI*

## 2020-10-04 PROCEDURE — C9113 INJ PANTOPRAZOLE SODIUM, VIA: HCPCS | Performed by: STUDENT IN AN ORGANIZED HEALTH CARE EDUCATION/TRAINING PROGRAM

## 2020-10-04 PROCEDURE — A9270 NON-COVERED ITEM OR SERVICE: HCPCS | Performed by: STUDENT IN AN ORGANIZED HEALTH CARE EDUCATION/TRAINING PROGRAM

## 2020-10-04 PROCEDURE — 700105 HCHG RX REV CODE 258: Performed by: HOSPITALIST

## 2020-10-04 PROCEDURE — 700102 HCHG RX REV CODE 250 W/ 637 OVERRIDE(OP): Performed by: HOSPITALIST

## 2020-10-04 PROCEDURE — 99232 SBSQ HOSP IP/OBS MODERATE 35: CPT | Performed by: HOSPITALIST

## 2020-10-04 PROCEDURE — 85025 COMPLETE CBC W/AUTO DIFF WBC: CPT

## 2020-10-04 PROCEDURE — 700111 HCHG RX REV CODE 636 W/ 250 OVERRIDE (IP): Performed by: HOSPITALIST

## 2020-10-04 PROCEDURE — 84145 PROCALCITONIN (PCT): CPT

## 2020-10-04 PROCEDURE — A9270 NON-COVERED ITEM OR SERVICE: HCPCS | Performed by: HOSPITALIST

## 2020-10-04 PROCEDURE — 85018 HEMOGLOBIN: CPT | Mod: 91

## 2020-10-04 PROCEDURE — 81001 URINALYSIS AUTO W/SCOPE: CPT

## 2020-10-04 PROCEDURE — 700105 HCHG RX REV CODE 258

## 2020-10-04 PROCEDURE — 71045 X-RAY EXAM CHEST 1 VIEW: CPT

## 2020-10-04 PROCEDURE — 700111 HCHG RX REV CODE 636 W/ 250 OVERRIDE (IP): Performed by: STUDENT IN AN ORGANIZED HEALTH CARE EDUCATION/TRAINING PROGRAM

## 2020-10-04 PROCEDURE — 36415 COLL VENOUS BLD VENIPUNCTURE: CPT

## 2020-10-04 PROCEDURE — 83605 ASSAY OF LACTIC ACID: CPT

## 2020-10-04 RX ORDER — OMEPRAZOLE 20 MG/1
20 CAPSULE, DELAYED RELEASE ORAL 2 TIMES DAILY
Status: DISCONTINUED | OUTPATIENT
Start: 2020-10-04 | End: 2020-10-08 | Stop reason: HOSPADM

## 2020-10-04 RX ORDER — SODIUM CHLORIDE 9 MG/ML
INJECTION, SOLUTION INTRAVENOUS
Status: COMPLETED
Start: 2020-10-04 | End: 2020-10-04

## 2020-10-04 RX ADMIN — CARBIDOPA AND LEVODOPA 1 TABLET: 25; 100 TABLET ORAL at 19:56

## 2020-10-04 RX ADMIN — MIDODRINE HYDROCHLORIDE 5 MG: 5 TABLET ORAL at 16:04

## 2020-10-04 RX ADMIN — OMEPRAZOLE 20 MG: 20 CAPSULE, DELAYED RELEASE ORAL at 16:05

## 2020-10-04 RX ADMIN — PANTOPRAZOLE SODIUM 40 MG: 40 INJECTION, POWDER, LYOPHILIZED, FOR SOLUTION INTRAVENOUS at 06:12

## 2020-10-04 RX ADMIN — CARBIDOPA AND LEVODOPA 1 TABLET: 25; 100 TABLET ORAL at 12:30

## 2020-10-04 RX ADMIN — CARBIDOPA AND LEVODOPA 1 TABLET: 25; 100 TABLET ORAL at 16:05

## 2020-10-04 RX ADMIN — CARBIDOPA AND LEVODOPA 1 TABLET: 25; 100 TABLET ORAL at 08:08

## 2020-10-04 RX ADMIN — SODIUM CHLORIDE 500 ML: 9 INJECTION, SOLUTION INTRAVENOUS at 16:29

## 2020-10-04 RX ADMIN — MIDODRINE HYDROCHLORIDE 5 MG: 5 TABLET ORAL at 06:12

## 2020-10-04 RX ADMIN — SODIUM CHLORIDE 3 G: 900 INJECTION INTRAVENOUS at 16:28

## 2020-10-04 RX ADMIN — FLUDROCORTISONE ACETATE 0.1 MG: 0.1 TABLET ORAL at 06:12

## 2020-10-04 RX ADMIN — MIDODRINE HYDROCHLORIDE 5 MG: 5 TABLET ORAL at 12:30

## 2020-10-04 ASSESSMENT — ENCOUNTER SYMPTOMS
CHILLS: 0
VOMITING: 0
NAUSEA: 0
DIZZINESS: 0
DOUBLE VISION: 0
BLURRED VISION: 0
MYALGIAS: 0
PALPITATIONS: 0
DEPRESSION: 0
FEVER: 0
COUGH: 0
BRUISES/BLEEDS EASILY: 0
BACK PAIN: 0
HEADACHES: 0
HEARTBURN: 0
HEMOPTYSIS: 0

## 2020-10-04 ASSESSMENT — PAIN DESCRIPTION - PAIN TYPE: TYPE: ACUTE PAIN

## 2020-10-04 NOTE — PROGRESS NOTES
Patient had small black BM and 1200 Hgb dropped to 7.2 Dr. Ellis notified will continue Q6 hr Hgb checks. No other orders at this time. Patients Daughter Johnna notified.

## 2020-10-04 NOTE — PROGRESS NOTES
Spoke with Dr. Ellis and patients daughter Johnna regarding FEES. According to Johnna patient has had multiple fees done in the past and has been on a soft, thin liquid diet using the double swallow technique. Patient has demonstrated that to this RN with out cueing needed. FEES will be canceled per daughter request.

## 2020-10-04 NOTE — PROGRESS NOTES
[facility] Medicine Daily Progress Note    Date of Service  10/4/2020    Chief Complaint  89 y.o. male admitted 10/2/2020 with GIB anemia      Interval Problem Update  In bed, wbc increased today but no fever, hb 7.7 no melena no hematochezia or hemoptysis, continue monitoring, continue ppi, tolerating diet as per speech.   Checking cxr that showed improvement  procalcitonin pending.       Consultants/Specialty  GI    Code Status  Full Code    Disposition  Back to facility in 1-2 days.     Review of Systems  Review of Systems   Constitutional: Negative for chills and fever.   HENT: Negative for congestion.    Eyes: Negative for blurred vision and double vision.   Respiratory: Negative for cough and hemoptysis.    Cardiovascular: Negative for chest pain and palpitations.   Gastrointestinal: Negative for heartburn, nausea and vomiting.   Genitourinary: Negative for dysuria and urgency.   Musculoskeletal: Negative for back pain and myalgias.   Skin: Negative for rash.   Neurological: Negative for dizziness and headaches.   Endo/Heme/Allergies: Does not bruise/bleed easily.   Psychiatric/Behavioral: Negative for depression.        Physical Exam  Temp:  [36.2 °C (97.1 °F)-36.9 °C (98.4 °F)] 36.9 °C (98.4 °F)  Pulse:  [] 100  Resp:  [16-18] 16  BP: (108-122)/(51-73) 118/73  SpO2:  [93 %-100 %] 100 %    Physical Exam  Vitals signs and nursing note reviewed.   Constitutional:       Appearance: Normal appearance.   HENT:      Head: Normocephalic.      Nose: Nose normal.      Mouth/Throat:      Mouth: Mucous membranes are moist.      Pharynx: Oropharynx is clear.   Eyes:      Pupils: Pupils are equal, round, and reactive to light.   Neck:      Musculoskeletal: Normal range of motion and neck supple.   Cardiovascular:      Rate and Rhythm: Normal rate and regular rhythm.      Pulses: Normal pulses.      Heart sounds: Normal heart sounds.   Pulmonary:      Effort: Pulmonary effort is normal. No respiratory distress.       Breath sounds: Normal breath sounds.   Abdominal:      General: Bowel sounds are normal. There is no distension.      Palpations: Abdomen is soft.   Musculoskeletal: Normal range of motion.   Skin:     General: Skin is warm.      Capillary Refill: Capillary refill takes less than 2 seconds.   Neurological:      General: No focal deficit present.      Mental Status: He is alert.      Cranial Nerves: No cranial nerve deficit.   Psychiatric:         Mood and Affect: Mood normal.         Fluids    Intake/Output Summary (Last 24 hours) at 10/4/2020 1502  Last data filed at 10/4/2020 0930  Gross per 24 hour   Intake 120 ml   Output --   Net 120 ml       Laboratory  Recent Labs     10/02/20  0035  10/03/20  0405 10/04/20  0048 10/04/20  1159   WBC 8.5  --   --  16.2*  --    RBC 3.23*  --   --  2.67*  --    HEMOGLOBIN 9.4*   < > 9.0* 7.7* 7.2*   HEMATOCRIT 31.1*   < > 28.0* 24.8* 23.3*   MCV 96.3  --   --  92.9  --    MCH 29.1  --   --  28.8  --    MCHC 30.2*  --   --  31.0*  --    RDW 53.8*  --   --  52.8*  --    PLATELETCT 296  --   --  277  --    MPV 9.5  --   --  9.6  --     < > = values in this interval not displayed.     Recent Labs     10/02/20  0035 10/03/20  0024   SODIUM 139 138   POTASSIUM 4.7 4.1   CHLORIDE 104 106   CO2 21 22   GLUCOSE 95 97   BUN 61* 68*   CREATININE 1.01 0.98   CALCIUM 8.2* 8.9     Recent Labs     10/02/20  0146   APTT 25.7   INR 1.25*               Imaging  DX-CHEST-PORTABLE (1 VIEW)   Final Result         Decreased right basilar opacities, likely resolving atelectasis.      DX-CHEST-PORTABLE (1 VIEW)   Final Result      1.  Mild right basilar opacity which could represent atelectasis and/or pneumonitis.   2.  Cardiomegaly.   3.  No evidence of pneumomediastinum.           Assessment/Plan  Hematemesis- (present on admission)  Assessment & Plan  GI consulted, plan was for egd today but patient and patient's daughter changed their mind and would like to continue conservative management.   Hb  7.7 today, no sing of bleeding, will continue monitoring hb.        Anemia- (present on admission)  Assessment & Plan  Probably due to iron def, ferritin is low.   Will need to start on iron supplement.     Orthostatic hypotension- (present on admission)  Assessment & Plan  C/w home Fludrocortisone and midodrine    Parkinsonian features- (present on admission)  Assessment & Plan  C/w home PO medications       VTE prophylaxis: scd's.     Case and plan of care discussed with nurse staff and during multidisciplinary rounds.     Hemoglobin dropped to 7.2, palliative care consulted, patient wants conservative management only, will continue monitoring his hb if drops below 7.0 will transfuse one unit of blood.     procalcitonin is elevated with increasing wbc possible aspiration pneumonia will start iv unasyn today.   Speech following.

## 2020-10-04 NOTE — PROGRESS NOTES
Report received from Day Shift RN at 1915. Assumed care of patient. Plan of care discussed, questions answered. Assessment completed. VSS, A&O x4, denies pain. PRN med ordered. Call light in reach. Patient educated on use of call light for assistance. Will continue to monitor.

## 2020-10-04 NOTE — THERAPY
Missed Therapy     Patient Name: Miguel Ángel Page  Age:  89 y.o., Sex:  male  Medical Record #: [id number]  Today's Date: 10/4/2020       10/04/20 1000   Interdisciplinary Plan of Care Collaboration   IDT Collaboration with  Nursing   Collaboration Comments Spoke with RN regarding order for FEES.  Per notes. EGD was not completed yesterday as family choosing to proceed with conservative measures.  She was able to contact MD and family, and at this time, family has declined FEES procedure and is wanting patient to have soft foods and thin liquids.  Palliative Care has been consulted to discuss POC and will defer any diet decision to MD at this time.  SLP will continue to follow as appropriate to assist with swallowing precautions and family/patient education.

## 2020-10-04 NOTE — PROGRESS NOTES
[facility] Medicine Daily Progress Note    Date of Service  10/4/2020    Chief Complaint  89 y.o. male admitted 10/2/2020 with GIB anemia      Interval Problem Update  In bed, wbc increased today but no fever, hb 7.7 no melena no hematochezia or hemoptysis, continue monitoring, continue ppi, tolerating diet as per speech.   Checking cxr that showed improvement  procalcitonin pending.       Consultants/Specialty  GI    Code Status  Full Code    Disposition  Back to facility in 1-2 days.     Review of Systems  Review of Systems   Constitutional: Negative for chills and fever.   HENT: Negative for congestion.    Eyes: Negative for blurred vision and double vision.   Respiratory: Negative for cough and hemoptysis.    Cardiovascular: Negative for chest pain and palpitations.   Gastrointestinal: Negative for heartburn, nausea and vomiting.   Genitourinary: Negative for dysuria and urgency.   Musculoskeletal: Negative for back pain and myalgias.   Skin: Negative for rash.   Neurological: Negative for dizziness and headaches.   Endo/Heme/Allergies: Does not bruise/bleed easily.   Psychiatric/Behavioral: Negative for depression.        Physical Exam  Temp:  [36.2 °C (97.1 °F)-36.9 °C (98.4 °F)] 36.9 °C (98.4 °F)  Pulse:  [] 100  Resp:  [16-18] 16  BP: (108-122)/(51-73) 118/73  SpO2:  [93 %-100 %] 100 %    Physical Exam  Vitals signs and nursing note reviewed.   Constitutional:       Appearance: Normal appearance.   HENT:      Head: Normocephalic.      Nose: Nose normal.      Mouth/Throat:      Mouth: Mucous membranes are moist.      Pharynx: Oropharynx is clear.   Eyes:      Pupils: Pupils are equal, round, and reactive to light.   Neck:      Musculoskeletal: Normal range of motion and neck supple.   Cardiovascular:      Rate and Rhythm: Normal rate and regular rhythm.      Pulses: Normal pulses.      Heart sounds: Normal heart sounds.   Pulmonary:      Effort: Pulmonary effort is normal. No respiratory distress.       Breath sounds: Normal breath sounds.   Abdominal:      General: Bowel sounds are normal. There is no distension.      Palpations: Abdomen is soft.   Musculoskeletal: Normal range of motion.   Skin:     General: Skin is warm.      Capillary Refill: Capillary refill takes less than 2 seconds.   Neurological:      General: No focal deficit present.      Mental Status: He is alert.      Cranial Nerves: No cranial nerve deficit.   Psychiatric:         Mood and Affect: Mood normal.         Fluids    Intake/Output Summary (Last 24 hours) at 10/4/2020 1157  Last data filed at 10/4/2020 0930  Gross per 24 hour   Intake 120 ml   Output --   Net 120 ml       Laboratory  Recent Labs     10/02/20  0035  10/03/20  0024 10/03/20  0405 10/04/20  0048   WBC 8.5  --   --   --  16.2*   RBC 3.23*  --   --   --  2.67*   HEMOGLOBIN 9.4*   < > 8.8* 9.0* 7.7*   HEMATOCRIT 31.1*   < > 28.3* 28.0* 24.8*   MCV 96.3  --   --   --  92.9   MCH 29.1  --   --   --  28.8   MCHC 30.2*  --   --   --  31.0*   RDW 53.8*  --   --   --  52.8*   PLATELETCT 296  --   --   --  277   MPV 9.5  --   --   --  9.6    < > = values in this interval not displayed.     Recent Labs     10/02/20  0035 10/03/20  0024   SODIUM 139 138   POTASSIUM 4.7 4.1   CHLORIDE 104 106   CO2 21 22   GLUCOSE 95 97   BUN 61* 68*   CREATININE 1.01 0.98   CALCIUM 8.2* 8.9     Recent Labs     10/02/20  0146   APTT 25.7   INR 1.25*               Imaging  DX-CHEST-PORTABLE (1 VIEW)   Final Result         Decreased right basilar opacities, likely resolving atelectasis.      DX-CHEST-PORTABLE (1 VIEW)   Final Result      1.  Mild right basilar opacity which could represent atelectasis and/or pneumonitis.   2.  Cardiomegaly.   3.  No evidence of pneumomediastinum.           Assessment/Plan  Hematemesis- (present on admission)  Assessment & Plan  GI consulted, plan was for egd today but patient and patient's daughter changed their mind and would like to continue conservative management.   Hb  7.7 today, no sing of bleeding, will continue monitoring hb.        Anemia- (present on admission)  Assessment & Plan  Probably due to iron def, ferritin is low.   Will need to start on iron supplement.     Orthostatic hypotension- (present on admission)  Assessment & Plan  C/w home Fludrocortisone and midodrine    Parkinsonian features- (present on admission)  Assessment & Plan  C/w home PO medications       VTE prophylaxis: scd's.     Case and plan of care discussed with nurse staff and during multidisciplinary rounds.

## 2020-10-05 PROBLEM — J69.0 ASPIRATION PNEUMONIA (HCC): Status: ACTIVE | Noted: 2020-10-05

## 2020-10-05 LAB
ALBUMIN SERPL BCP-MCNC: 3 G/DL (ref 3.2–4.9)
ALBUMIN/GLOB SERPL: 1.2 G/DL
ALP SERPL-CCNC: 63 U/L (ref 30–99)
ALT SERPL-CCNC: 6 U/L (ref 2–50)
ANION GAP SERPL CALC-SCNC: 12 MMOL/L (ref 7–16)
AST SERPL-CCNC: 5 U/L (ref 12–45)
BASOPHILS # BLD AUTO: 0.3 % (ref 0–1.8)
BASOPHILS # BLD: 0.05 K/UL (ref 0–0.12)
BILIRUB SERPL-MCNC: 0.4 MG/DL (ref 0.1–1.5)
BUN SERPL-MCNC: 53 MG/DL (ref 8–22)
CALCIUM SERPL-MCNC: 8.3 MG/DL (ref 8.5–10.5)
CHLORIDE SERPL-SCNC: 109 MMOL/L (ref 96–112)
CO2 SERPL-SCNC: 22 MMOL/L (ref 20–33)
CREAT SERPL-MCNC: 1.04 MG/DL (ref 0.5–1.4)
EOSINOPHIL # BLD AUTO: 0.03 K/UL (ref 0–0.51)
EOSINOPHIL NFR BLD: 0.2 % (ref 0–6.9)
ERYTHROCYTE [DISTWIDTH] IN BLOOD BY AUTOMATED COUNT: 55.9 FL (ref 35.9–50)
GLOBULIN SER CALC-MCNC: 2.6 G/DL (ref 1.9–3.5)
GLUCOSE SERPL-MCNC: 166 MG/DL (ref 65–99)
HCT VFR BLD AUTO: 24.5 % (ref 42–52)
HCT VFR BLD AUTO: 24.7 % (ref 42–52)
HGB BLD-MCNC: 7.4 G/DL (ref 14–18)
HGB BLD-MCNC: 7.6 G/DL (ref 14–18)
IMM GRANULOCYTES # BLD AUTO: 0.13 K/UL (ref 0–0.11)
IMM GRANULOCYTES NFR BLD AUTO: 0.7 % (ref 0–0.9)
LYMPHOCYTES # BLD AUTO: 0.37 K/UL (ref 1–4.8)
LYMPHOCYTES NFR BLD: 1.9 % (ref 22–41)
MCH RBC QN AUTO: 29 PG (ref 27–33)
MCHC RBC AUTO-ENTMCNC: 30.2 G/DL (ref 33.7–35.3)
MCV RBC AUTO: 96.1 FL (ref 81.4–97.8)
MONOCYTES # BLD AUTO: 1.35 K/UL (ref 0–0.85)
MONOCYTES NFR BLD AUTO: 7.1 % (ref 0–13.4)
NEUTROPHILS # BLD AUTO: 17.16 K/UL (ref 1.82–7.42)
NEUTROPHILS NFR BLD: 89.8 % (ref 44–72)
NRBC # BLD AUTO: 0 K/UL
NRBC BLD-RTO: 0 /100 WBC
PLATELET # BLD AUTO: 266 K/UL (ref 164–446)
PMV BLD AUTO: 10 FL (ref 9–12.9)
POTASSIUM SERPL-SCNC: 3.6 MMOL/L (ref 3.6–5.5)
PROCALCITONIN SERPL-MCNC: 0.43 NG/ML
PROT SERPL-MCNC: 5.6 G/DL (ref 6–8.2)
RBC # BLD AUTO: 2.55 M/UL (ref 4.7–6.1)
SODIUM SERPL-SCNC: 143 MMOL/L (ref 135–145)
WBC # BLD AUTO: 19.1 K/UL (ref 4.8–10.8)

## 2020-10-05 PROCEDURE — 36415 COLL VENOUS BLD VENIPUNCTURE: CPT

## 2020-10-05 PROCEDURE — A9270 NON-COVERED ITEM OR SERVICE: HCPCS | Performed by: HOSPITALIST

## 2020-10-05 PROCEDURE — 87040 BLOOD CULTURE FOR BACTERIA: CPT

## 2020-10-05 PROCEDURE — 700102 HCHG RX REV CODE 250 W/ 637 OVERRIDE(OP): Performed by: STUDENT IN AN ORGANIZED HEALTH CARE EDUCATION/TRAINING PROGRAM

## 2020-10-05 PROCEDURE — 700111 HCHG RX REV CODE 636 W/ 250 OVERRIDE (IP): Performed by: HOSPITALIST

## 2020-10-05 PROCEDURE — 302146: Performed by: HOSPITALIST

## 2020-10-05 PROCEDURE — 84145 PROCALCITONIN (PCT): CPT

## 2020-10-05 PROCEDURE — 85018 HEMOGLOBIN: CPT

## 2020-10-05 PROCEDURE — 770006 HCHG ROOM/CARE - MED/SURG/GYN SEMI*

## 2020-10-05 PROCEDURE — 85025 COMPLETE CBC W/AUTO DIFF WBC: CPT

## 2020-10-05 PROCEDURE — A9270 NON-COVERED ITEM OR SERVICE: HCPCS | Performed by: STUDENT IN AN ORGANIZED HEALTH CARE EDUCATION/TRAINING PROGRAM

## 2020-10-05 PROCEDURE — 99497 ADVNCD CARE PLAN 30 MIN: CPT | Performed by: NURSE PRACTITIONER

## 2020-10-05 PROCEDURE — 700102 HCHG RX REV CODE 250 W/ 637 OVERRIDE(OP): Performed by: HOSPITALIST

## 2020-10-05 PROCEDURE — 80053 COMPREHEN METABOLIC PANEL: CPT

## 2020-10-05 PROCEDURE — 700105 HCHG RX REV CODE 258: Performed by: HOSPITALIST

## 2020-10-05 PROCEDURE — 99233 SBSQ HOSP IP/OBS HIGH 50: CPT | Performed by: HOSPITALIST

## 2020-10-05 PROCEDURE — 85014 HEMATOCRIT: CPT

## 2020-10-05 RX ORDER — FERROUS SULFATE 325(65) MG
325 TABLET ORAL 2 TIMES DAILY WITH MEALS
Status: DISCONTINUED | OUTPATIENT
Start: 2020-10-05 | End: 2020-10-08 | Stop reason: HOSPADM

## 2020-10-05 RX ADMIN — MIDODRINE HYDROCHLORIDE 5 MG: 5 TABLET ORAL at 18:40

## 2020-10-05 RX ADMIN — CARBIDOPA AND LEVODOPA 1 TABLET: 25; 100 TABLET ORAL at 12:28

## 2020-10-05 RX ADMIN — CARBIDOPA AND LEVODOPA 1 TABLET: 25; 100 TABLET ORAL at 20:30

## 2020-10-05 RX ADMIN — OMEPRAZOLE 20 MG: 20 CAPSULE, DELAYED RELEASE ORAL at 18:40

## 2020-10-05 RX ADMIN — MIDODRINE HYDROCHLORIDE 5 MG: 5 TABLET ORAL at 12:28

## 2020-10-05 RX ADMIN — CARBIDOPA AND LEVODOPA 1 TABLET: 25; 100 TABLET ORAL at 08:15

## 2020-10-05 RX ADMIN — SODIUM CHLORIDE 3 G: 900 INJECTION INTRAVENOUS at 06:18

## 2020-10-05 RX ADMIN — SODIUM CHLORIDE 3 G: 900 INJECTION INTRAVENOUS at 00:24

## 2020-10-05 RX ADMIN — FERROUS SULFATE TAB 325 MG (65 MG ELEMENTAL FE) 325 MG: 325 (65 FE) TAB at 18:40

## 2020-10-05 RX ADMIN — SODIUM CHLORIDE 3 G: 900 INJECTION INTRAVENOUS at 12:28

## 2020-10-05 RX ADMIN — CARBIDOPA AND LEVODOPA 1 TABLET: 25; 100 TABLET ORAL at 18:40

## 2020-10-05 RX ADMIN — SODIUM CHLORIDE 3 G: 900 INJECTION INTRAVENOUS at 18:40

## 2020-10-05 RX ADMIN — FLUDROCORTISONE ACETATE 0.1 MG: 0.1 TABLET ORAL at 06:18

## 2020-10-05 RX ADMIN — MIDODRINE HYDROCHLORIDE 5 MG: 5 TABLET ORAL at 08:15

## 2020-10-05 RX ADMIN — OMEPRAZOLE 20 MG: 20 CAPSULE, DELAYED RELEASE ORAL at 06:18

## 2020-10-05 ASSESSMENT — ENCOUNTER SYMPTOMS
NAUSEA: 0
MYALGIAS: 0
DEPRESSION: 0
HEARTBURN: 0
BLURRED VISION: 0
DIZZINESS: 0
HEADACHES: 0
FEVER: 0
VOMITING: 0
COUGH: 1
CHILLS: 0
PALPITATIONS: 0
BACK PAIN: 0
BRUISES/BLEEDS EASILY: 0
HEMOPTYSIS: 0

## 2020-10-05 NOTE — ASSESSMENT & PLAN NOTE
Wbc is trending up, procacitonin elevated.  Continue unasyn o2 requirement improved  Discussed with daughter regarding goals of care and hospice since they do not want aggressive measures.   Daughter would like to speak with palliative team again regarding it  Palliative team informed.    Family has decided for hospice, Sw working to arrange for hospice

## 2020-10-05 NOTE — CARE PLAN
Problem: Communication  Goal: The ability to communicate needs accurately and effectively will improve  Outcome: PROGRESSING AS EXPECTED     Problem: Safety  Goal: Will remain free from injury  Outcome: PROGRESSING AS EXPECTED     Problem: Infection  Goal: Will remain free from infection  Outcome: PROGRESSING AS EXPECTED     Problem: Bowel/Gastric:  Goal: Normal bowel function is maintained or improved  Outcome: PROGRESSING AS EXPECTED     Problem: Knowledge Deficit  Goal: Knowledge of disease process/condition, treatment plan, diagnostic tests, and medications will improve  Outcome: PROGRESSING AS EXPECTED     Problem: Skin Integrity  Goal: Risk for impaired skin integrity will decrease  Outcome: PROGRESSING AS EXPECTED

## 2020-10-05 NOTE — PALLIATIVE CARE
Palliative Care follow-up  Appreciate call from BS RN noting new referral and providing patient updates. Pt/family focusing on less aggressive measures but pt remains full code. EMR reviewed noting AD and POLST completed and scanned in. Reviewed POLST completed by the patient in 2019 for DNR and selective focused interventions. TT to MD with updates and plan for PC to visit likely 10/6.      Updated: RN/MD    Plan: formal consult to follow    Thank you for allowing Palliative Care to support this patient and family. Contact [phone] for additional assistance, change in patient status, or with any questions/concerns.

## 2020-10-05 NOTE — PROGRESS NOTES
[facility] Medicine Daily Progress Note    Date of Service  10/5/2020    Chief Complaint  89 y.o. male admitted 10/2/2020 with GIB anemia      Interval Problem Update  Patient is resting in bed, looks weak, no fever or chills, off o2, no abdominal pain per nurse staff still having small amount of melena, wet cough.   Will discuss with patient's daughter regarding goals of care. Palliative care consulted.       Consultants/Specialty  GI    Code Status  Full Code    Disposition  Back to facility in 1-2 days.     Review of Systems  Review of Systems   Constitutional: Positive for malaise/fatigue. Negative for chills and fever.   HENT: Negative for congestion.    Eyes: Negative for blurred vision.   Respiratory: Positive for cough. Negative for hemoptysis.    Cardiovascular: Negative for chest pain and palpitations.   Gastrointestinal: Positive for melena. Negative for heartburn, nausea and vomiting.   Genitourinary: Negative for dysuria and urgency.   Musculoskeletal: Negative for back pain and myalgias.   Skin: Negative for rash.   Neurological: Negative for dizziness and headaches.   Endo/Heme/Allergies: Does not bruise/bleed easily.   Psychiatric/Behavioral: Negative for depression.        Physical Exam  Temp:  [36.3 °C (97.3 °F)-37.1 °C (98.8 °F)] 37.1 °C (98.8 °F)  Pulse:  [] 105  Resp:  [16-17] 16  BP: (101-123)/(58-68) 123/67  SpO2:  [97 %-98 %] 97 %    Physical Exam  Vitals signs and nursing note reviewed.   Constitutional:       Appearance: He is cachectic.   HENT:      Head: Normocephalic.      Nose: Nose normal.      Mouth/Throat:      Mouth: Mucous membranes are moist.      Pharynx: Oropharynx is clear.   Eyes:      General: No scleral icterus.     Pupils: Pupils are equal, round, and reactive to light.   Neck:      Musculoskeletal: Normal range of motion and neck supple.   Cardiovascular:      Rate and Rhythm: Normal rate and regular rhythm.      Pulses: Normal pulses.      Heart sounds: Normal heart  sounds.   Pulmonary:      Effort: Pulmonary effort is normal.      Breath sounds: Rales present. No wheezing.   Abdominal:      General: Bowel sounds are normal. There is no distension.      Palpations: Abdomen is soft.      Tenderness: There is no abdominal tenderness.   Musculoskeletal: Normal range of motion.   Skin:     General: Skin is warm.      Capillary Refill: Capillary refill takes less than 2 seconds.   Neurological:      General: No focal deficit present.      Mental Status: He is alert.      Cranial Nerves: No cranial nerve deficit.   Psychiatric:         Mood and Affect: Mood normal.         Fluids    Intake/Output Summary (Last 24 hours) at 10/5/2020 1318  Last data filed at 10/5/2020 0900  Gross per 24 hour   Intake 240 ml   Output --   Net 240 ml       Laboratory  Recent Labs     10/04/20  0048  10/04/20  1750 10/05/20  0111 10/05/20  0542   WBC 16.2*  --   --  19.1*  --    RBC 2.67*  --   --  2.55*  --    HEMOGLOBIN 7.7*   < > 7.1* 7.4* 7.6*   HEMATOCRIT 24.8*   < > 22.5* 24.5* 24.7*   MCV 92.9  --   --  96.1  --    MCH 28.8  --   --  29.0  --    MCHC 31.0*  --   --  30.2*  --    RDW 52.8*  --   --  55.9*  --    PLATELETCT 277  --   --  266  --    MPV 9.6  --   --  10.0  --     < > = values in this interval not displayed.     Recent Labs     10/03/20  0024 10/05/20  0835   SODIUM 138 143   POTASSIUM 4.1 3.6   CHLORIDE 106 109   CO2 22 22   GLUCOSE 97 166*   BUN 68* 53*   CREATININE 0.98 1.04   CALCIUM 8.9 8.3*                   Imaging  DX-CHEST-PORTABLE (1 VIEW)   Final Result         Decreased right basilar opacities, likely resolving atelectasis.      DX-CHEST-PORTABLE (1 VIEW)   Final Result      1.  Mild right basilar opacity which could represent atelectasis and/or pneumonitis.   2.  Cardiomegaly.   3.  No evidence of pneumomediastinum.           Assessment/Plan  Aspiration pneumonia (HCC)  Assessment & Plan  Wbc is trending up, procacitonin elevated.  Continue unasyn o2 requirement  improved  Speech therapy following.   Blood cx today.     Hematemesis- (present on admission)  Assessment & Plan  GI consulted, plan was for egd today but patient and patient's daughter changed their mind and would like to continue conservative management.   Hb 7.6 today, no sing of bleeding, will continue monitoring hb.        Anemia- (present on admission)  Assessment & Plan  Probably due to iron def, ferritin is low.  started on iron supplement.       Orthostatic hypotension- (present on admission)  Assessment & Plan  C/w home Fludrocortisone and midodrine    Parkinsonian features- (present on admission)  Assessment & Plan  C/w home PO medications       VTE prophylaxis: scd's.     Case and plan of care discussed with nurse staff and during multidisciplinary rounds.     Condition guarded.

## 2020-10-05 NOTE — PROGRESS NOTES
Report received from Day Shift RN at 1915. Assumed care of patient. Plan of care discussed, questions answered. Assessment completed. VSS, A&O x4, denies pain. PRN med ordered. Call light in reach. Patient educated on use of call light for assistance.

## 2020-10-05 NOTE — CARE PLAN
Problem: Safety  Goal: Will remain free from injury  Outcome: PROGRESSING AS EXPECTED     Problem: Infection  Goal: Will remain free from infection  Outcome: PROGRESSING AS EXPECTED  Intervention: Assess signs and symptoms of infection  Note: Increased WBC, chest xr, UA ordered. Unasyn started     Problem: Bowel/Gastric:  Goal: Normal bowel function is maintained or improved  Outcome: PROGRESSING AS EXPECTED     Problem: Skin Integrity  Goal: Risk for impaired skin integrity will decrease  Outcome: PROGRESSING AS EXPECTED  Intervention: Implement precautions to protect skin integrity in collaboration with the interdisciplinary team  Flowsheets  Taken 10/4/2020 1823  Skin Preventative Measures:   Pillows in Use for Support / Positioning   Waffle Cushion  Moisturizers/Barriers: Barrier Paste  Taken 10/4/2020 0800  Friction Interventions: Draw Sheet / Pad Used for Repositioning  Note: Q2 turns

## 2020-10-05 NOTE — CARE PLAN
Problem: Nutritional:  Goal: Achieve adequate nutritional intake  Description: Patient will consume >50% of meals on diet > clear liquids  Outcome: PROGRESSING SLOWER THAN EXPECTED     Diet advanced to Level 6/L2 liquids on 10/2. PO < 25% of meals and 25 - 50% of Boost this AM. Receiving Boost Plus TID.  RD will continue to follow.

## 2020-10-05 NOTE — CONSULTS
Reason for Palliative Care Consult: Advance Care Planning    Consulted by: Josh Tuttle M.D.    HPI: Miguel Ángel Page is a 89 y.o. male who was admitted 10/2/20 after patient vomited at assisted living facility, with concern for hematenesis.  Patient had positive guaiac in ED and was admitted with GI bleed and anemia.  GI consulted 10/3/20 with initial plan for EGD.  However, patient and patient's daughter Nabila (DPOA-HC) opted for less aggressive medical management.  Palliative Care consulting for Advance Care Planning, specifically code status discussion.    Miguel Ángel is familiar to the Palliative Care team and was previously seen for ACP by PC PRISCILLA Page on 2019.     Past medical/surgical history: Parkinsonian features, orthostatic hypotension, anemia, hematemesis, aspiration pneumonia, constipation, history of UTI, fall risk, BPH.     Additional consults:   Gastroenterology  Anesthesia  Speech Language Pathologist    Assessment:  Neuro: Patient alert & oriented X2.  Disoriented to time (thought it was ) and situation.  Dyspnea: Yes- 97% on 1L NC  Last BM: 10/05/20-    Pain: No-    Depression: Mood appropriate for situation-    Dementia: Unable to Determine;       Living situation & psychosocial: Patient is , retired and lives at assisted living facility [facility].  He has an adult daughter Nabila who lives locally.  He also has another dtr, Isabell and son Albert that reside in [place].    Spiritual:  Is Yazidi or spirituality important for coping with this illness? No- Deferred  Has a  or spiritual provider visit been requested? No    Palliative Performance Scale: 40%    Advance Directive: Advance Directive-    DPOA: Yes- Nabila Page  POLST: Yes-      Code Status: Full-      Discussion:  Met with patient at bedside. Introduced myself and explained the role of palliative care.  Patient was oriented to self (knew ) and place.  Disoriented to time (thought it was ) and  "situation (unable to verbalize why he was in the hospital).  It was difficult to hear and understand patient, as he communicates in a barely audible whisper.  Reviewed AD signed  4/26/2017.  When I asked if he still wanted his daughter Nabila Page as his DPOA-HC with son Sunil Page as First Alternate, patient nodded his head yes.  He gave me permission to call and speak with his daughter Nabila (DOA-HC).    Called Nabila ([phone]).  Introduced myself and explained my role in Palliative Care.  Advised we assist patients with AD paperwork.    Queried her understanding her her dad's medical situation.  She said, \"His hemoglobin was down and he might have gotten a blood transfusion.  Last I was told, he was most likely getting transferred back to his assisted living facility.\"     Discussed GOC.  Nabila confirms does not wish to pursue aggressive treatment including EGD, surgery, \"or any procedure that would involve anesthesia.\"  She expressed concern that aggressive treatment would not be appropriate due to patient's advanced age and fragile condition.  She also does not want patient to receive FEES, \"because he hates that thickened liquid.\"  She confirmed they wish to continue pursuing selective conservative medical management including lab monitoring, and blood transfusions (if necessary).  We discussed possibility that patient may have to take iron supplements post-discharge d/t decreased ferritin level.  She expressed her concern that patient \"has been babied for the last 3 days.  Things stop working when you stop using them.  I would really like to get him out of the hospital and get him back to his facility.\"  She advised she would like to s/w Dr. Tuttle to get update on patient condition/POC/anticipated discharge.  I advised I would notify Dr. Tuttle of her request via TT.     Discussed code status and POLST form signed 6/13/19 indicating DNR/DNI.  She is familiar with POLST form stating, " "\"It's the pink form on the refrigerator.\"  I advised patient is currently listed as Full Code.  She said, \"I don't how that happened.  He should be Do Not Resuscitate.\"  I advised it may have possibly changed when GI/Anesthesiology was considering taking patient for procedure (EGD).  When I asked her permission to change patient's code status from Full to DNR/DNI Nabila said, \"Yes.  Please change his code status.\"  All questions answered.  I provided PC contact information and encouraged Nabila to contact me with any questions/needs.  She was grateful for the call.      AD on file signed 4/26/2017 shows daughter Nabila Page ([phone]) as DPOA-HC; with son Sunil Page ([phone]; [phone]) as First Alternate.  Under Statements of Desire section, patient selected #2, #3, and #5, meaning that he would not want life prolonging treatments to be provided if there was no reasonable hope of recovery, long-term survival or if the patient was in a coma.  He would want his POA-HC to consider quality, relief of suffering, and preservation of function.  Patient affirms this still reflects his current wishes.    Declaration to Physicians on file signed 4/26/2017.    POLST form on file signed 6/13/29 indicates:  1) DNR/DNI  2) Selective Treatment.    To locate and/or review the AD/POLST, please hover the cursor over the patient's code status to find all linked ACP documents.    Provided therapeutic communication including open-ended questions, therapeutic silence, reflective listening, and validation throughout encounter.      Outcome:  Reviewed AD signed 4/26/2017, which still reflects patient's current wishes.  Received permission from daughter Nabila (DPOA-HC) to change code status from Full to DNR/DNI.    Plan: Palliative care to continue to follow, provide support, and help facilitate decision making as clinical picture evolves.    Recommendations: I do not recommend an ethics or hospice consult at this time " because patient and daughter wish to continue pursuing selective treatment at this time.    Updated: Dr. Tuttle via TT; requested he call daughter Nabila (per her request) for update on patient condition/POC/anticipated discharge.    Thank you for allowing Palliative Care to participate in Miguel Ángel' care. Please call our team with questions and/or additional needs.    As appropriate, Palliative Care encourages medical team to engage in further conversation, education for patient/family at bedside regarding GOC/POC.    Total visit time was 35 minutes discussing and coordinating advance care planning.     HERMELINDO Chung, [facility]-BC  Palliative Care Nurse Practitioner  [phone]

## 2020-10-06 LAB
ANION GAP SERPL CALC-SCNC: 13 MMOL/L (ref 7–16)
BASOPHILS # BLD AUTO: 0.2 % (ref 0–1.8)
BASOPHILS # BLD: 0.04 K/UL (ref 0–0.12)
BUN SERPL-MCNC: 41 MG/DL (ref 8–22)
CALCIUM SERPL-MCNC: 8.5 MG/DL (ref 8.5–10.5)
CHLORIDE SERPL-SCNC: 108 MMOL/L (ref 96–112)
CO2 SERPL-SCNC: 22 MMOL/L (ref 20–33)
CREAT SERPL-MCNC: 0.96 MG/DL (ref 0.5–1.4)
EOSINOPHIL # BLD AUTO: 0.09 K/UL (ref 0–0.51)
EOSINOPHIL NFR BLD: 0.4 % (ref 0–6.9)
ERYTHROCYTE [DISTWIDTH] IN BLOOD BY AUTOMATED COUNT: 55.8 FL (ref 35.9–50)
GLUCOSE SERPL-MCNC: 100 MG/DL (ref 65–99)
HCT VFR BLD AUTO: 23.1 % (ref 42–52)
HGB BLD-MCNC: 7 G/DL (ref 14–18)
IMM GRANULOCYTES # BLD AUTO: 0.13 K/UL (ref 0–0.11)
IMM GRANULOCYTES NFR BLD AUTO: 0.6 % (ref 0–0.9)
LYMPHOCYTES # BLD AUTO: 0.54 K/UL (ref 1–4.8)
LYMPHOCYTES NFR BLD: 2.6 % (ref 22–41)
MCH RBC QN AUTO: 28.8 PG (ref 27–33)
MCHC RBC AUTO-ENTMCNC: 30.3 G/DL (ref 33.7–35.3)
MCV RBC AUTO: 95.1 FL (ref 81.4–97.8)
MONOCYTES # BLD AUTO: 1.67 K/UL (ref 0–0.85)
MONOCYTES NFR BLD AUTO: 7.9 % (ref 0–13.4)
NEUTROPHILS # BLD AUTO: 18.69 K/UL (ref 1.82–7.42)
NEUTROPHILS NFR BLD: 88.3 % (ref 44–72)
NRBC # BLD AUTO: 0.02 K/UL
NRBC BLD-RTO: 0.1 /100 WBC
PLATELET # BLD AUTO: 303 K/UL (ref 164–446)
PMV BLD AUTO: 10.1 FL (ref 9–12.9)
POTASSIUM SERPL-SCNC: 3.7 MMOL/L (ref 3.6–5.5)
PROCALCITONIN SERPL-MCNC: 0.67 NG/ML
RBC # BLD AUTO: 2.43 M/UL (ref 4.7–6.1)
SODIUM SERPL-SCNC: 143 MMOL/L (ref 135–145)
WBC # BLD AUTO: 21.2 K/UL (ref 4.8–10.8)

## 2020-10-06 PROCEDURE — 700102 HCHG RX REV CODE 250 W/ 637 OVERRIDE(OP): Performed by: HOSPITALIST

## 2020-10-06 PROCEDURE — 700111 HCHG RX REV CODE 636 W/ 250 OVERRIDE (IP): Performed by: HOSPITALIST

## 2020-10-06 PROCEDURE — 700105 HCHG RX REV CODE 258

## 2020-10-06 PROCEDURE — 36415 COLL VENOUS BLD VENIPUNCTURE: CPT

## 2020-10-06 PROCEDURE — 51798 US URINE CAPACITY MEASURE: CPT

## 2020-10-06 PROCEDURE — 84145 PROCALCITONIN (PCT): CPT

## 2020-10-06 PROCEDURE — 700102 HCHG RX REV CODE 250 W/ 637 OVERRIDE(OP): Performed by: STUDENT IN AN ORGANIZED HEALTH CARE EDUCATION/TRAINING PROGRAM

## 2020-10-06 PROCEDURE — 700105 HCHG RX REV CODE 258: Performed by: HOSPITALIST

## 2020-10-06 PROCEDURE — 85025 COMPLETE CBC W/AUTO DIFF WBC: CPT

## 2020-10-06 PROCEDURE — 97167 OT EVAL HIGH COMPLEX 60 MIN: CPT

## 2020-10-06 PROCEDURE — 99232 SBSQ HOSP IP/OBS MODERATE 35: CPT | Performed by: INTERNAL MEDICINE

## 2020-10-06 PROCEDURE — 99497 ADVNCD CARE PLAN 30 MIN: CPT | Performed by: NURSE PRACTITIONER

## 2020-10-06 PROCEDURE — 80048 BASIC METABOLIC PNL TOTAL CA: CPT

## 2020-10-06 PROCEDURE — 97535 SELF CARE MNGMENT TRAINING: CPT

## 2020-10-06 PROCEDURE — A9270 NON-COVERED ITEM OR SERVICE: HCPCS | Performed by: STUDENT IN AN ORGANIZED HEALTH CARE EDUCATION/TRAINING PROGRAM

## 2020-10-06 PROCEDURE — A9270 NON-COVERED ITEM OR SERVICE: HCPCS | Performed by: HOSPITALIST

## 2020-10-06 PROCEDURE — 770006 HCHG ROOM/CARE - MED/SURG/GYN SEMI*

## 2020-10-06 RX ORDER — SODIUM CHLORIDE 9 MG/ML
INJECTION, SOLUTION INTRAVENOUS
Status: COMPLETED
Start: 2020-10-06 | End: 2020-10-06

## 2020-10-06 RX ADMIN — FERROUS SULFATE TAB 325 MG (65 MG ELEMENTAL FE) 325 MG: 325 (65 FE) TAB at 16:46

## 2020-10-06 RX ADMIN — MIDODRINE HYDROCHLORIDE 5 MG: 5 TABLET ORAL at 05:35

## 2020-10-06 RX ADMIN — CARBIDOPA AND LEVODOPA 1 TABLET: 25; 100 TABLET ORAL at 16:46

## 2020-10-06 RX ADMIN — CARBIDOPA AND LEVODOPA 1 TABLET: 25; 100 TABLET ORAL at 19:48

## 2020-10-06 RX ADMIN — SODIUM CHLORIDE 3 G: 900 INJECTION INTRAVENOUS at 23:53

## 2020-10-06 RX ADMIN — FERROUS SULFATE TAB 325 MG (65 MG ELEMENTAL FE) 325 MG: 325 (65 FE) TAB at 05:35

## 2020-10-06 RX ADMIN — SODIUM CHLORIDE 3 G: 900 INJECTION INTRAVENOUS at 05:35

## 2020-10-06 RX ADMIN — SODIUM CHLORIDE 3 G: 900 INJECTION INTRAVENOUS at 00:34

## 2020-10-06 RX ADMIN — OMEPRAZOLE 20 MG: 20 CAPSULE, DELAYED RELEASE ORAL at 05:35

## 2020-10-06 RX ADMIN — MIDODRINE HYDROCHLORIDE 5 MG: 5 TABLET ORAL at 16:46

## 2020-10-06 RX ADMIN — CARBIDOPA AND LEVODOPA 1 TABLET: 25; 100 TABLET ORAL at 08:24

## 2020-10-06 RX ADMIN — OMEPRAZOLE 20 MG: 20 CAPSULE, DELAYED RELEASE ORAL at 16:46

## 2020-10-06 RX ADMIN — SODIUM CHLORIDE 3 G: 900 INJECTION INTRAVENOUS at 11:56

## 2020-10-06 RX ADMIN — FLUDROCORTISONE ACETATE 0.1 MG: 0.1 TABLET ORAL at 05:35

## 2020-10-06 RX ADMIN — SODIUM CHLORIDE 3 G: 900 INJECTION INTRAVENOUS at 16:56

## 2020-10-06 RX ADMIN — SODIUM CHLORIDE 500 ML: 9 INJECTION, SOLUTION INTRAVENOUS at 11:55

## 2020-10-06 RX ADMIN — CARBIDOPA AND LEVODOPA 1 TABLET: 25; 100 TABLET ORAL at 12:00

## 2020-10-06 RX ADMIN — MIDODRINE HYDROCHLORIDE 5 MG: 5 TABLET ORAL at 11:55

## 2020-10-06 ASSESSMENT — COGNITIVE AND FUNCTIONAL STATUS - GENERAL
PERSONAL GROOMING: A LOT
TOILETING: A LOT
DRESSING REGULAR UPPER BODY CLOTHING: A LOT
SUGGESTED CMS G CODE MODIFIER DAILY ACTIVITY: CL
DAILY ACTIVITIY SCORE: 13
DRESSING REGULAR LOWER BODY CLOTHING: A LOT
HELP NEEDED FOR BATHING: A LOT
EATING MEALS: A LITTLE

## 2020-10-06 ASSESSMENT — ENCOUNTER SYMPTOMS
DIZZINESS: 0
COUGH: 1
HEMOPTYSIS: 0
BLURRED VISION: 0
MYALGIAS: 0
PALPITATIONS: 0
VOMITING: 0
BACK PAIN: 0
HEADACHES: 0
BRUISES/BLEEDS EASILY: 0
FEVER: 0
HEARTBURN: 0
NAUSEA: 0
CHILLS: 0
DEPRESSION: 0

## 2020-10-06 ASSESSMENT — ACTIVITIES OF DAILY LIVING (ADL): TOILETING: DEPENDENT

## 2020-10-06 NOTE — PROGRESS NOTES
Spoke with daughter Nabila, she relayed message that [facility] needs an updated COVID test as well as an outpatient PT order so patient can participate with therapy through [facility] at the custodial facility. Gisel sent to Dr. Ellis notifying him of these things. PT/OT order placed. Will discuss other requirements with team in rounds tomorrow.

## 2020-10-06 NOTE — PROGRESS NOTES
[facility] Medicine Daily Progress Note    Date of Service  10/6/2020    Chief Complaint  89 y.o. male admitted 10/2/2020 with GIB anemia      Interval Problem Update  Patient seen and examined  is resting in bed, looks weak, no fever or chills, no abdominal pain per nurse staff still having small amount of melena, wet cough.   Discussed with his daughter again today regarding pt goals of care per daughter no aggressive measures, pt still probably aspirating, his wbc trending up. Discussed regarding hospice as an option since they do not want to have aggressive measures as pt probably still aspirating.  Daughter wanted to talk with palliative team which was informed.       Consultants/Specialty  GI  Palliative team    Code Status  DNAR/DNI    Disposition  Back to facility in 1-2 days.     Review of Systems  Review of Systems   Constitutional: Positive for malaise/fatigue. Negative for chills and fever.   HENT: Negative for congestion.    Eyes: Negative for blurred vision.   Respiratory: Positive for cough. Negative for hemoptysis.    Cardiovascular: Negative for chest pain and palpitations.   Gastrointestinal: Positive for melena. Negative for heartburn, nausea and vomiting.   Genitourinary: Negative for dysuria and urgency.   Musculoskeletal: Negative for back pain and myalgias.   Skin: Negative for rash.   Neurological: Negative for dizziness and headaches.   Endo/Heme/Allergies: Does not bruise/bleed easily.   Psychiatric/Behavioral: Negative for depression.        Physical Exam  Temp:  [36.4 °C (97.6 °F)-36.8 °C (98.2 °F)] 36.6 °C (97.9 °F)  Pulse:  [] 105  Resp:  [16-18] 17  BP: (100-117)/(56-68) 117/67  SpO2:  [90 %-94 %] 94 %    Physical Exam  Vitals signs and nursing note reviewed.   Constitutional:       Appearance: He is cachectic.   HENT:      Head: Normocephalic.      Nose: Nose normal.      Mouth/Throat:      Mouth: Mucous membranes are moist.      Pharynx: Oropharynx is clear.   Eyes:      General:  No scleral icterus.     Pupils: Pupils are equal, round, and reactive to light.   Neck:      Musculoskeletal: Normal range of motion and neck supple.   Cardiovascular:      Rate and Rhythm: Normal rate and regular rhythm.      Pulses: Normal pulses.      Heart sounds: Normal heart sounds.   Pulmonary:      Effort: Pulmonary effort is normal.      Breath sounds: Rales present. No wheezing.   Abdominal:      General: Bowel sounds are normal. There is no distension.      Palpations: Abdomen is soft.      Tenderness: There is no abdominal tenderness.   Musculoskeletal: Normal range of motion.   Skin:     General: Skin is warm.      Capillary Refill: Capillary refill takes less than 2 seconds.   Neurological:      General: No focal deficit present.      Mental Status: He is alert.      Cranial Nerves: No cranial nerve deficit.   Psychiatric:         Mood and Affect: Mood normal.         Fluids    Intake/Output Summary (Last 24 hours) at 10/6/2020 1302  Last data filed at 10/6/2020 0900  Gross per 24 hour   Intake 600 ml   Output 500 ml   Net 100 ml       Laboratory  Recent Labs     10/04/20  0048  10/05/20  0111 10/05/20  0542 10/06/20  0807   WBC 16.2*  --  19.1*  --  21.2*   RBC 2.67*  --  2.55*  --  2.43*   HEMOGLOBIN 7.7*   < > 7.4* 7.6* 7.0*   HEMATOCRIT 24.8*   < > 24.5* 24.7* 23.1*   MCV 92.9  --  96.1  --  95.1   MCH 28.8  --  29.0  --  28.8   MCHC 31.0*  --  30.2*  --  30.3*   RDW 52.8*  --  55.9*  --  55.8*   PLATELETCT 277  --  266  --  303   MPV 9.6  --  10.0  --  10.1    < > = values in this interval not displayed.     Recent Labs     10/05/20  0835 10/06/20  0807   SODIUM 143 143   POTASSIUM 3.6 3.7   CHLORIDE 109 108   CO2 22 22   GLUCOSE 166* 100*   BUN 53* 41*   CREATININE 1.04 0.96   CALCIUM 8.3* 8.5                   Imaging  DX-CHEST-PORTABLE (1 VIEW)   Final Result         Decreased right basilar opacities, likely resolving atelectasis.      DX-CHEST-PORTABLE (1 VIEW)   Final Result      1.  Mild  right basilar opacity which could represent atelectasis and/or pneumonitis.   2.  Cardiomegaly.   3.  No evidence of pneumomediastinum.           Assessment/Plan  Aspiration pneumonia (HCC)  Assessment & Plan  Wbc is trending up, procacitonin elevated.  Continue unasyn o2 requirement improved  Discussed with daughter regarding goals of care and hospice since they do not want aggressive measures.   Daughter would like to speak with palliative team again regarding it  Palliative team informed.      Hematemesis- (present on admission)  Assessment & Plan  GI consulted, plan was for egd today but patient and patient's daughter changed their mind and would like to continue conservative management.   Hb 7.6 today, no sing of bleeding, will continue monitoring hb.        Anemia- (present on admission)  Assessment & Plan  Probably due to iron def, ferritin is low.  started on iron supplement.       Orthostatic hypotension- (present on admission)  Assessment & Plan  C/w home Fludrocortisone and midodrine    Parkinsonian features- (present on admission)  Assessment & Plan  C/w home PO medications       VTE prophylaxis: scd's.    Condition guarded.

## 2020-10-06 NOTE — PROGRESS NOTES
"Palliative Care Advance Care Planning Progress Note    HPI: Miguel Ángel Page is a 89 y.o. male who was admitted 10/2/20 after patient vomited at assisted living facility, with concern for hematenesis.  Patient had positive guaiac in ED and was admitted with GI bleed and anemia.  GI consulted 10/3/20 with initial plan for EGD.  However, patient and patient's daughter Nabila (DPOA-HC) opted for less aggressive medical management.       Miguel Ángel is familiar to the Palliative Care team and was previously seen for ACP by PC PRISCILLA Page on 6/11/2019.     Discussion: Per Dr. Salcedo's request, I called and s/w patient's daughter Nabila  ([phone]).  She recalled her telephone conversation with Dr. Salcedo this morning stating, \"He's worried that if we just send my dad back to the assisted living, he's going to keep aspirating, then he will have to come back to the hospital, and it'll be this big [place]ie.\"  She again confirmed that she does not wish to pursue invasive, aggressive treatment for her dad stating, \"I don't want him to go back to the hospital.\"    Discussed Hospice as a treatment option and educated Nabila about what services Hospice does and does not provide.   Advised Hospice care is employed when patient is no longer seeking aggressive disease-modifying treatment, and focus of treatment shifts from \"cure\" to \"comfort.\"  Advised Hospice care is provided by an inter-disciplinary team (physicians, nurses, aides, , ), whose main focus is to manage patient's symptoms (pain, nausea, anxiety, etc.) at home.  I affirmed patient could receive Hospice in his assisted living facility.  Nabila open to considering this as a treatment option for her dad.  I emailed her a hospice choice form listing local hospice agencies in the [facility].  She would like to take some time to review, prior to making a decision.  I validated this as reasonable.      Provided therapeutic communication including " open-ended questions, therapeutic silence, reflective listening, and validation throughout encounter. Provided my contact information and encouraged her to call with any questions or needs.    Outcome: I emailed hospice choice form to daughter Nabila.  She will review and get back to me.    Plan: Palliative care to continue to follow, provide support, and help facilitate decision making as clinical picture evolves.    Updated: Dr. Salcedo via TT    Thank you for allowing Palliative Care to participate in Miguel Ángel' care. Please call our team with questions and/or additional needs.    As appropriate, Palliative Care encourages medical team to engage in further conversation, education for patient/family at bedside regarding code status, GOC/POC.    Total visit time was 20 minutes discussing and coordinating advance care planning.    HERMELINDO Chung, AGACN-BC  Palliative Care Nurse Practitioner  [phone]

## 2020-10-06 NOTE — CARE PLAN
Problem: Safety  Goal: Will remain free from falls  Outcome: PROGRESSING AS EXPECTED  Intervention: Assess risk factors for falls  Note: Fall risk assessed using Charels-Froylan Scale. Pt is High fall risk. Fall precautions in place including bed alarm. Pt educated to call for assistance.       Problem: Skin Integrity  Goal: Risk for impaired skin integrity will decrease  Outcome: PROGRESSING AS EXPECTED  Intervention: Assess risk factors for impaired skin integrity and/or pressure ulcers  Note: Q2 hour turns, Waffle mattress in place.

## 2020-10-06 NOTE — CARE PLAN
Problem: Communication  Goal: The ability to communicate needs accurately and effectively will improve  Outcome: PROGRESSING SLOWER THAN EXPECTED   Patient demonstrates difficulty communicating with staff.    Problem: Bowel/Gastric:  Goal: Normal bowel function is maintained or improved  Outcome: PROGRESSING SLOWER THAN EXPECTED  Patient continues to have frequent, loose, tarry stools.

## 2020-10-06 NOTE — THERAPY
"Occupational Therapy   Initial Evaluation     Patient Name: Miguel Ángel Page  Age:  89 y.o., Sex:  male  Medical Record #: [id number]  Today's Date: 10/6/2020     Precautions:Fall Risk, Swallow Precautions ( See Comments)    Assessment  Patient is 89 y.o. male admitted for hematemesis. He has a PMHx of parkinson's disease and orthostatic hypotension. Pt was able to follow one step directions in order to participate in bed mobility and grooming. Pts ability to participate in ADLs is limited by his cognition, slow initiation and motor planning. Anticipate that this is his prior level of functioning, but unable to confirm with the patient.     Plan  Recommend Occupational Therapy 1 time per week until therapy goals are met for the following treatments:  Adaptive Equipment, Cognitive Skill Development, Manual Therapy Techniques, Neuro Re-Education / Balance, Self Care/Activities of Daily Living, Therapeutic Activities and Therapeutic Exercises.    DC Equipment Recommendations: Unable to determine at this time  Discharge Recommendations: Other -Pt and family would like him to return to his prior assisted living facility. Unable to determine the level of assistance available at facility; need to confirm with family. Patient requires 24/7 assistance, if facility provides this care anticipate return to prior setting.  Per chart possible d/c to assisted living with hospice.    Subjective  \"Is that water?\"     Objective     10/06/20 1000   Initial Contact Note    Initial Contact Note Order Received and Verified, Occupational Therapy Evaluation in Progress with Full Report to Follow.   Prior Living Situation   Prior Services Other (Comments)  (Pt reports having assistance with all ADLs)   Housing / Facility Assisted Living Residence   Equipment Owned   (W/C at factility)   Lives with - Patient's Self Care Capacity   (Need to confirm level of assistance with ADLs at facility)   Comments Per prior note, Pt's family reported he  " live in local assisted living. Daughter lives locally   Prior Level of ADL Function   Self Feeding Requires Assist   Grooming / Hygiene Requires Assist   Bathing Dependent   Dressing Requires Assist   Toileting Dependent   Comments   (Pt reported assistance with ADLs; need to confirm baseline)   Prior Level of IADL Function   Medication Management Requires Assist   Laundry Dependent   Comments Pt lives at assisted living; unable to determine   Cognition    Speech/ Communication Delayed Responses;Incomprehensible Words  (Quiet speech; some words difficult to understand)   Level of Consciousness Alert   Ability To Follow Commands 1 Step  (More than one step motor planning was difficult)   Safety Awareness Impaired   New Learning Impaired   Attention Impaired   Sequencing Impaired   Initiation Impaired   Comments Pts verbal responses are delayed and very quiet; difficulty following more than 1 step commands; motor planning and initiation was difficult   Active ROM Upper Body   Active ROM Upper Body  Not Tested   Strength Upper Body   Upper Body Strength  Not Tested   Upper Body Muscle Tone   Comments rigidity in extremeties and posterior trunk lean   Neurological Concerns   Sitting Posture During ADL's Posterior Lean;Kyphotic;Posterior Push;Posterior Pelvic Tilt   Rt Upper Extremity Fine Motor Control Impaired   Lt Upper Extremity Fine Motor Control Impaired   Coordination Upper Body   Comments d/t parkinsons slow movements and slight tremor   Balance Assessment   Sitting Balance (Static) Poor +   Sitting Balance (Dynamic) Poor +   Standing Balance (Static) Trace   Standing Balance (Dynamic) Dependent   Weight Shift Sitting Poor   Weight Shift Standing Absent   Comments at EOB; dependent stand   Bed Mobility    Supine to Sit Maximal Assist   Sit to Supine Maximal Assist   Scooting Maximal Assist   Rolling Maximal Assist to Rt.;Moderate Assist to Lt.   ADL Assessment   Grooming Moderate Assist  (mod A with oral care;  min A with washcloth for face)   Toileting Maximal Assist   Functional Mobility   Sit to Stand Total Assist   Bed, Chair, Wheelchair Transfer Maximal Assist   Comments Stood briefly with total assit from OT   Activity Tolerance   Comments easily fatigued   Patient / Family Goals   Patient / Family Goal #1 to go back to assisted living   Short Term Goals   Short Term Goal # 1 Pt will perform oral care with Esteban   Short Term Goal # 2 Pt will eat meal with min A   Education Group   Education Provided Activities of Daily Living;Role of Occupational Therapist   Additional Comments Focused on grooming activities at EOB, bed mobility, and assisted stand to promote movement   Problem List   Problem List Decreased Active Daily Living Skills;Decreased Homemaking Skills;Decreased Functional Mobility;Decreased Activity Tolerance;Safety Awareness Deficits / Cognition;Impaired Posture / Trunk Alignment;Impaired Postural Control / Balance;Impaired Cognitive Function

## 2020-10-07 LAB
ABO GROUP BLD: NORMAL
ANION GAP SERPL CALC-SCNC: 13 MMOL/L (ref 7–16)
BARCODED ABORH UBTYP: 7300
BARCODED PRD CODE UBPRD: NORMAL
BARCODED UNIT NUM UBUNT: NORMAL
BLD GP AB SCN SERPL QL: NORMAL
BUN SERPL-MCNC: 37 MG/DL (ref 8–22)
CALCIUM SERPL-MCNC: 8.5 MG/DL (ref 8.5–10.5)
CHLORIDE SERPL-SCNC: 108 MMOL/L (ref 96–112)
CO2 SERPL-SCNC: 23 MMOL/L (ref 20–33)
COMPONENT R 8504R: NORMAL
CREAT SERPL-MCNC: 1.09 MG/DL (ref 0.5–1.4)
ERYTHROCYTE [DISTWIDTH] IN BLOOD BY AUTOMATED COUNT: 55.1 FL (ref 35.9–50)
GLUCOSE SERPL-MCNC: 86 MG/DL (ref 65–99)
HCT VFR BLD AUTO: 20.8 % (ref 42–52)
HCT VFR BLD AUTO: 26.8 % (ref 42–52)
HGB BLD-MCNC: 6.5 G/DL (ref 14–18)
HGB BLD-MCNC: 8.4 G/DL (ref 14–18)
MCH RBC QN AUTO: 29.5 PG (ref 27–33)
MCHC RBC AUTO-ENTMCNC: 31.3 G/DL (ref 33.7–35.3)
MCV RBC AUTO: 94.5 FL (ref 81.4–97.8)
PLATELET # BLD AUTO: 298 K/UL (ref 164–446)
PMV BLD AUTO: 9.7 FL (ref 9–12.9)
POTASSIUM SERPL-SCNC: 3.1 MMOL/L (ref 3.6–5.5)
PRODUCT TYPE UPROD: NORMAL
RBC # BLD AUTO: 2.2 M/UL (ref 4.7–6.1)
RH BLD: NORMAL
SODIUM SERPL-SCNC: 144 MMOL/L (ref 135–145)
UNIT STATUS USTAT: NORMAL
WBC # BLD AUTO: 17.4 K/UL (ref 4.8–10.8)

## 2020-10-07 PROCEDURE — 86850 RBC ANTIBODY SCREEN: CPT

## 2020-10-07 PROCEDURE — 99232 SBSQ HOSP IP/OBS MODERATE 35: CPT | Performed by: INTERNAL MEDICINE

## 2020-10-07 PROCEDURE — 85027 COMPLETE CBC AUTOMATED: CPT

## 2020-10-07 PROCEDURE — 700111 HCHG RX REV CODE 636 W/ 250 OVERRIDE (IP): Performed by: HOSPITALIST

## 2020-10-07 PROCEDURE — 80048 BASIC METABOLIC PNL TOTAL CA: CPT

## 2020-10-07 PROCEDURE — 30233N1 TRANSFUSION OF NONAUTOLOGOUS RED BLOOD CELLS INTO PERIPHERAL VEIN, PERCUTANEOUS APPROACH: ICD-10-PCS | Performed by: INTERNAL MEDICINE

## 2020-10-07 PROCEDURE — 86923 COMPATIBILITY TEST ELECTRIC: CPT

## 2020-10-07 PROCEDURE — 36415 COLL VENOUS BLD VENIPUNCTURE: CPT

## 2020-10-07 PROCEDURE — P9016 RBC LEUKOCYTES REDUCED: HCPCS

## 2020-10-07 PROCEDURE — 700102 HCHG RX REV CODE 250 W/ 637 OVERRIDE(OP): Performed by: STUDENT IN AN ORGANIZED HEALTH CARE EDUCATION/TRAINING PROGRAM

## 2020-10-07 PROCEDURE — 770006 HCHG ROOM/CARE - MED/SURG/GYN SEMI*

## 2020-10-07 PROCEDURE — 700102 HCHG RX REV CODE 250 W/ 637 OVERRIDE(OP): Performed by: HOSPITALIST

## 2020-10-07 PROCEDURE — 86901 BLOOD TYPING SEROLOGIC RH(D): CPT

## 2020-10-07 PROCEDURE — A9270 NON-COVERED ITEM OR SERVICE: HCPCS | Performed by: HOSPITALIST

## 2020-10-07 PROCEDURE — 700102 HCHG RX REV CODE 250 W/ 637 OVERRIDE(OP): Performed by: INTERNAL MEDICINE

## 2020-10-07 PROCEDURE — 302172 SOFT BED BELT: Performed by: INTERNAL MEDICINE

## 2020-10-07 PROCEDURE — A9270 NON-COVERED ITEM OR SERVICE: HCPCS | Performed by: INTERNAL MEDICINE

## 2020-10-07 PROCEDURE — 700105 HCHG RX REV CODE 258: Performed by: HOSPITALIST

## 2020-10-07 PROCEDURE — 85014 HEMATOCRIT: CPT

## 2020-10-07 PROCEDURE — A9270 NON-COVERED ITEM OR SERVICE: HCPCS | Performed by: STUDENT IN AN ORGANIZED HEALTH CARE EDUCATION/TRAINING PROGRAM

## 2020-10-07 PROCEDURE — 36430 TRANSFUSION BLD/BLD COMPNT: CPT

## 2020-10-07 PROCEDURE — 700105 HCHG RX REV CODE 258

## 2020-10-07 PROCEDURE — 85018 HEMOGLOBIN: CPT

## 2020-10-07 PROCEDURE — 86900 BLOOD TYPING SEROLOGIC ABO: CPT

## 2020-10-07 RX ORDER — POTASSIUM CHLORIDE 20 MEQ/1
40 TABLET, EXTENDED RELEASE ORAL 2 TIMES DAILY
Status: DISCONTINUED | OUTPATIENT
Start: 2020-10-07 | End: 2020-10-08 | Stop reason: HOSPADM

## 2020-10-07 RX ORDER — SODIUM CHLORIDE 9 MG/ML
INJECTION, SOLUTION INTRAVENOUS
Status: COMPLETED
Start: 2020-10-07 | End: 2020-10-07

## 2020-10-07 RX ADMIN — MIDODRINE HYDROCHLORIDE 5 MG: 5 TABLET ORAL at 11:44

## 2020-10-07 RX ADMIN — SODIUM CHLORIDE 3 G: 900 INJECTION INTRAVENOUS at 23:46

## 2020-10-07 RX ADMIN — SODIUM CHLORIDE: 9 INJECTION, SOLUTION INTRAVENOUS at 11:45

## 2020-10-07 RX ADMIN — POTASSIUM CHLORIDE 40 MEQ: 1500 TABLET, EXTENDED RELEASE ORAL at 17:22

## 2020-10-07 RX ADMIN — MIDODRINE HYDROCHLORIDE 5 MG: 5 TABLET ORAL at 17:22

## 2020-10-07 RX ADMIN — POTASSIUM CHLORIDE 40 MEQ: 1500 TABLET, EXTENDED RELEASE ORAL at 07:53

## 2020-10-07 RX ADMIN — MIDODRINE HYDROCHLORIDE 5 MG: 5 TABLET ORAL at 05:25

## 2020-10-07 RX ADMIN — SODIUM CHLORIDE 3 G: 900 INJECTION INTRAVENOUS at 17:26

## 2020-10-07 RX ADMIN — SODIUM CHLORIDE 3 G: 900 INJECTION INTRAVENOUS at 05:15

## 2020-10-07 RX ADMIN — FERROUS SULFATE TAB 325 MG (65 MG ELEMENTAL FE) 325 MG: 325 (65 FE) TAB at 17:22

## 2020-10-07 RX ADMIN — CARBIDOPA AND LEVODOPA 1 TABLET: 25; 100 TABLET ORAL at 21:07

## 2020-10-07 RX ADMIN — FERROUS SULFATE TAB 325 MG (65 MG ELEMENTAL FE) 325 MG: 325 (65 FE) TAB at 07:53

## 2020-10-07 RX ADMIN — CARBIDOPA AND LEVODOPA 1 TABLET: 25; 100 TABLET ORAL at 07:53

## 2020-10-07 RX ADMIN — CARBIDOPA AND LEVODOPA 1 TABLET: 25; 100 TABLET ORAL at 11:44

## 2020-10-07 RX ADMIN — CARBIDOPA AND LEVODOPA 1 TABLET: 25; 100 TABLET ORAL at 17:22

## 2020-10-07 RX ADMIN — FLUDROCORTISONE ACETATE 0.1 MG: 0.1 TABLET ORAL at 05:15

## 2020-10-07 RX ADMIN — OMEPRAZOLE 20 MG: 20 CAPSULE, DELAYED RELEASE ORAL at 05:16

## 2020-10-07 RX ADMIN — OMEPRAZOLE 20 MG: 20 CAPSULE, DELAYED RELEASE ORAL at 17:22

## 2020-10-07 RX ADMIN — SODIUM CHLORIDE 3 G: 900 INJECTION INTRAVENOUS at 11:40

## 2020-10-07 ASSESSMENT — ENCOUNTER SYMPTOMS
HEADACHES: 0
MYALGIAS: 0
DEPRESSION: 0
NAUSEA: 0
VOMITING: 0
HEARTBURN: 0
FEVER: 0
CHILLS: 0
COUGH: 1
PALPITATIONS: 0
BACK PAIN: 0
BRUISES/BLEEDS EASILY: 0
BLURRED VISION: 0
DIZZINESS: 0
HEMOPTYSIS: 0

## 2020-10-07 ASSESSMENT — PAIN DESCRIPTION - PAIN TYPE
TYPE: ACUTE PAIN
TYPE: ACUTE PAIN

## 2020-10-07 NOTE — PROGRESS NOTES
PRBC infusion started at 0626 after 2 RN verification. This RN stayed with patient for 15 minutes after start of transfusion. No significant change in vital signs or suspected reaction.

## 2020-10-07 NOTE — CARE PLAN
Problem: Nutritional:  Goal: Achieve adequate nutritional intake  Description: Patient will consume >50% of meals on diet > clear liquids  Outcome: PROGRESSING SLOWER THAN EXPECTED   See RD note.

## 2020-10-07 NOTE — PROGRESS NOTES
Spoke to MD Dali Winkler regarding patient's hemoglobin of 6.5. MD verbalized that she would put in orders for 1 unit PRBC and type and screen. Informed MD that patient in confused and unable to give consent. Viki RN and Andrew PAYTON spoke to Nabila, the patient's daughter and DPOA, who gave consent for the blood transfusion.

## 2020-10-07 NOTE — DISCHARGE PLANNING
Anticipated Discharge Disposition: Back to [facility] w/ Hospice    Action: LSW received msg from Carmel ([phone]) w/ [facility] stating that family wants to take pt home ASAP on service w/ them.    LSW called and left msg w/ dtr Nabila to verify this for verbal consent to send hospice referral.    Barriers to Discharge: N/A    Plan: F/u w/ pt's family to confirm hospice choice for referral and tx back to [facility].      5190 Update: Nabila called and confirmed consent for [facility], choice form faxed to CARRIE Burdick.    In IDT rounds, it was discussed the pt could tx back to [facility] tomorrow once hospice is arranged.

## 2020-10-07 NOTE — DISCHARGE PLANNING
LSW spoke w/ Carmel ([phone]) from [facility], they have accepted the pt and will be available to receive him tomorrow at his PABLO.  She has discussed w/ family and does not believe there are any DME needs but if anything arises, she can handle it and there would be no delay in dc.    LSW will remain available to set up [facility] transport tomorrow once medical clearance is confirmed.

## 2020-10-07 NOTE — PROGRESS NOTES
[facility] Medicine Daily Progress Note    Date of Service  10/7/2020    Chief Complaint  89 y.o. male admitted 10/2/2020 with GIB anemia      Interval Problem Update  10/6: Patient seen and examined  is resting in bed, looks weak, no fever or chills, no abdominal pain per nurse staff still having small amount of melena, wet cough.   Discussed with his daughter again today regarding pt goals of care per daughter no aggressive measures, pt still probably aspirating, his wbc trending up. Discussed regarding hospice as an option since they do not want to have aggressive measures as pt probably still aspirating.  Daughter wanted to talk with palliative team which was informed.     10/7: No overnight events, pt resting in bed, hemoglobin this AM low at 6.5 received 1 unit of RBC now back to 8.4  Family has decided for hospice and SW arranging for it.    Consultants/Specialty  GI  Palliative team    Code Status  DNAR/DNI    Disposition  Back to facility in 1-2 days.     Review of Systems  Review of Systems   Constitutional: Positive for malaise/fatigue. Negative for chills and fever.   HENT: Negative for congestion.    Eyes: Negative for blurred vision.   Respiratory: Positive for cough. Negative for hemoptysis.    Cardiovascular: Negative for chest pain and palpitations.   Gastrointestinal: Positive for melena. Negative for heartburn, nausea and vomiting.   Genitourinary: Negative for dysuria and urgency.   Musculoskeletal: Negative for back pain and myalgias.   Skin: Negative for rash.   Neurological: Negative for dizziness and headaches.   Endo/Heme/Allergies: Does not bruise/bleed easily.   Psychiatric/Behavioral: Negative for depression.        Physical Exam  Temp:  [36.3 °C (97.4 °F)-37.6 °C (99.7 °F)] 36.7 °C (98 °F)  Pulse:  [] 87  Resp:  [18] 18  BP: (117-131)/(59-72) 128/72  SpO2:  [92 %-97 %] 95 %    Physical Exam  Vitals signs and nursing note reviewed.   Constitutional:       Appearance: He is cachectic.    HENT:      Head: Normocephalic.      Nose: Nose normal.      Mouth/Throat:      Mouth: Mucous membranes are moist.      Pharynx: Oropharynx is clear.   Eyes:      General: No scleral icterus.     Pupils: Pupils are equal, round, and reactive to light.   Neck:      Musculoskeletal: Normal range of motion and neck supple.   Cardiovascular:      Rate and Rhythm: Normal rate and regular rhythm.      Pulses: Normal pulses.      Heart sounds: Normal heart sounds.   Pulmonary:      Effort: Pulmonary effort is normal.      Breath sounds: Rales present. No wheezing.   Abdominal:      General: Bowel sounds are normal. There is no distension.      Palpations: Abdomen is soft.      Tenderness: There is no abdominal tenderness.   Musculoskeletal: Normal range of motion.   Skin:     General: Skin is warm.      Capillary Refill: Capillary refill takes less than 2 seconds.   Neurological:      General: No focal deficit present.      Mental Status: He is alert.      Cranial Nerves: No cranial nerve deficit.   Psychiatric:         Mood and Affect: Mood normal.         Fluids    Intake/Output Summary (Last 24 hours) at 10/7/2020 1230  Last data filed at 10/7/2020 0959  Gross per 24 hour   Intake 2583.75 ml   Output --   Net 2583.75 ml       Laboratory  Recent Labs     10/05/20  0111  10/06/20  0807 10/07/20  0038 10/07/20  1039   WBC 19.1*  --  21.2* 17.4*  --    RBC 2.55*  --  2.43* 2.20*  --    HEMOGLOBIN 7.4*   < > 7.0* 6.5* 8.4*   HEMATOCRIT 24.5*   < > 23.1* 20.8* 26.8*   MCV 96.1  --  95.1 94.5  --    MCH 29.0  --  28.8 29.5  --    MCHC 30.2*  --  30.3* 31.3*  --    RDW 55.9*  --  55.8* 55.1*  --    PLATELETCT 266  --  303 298  --    MPV 10.0  --  10.1 9.7  --     < > = values in this interval not displayed.     Recent Labs     10/05/20  0835 10/06/20  0807 10/07/20  0038   SODIUM 143 143 144   POTASSIUM 3.6 3.7 3.1*   CHLORIDE 109 108 108   CO2 22 22 23   GLUCOSE 166* 100* 86   BUN 53* 41* 37*   CREATININE 1.04 0.96 1.09    CALCIUM 8.3* 8.5 8.5                   Imaging  DX-CHEST-PORTABLE (1 VIEW)   Final Result         Decreased right basilar opacities, likely resolving atelectasis.      DX-CHEST-PORTABLE (1 VIEW)   Final Result      1.  Mild right basilar opacity which could represent atelectasis and/or pneumonitis.   2.  Cardiomegaly.   3.  No evidence of pneumomediastinum.           Assessment/Plan  Aspiration pneumonia (HCC)  Assessment & Plan  Wbc is trending up, procacitonin elevated.  Continue unasyn o2 requirement improved  Discussed with daughter regarding goals of care and hospice since they do not want aggressive measures.   Daughter would like to speak with palliative team again regarding it  Palliative team informed.    Family has decided for hospice, Sw working to arrange for hospice      Hematemesis- (present on admission)  Assessment & Plan  GI consulted, plan was for egd today but patient and patient's daughter changed their mind and would like to continue conservative management.   Hb 7.6 today, no sing of bleeding, will continue monitoring hb.        Anemia- (present on admission)  Assessment & Plan  Hemoglobin 6.5 this AM, received 1 unit now back to 8.4  Monitor and transfuse if below 7       Orthostatic hypotension- (present on admission)  Assessment & Plan  C/w home Fludrocortisone and midodrine    Parkinsonian features- (present on admission)  Assessment & Plan  C/w home PO medications       VTE prophylaxis: scd's.    Condition guarded.

## 2020-10-07 NOTE — DISCHARGE PLANNING
Received Choice form at 0090  Agency/Facility Name: [facility]  Referral sent per Choice form @ 7062

## 2020-10-07 NOTE — CARE PLAN
Problem: Communication  Goal: The ability to communicate needs accurately and effectively will improve  Outcome: PROGRESSING AS EXPECTED  Note: Plan of Care discussed, all questions answered. Pt effectively communicates needs to staff.       Problem: Infection  Goal: Will remain free from infection  Outcome: PROGRESSING SLOWER THAN EXPECTED  Note: Q6 IV ABX administered per order.

## 2020-10-07 NOTE — DIETARY
Nutrition Services: Brief Update  Pt noted to be refusing all recent meals and supplements.     Per MD notes family has decided to transition pt to hospice care. MD reports TF is not within pt's plan of care at this time.     Per MSW note pt has been accepted by [facility] and will DC tomorrow.     Nutrition interventions have been exhausted at this time.    RD will follow up if pt still admitted on Friday and re-evaluate as needed at that time.

## 2020-10-07 NOTE — CARE PLAN
Problem: Skin Integrity  Goal: Risk for impaired skin integrity will decrease  Outcome: PROGRESSING AS EXPECTED   Waffle cushion in place. Mepilex in place to bilateral heels. Sacral mepilex contraindicated due to incontinence. Barrier paste applied. Pillows in use for support and repositioning every two hours.    Problem: Pain Management  Goal: Pain level will decrease to patient's comfort goal  Outcome: PROGRESSING AS EXPECTED   Patient reports adequate pain control.

## 2020-10-07 NOTE — PROGRESS NOTES
Spoke to MD Dali Winkler. Verified that that this RN could input orders for previously discussed 1 unit PRBCs, COD and consent for blood transfusion.

## 2020-10-08 VITALS
SYSTOLIC BLOOD PRESSURE: 134 MMHG | TEMPERATURE: 97.7 F | BODY MASS INDEX: 15.75 KG/M2 | RESPIRATION RATE: 14 BRPM | HEIGHT: 70 IN | DIASTOLIC BLOOD PRESSURE: 87 MMHG | OXYGEN SATURATION: 90 % | WEIGHT: 110.01 LBS | HEART RATE: 91 BPM

## 2020-10-08 LAB
ANION GAP SERPL CALC-SCNC: 9 MMOL/L (ref 7–16)
BUN SERPL-MCNC: 28 MG/DL (ref 8–22)
CALCIUM SERPL-MCNC: 8.3 MG/DL (ref 8.5–10.5)
CHLORIDE SERPL-SCNC: 108 MMOL/L (ref 96–112)
CO2 SERPL-SCNC: 25 MMOL/L (ref 20–33)
CREAT SERPL-MCNC: 1.05 MG/DL (ref 0.5–1.4)
ERYTHROCYTE [DISTWIDTH] IN BLOOD BY AUTOMATED COUNT: 57 FL (ref 35.9–50)
GLUCOSE SERPL-MCNC: 95 MG/DL (ref 65–99)
HCT VFR BLD AUTO: 24.7 % (ref 42–52)
HGB BLD-MCNC: 8.1 G/DL (ref 14–18)
MCH RBC QN AUTO: 29 PG (ref 27–33)
MCHC RBC AUTO-ENTMCNC: 32.8 G/DL (ref 33.7–35.3)
MCV RBC AUTO: 88.5 FL (ref 81.4–97.8)
PLATELET # BLD AUTO: 314 K/UL (ref 164–446)
PMV BLD AUTO: 9.5 FL (ref 9–12.9)
POTASSIUM SERPL-SCNC: 3.9 MMOL/L (ref 3.6–5.5)
RBC # BLD AUTO: 2.79 M/UL (ref 4.7–6.1)
SODIUM SERPL-SCNC: 142 MMOL/L (ref 135–145)
WBC # BLD AUTO: 13 K/UL (ref 4.8–10.8)

## 2020-10-08 PROCEDURE — 700102 HCHG RX REV CODE 250 W/ 637 OVERRIDE(OP): Performed by: STUDENT IN AN ORGANIZED HEALTH CARE EDUCATION/TRAINING PROGRAM

## 2020-10-08 PROCEDURE — 85027 COMPLETE CBC AUTOMATED: CPT

## 2020-10-08 PROCEDURE — 700105 HCHG RX REV CODE 258: Performed by: HOSPITALIST

## 2020-10-08 PROCEDURE — 99239 HOSP IP/OBS DSCHRG MGMT >30: CPT | Performed by: INTERNAL MEDICINE

## 2020-10-08 PROCEDURE — 36415 COLL VENOUS BLD VENIPUNCTURE: CPT

## 2020-10-08 PROCEDURE — A9270 NON-COVERED ITEM OR SERVICE: HCPCS | Performed by: INTERNAL MEDICINE

## 2020-10-08 PROCEDURE — A9270 NON-COVERED ITEM OR SERVICE: HCPCS | Performed by: HOSPITALIST

## 2020-10-08 PROCEDURE — 700102 HCHG RX REV CODE 250 W/ 637 OVERRIDE(OP): Performed by: INTERNAL MEDICINE

## 2020-10-08 PROCEDURE — 80048 BASIC METABOLIC PNL TOTAL CA: CPT

## 2020-10-08 PROCEDURE — A9270 NON-COVERED ITEM OR SERVICE: HCPCS | Performed by: STUDENT IN AN ORGANIZED HEALTH CARE EDUCATION/TRAINING PROGRAM

## 2020-10-08 PROCEDURE — 700111 HCHG RX REV CODE 636 W/ 250 OVERRIDE (IP): Performed by: HOSPITALIST

## 2020-10-08 PROCEDURE — 700102 HCHG RX REV CODE 250 W/ 637 OVERRIDE(OP): Performed by: HOSPITALIST

## 2020-10-08 RX ORDER — OMEPRAZOLE 20 MG/1
20 CAPSULE, DELAYED RELEASE ORAL 2 TIMES DAILY
Qty: 30 CAP | Refills: 0 | Status: SHIPPED | OUTPATIENT
Start: 2020-10-08

## 2020-10-08 RX ORDER — FERROUS SULFATE 325(65) MG
325 TABLET ORAL 2 TIMES DAILY WITH MEALS
Qty: 30 TAB | Refills: 0 | Status: SHIPPED | OUTPATIENT
Start: 2020-10-08 | End: 2020-10-12 | Stop reason: SDUPTHER

## 2020-10-08 RX ORDER — AMOXICILLIN AND CLAVULANATE POTASSIUM 875; 125 MG/1; MG/1
1 TABLET, FILM COATED ORAL 2 TIMES DAILY
Qty: 10 TAB | Refills: 0 | Status: SHIPPED | OUTPATIENT
Start: 2020-10-08 | End: 2020-10-13

## 2020-10-08 RX ADMIN — CARBIDOPA AND LEVODOPA 1 TABLET: 25; 100 TABLET ORAL at 07:57

## 2020-10-08 RX ADMIN — MIDODRINE HYDROCHLORIDE 5 MG: 5 TABLET ORAL at 07:57

## 2020-10-08 RX ADMIN — FERROUS SULFATE TAB 325 MG (65 MG ELEMENTAL FE) 325 MG: 325 (65 FE) TAB at 07:57

## 2020-10-08 RX ADMIN — POTASSIUM CHLORIDE 40 MEQ: 1500 TABLET, EXTENDED RELEASE ORAL at 05:32

## 2020-10-08 RX ADMIN — OMEPRAZOLE 20 MG: 20 CAPSULE, DELAYED RELEASE ORAL at 05:32

## 2020-10-08 RX ADMIN — FLUDROCORTISONE ACETATE 0.1 MG: 0.1 TABLET ORAL at 05:31

## 2020-10-08 RX ADMIN — SODIUM CHLORIDE 3 G: 900 INJECTION INTRAVENOUS at 05:32

## 2020-10-08 RX ADMIN — DOCUSATE SODIUM 50 MG AND SENNOSIDES 8.6 MG 2 TABLET: 8.6; 5 TABLET, FILM COATED ORAL at 05:32

## 2020-10-08 ASSESSMENT — PAIN DESCRIPTION - PAIN TYPE: TYPE: ACUTE PAIN

## 2020-10-08 NOTE — THERAPY
Contact Note    Patient Name: Miguel Ángel Page  Age:  89 y.o., Sex:  male  Medical Record #: [id number]  Today's Date: 10/7/2020    Consult received; note per OT note pt essentially needs 24/7 assist; appears to have at his PABLO per chart and will be dc'ing to home with hospice tomorrow; if POC or dc plan changes, please reconsult for dc planning/placement needs; will not actively follow unless re-ordered/POC changes

## 2020-10-08 NOTE — CARE PLAN
Problem: Infection  Goal: Will remain free from infection  Outcome: PROGRESSING AS EXPECTED  Intervention: Assess signs and symptoms of infection  Note: No s/s of infection noted.     Problem: Venous Thromboembolism (VTW)/Deep Vein Thrombosis (DVT) Prevention:  Goal: Patient will participate in Venous Thrombosis (VTE)/Deep Vein Thrombosis (DVT)Prevention Measures  Outcome: PROGRESSING AS EXPECTED  Intervention: Ensure patient wears graduated elastic stockings (MAGNUS hose) and/or SCDs, if ordered, when in bed or chair (Remove at least once per shift for skin check)  Note: SCDs in place

## 2020-10-08 NOTE — DISCHARGE INSTRUCTIONS
Discharge Instructions    Discharged to home by ambulance with escort. Discharged via ambulance, hospital escort: Yes.  Special equipment needed: Not Applicable    Be sure to schedule a follow-up appointment with your primary care doctor or any specialists as instructed.     Discharge Plan:   Diet Plan: Discussed  Activity Level: Discussed  Confirmed Follow up Appointment: Patient to Call and Schedule Appointment  Confirmed Symptoms Management: Discussed  Medication Reconciliation Updated: Yes  Influenza Vaccine Indication: Patient Refuses    I understand that a diet low in cholesterol, fat, and sodium is recommended for good health. Unless I have been given specific instructions below for another diet, I accept this instruction as my diet prescription.   Other diet:     Special Instructions: None    · Is patient discharged on Warfarin / Coumadin?   No         Hospice  Hospice is a service that is designed to provide people who are terminally ill and their families with medical, spiritual, and psychological support. Its aim is to improve your quality of life by keeping you as comfortable as possible in the final stages of life.  Who will be my providers when I begin hospice care?  Hospice teams often include:  · A nurse.  · A doctor. The hospice doctor will be available for your care, but you can include your regular doctor or nurse practitioner.  · A .  · A counselor.  · A  (such as a ).  · A dietitian.  · Therapists.  · Trained volunteers who can help with care.  What services does hospice provide?  Hospice services can vary depending on the center or organization. Generally, they include:  · Ways to keep you comfortable, such as:  ? Providing care in your home or in a home-like setting.  ? Working with your family and friends to help meet your needs.  ? Allowing you to enjoy the support of loved ones by receiving much of your basic care from family and friends.  · Pain relief  and symptom management. The staff will supply all necessary medicines and equipment so that you can stay comfortable and alert enough to enjoy the company of your friends and family.  · Visits or care from a nurse and doctor. This may include 24-hour on-call services.  · Companionship when you are alone.  · Allowing you and your family to rest. Hospice staff may do light housekeeping, prepare meals, and run errands.  · Counseling. They will make sure your emotional, spiritual, and social needs are being met, as well as those needs of your family members.  · Spiritual care. This will be individualized to meet your needs and your family's needs. It may involve:  ? Helping you and your family understand the dying process.  ? Helping you say goodbye to your family and friends.  ? Performing a specific [employer] ceremony or ritual.  · Massage.  · Nutrition therapy.  · Physical and occupational therapy.  · Short-term inpatient care, if something cannot be managed in the home.  · Art or music therapy.  · Bereavement support for grieving family members.  When should hospice care begin?  Most people who use hospice are believed to have less than 6 months to live.  · Your family and health care providers can help you decide when hospice services should begin.  · If you live longer than 6 months but your condition does not improve, your doctor may be able to approve you for continued hospice care.  · If your condition improves, you may discontinue the program.  What should I consider before selecting a program?  Most hospice programs are run by nonprofit, independent organizations. Some are affiliated with hospitals, nursing homes, or home health care agencies. Hospice programs can take place in your home or at a hospice center, hospital, or skilled nursing facility. When choosing a hospice program, ask the following questions:  · What services are available to me?  · What services will be offered to my loved ones?  · How  involved will my loved ones be?  · How involved will my health care provider be?  · Who makes up the hospice care team? How are they trained or screened?  · How will my pain and symptoms be managed?  · If my circumstances change, can the services be provided in a different setting, such as my home or in the hospital?  · Is the program reviewed and licensed by the state or certified in some other way?  · What does it cost? Is it covered by insurance?  · If I choose a hospice center or nursing home, where is the hospice center located? Is it convenient for family and friends?  · If I choose a hospice center or nursing home, can my family and friends visit any time?  · Will you provide emotional and spiritual support?  · Who can my family call with questions?  Where can I learn more about hospice?  You can learn about existing hospice programs in your area from your health care providers. You can also read more about hospice online. The websites of the following organizations have helpful information:  · National Hospice and Palliative Care Organization (NHPCO): www.nhpco.org  · National Association for Home Care & Hospice (NAHC): www.nahc.org  · Hospice Foundation of [place] (HFA): www.hospicefoundation.org  · American Cancer Society (ACS): www.cancer.org  · Hospice Net: www.hospicenet.org  · Visiting Nurse Associations of [place] (VNAA): www.vnaa.org  You may also find more information by contacting the following agencies:  · A local agency on aging.  · Your local [facility] chapter.  · Your state's department of health or .  Summary  · Hospice is a service that is designed to provide people who are terminally ill and their families with medical, spiritual, and psychological support.  · Hospice aims to improve your quality of life by keeping you as comfortable as possible in the final stages of life.  · Hospice teams often include a doctor, nurse, , counselor, ,dietitian,  therapists, and volunteers.  · Hospice care generally includes medicine for symptom management, visits from doctors and nurses, physical and occupational therapy, nutrition counseling, spiritual and emotional counseling, caregiver support, and bereavement support for grieving family members.  · Hospice programs can take place in your home or at a hospice center, hospital, or skilled nursing facility.  This information is not intended to replace advice given to you by your health care provider. Make sure you discuss any questions you have with your health care provider.  Document Released: 04/05/2005 Document Revised: 11/30/2018 Document Reviewed: 01/09/2018  [employer] Patient Education © 2020 [employer]    Depression / Suicide Risk    As you are discharged from this [facility] facility, it is important to learn how to keep safe from harming yourself.    Recognize the warning signs:  · Abrupt changes in personality, positive or negative- including increase in energy   · Giving away possessions  · Change in eating patterns- significant weight changes-  positive or negative  · Change in sleeping patterns- unable to sleep or sleeping all the time   · Unwillingness or inability to communicate  · Depression  · Unusual sadness, discouragement and loneliness  · Talk of wanting to die  · Neglect of personal appearance   · Rebelliousness- reckless behavior  · Withdrawal from people/activities they love  · Confusion- inability to concentrate     If you or a loved one observes any of these behaviors or has concerns about self-harm, here's what you can do:  · Talk about it- your feelings and reasons for harming yourself  · Remove any means that you might use to hurt yourself (examples: pills, rope, extension cords, firearm)  · Get professional help from the community (Mental Health, Substance Abuse, psychological counseling)  · Do not be alone:Call your Safe Contact- someone whom you trust who will be there for you.  · Call  your local CRISIS HOTLINE 648-7059 or 063-357-1280  · Call your local Children's Mobile Crisis Response Team [place] (790) 539-2701 or www.Focus IP  · Call the toll free National Suicide Prevention Hotlines   · National Suicide Prevention Lifeline 092-817-MVGQ (8120)  · National Hope Line Network 800-SUICIDE (175-2721)

## 2020-10-08 NOTE — DISCHARGE SUMMARY
Discharge Summary    CHIEF COMPLAINT ON ADMISSION  Chief Complaint   Patient presents with   • Upper GI Bleed     BIB EMS FROM [facility] AFTER VOMITTING BLOOD X1       Reason for Admission  EMS     CODE STATUS  DNAR/DNI    HPI & HOSPITAL COURSE  This is a 89 y.o. male who presented 10/2/2020 [facility]. Per pt, he was sitting eating dinner and had some chocolate cake when he started to feel sick and vomited.vomiting. In the ED, pt noted to have mild decrease in Hb from baseline. He is additionally with tachycardia, and stool guaic performed by ED attending was positive. GI was consulted and discussed with family about possible EGD, but family did not want aggressive measures and EGD was cancelled. ALso speech saw pt and there is high risk of aspiration, and recommended for FEES but family again refused, they want for pt just to eat what he can and possible go back to [facility]. Palliative team was consulted and meet with daughter and discussed with family risk of aspiration, and also tube feeds and family doesn't want any tube feeding and hospice was discussed and family has decided for hospice, so pt will be discharged to [facility] with hospice     The patient met 2-midnight criteria for an inpatient stay at the time of discharge.      DISCHARGE DIAGNOSES  Active Problems:    Parkinsonian features POA: Yes      Overview: 5/15/17 dr.basa lopez medical note unsteadiness on feet, refer to       neurology      3/29/18 start sinemet half a tablet 3 times a day      4/6/18 stopped sinemet due to dizziness, recommend he try 1/2 qday       5/29/18 [facility] PT note       7/5/18 on sinemet 1/2 bid increase to 1/2 tid      10/4/18 increase sinemet to 1 bid (difficulty remembering to take       medication)      2/5/19 [facility] note      3/12/19 increase to sinemet 1 tid       4/29/19 [facility] note at [facility]      6/10/19 hospital admission,  6/20/19 hospital discharge, admitted for fall,       worked with physical therapy, occupational therapy, requires 24-hour       assistance, DNR, hospitalist discussed with patient, declined comfort care       status for now, physical therapy recommend discharge to skilled nursing       facility but patient refused, discharge instead to [facility]      6/20/19 discharged on midodrine 5 mg tid and fludrocortisone 0.1 mg qam      10/12/19 increase sinemet 25/100 mg from tid to qid for bradykinesia      5/30/20 x-ray pelvis acute angulated fracture left femoral neck      5/30/20  orthopedic operative note left hip hemiarthroplasty      7/7/20 on sinemet 25/100 qid                Orthostatic hypotension POA: Yes      Overview: 5/15/17 dr.basa lopez medical note unsteadiness on feet, refer to       neurology      3/29/18 start sinemet half a tablet 3 times a day      4/6/18 stopped sinemet due to dizziness, recommend he try 1/2 qday       7/5/18 on sinemet 1/2 bid increase to 1/2 tid      10/4/18 increase sinemet to 1 bid (difficulty remembering to take       medication)      3/12/19 increase to sinemet 1 tid       6/10/19 hospital admission, 6/20/19 hospital discharge, admitted for fall,       worked with physical therapy, occupational therapy, requires 24-hour       assistance, DNR, hospitalist discussed with patient, declined comfort care       status for now, physical therapy recommend discharge to skilled nursing       facility but patient refused, discharge instead to [facility]      6/20/19 discharged on midodrine 5 mg tid and fludrocortisone 0.1 mg qam                Anemia POA: Yes      Overview: 6/10/19 hgb 13,hct 41,mcv 101b12 261      6/22/19 hgb 10.5,hct 31.5, iron 24,%sat 11      7/2/19 hgb 11.4,hct 35,mcv 102.9      12/19/19 hgb 13,hct 39,mcv 100,b12 861    Hematemesis POA: Yes    Aspiration pneumonia (HCC) POA: Unknown  Resolved Problems:    * No resolved hospital problems.  *      FOLLOW UP  No future appointments.  [facility]  [address]  [place] [address]  [phone]          MEDICATIONS ON DISCHARGE     Medication List      START taking these medications      Instructions   amoxicillin-clavulanate 875-125 MG Tabs  Commonly known as: AUGMENTIN   Take 1 Tab by mouth 2 times a day for 5 days.  Dose: 1 Tab     ferrous sulfate 325 (65 Fe) MG tablet   Take 1 Tab by mouth 2 times a day, with meals.  Dose: 325 mg     omeprazole 20 MG delayed-release capsule  Commonly known as: PRILOSEC   Take 1 Cap by mouth 2 Times a Day.  Dose: 20 mg        CONTINUE taking these medications      Instructions   acetaminophen 325 MG Tabs  Commonly known as: TYLENOL   Take 2 Tabs by mouth every 6 hours as needed for Moderate Pain or Fever (Mild Pain; (Pain scale 1-3); Temp greater than 100.5 F).  Dose: 650 mg     carbidopa-levodopa  MG Tabs  Commonly known as: SINEMET   Take 1 Tab by mouth 4 times a day.  Dose: 1 Tab     * cyanocobalamin 1000 MCG Tabs  Commonly known as: VITAMIN B12   Take 1 Tab by mouth every day.  Dose: 1,000 mcg     * vitamin B-12 1000 MCG Tabs   Take 1 Tab by mouth every day.  Dose: 1,000 mcg     docusate sodium 100 MG Caps  Commonly known as: COLACE   Take 1 Cap by mouth 1 time daily as needed for Constipation.  Dose: 100 mg     fludrocortisone 0.1 MG Tabs  Commonly known as: FLORINEF   Take 1 Tab by mouth every day.  Dose: 0.1 mg     midodrine 5 MG Tabs  Commonly known as: PROAMATINE   Take 1 Tab by mouth 3 times a day before meals.  Dose: 5 mg     PreserVision AREDS 2 Chew   Take 2 Tabs by mouth 2 Times a Day.  Dose: 2 Tab     tamsulosin 0.4 MG capsule  Commonly known as: Flomax   Take 1 Cap by mouth ONE-HALF HOUR AFTER BREAKFAST.  Dose: 0.4 mg     triamcinolone 55 MCG/ACT nasal inhaler  Commonly known as: NASACORT   Spray 1 Spray in nose every day.  Dose: 1 Spray         * This list has 2 medication(s) that are the same as other medications  prescribed for you. Read the directions carefully, and ask your doctor or other care provider to review them with you.                Allergies  Allergies   Allergen Reactions   • Tramadol    • Ultram [Tramadol Hcl]      Patient states no allergy - not sure of reaction.       DIET  Orders Placed This Encounter   Procedures   • Diet Order Regular (MILDLY THICK LIQUIDS)     Standing Status:   Standing     Number of Occurrences:   1     Order Specific Question:   Diet:     Answer:   Regular [1]     Comments:   MILDLY THICK LIQUIDS     Order Specific Question:   Texture Modifier     Answer:   Level 6 - Soft & Bite Sized (Dysphagia 3)     Order Specific Question:   Liquid level     Answer:   Level 2 - Mildly Thick       ACTIVITY  As tolerated.  Weight bearing as tolerated    LINES, DRAINS, AND WOUNDS  This is an automated list. Peripheral IVs will be removed prior to discharge.  Peripheral IV 10/07/20 22 G Right Forearm (Active)   Site Assessment Clean;Dry;Intact 10/07/20 2202   Dressing Type Occlusive;Transparent 10/07/20 2202   Line Status Infusing 10/07/20 2202   Dressing Status Clean;Dry;Intact 10/07/20 2202   Dressing Intervention N/A 10/07/20 2202   Infiltration Grading ([facility] [place] [place]) 0 10/07/20 2202   Phlebitis Scale ([facility] Only) 0 10/07/20 2202     Urethral Catheter 16 Fr. (Active)      Wound 05/30/20 Incision Hip Left Prevena (Active)       Peripheral IV 10/07/20 22 G Right Forearm (Active)   Site Assessment Clean;Dry;Intact 10/07/20 2202   Dressing Type Occlusive;Transparent 10/07/20 2202   Line Status Infusing 10/07/20 2202   Dressing Status Clean;Dry;Intact 10/07/20 2202   Dressing Intervention N/A 10/07/20 2202   Infiltration Grading ([facility] [place] [place]) 0 10/07/20 2202   Phlebitis Scale ([facility] Only) 0 10/07/20 2202           Urethral Catheter 16 Fr. (Active)        MENTAL STATUS ON TRANSFER  Level of Consciousness: Alert  Orientation : Oriented x 4  Speech: Speech Clear    CONSULTATIONS  GI  Palliative team      PROCEDURES  None     LABORATORY  Lab Results   Component Value Date    SODIUM 142 10/08/2020    POTASSIUM 3.9 10/08/2020    CHLORIDE 108 10/08/2020    CO2 25 10/08/2020    GLUCOSE 95 10/08/2020    BUN 28 (H) 10/08/2020    CREATININE 1.05 10/08/2020        Lab Results   Component Value Date    WBC 13.0 (H) 10/08/2020    HEMOGLOBIN 8.1 (L) 10/08/2020    HEMATOCRIT 24.7 (L) 10/08/2020    PLATELETCT 314 10/08/2020        Total time of the discharge process exceeds 42 minutes.

## 2020-10-08 NOTE — PROGRESS NOTES
Report received from Day Shift RN at 1915. Assumed care of patient. Plan of care discussed, questions answered. Assessment completed. VSS, A&O x4, denies pain. PRN med given. Call light in reach. Patient educated on use of call light for assistance.

## 2020-10-08 NOTE — PROGRESS NOTES
AVS reviewed with patient. Follow up appointments discussed.No equipment needed. IV d/c'd. Prescriptions sent to pharmacy. No other questions or concerns expressed. Pt d/c back to facility with family support.

## 2020-10-08 NOTE — DISCHARGE PLANNING
Anticipated Discharge Disposition: home with hospice    Action: Pt has been cleared for discharge to home with hospice. Spoke to Carmel from [facility], requesting transport for 1100 today. Carmel states [facility] and family aware of plan and agree. [facility] transport arranged for 1100 today.    Barriers to Discharge:none    Plan: Home with hospice.

## 2020-10-11 LAB
BACTERIA BLD CULT: NORMAL
BACTERIA BLD CULT: NORMAL
SIGNIFICANT IND 70042: NORMAL
SIGNIFICANT IND 70042: NORMAL
SITE SITE: NORMAL
SITE SITE: NORMAL
SOURCE SOURCE: NORMAL
SOURCE SOURCE: NORMAL

## 2020-10-12 RX ORDER — FERROUS SULFATE 325(65) MG
325 TABLET ORAL 2 TIMES DAILY WITH MEALS
Qty: 30 TAB | Refills: 5 | Status: SHIPPED | OUTPATIENT
Start: 2020-10-12

## 2020-10-12 NOTE — TELEPHONE ENCOUNTER
Ferrous Sulfate  [facility]  Received request via: Patient Rep at [facility]    Was the patient seen in the last year in this department? Yes    Does the patient have an active prescription (recently filled or refills available) for medication(s) requested? YES

## 2020-10-14 NOTE — DOCUMENTATION QUERY
"                                                                         [facility]                                                                       Query Response Note      PATIENT:               HELADIO PINEDA  ACCT #:                  [id number]  MRN:                     [id number]  :                      [id number]  ADMIT DATE:       10/2/2020 12:27 AM  DISCH DATE:        10/8/2020 11:14 AM  RESPONDING  PROVIDER #:        [id number]           QUERY TEXT:    It is unclear whether Aspiration PNA was present on admission.  Please clarify the POA status.    The patient's Clinical Indicators include:  CXR on admission noted \"Mild right basilar opacity which could represent atelectasis and/or pneumonitis\". WBC: 8.5 on admission with an increase to 16.2 on 10/4/20. Procalcitonin: 0.14 on admission with an increase to 0.31 on 10/4/20. Initial documentation of aspiration PNA was on 10/5/20.    Treatments include: Unasyn (first dose given 10/4/20) and RT/O2 per Protocol.    Risk factors include: dx Hematemesis and Advanced Age.  Options provided:   -- Aspiration PNA was present on admission   -- Aspiration PNA was not present on admission   -- Unable to determine      Query created by: Mateus Alfonso on 10/8/2020 7:31 AM    RESPONSE TEXT:    Aspiration PNA was present on admission          Electronically signed by:  JESUS CORRIGAN MD 10/14/2020 1:39 AM              "

## 2020-10-16 PROBLEM — J69.0 ASPIRATION PNEUMONIA (HCC): Status: RESOLVED | Noted: 2020-10-05 | Resolved: 2020-10-16

## 2020-10-20 ENCOUNTER — TELEPHONE (OUTPATIENT)
Dept: MEDICAL GROUP | Facility: MEDICAL CENTER | Age: 85
End: 2020-10-20

## 2020-10-20 RX ORDER — FLUDROCORTISONE ACETATE 0.1 MG/1
0.1 TABLET ORAL DAILY
Qty: 90 TAB | Refills: 1 | Status: SHIPPED | OUTPATIENT
Start: 2020-10-20 | End: 2020-10-22 | Stop reason: SDUPTHER

## 2020-10-21 NOTE — TELEPHONE ENCOUNTER
Please notify the patient she is due for a cholesterol test, I will place the fasting blood test orders in the computer system for her.   
Pt informed  
95

## 2020-10-22 RX ORDER — LANOLIN ALCOHOL/MO/W.PET/CERES
1000 CREAM (GRAM) TOPICAL DAILY
Qty: 90 TAB | Refills: 3 | Status: SHIPPED | OUTPATIENT
Start: 2020-10-22

## 2020-10-22 RX ORDER — FLUDROCORTISONE ACETATE 0.1 MG/1
0.1 TABLET ORAL DAILY
Qty: 90 TAB | Refills: 1 | Status: SHIPPED
Start: 2020-10-22

## 2021-01-11 DIAGNOSIS — Z23 NEED FOR VACCINATION: ICD-10-CM

## 2023-12-15 NOTE — THERAPY
Physical Therapy   Daily Treatment     Patient Name: Miguel Ángel Page  Age:  89 y.o., Sex:  male  Medical Record #: [id number]  Today's Date: 6/3/2020     Precautions: Fall Risk, Weight Bearing As Tolerated Left Lower Extremity      Assessment    Pt was pleasantly confused but agreeable to therapy session. He required frequent redirection as he was perseverating on calling his daughter and when he was going to leave. He required maxA and step by step instructions for all bed mobility. He presented with very rigid and tense posture. At first gripping therapist very tightly and strong posterior lean. He was able to hold self up for short period of times with BUE support. Performed seated exercises with max vc at EOB. He was able to complete STS with maxAx2 but unable to reach full upright position. He will benefit from post acute placement at [facility]. Will continue to follow while in house.     Plan    Continue current treatment plan.    Discharge recommendations:  Recommend post-acute placement for continued physical therapy services prior to discharge home.            06/03/20 1140   Balance   Sitting Balance (Static) Poor   Sitting Balance (Dynamic) Poor -   Standing Balance (Static) Trace +   Standing Balance (Dynamic) Trace   Weight Shift Sitting Poor   Weight Shift Standing Absent   Skilled Intervention Verbal Cuing;Sequencing;Compensatory Strategies   Comments Pt strong lean posteriorly at first with EOB. Using BUE support was able to hold self for short period of time. Pt with tense and rigid posture vc for relaxation and improved overall posture.    Gait Analysis   Gait Level Of Assist Unable to Participate   Bed Mobility    Supine to Sit Maximal Assist   Sit to Supine Maximal Assist   Scooting Maximal Assist   Rolling Maximal Assist to Rt.   Skilled Intervention Verbal Cuing   Comments max A for all mobility provided step by step instructions.    Functional Mobility   Sit to Stand Maximal Assist  (x2)   Skilled  Intervention Verbal Cuing;Tactile Cuing   Short Term Goals    Short Term Goal # 1 pt will perform supine <> sit without bed features with SPV in 6 visits for return to PLOF   Goal Outcome # 1 goal not met   Short Term Goal # 2 pt will perform all functional xfrs with SPV in 6 visits for return to PLOF   Goal Outcome # 2 Goal not met   Short Term Goal # 3 pt will ambulate 150ft with FWW and SPV in 6 visits to access home environment   Goal Outcome # 3 Goal not met      61

## 2024-03-18 NOTE — TELEPHONE ENCOUNTER
----- Message from Gwen Bowman, Med Ass't sent at 6/30/2020  1:22 PM PDT -----  Regarding: Non-Urgent Medical Question  Contact: [phone]      ----- Message -----  From: Miguel Ángel Page  Sent: 6/30/2020  11:37 AM PDT  To: Angelica Morfin [facility]  Subject: Non-Urgent Medical Question                      I need to schedule an appointment to see you. I fell and broke my left hip on 5/30/20 and had  partial hip replacement surgery. I was told to follow up with my primary care physician.  I'm wondering if we can set-up a video chat because if I leave my assisted living residential home, I'll be required to quarantine in my room for 14 days.  If you could set this up with my daughter Nabila [phone], I would appreciate it.  Thank you,  Miguel Ángel Page   [employer] Monitoring Guidelines: There is no laboratory monitoring requirement with [employer].

## (undated) DEVICE — STAPLER SKIN DISP - (6/BX 10BX/CA) VISISTAT

## (undated) DEVICE — SODIUM CHL. IRRIGATION 0.9% 3000ML (4EA/CA 65CA/PF)

## (undated) DEVICE — GOWN WARMING STANDARD FLEX - (30/CA)

## (undated) DEVICE — SUTURE QUILL #2 POLYPROPYLENE - (12/BX)

## (undated) DEVICE — SYSTEM PEEL & PLACE 13CM INCISIONS

## (undated) DEVICE — SUTURE 5 TI-CRON HOS-14 - (36/BX)

## (undated) DEVICE — MIXER BONE CEMENT REVOLUTION - W/FEMORAL PRESSURIZER (6/CA)

## (undated) DEVICE — SET EXTENSION WITH 2 PORTS (48EA/CA) ***PART #2C8610 IS A SUBSTITUTE*****

## (undated) DEVICE — GLOVE BIOGEL SZ 7.5 SURGICAL PF LTX - (50PR/BX 4BX/CA)

## (undated) DEVICE — SODIUM CHL IRRIGATION 0.9% 1000ML (12EA/CA)

## (undated) DEVICE — HANDPIECE 10FT INTPLS SCT PLS IRRIGATION HAND CONTROL SET (6/PK)

## (undated) DEVICE — SUTURE 1 VICRYL PLUS CTX - 8 X 18 INCH (12/BX)

## (undated) DEVICE — ELECTRODE DUAL RETURN W/ CORD - (50/PK)

## (undated) DEVICE — HEAD HOLDER JUNIOR/ADULT

## (undated) DEVICE — DRAPE STRLE REG TOWEL 18X24 - (10/BX 4BX/CA)"

## (undated) DEVICE — BLADE SAGITTAL SAW DUAL CUT 75.0 X 25.0MM (1/EA)

## (undated) DEVICE — SENSOR SPO2 NEO LNCS ADHESIVE (20/BX) SEE USER NOTES

## (undated) DEVICE — SLEEVE, VASO, THIGH, MED

## (undated) DEVICE — SUTURE GENERAL

## (undated) DEVICE — SUTURE 2-0 VICRYL PLUS CT-1 - 8 X 18 INCH(12/BX)

## (undated) DEVICE — PROTECTOR ULNA NERVE - (36PR/CA)

## (undated) DEVICE — SET LEADWIRE 5 LEAD BEDSIDE DISPOSABLE ECG (1SET OF 5/EA)

## (undated) DEVICE — GLOVE BIOGEL INDICATOR SZ 7.5 SURGICAL PF LTX - (50PR/BX 4BX/CA)

## (undated) DEVICE — KIT CUSTOM PROCEDURE SINGLE FOR ENDO  (15/CA)

## (undated) DEVICE — KIT HIP PREP IM ENCHANCE TOTAL (5EA/BX)

## (undated) DEVICE — SUCTION INSTRUMENT YANKAUER BULBOUS TIP W/O VENT (50EA/CA)

## (undated) DEVICE — TIP INTPLS HFLO ML ORFC BTRY - (12/CS)  FOR SURGILAV

## (undated) DEVICE — BITE BLOCK ADULT 60FR (100EA/CA)

## (undated) DEVICE — LACTATED RINGERS INJ 1000 ML - (14EA/CA 60CA/PF)

## (undated) DEVICE — BRUSH FMCNL TIP IRR STRL - (12/PKG) FOR SURGILAV

## (undated) DEVICE — KIT ANESTHESIA W/CIRCUIT & 3/LT BAG W/FILTER (20EA/CA)

## (undated) DEVICE — TUBING CLEARLINK DUO-VENT - C-FLO (48EA/CA)

## (undated) DEVICE — MASK ANESTHESIA ADULT  - (100/CA)

## (undated) DEVICE — DRESSING POST OP BORDER 4 X 10 (5EA/BX)

## (undated) DEVICE — LENS/HOOD FOR SPACESUIT - (32/PK) PEEL AWAY FACE

## (undated) DEVICE — ELECTRODE 850 FOAM ADHESIVE - HYDROGEL RADIOTRNSPRNT (50/PK)

## (undated) DEVICE — NEPTUNE 4 PORT MANIFOLD - (20/PK)

## (undated) DEVICE — CANISTER SUCTION 3000ML MECHANICAL FILTER AUTO SHUTOFF MEDI-VAC NONSTERILE LF DISP  (40EA/CA)